# Patient Record
Sex: MALE | Race: WHITE | NOT HISPANIC OR LATINO | Employment: OTHER | URBAN - METROPOLITAN AREA
[De-identification: names, ages, dates, MRNs, and addresses within clinical notes are randomized per-mention and may not be internally consistent; named-entity substitution may affect disease eponyms.]

---

## 2019-02-05 ENCOUNTER — HOSPITAL ENCOUNTER (OUTPATIENT)
Dept: RADIOLOGY | Facility: MEDICAL CENTER | Age: 74
End: 2019-02-05

## 2019-02-05 ENCOUNTER — HOSPITAL ENCOUNTER (EMERGENCY)
Facility: MEDICAL CENTER | Age: 74
End: 2019-02-06
Attending: EMERGENCY MEDICINE
Payer: COMMERCIAL

## 2019-02-05 ENCOUNTER — APPOINTMENT (OUTPATIENT)
Dept: RADIOLOGY | Facility: MEDICAL CENTER | Age: 74
End: 2019-02-05
Attending: EMERGENCY MEDICINE
Payer: COMMERCIAL
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
## 2019-02-05 DIAGNOSIS — S09.90XA CLOSED HEAD INJURY, INITIAL ENCOUNTER: ICD-10-CM

## 2019-02-05 LAB
ALBUMIN SERPL BCP-MCNC: 3.3 G/DL (ref 3.2–4.9)
ALBUMIN/GLOB SERPL: 0.8 G/DL
ALP SERPL-CCNC: 81 U/L (ref 30–99)
ALT SERPL-CCNC: 11 U/L (ref 2–50)
ANION GAP SERPL CALC-SCNC: 7 MMOL/L (ref 0–11.9)
AST SERPL-CCNC: 33 U/L (ref 12–45)
BILIRUB SERPL-MCNC: 0.3 MG/DL (ref 0.1–1.5)
BUN SERPL-MCNC: 7 MG/DL (ref 8–22)
CALCIUM SERPL-MCNC: 8.6 MG/DL (ref 8.5–10.5)
CHLORIDE SERPL-SCNC: 103 MMOL/L (ref 96–112)
CO2 SERPL-SCNC: 22 MMOL/L (ref 20–33)
CREAT SERPL-MCNC: 0.59 MG/DL (ref 0.5–1.4)
ERYTHROCYTE [DISTWIDTH] IN BLOOD BY AUTOMATED COUNT: 48.5 FL (ref 35.9–50)
ETHANOL BLD-MCNC: 0.11 G/DL
GLOBULIN SER CALC-MCNC: 4.3 G/DL (ref 1.9–3.5)
GLUCOSE SERPL-MCNC: 91 MG/DL (ref 65–99)
HCT VFR BLD AUTO: 46.4 % (ref 42–52)
HGB BLD-MCNC: 15.4 G/DL (ref 14–18)
MCH RBC QN AUTO: 31 PG (ref 27–33)
MCHC RBC AUTO-ENTMCNC: 33.2 G/DL (ref 33.7–35.3)
MCV RBC AUTO: 93.5 FL (ref 81.4–97.8)
PLATELET # BLD AUTO: 260 K/UL (ref 164–446)
PMV BLD AUTO: 9.8 FL (ref 9–12.9)
POTASSIUM SERPL-SCNC: 5.9 MMOL/L (ref 3.6–5.5)
PROT SERPL-MCNC: 7.6 G/DL (ref 6–8.2)
RBC # BLD AUTO: 4.96 M/UL (ref 4.7–6.1)
SODIUM SERPL-SCNC: 132 MMOL/L (ref 135–145)
WBC # BLD AUTO: 3.8 K/UL (ref 4.8–10.8)

## 2019-02-05 PROCEDURE — 80307 DRUG TEST PRSMV CHEM ANLYZR: CPT

## 2019-02-05 PROCEDURE — 36415 COLL VENOUS BLD VENIPUNCTURE: CPT

## 2019-02-05 PROCEDURE — 80053 COMPREHEN METABOLIC PANEL: CPT

## 2019-02-05 PROCEDURE — 90715 TDAP VACCINE 7 YRS/> IM: CPT | Performed by: EMERGENCY MEDICINE

## 2019-02-05 PROCEDURE — 85027 COMPLETE CBC AUTOMATED: CPT

## 2019-02-05 PROCEDURE — 305948 HCHG GREEN TRAUMA ACT PRE-NOTIFY NO CC

## 2019-02-05 PROCEDURE — 90471 IMMUNIZATION ADMIN: CPT

## 2019-02-05 PROCEDURE — 99284 EMERGENCY DEPT VISIT MOD MDM: CPT

## 2019-02-05 PROCEDURE — 700111 HCHG RX REV CODE 636 W/ 250 OVERRIDE (IP): Performed by: EMERGENCY MEDICINE

## 2019-02-05 RX ORDER — ASPIRIN 81 MG/1
81 TABLET, CHEWABLE ORAL DAILY
Status: SHIPPED | COMMUNITY
End: 2023-09-24

## 2019-02-05 RX ADMIN — CLOSTRIDIUM TETANI TOXOID ANTIGEN (FORMALDEHYDE INACTIVATED), CORYNEBACTERIUM DIPHTHERIAE TOXOID ANTIGEN (FORMALDEHYDE INACTIVATED), BORDETELLA PERTUSSIS TOXOID ANTIGEN (GLUTARALDEHYDE INACTIVATED), BORDETELLA PERTUSSIS FILAMENTOUS HEMAGGLUTININ ANTIGEN (FORMALDEHYDE INACTIVATED), BORDETELLA PERTUSSIS PERTACTIN ANTIGEN, AND BORDETELLA PERTUSSIS FIMBRIAE 2/3 ANTIGEN 0.5 ML: 5; 2; 2.5; 5; 3; 5 INJECTION, SUSPENSION INTRAMUSCULAR at 22:15

## 2019-02-06 VITALS
OXYGEN SATURATION: 97 % | WEIGHT: 200.62 LBS | SYSTOLIC BLOOD PRESSURE: 116 MMHG | HEIGHT: 68 IN | TEMPERATURE: 98.7 F | HEART RATE: 87 BPM | RESPIRATION RATE: 18 BRPM | BODY MASS INDEX: 30.41 KG/M2 | DIASTOLIC BLOOD PRESSURE: 78 MMHG

## 2019-02-06 PROCEDURE — 72141 MRI NECK SPINE W/O DYE: CPT

## 2019-02-06 NOTE — ED PROVIDER NOTES
"ED Provider Note    ER PROVIDER NOTE    CHIEF COMPLAINT  Chief Complaint   Patient presents with   • Trauma Green     [facility] and [facility] head, CT shows c-2 pedicle abnormality, recommends MRI for confirmation       HPI  eSn Vasquez is a 73 y.o. male who presents to the emergency department as a [facility] green [facility].  Patient was intoxicated walking to actually get in his car when he slipped and fell hitting the back of his head.  This was witnessed, no LOC.  [facility] CT scan scan of his C-spine.  Patient denies any focal complaints currently.  He denies any headache, neck pain, focal weakness numbness or tingling.  No nausea or vomiting.  No chest pain abdominal pain or extremity pain.  States his last tetanus shot was in 2002    REVIEW OF SYSTEMS  Pertinent positives include fall. Pertinent negatives include no weakness numbness. See HPI for details. All other systems reviewed and are negative.    PAST MEDICAL HISTORY   has a past medical history of Alcohol abuse; Coronary arteriosclerosis; Dyslipidemia; GERD (gastroesophageal reflux disease); Mediastinal mass; and Osteopenia.\"Heart problems but I do not know what\"per patient    SURGICAL HISTORY   has a past surgical history that includes coronary artery bypass, 1.    FAMILY HISTORY  History reviewed. No pertinent family history.    SOCIAL HISTORY  Social History     Social History   • Marital status: Single     Spouse name: N/A   • Number of children: N/A   • Years of education: N/A     Social History Main Topics   • Smoking status: Current Some Day Smoker     Types: Cigarettes   • Smokeless tobacco: Never Used   • Alcohol use Yes      Comment: 400 ml Arabella daily   • Drug use: No   • Sexual activity: Not on file     Other Topics Concern   • Not on file     Social History Narrative   • No narrative on file      History   Drug Use No       CURRENT " "MEDICATIONS  Home Medications     Reviewed by Fish Miller R.N. (Registered Nurse) on 02/05/19 at 2211  Med List Status: Complete   Medication Last Dose Status   aspirin (ASA) 81 MG Chew Tab chewable tablet  Active                ALLERGIES  Allergies   Allergen Reactions   • Pravastatin Hives and Swelling       PHYSICAL EXAM    PRIMARY SURVEY:    Airway: Phonating well,clear  Breathing: Equal breath sounds bilaterally  Circulation: Normal heart sounds 2+ pulses at bilateral radial and femoral arteries  Disability:  GCS 15      Blood pressure 105/65, pulse 84, temperature 36 °C (96.8 °F), temperature source Temporal, resp. rate 14, height 1.727 m (5' 8\"), weight 91 kg (200 lb 9.9 oz), SpO2 94 %.    Secondary Survey:      Constitutional: Awake, alert, oriented x3.    Heent: Head is normocephalic other than small abrasion to posterior occiput, atraumaticPupils 3mm reactive bilaterally. Midface stable. No malocclusion.  No hemotympanum bilaterally. No septal hematoma.  Neck: No tracheal deviation. No midline cervical spine tenderness. C-collar in place.  Cardiovascular: Regular rate and rhythm no murmur rub or gallop intact distal pulses peripherally x4  Pulmonary/Chest: Clavicles nontender to palpation. There is not any chest wall tenderness bilaterally.  No crepitus. Positive breath sounds bilaterally.   Abdominal: Soft, nondistended. Nontender to palpation. Pelvis is stable to AP and lateral compression. No seatbelt sign.   Musculoskeletal: Right upper extremity atraumatic, palpable radial pulse. 5/5  strength. Full ROM and strength at elbow.  Left upper extremity atraumatic, palpable radial pulse. 5/5  strength. Full ROM and strength at elbow.  Right lower extremity atraumatic. 5/5 strength in ankle plantar flexion and dorsiflexion. No pain and full ROM at right knee and hip.   Left  lower extremity atraumatic. 5/5 strength in ankle plantar flexion and dorsiflexion. No pain and full ROM at left knee " "and hip.   Back: Midline thoracic and lumbar spines are nontender to palpation. No step-offs.   Neurological: Sensation intact to light touch dorsum and plantar surfaces of both feet and the medial and lateral aspects of both lower legs.  Sensation intact to light touch dorsum and plantar surfaces of both hands.   Skin: Skin is warm and dry.  No diaphoresis. No erythema. No pallor.      VITAL SIGNS: /65   Pulse 84   Temp 36 °C (96.8 °F) (Temporal)   Resp 14   Ht 1.727 m (5' 8\")   Wt 91 kg (200 lb 9.9 oz)   SpO2 94%   BMI 30.50 kg/m²   Pulse ox interpretation: I interpret this pulse ox as normal.        DIAGNOSTIC STUDIES / PROCEDURES      LABS  Labs Reviewed   DIAGNOSTIC ALCOHOL - Abnormal; Notable for the following:        Result Value    Diagnostic Alcohol 0.11 (*)     All other components within normal limits   CBC WITHOUT DIFFERENTIAL - Abnormal; Notable for the following:     WBC 3.8 (*)     MCHC 33.2 (*)     All other components within normal limits   COMP METABOLIC PANEL - Abnormal; Notable for the following:     Sodium 132 (*)     Potassium 5.9 (*)     Bun 7 (*)     Globulin 4.3 (*)     All other components within normal limits   ESTIMATED GFR       All labs reviewed by me.    RADIOLOGY  OUTSIDE IMAGES-CT CERVICAL SPINE   Final Result      OUTSIDE IMAGES-CT HEAD   Final Result      OUTSIDE IMAGES-DX CHEST   Final Result      MR-CERVICAL SPINE-W/O    (Results Pending)     Discussed results of MR with radiologist, appears negative    The radiologist's interpretation of all radiological studies have been reviewed by me.    COURSE & MEDICAL DECISION MAKING  Nursing notes, VS, PMSFHx reviewed in chart.    9:58 PM Patient seen and examined at bedside. Patient will be treated with tdap. Ordered for MRI to evaluate his symptoms.     Reviewed records from outside facility, negative CT head, negative chest x-ray, CT of his C-spine with questionable lucency at C2 through the pedicle    1:20 AM discussed " results of MRI with radiologist, appears negative    1:30 AM  Patient reevaluated, he is comfortable at this time, c-collar removed, full range of motion without pain, neck is still nontender, will plan for discharge    Decision Making:  This is a 73 y.o. male [facility] with [facility].  Patient had a mechanical fall while intoxicated hitting his head.  There is no intracranial pathology on his CT although he had a questionable abnormality on the CT of his cervical spine was transferred here for MRI.  MRIs negative per radiology read at this time.  Additionally patient has no actual neck pain tenderness or neurologic findings on his exam or per his symptoms to suggest spinal injury.  Patient was intoxicated but sobered appropriately, slight elevation in his potassium which we will have rechecked through [facility]     The patient will return for new or worsening symptoms and is stable at the time of discharge.    The patient is referred to a primary physician for blood pressure management, diabetic screening, and for all other preventative health concerns.      DISPOSITION:  Patient will be discharged home in stable condition.    FOLLOW UP:  With your primary care doctor through the [facility]    In 1 week        OUTPATIENT MEDICATIONS:  New Prescriptions    No medications on file         FINAL IMPRESSION  1. Closed head injury, initial encounter    2. Fall, initial encounter          The note accurately reflects work and decisions made by me.  Drake Latif  2/6/2019  1:38 AM

## 2019-02-06 NOTE — ED TRIAGE NOTES
Sen Vasquez  73 y.o.  Chief Complaint   Patient presents with   • Trauma Green     [facility], [facility] fell backwards and hit head, CT shows c-2 pedicle abnormality, recommends MRI for confirmation     [facility]. Pt was getting into his car tonight and fell backwards and hit head.  Patient has history of alcoholism, diagnostic alcohol [facility].2 [facility].  Unknown LOC.  Apparently [facility] so they transferred for patient to have MRI.    PTA pt received 500 ml banana bag, PIV placed.     Pt denies pain at this time.      Report to [facility].

## 2021-01-15 DIAGNOSIS — Z23 NEED FOR VACCINATION: ICD-10-CM

## 2021-07-12 PROBLEM — I25.10 CAD (CORONARY ARTERY DISEASE): Status: ACTIVE | Noted: 2021-07-12

## 2021-07-12 PROBLEM — F10.10 ETOH ABUSE: Status: ACTIVE | Noted: 2021-07-12

## 2021-07-12 PROBLEM — G89.29 CHRONIC PAIN OF BOTH KNEES: Status: ACTIVE | Noted: 2021-07-12

## 2021-07-12 PROBLEM — K61.39 ISCHIORECTAL ABSCESS: Status: ACTIVE | Noted: 2021-07-12

## 2021-07-12 PROBLEM — M25.561 CHRONIC PAIN OF BOTH KNEES: Status: ACTIVE | Noted: 2021-07-12

## 2021-07-12 PROBLEM — M25.562 CHRONIC PAIN OF BOTH KNEES: Status: ACTIVE | Noted: 2021-07-12

## 2021-07-13 PROBLEM — R53.1 WEAKNESS: Status: ACTIVE | Noted: 2021-07-13

## 2023-09-24 ENCOUNTER — APPOINTMENT (OUTPATIENT)
Dept: RADIOLOGY | Facility: MEDICAL CENTER | Age: 78
DRG: 896 | End: 2023-09-24
Attending: STUDENT IN AN ORGANIZED HEALTH CARE EDUCATION/TRAINING PROGRAM
Payer: COMMERCIAL

## 2023-09-24 ENCOUNTER — HOSPITAL ENCOUNTER (INPATIENT)
Facility: MEDICAL CENTER | Age: 78
LOS: 3 days | DRG: 896 | End: 2023-09-27
Attending: STUDENT IN AN ORGANIZED HEALTH CARE EDUCATION/TRAINING PROGRAM | Admitting: STUDENT IN AN ORGANIZED HEALTH CARE EDUCATION/TRAINING PROGRAM
Payer: COMMERCIAL

## 2023-09-24 DIAGNOSIS — F10.931 ALCOHOL WITHDRAWAL SYNDROME, WITH DELIRIUM (HCC): ICD-10-CM

## 2023-09-24 PROBLEM — E87.6 HYPOKALEMIA: Status: ACTIVE | Noted: 2023-09-24

## 2023-09-24 PROBLEM — F10.139 ALCOHOL ABUSE WITH WITHDRAWAL (HCC): Status: ACTIVE | Noted: 2023-09-24

## 2023-09-24 PROBLEM — G93.40 ENCEPHALOPATHY: Status: ACTIVE | Noted: 2023-09-24

## 2023-09-24 LAB
ALBUMIN SERPL BCP-MCNC: 3.4 G/DL (ref 3.2–4.9)
ALBUMIN/GLOB SERPL: 1.1 G/DL
ALP SERPL-CCNC: 114 U/L (ref 30–99)
ALT SERPL-CCNC: 54 U/L (ref 2–50)
AMMONIA PLAS-SCNC: <10 UMOL/L (ref 11–45)
ANION GAP SERPL CALC-SCNC: 13 MMOL/L (ref 7–16)
APAP SERPL-MCNC: <5 UG/ML (ref 10–30)
APPEARANCE UR: CLEAR
AST SERPL-CCNC: 105 U/L (ref 12–45)
BACTERIA #/AREA URNS HPF: NEGATIVE /HPF
BASOPHILS # BLD AUTO: 0.5 % (ref 0–1.8)
BASOPHILS # BLD: 0.02 K/UL (ref 0–0.12)
BILIRUB SERPL-MCNC: 1 MG/DL (ref 0.1–1.5)
BILIRUB UR QL STRIP.AUTO: NEGATIVE
BUN SERPL-MCNC: 3 MG/DL (ref 8–22)
CALCIUM ALBUM COR SERPL-MCNC: 8.3 MG/DL (ref 8.5–10.5)
CALCIUM SERPL-MCNC: 7.8 MG/DL (ref 8.5–10.5)
CHLORIDE SERPL-SCNC: 100 MMOL/L (ref 96–112)
CO2 SERPL-SCNC: 24 MMOL/L (ref 20–33)
COLOR UR: YELLOW
CREAT SERPL-MCNC: 0.48 MG/DL (ref 0.5–1.4)
EOSINOPHIL # BLD AUTO: 0.01 K/UL (ref 0–0.51)
EOSINOPHIL NFR BLD: 0.2 % (ref 0–6.9)
EPI CELLS #/AREA URNS HPF: NEGATIVE /HPF
ERYTHROCYTE [DISTWIDTH] IN BLOOD BY AUTOMATED COUNT: 51.8 FL (ref 35.9–50)
ETHANOL BLD-MCNC: <10.1 MG/DL
GFR SERPLBLD CREATININE-BSD FMLA CKD-EPI: 107 ML/MIN/1.73 M 2
GLOBULIN SER CALC-MCNC: 3.1 G/DL (ref 1.9–3.5)
GLUCOSE SERPL-MCNC: 105 MG/DL (ref 65–99)
GLUCOSE UR STRIP.AUTO-MCNC: NEGATIVE MG/DL
HCT VFR BLD AUTO: 46.5 % (ref 42–52)
HGB BLD-MCNC: 16.1 G/DL (ref 14–18)
HYALINE CASTS #/AREA URNS LPF: ABNORMAL /LPF
IMM GRANULOCYTES # BLD AUTO: 0.01 K/UL (ref 0–0.11)
IMM GRANULOCYTES NFR BLD AUTO: 0.2 % (ref 0–0.9)
KETONES UR STRIP.AUTO-MCNC: 15 MG/DL
LEUKOCYTE ESTERASE UR QL STRIP.AUTO: ABNORMAL
LIPASE SERPL-CCNC: 15 U/L (ref 11–82)
LYMPHOCYTES # BLD AUTO: 0.54 K/UL (ref 1–4.8)
LYMPHOCYTES NFR BLD: 12.2 % (ref 22–41)
MCH RBC QN AUTO: 33.7 PG (ref 27–33)
MCHC RBC AUTO-ENTMCNC: 34.6 G/DL (ref 32.3–36.5)
MCV RBC AUTO: 97.3 FL (ref 81.4–97.8)
MICRO URNS: ABNORMAL
MONOCYTES # BLD AUTO: 0.14 K/UL (ref 0–0.85)
MONOCYTES NFR BLD AUTO: 3.2 % (ref 0–13.4)
NEUTROPHILS # BLD AUTO: 3.72 K/UL (ref 1.82–7.42)
NEUTROPHILS NFR BLD: 83.7 % (ref 44–72)
NITRITE UR QL STRIP.AUTO: NEGATIVE
NRBC # BLD AUTO: 0 K/UL
NRBC BLD-RTO: 0 /100 WBC (ref 0–0.2)
PH UR STRIP.AUTO: 6.5 [PH] (ref 5–8)
PLATELET # BLD AUTO: 142 K/UL (ref 164–446)
PMV BLD AUTO: 10.6 FL (ref 9–12.9)
POTASSIUM SERPL-SCNC: 3.5 MMOL/L (ref 3.6–5.5)
PROT SERPL-MCNC: 6.5 G/DL (ref 6–8.2)
PROT UR QL STRIP: NEGATIVE MG/DL
RBC # BLD AUTO: 4.78 M/UL (ref 4.7–6.1)
RBC # URNS HPF: ABNORMAL /HPF
RBC UR QL AUTO: NEGATIVE
SALICYLATES SERPL-MCNC: <1 MG/DL (ref 15–25)
SODIUM SERPL-SCNC: 137 MMOL/L (ref 135–145)
SP GR UR STRIP.AUTO: 1.01
TROPONIN T SERPL-MCNC: 12 NG/L (ref 6–19)
UROBILINOGEN UR STRIP.AUTO-MCNC: 1 MG/DL
WBC # BLD AUTO: 4.4 K/UL (ref 4.8–10.8)
WBC #/AREA URNS HPF: ABNORMAL /HPF

## 2023-09-24 PROCEDURE — 99291 CRITICAL CARE FIRST HOUR: CPT

## 2023-09-24 PROCEDURE — 94760 N-INVAS EAR/PLS OXIMETRY 1: CPT

## 2023-09-24 PROCEDURE — 700111 HCHG RX REV CODE 636 W/ 250 OVERRIDE (IP): Mod: JZ,JG | Performed by: STUDENT IN AN ORGANIZED HEALTH CARE EDUCATION/TRAINING PROGRAM

## 2023-09-24 PROCEDURE — 82140 ASSAY OF AMMONIA: CPT

## 2023-09-24 PROCEDURE — 99223 1ST HOSP IP/OBS HIGH 75: CPT | Mod: AI | Performed by: STUDENT IN AN ORGANIZED HEALTH CARE EDUCATION/TRAINING PROGRAM

## 2023-09-24 PROCEDURE — 81001 URINALYSIS AUTO W/SCOPE: CPT

## 2023-09-24 PROCEDURE — 80179 DRUG ASSAY SALICYLATE: CPT

## 2023-09-24 PROCEDURE — 82077 ASSAY SPEC XCP UR&BREATH IA: CPT

## 2023-09-24 PROCEDURE — 80053 COMPREHEN METABOLIC PANEL: CPT

## 2023-09-24 PROCEDURE — 71045 X-RAY EXAM CHEST 1 VIEW: CPT

## 2023-09-24 PROCEDURE — 96375 TX/PRO/DX INJ NEW DRUG ADDON: CPT

## 2023-09-24 PROCEDURE — 36415 COLL VENOUS BLD VENIPUNCTURE: CPT

## 2023-09-24 PROCEDURE — 80143 DRUG ASSAY ACETAMINOPHEN: CPT

## 2023-09-24 PROCEDURE — 70498 CT ANGIOGRAPHY NECK: CPT

## 2023-09-24 PROCEDURE — 70496 CT ANGIOGRAPHY HEAD: CPT

## 2023-09-24 PROCEDURE — 96365 THER/PROPH/DIAG IV INF INIT: CPT

## 2023-09-24 PROCEDURE — 770000 HCHG ROOM/CARE - INTERMEDIATE ICU *

## 2023-09-24 PROCEDURE — 700117 HCHG RX CONTRAST REV CODE 255: Performed by: STUDENT IN AN ORGANIZED HEALTH CARE EDUCATION/TRAINING PROGRAM

## 2023-09-24 PROCEDURE — 700101 HCHG RX REV CODE 250: Performed by: STUDENT IN AN ORGANIZED HEALTH CARE EDUCATION/TRAINING PROGRAM

## 2023-09-24 PROCEDURE — 85025 COMPLETE CBC W/AUTO DIFF WBC: CPT

## 2023-09-24 PROCEDURE — 93005 ELECTROCARDIOGRAM TRACING: CPT | Performed by: STUDENT IN AN ORGANIZED HEALTH CARE EDUCATION/TRAINING PROGRAM

## 2023-09-24 PROCEDURE — 84484 ASSAY OF TROPONIN QUANT: CPT

## 2023-09-24 PROCEDURE — 83690 ASSAY OF LIPASE: CPT

## 2023-09-24 RX ORDER — LORAZEPAM 2 MG/ML
0.5 INJECTION INTRAMUSCULAR ONCE
Status: COMPLETED | OUTPATIENT
Start: 2023-09-24 | End: 2023-09-24

## 2023-09-24 RX ORDER — LORAZEPAM 2 MG/ML
1 INJECTION INTRAMUSCULAR
Status: DISCONTINUED | OUTPATIENT
Start: 2023-09-24 | End: 2023-09-25

## 2023-09-24 RX ORDER — DIPHENHYDRAMINE HYDROCHLORIDE 50 MG/ML
12.5 INJECTION INTRAMUSCULAR; INTRAVENOUS ONCE
Status: COMPLETED | OUTPATIENT
Start: 2023-09-24 | End: 2023-09-24

## 2023-09-24 RX ORDER — LORAZEPAM 0.5 MG/1
0.5 TABLET ORAL EVERY 4 HOURS PRN
Status: DISCONTINUED | OUTPATIENT
Start: 2023-09-24 | End: 2023-09-25

## 2023-09-24 RX ORDER — LORAZEPAM 2 MG/ML
0.5 INJECTION INTRAMUSCULAR EVERY 4 HOURS PRN
Status: DISCONTINUED | OUTPATIENT
Start: 2023-09-24 | End: 2023-09-25

## 2023-09-24 RX ORDER — ENOXAPARIN SODIUM 100 MG/ML
40 INJECTION SUBCUTANEOUS DAILY
Status: DISCONTINUED | OUTPATIENT
Start: 2023-09-25 | End: 2023-09-27 | Stop reason: HOSPADM

## 2023-09-24 RX ORDER — MAGNESIUM OXIDE 420 MG/1
420 TABLET ORAL DAILY
Status: ON HOLD | COMMUNITY
Start: 2023-09-11 | End: 2023-09-27

## 2023-09-24 RX ORDER — LORAZEPAM 1 MG/1
1 TABLET ORAL EVERY 4 HOURS PRN
Status: DISCONTINUED | OUTPATIENT
Start: 2023-09-24 | End: 2023-09-25

## 2023-09-24 RX ORDER — LORAZEPAM 2 MG/1
2 TABLET ORAL
Status: DISCONTINUED | OUTPATIENT
Start: 2023-09-24 | End: 2023-09-25

## 2023-09-24 RX ORDER — POTASSIUM CHLORIDE 7.45 MG/ML
10 INJECTION INTRAVENOUS
Status: COMPLETED | OUTPATIENT
Start: 2023-09-25 | End: 2023-09-25

## 2023-09-24 RX ORDER — LORAZEPAM 2 MG/1
4 TABLET ORAL
Status: DISCONTINUED | OUTPATIENT
Start: 2023-09-24 | End: 2023-09-25

## 2023-09-24 RX ORDER — GAUZE BANDAGE 2" X 2"
100 BANDAGE TOPICAL DAILY
Status: DISCONTINUED | OUTPATIENT
Start: 2023-09-25 | End: 2023-09-24

## 2023-09-24 RX ORDER — PHENOBARBITAL SODIUM 130 MG/ML
130 INJECTION, SOLUTION INTRAMUSCULAR; INTRAVENOUS ONCE
Status: COMPLETED | OUTPATIENT
Start: 2023-09-24 | End: 2023-09-24

## 2023-09-24 RX ORDER — CLONIDINE HYDROCHLORIDE 0.1 MG/1
0.1 TABLET ORAL
Status: DISCONTINUED | OUTPATIENT
Start: 2023-09-24 | End: 2023-09-27 | Stop reason: HOSPADM

## 2023-09-24 RX ORDER — LORAZEPAM 2 MG/ML
1.5 INJECTION INTRAMUSCULAR
Status: DISCONTINUED | OUTPATIENT
Start: 2023-09-24 | End: 2023-09-25

## 2023-09-24 RX ORDER — SODIUM CHLORIDE, SODIUM LACTATE, POTASSIUM CHLORIDE, CALCIUM CHLORIDE 600; 310; 30; 20 MG/100ML; MG/100ML; MG/100ML; MG/100ML
INJECTION, SOLUTION INTRAVENOUS CONTINUOUS
Status: ACTIVE | OUTPATIENT
Start: 2023-09-25 | End: 2023-09-25

## 2023-09-24 RX ORDER — FOLIC ACID 1 MG/1
1 TABLET ORAL DAILY
Status: DISCONTINUED | OUTPATIENT
Start: 2023-09-25 | End: 2023-09-27 | Stop reason: HOSPADM

## 2023-09-24 RX ORDER — LORAZEPAM 2 MG/ML
2 INJECTION INTRAMUSCULAR
Status: DISCONTINUED | OUTPATIENT
Start: 2023-09-24 | End: 2023-09-25

## 2023-09-24 RX ORDER — LORAZEPAM 2 MG/ML
1 INJECTION INTRAMUSCULAR ONCE
Status: COMPLETED | OUTPATIENT
Start: 2023-09-24 | End: 2023-09-24

## 2023-09-24 RX ADMIN — LORAZEPAM 1 MG: 2 INJECTION INTRAMUSCULAR; INTRAVENOUS at 22:03

## 2023-09-24 RX ADMIN — IOHEXOL 80 ML: 350 INJECTION, SOLUTION INTRAVENOUS at 21:15

## 2023-09-24 RX ADMIN — THIAMINE HYDROCHLORIDE: 100 INJECTION, SOLUTION INTRAMUSCULAR; INTRAVENOUS at 21:45

## 2023-09-24 RX ADMIN — LORAZEPAM 0.5 MG: 2 INJECTION INTRAMUSCULAR; INTRAVENOUS at 21:51

## 2023-09-24 RX ADMIN — PHENOBARBITAL SODIUM 130 MG: 130 INJECTION INTRAMUSCULAR; INTRAVENOUS at 21:54

## 2023-09-24 RX ADMIN — DIPHENHYDRAMINE HYDROCHLORIDE 12.5 MG: 50 INJECTION, SOLUTION INTRAMUSCULAR; INTRAVENOUS at 20:27

## 2023-09-25 PROBLEM — D69.6 THROMBOCYTOPENIA (HCC): Status: ACTIVE | Noted: 2023-09-25

## 2023-09-25 PROBLEM — E83.42 HYPOMAGNESEMIA: Status: ACTIVE | Noted: 2023-09-25

## 2023-09-25 LAB
ALBUMIN SERPL BCP-MCNC: 3.5 G/DL (ref 3.2–4.9)
ALBUMIN/GLOB SERPL: 1.1 G/DL
ALP SERPL-CCNC: 116 U/L (ref 30–99)
ALT SERPL-CCNC: 47 U/L (ref 2–50)
ANION GAP SERPL CALC-SCNC: 15 MMOL/L (ref 7–16)
AST SERPL-CCNC: 76 U/L (ref 12–45)
BASOPHILS # BLD AUTO: 0.4 % (ref 0–1.8)
BASOPHILS # BLD: 0.03 K/UL (ref 0–0.12)
BILIRUB SERPL-MCNC: 1.2 MG/DL (ref 0.1–1.5)
BUN SERPL-MCNC: 2 MG/DL (ref 8–22)
CALCIUM ALBUM COR SERPL-MCNC: 8.6 MG/DL (ref 8.5–10.5)
CALCIUM SERPL-MCNC: 8.2 MG/DL (ref 8.5–10.5)
CHLORIDE SERPL-SCNC: 95 MMOL/L (ref 96–112)
CO2 SERPL-SCNC: 23 MMOL/L (ref 20–33)
CREAT SERPL-MCNC: 0.48 MG/DL (ref 0.5–1.4)
EKG IMPRESSION: NORMAL
EOSINOPHIL # BLD AUTO: 0 K/UL (ref 0–0.51)
EOSINOPHIL NFR BLD: 0 % (ref 0–6.9)
ERYTHROCYTE [DISTWIDTH] IN BLOOD BY AUTOMATED COUNT: 49.8 FL (ref 35.9–50)
GFR SERPLBLD CREATININE-BSD FMLA CKD-EPI: 107 ML/MIN/1.73 M 2
GLOBULIN SER CALC-MCNC: 3.1 G/DL (ref 1.9–3.5)
GLUCOSE SERPL-MCNC: 90 MG/DL (ref 65–99)
HCT VFR BLD AUTO: 44.1 % (ref 42–52)
HGB BLD-MCNC: 15.4 G/DL (ref 14–18)
IMM GRANULOCYTES # BLD AUTO: 0.02 K/UL (ref 0–0.11)
IMM GRANULOCYTES NFR BLD AUTO: 0.3 % (ref 0–0.9)
LYMPHOCYTES # BLD AUTO: 1.25 K/UL (ref 1–4.8)
LYMPHOCYTES NFR BLD: 16.6 % (ref 22–41)
MAGNESIUM SERPL-MCNC: 1.4 MG/DL (ref 1.5–2.5)
MCH RBC QN AUTO: 33.4 PG (ref 27–33)
MCHC RBC AUTO-ENTMCNC: 34.9 G/DL (ref 32.3–36.5)
MCV RBC AUTO: 95.7 FL (ref 81.4–97.8)
MONOCYTES # BLD AUTO: 0.28 K/UL (ref 0–0.85)
MONOCYTES NFR BLD AUTO: 3.7 % (ref 0–13.4)
NEUTROPHILS # BLD AUTO: 5.96 K/UL (ref 1.82–7.42)
NEUTROPHILS NFR BLD: 79 % (ref 44–72)
NRBC # BLD AUTO: 0 K/UL
NRBC BLD-RTO: 0 /100 WBC (ref 0–0.2)
PHOSPHATE SERPL-MCNC: 1.6 MG/DL (ref 2.5–4.5)
PLATELET # BLD AUTO: 159 K/UL (ref 164–446)
PMV BLD AUTO: 10.7 FL (ref 9–12.9)
POTASSIUM SERPL-SCNC: 4.1 MMOL/L (ref 3.6–5.5)
PROT SERPL-MCNC: 6.6 G/DL (ref 6–8.2)
RBC # BLD AUTO: 4.61 M/UL (ref 4.7–6.1)
SODIUM SERPL-SCNC: 133 MMOL/L (ref 135–145)
WBC # BLD AUTO: 7.5 K/UL (ref 4.8–10.8)

## 2023-09-25 PROCEDURE — 700111 HCHG RX REV CODE 636 W/ 250 OVERRIDE (IP): Performed by: HOSPITALIST

## 2023-09-25 PROCEDURE — 700101 HCHG RX REV CODE 250: Performed by: HOSPITALIST

## 2023-09-25 PROCEDURE — 99291 CRITICAL CARE FIRST HOUR: CPT | Performed by: HOSPITALIST

## 2023-09-25 PROCEDURE — 83735 ASSAY OF MAGNESIUM: CPT

## 2023-09-25 PROCEDURE — 770000 HCHG ROOM/CARE - INTERMEDIATE ICU *

## 2023-09-25 PROCEDURE — 96375 TX/PRO/DX INJ NEW DRUG ADDON: CPT

## 2023-09-25 PROCEDURE — 700111 HCHG RX REV CODE 636 W/ 250 OVERRIDE (IP): Mod: JZ | Performed by: STUDENT IN AN ORGANIZED HEALTH CARE EDUCATION/TRAINING PROGRAM

## 2023-09-25 PROCEDURE — 700111 HCHG RX REV CODE 636 W/ 250 OVERRIDE (IP): Performed by: INTERNAL MEDICINE

## 2023-09-25 PROCEDURE — 80053 COMPREHEN METABOLIC PANEL: CPT

## 2023-09-25 PROCEDURE — 84100 ASSAY OF PHOSPHORUS: CPT

## 2023-09-25 PROCEDURE — 700105 HCHG RX REV CODE 258: Performed by: STUDENT IN AN ORGANIZED HEALTH CARE EDUCATION/TRAINING PROGRAM

## 2023-09-25 PROCEDURE — 85025 COMPLETE CBC W/AUTO DIFF WBC: CPT

## 2023-09-25 PROCEDURE — 700105 HCHG RX REV CODE 258: Performed by: HOSPITALIST

## 2023-09-25 RX ORDER — DEXMEDETOMIDINE HYDROCHLORIDE 4 UG/ML
.1-1.5 INJECTION, SOLUTION INTRAVENOUS CONTINUOUS
Status: DISCONTINUED | OUTPATIENT
Start: 2023-09-25 | End: 2023-09-27 | Stop reason: HOSPADM

## 2023-09-25 RX ORDER — LORAZEPAM 2 MG/ML
0.5 INJECTION INTRAMUSCULAR
Status: DISCONTINUED | OUTPATIENT
Start: 2023-09-25 | End: 2023-09-27

## 2023-09-25 RX ORDER — LORAZEPAM 2 MG/ML
2 INJECTION INTRAMUSCULAR EVERY 6 HOURS
Status: DISCONTINUED | OUTPATIENT
Start: 2023-09-25 | End: 2023-09-26

## 2023-09-25 RX ADMIN — LORAZEPAM 1 MG: 2 INJECTION INTRAMUSCULAR; INTRAVENOUS at 01:14

## 2023-09-25 RX ADMIN — SODIUM CHLORIDE, POTASSIUM CHLORIDE, SODIUM LACTATE AND CALCIUM CHLORIDE: 600; 310; 30; 20 INJECTION, SOLUTION INTRAVENOUS at 09:03

## 2023-09-25 RX ADMIN — LORAZEPAM 0.5 MG: 2 INJECTION INTRAMUSCULAR; INTRAVENOUS at 23:17

## 2023-09-25 RX ADMIN — LORAZEPAM 2 MG: 2 INJECTION INTRAMUSCULAR; INTRAVENOUS at 10:29

## 2023-09-25 RX ADMIN — LORAZEPAM 2 MG: 2 INJECTION INTRAMUSCULAR; INTRAVENOUS at 05:01

## 2023-09-25 RX ADMIN — POTASSIUM CHLORIDE 10 MEQ: 7.46 INJECTION, SOLUTION INTRAVENOUS at 00:53

## 2023-09-25 RX ADMIN — ENOXAPARIN SODIUM 40 MG: 100 INJECTION SUBCUTANEOUS at 01:15

## 2023-09-25 RX ADMIN — THIAMINE HYDROCHLORIDE 200 MG: 100 INJECTION, SOLUTION INTRAMUSCULAR; INTRAVENOUS at 12:03

## 2023-09-25 RX ADMIN — THIAMINE HYDROCHLORIDE 200 MG: 100 INJECTION, SOLUTION INTRAMUSCULAR; INTRAVENOUS at 17:20

## 2023-09-25 RX ADMIN — THIAMINE HYDROCHLORIDE 200 MG: 100 INJECTION, SOLUTION INTRAMUSCULAR; INTRAVENOUS at 01:24

## 2023-09-25 RX ADMIN — POTASSIUM CHLORIDE 10 MEQ: 7.46 INJECTION, SOLUTION INTRAVENOUS at 04:23

## 2023-09-25 RX ADMIN — POTASSIUM CHLORIDE 10 MEQ: 7.46 INJECTION, SOLUTION INTRAVENOUS at 02:02

## 2023-09-25 RX ADMIN — SODIUM CHLORIDE, POTASSIUM CHLORIDE, SODIUM LACTATE AND CALCIUM CHLORIDE: 600; 310; 30; 20 INJECTION, SOLUTION INTRAVENOUS at 00:43

## 2023-09-25 RX ADMIN — ENOXAPARIN SODIUM 40 MG: 100 INJECTION SUBCUTANEOUS at 17:18

## 2023-09-25 RX ADMIN — DEXMEDETOMIDINE 0.2 MCG/KG/HR: 100 INJECTION, SOLUTION INTRAVENOUS at 04:57

## 2023-09-25 RX ADMIN — LORAZEPAM 2 MG: 2 INJECTION INTRAMUSCULAR; INTRAVENOUS at 21:02

## 2023-09-25 RX ADMIN — SODIUM CHLORIDE, POTASSIUM CHLORIDE, SODIUM LACTATE AND CALCIUM CHLORIDE: 600; 310; 30; 20 INJECTION, SOLUTION INTRAVENOUS at 01:16

## 2023-09-25 RX ADMIN — POTASSIUM CHLORIDE 10 MEQ: 7.46 INJECTION, SOLUTION INTRAVENOUS at 03:12

## 2023-09-25 RX ADMIN — DEXMEDETOMIDINE 0.3 MCG/KG/HR: 100 INJECTION, SOLUTION INTRAVENOUS at 19:25

## 2023-09-25 RX ADMIN — LORAZEPAM 2 MG: 2 INJECTION INTRAMUSCULAR; INTRAVENOUS at 03:10

## 2023-09-25 RX ADMIN — THIAMINE HYDROCHLORIDE 200 MG: 100 INJECTION, SOLUTION INTRAMUSCULAR; INTRAVENOUS at 06:33

## 2023-09-25 RX ADMIN — LORAZEPAM 2 MG: 2 INJECTION INTRAMUSCULAR; INTRAVENOUS at 16:51

## 2023-09-25 ASSESSMENT — FIBROSIS 4 INDEX: FIB4 SCORE: 5.23

## 2023-09-25 NOTE — CARE PLAN
The patient is Stable - Low risk of patient condition declining or worsening         Progress made toward(s) clinical / shift goals:      Problem: Optimal Care for Alcohol Withdrawal  Goal: Optimal Care for the alcohol withdrawal patient  Outcome: Progressing     Problem: Seizure Precautions  Goal: Implementation of seizure precautions  Outcome: Progressing     Problem: Risk for Aspiration  Goal: Patient's risk for aspiration will be absent or decrease  Outcome: Progressing       Patient is not progressing towards the following goals:

## 2023-09-25 NOTE — ASSESSMENT & PLAN NOTE
Acute metabolic encephalopathy secondary to alcohol withdrawal.  CT head negative, ammonia negative  9/27/2023:  Gradually improving continue to monitor clinically

## 2023-09-25 NOTE — PROGRESS NOTES
Pt on transport monitor, VSS, refused to wear nasal cannula oxygen sats 87-90 %. Pt agitated not answering orientation questions, RASS  -1 /+1 to +2. All belongings are with the pt.

## 2023-09-25 NOTE — ED NOTES
Med rec updated and complete.  Allergies reviewed.  Pt is not able to participate in an interview.  [facility].  Pt received a a prescription for  magnesium oxide on 09/11/23 for a 7 day course. Prior to that date last fill of any medications was 11/2022 for pantoprazole  40 mg and Vitamin B 12 100 mcg.      [facility]   [phone]

## 2023-09-25 NOTE — ED NOTES
Pt transported to [facility] with Rns on monitor in stable condition, chart and all belongings including phone, glasses and personal walker with patient.

## 2023-09-25 NOTE — ASSESSMENT & PLAN NOTE
Severe alcohol withdrawal with delirium  Given Ativan and phenobarbital in ED  Requiring admission to IMCU and initiation and titration of an IV Precedex drip. Add scheduled IV ativan 2 mg q6 hour to mitigate seizures.  High-dose thiamine for possible Wernicke's encephalopathy, 200 mg IV 3 times daily for 3 days then switch to p.o.  Multivitamin, folate, received detox bag in ED  Follow daily magnesium, phosphorus, and potassium and replace accordingly  9/27/2023:  Patient continues on Precedex infusion at present at 0.5 mg continue with scheduled Valium continue with as needed Ativan IV.  Patient not following adequate managed to have oral intake at present.  Continue to reorient replete electrolytes daily as needed.  Magnesium 1.8 potassium 3.8.

## 2023-09-25 NOTE — PROGRESS NOTES
Pt awake, mumbling where am I, Rass +2/3.  Scheduled ativan given.  May need to place patient in wrist restraints if agitation continues

## 2023-09-25 NOTE — ED TRIAGE NOTES
"77 y.o.  Male      Chief Complaint   Patient presents with    ALOC       Pt BIB EMS from home.  Pt's friend called EMS due to above complaint. Noticed that different behavior today.  GCS 12, ? (+) ETOH seen bottle of ashley beside the Pt. Denies CP and SOB.      Per EMS report poor historian,   Pt blood glucose level - 110 mg/ dl. Room air saturation 89% put Pt on 2 lpm via nasal cannula of oxygen. Pt not answering direct question and keep talking.     No treatment given except established PIV.     Pt back to room Red 7. Pt placed into gown and placed on the monitor.  Blood sample sent to lab. Chart up for ERP to see.     Ht 1.778 m (5' 10\")   Wt 81.6 kg (180 lb)   BMI 25.83 kg/m²     "

## 2023-09-25 NOTE — H&P
Hospital Medicine History & Physical Note    Date of Service  9/24/2023    Primary Care Physician  No primary care provider on file.      Code Status  Full Code    Chief Complaint  Chief Complaint   Patient presents with    ALOC       History of Presenting Illness  Sen Vasquez is a 77 y.o. male who presented 9/24/2023 with encephalopathy.  [facility] so [facility] review is limited.  History obtained mostly from EDP who was able to speak with his son.  I attempted calling but did not get through.  He does have a history of daily heavy alcohol use.  Patient lives alone and apparently is normally independent and cogent, he was last seen by his son 1 week ago and was in his normal state.  A friend felt he was altered, possibly after talking on the phone?  EMS was called and he was brought in by ambulance, EMS apparently noted a bottle of ashley next the patient.  Alcohol level was negative in the ED as well as ammonia.  CT head negative.  He was hypertensive, restless, delirious, not following commands.  Was felt this was most likely due to alcohol withdrawal and he was initially given Ativan and then phenobarbital after which the patient appeared more relaxed and was speaking in 1 or 2 words, following some commands.  IMCU admission requested due to high risk of deterioration requiring possible Precedex infusion etc.    I discussed the plan of care with patient.    Review of Systems  Review of Systems   Unable to perform ROS: Acuity of condition       Past Medical History   has a past medical history of Alcohol abuse, Coronary arteriosclerosis, Dyslipidemia, GERD (gastroesophageal reflux disease), Mediastinal mass, and Osteopenia.    Surgical History   has a past surgical history that includes coronary artery bypass, 1.     Family History  Patient unable to give family history  Family history reviewed with patient. There is no family history that is pertinent to the chief complaint.      Social History   reports that he has been smoking cigarettes. He has never used smokeless tobacco. He reports current alcohol use. He reports that he does not use drugs.    Allergies  Allergies   Allergen Reactions    Pravastatin Hives and Swelling       Medications  Prior to Admission Medications   Prescriptions Last Dose Informant Patient Reported? Taking?   Magnesium Oxide 420 MG Tab unknown at unknown  Yes Yes   Sig: Take 420 mg by mouth every day. * 7 DAY COURSE ONLY*      Facility-Administered Medications: None       Physical Exam  Temp:  [36.4 °C (97.5 °F)-36.7 °C (98 °F)] 36.5 °C (97.7 °F)  Pulse:  [] 84  Resp:  [15-20] 15  BP: (143-183)/(65-92) 183/81  SpO2:  [92 %-97 %] 96 %  Blood Pressure : (!) 143/65   Temperature: 36.4 °C (97.5 °F)   Pulse: 92   Respiration: 20   Pulse Oximetry: 92 %       Physical Exam  Constitutional:       General: He is not in acute distress.     Appearance: He is not toxic-appearing.   HENT:      Head: Normocephalic and atraumatic.      Nose: Nose normal.      Mouth/Throat:      Mouth: Mucous membranes are dry.      Pharynx: Oropharynx is clear.   Eyes:      Extraocular Movements: Extraocular movements intact.      Conjunctiva/sclera: Conjunctivae normal.   Cardiovascular:      Rate and Rhythm: Normal rate and regular rhythm.      Pulses: Normal pulses.      Heart sounds: Normal heart sounds.   Pulmonary:      Effort: Pulmonary effort is normal.      Breath sounds: Normal breath sounds.   Abdominal:      General: Bowel sounds are normal.      Palpations: Abdomen is soft.   Musculoskeletal:         General: No swelling or deformity.      Cervical back: Neck supple. No rigidity.   Skin:     General: Skin is warm and dry.   Neurological:      Comments: Awakens to speech and touch, responds with grunts or unintelligible speech  We will follow some commands, largely nonverbal  No focal deficits, appears delirious         Laboratory:  Recent Labs     09/24/23  1824   WBC 4.4*  "  RBC 4.78   HEMOGLOBIN 16.1   HEMATOCRIT 46.5   MCV 97.3   MCH 33.7*   MCHC 34.6   RDW 51.8*   PLATELETCT 142*   MPV 10.6     Recent Labs     09/24/23  1824   SODIUM 137   POTASSIUM 3.5*   CHLORIDE 100   CO2 24   GLUCOSE 105*   BUN 3*   CREATININE 0.48*   CALCIUM 7.8*     Recent Labs     09/24/23  1824   ALTSGPT 54*   ASTSGOT 105*   ALKPHOSPHAT 114*   TBILIRUBIN 1.0   LIPASE 15   GLUCOSE 105*         No results for input(s): \"NTPROBNP\" in the last 72 hours.      Recent Labs     09/24/23  1824   TROPONINT 12       Imaging:  CT-CTA HEAD WITH & W/O-POST PROCESS   Final Result         1.  No large vessel occlusion or aneurysm identified      CT-CTA NECK WITH & W/O-POST PROCESSING   Final Result         1.  Atherosclerosis and soft plaque versus mural thrombus at the left carotid bifurcation resulting in 50-70% stenosis   2.  Bilateral maxillary sinusitis changes, greater on the left         DX-CHEST-PORTABLE (1 VIEW)   Final Result      1.  Left basilar atelectasis or parenchymal scar      2.  Enlarged cardiac silhouette          EKG:  I have personally reviewed the images and compared with prior images. and My impression is: Sinus rhythm with ventricular rate 84, no ST elevations or depressions, QTc 439    Assessment/Plan:  Justification for Admission Status  I anticipate this patient will require at least two midnights for appropriate medical management, necessitating inpatient admission because acute encephalopathy likely secondary to severe alcohol withdrawal requiring close monitoring, IV medications and monitoring for toxicity    Patient will need a ICU (Adult and Pediatrics) bed on EMERGENCY service .  The need is secondary to above.    * Alcohol abuse with withdrawal (HCC)- (present on admission)  Assessment & Plan  Apparent alcohol withdrawal with delirium  Given Ativan and phenobarbital in ED  Patient currently appears more stable, largely nonverbal, following commands intermittently  Admit to IMCU  Started " CIWA protocol, may consider Precedex drip if benzo alone inadequate  High-dose thiamine for possible Wernicke's encephalopathy, 200 mg IV 3 times daily for 3 days then switch to p.o.  Multivitamin, folate, received detox bag in ED  Continue IV fluids, optimize electrolytes, have added mag and Phos to specimen in lab    Acute encephalopathy  Assessment & Plan  Most likely alcohol withdrawal versus Wernicke's encephalopathy  CT head negative, ammonia negative  Checking thyroid  See alcohol withdrawal for plan    Hypokalemia  Assessment & Plan  K 3.5  IV replacement ordered  Continue to monitor and replace as indicated for goal K greater than 4  Have added mag to specimen in lab, awaiting result replace as indicated        VTE prophylaxis: SCDs/TEDs and enoxaparin ppx

## 2023-09-25 NOTE — PROGRESS NOTES
4 Eyes Skin Assessment Completed by BROOKE Berg and BROOKE Walter.    Head WNL  Ears Redness, Scab back of left ear  Nose WDL  Mouth WDL  Neck WDL  Breast/Chest WDL  Shoulder Blades WDL  Spine WDL  (R) Arm/Elbow/Hand WDL  (L) Arm/Elbow/Hand WDL  Abdomen WDL  Groin WDL  Scrotum/Coccyx/Buttocks Redness and Blanching  (R) Leg Scab  (L) Leg Scab  (R) Heel/Foot/Toe WDL  (L) Heel/Foot/Toe WDL          Devices In Places ECG, Blood Pressure Cuff, and Pulse Ox      Interventions In Place Gray Ear Foams, Pillows, and Low Air Loss Mattress    Possible Skin Injury No    Pictures Uploaded Into Epic N/A  Wound Consult Placed N/A  RN Wound Prevention Protocol Ordered No

## 2023-09-25 NOTE — PROGRESS NOTES
IMCU Acceptance Note    Called by ERP for admission to IMCU for acute encephalopathy, possible EtOH withdrawal.  77-year-old male [facility], according to his son drinks alcohol daily in excess and lives alone.  Last seen well 1 week ago, brought in today with confusion.  CTA head unremarkable.  In the ED had possible focal motor seizure with left upper extremity twitching.  This was associated with tachycardia and hypertension and alcohol withdrawal/DTs was felt to be possible.  He received phenobarbital and benzodiazepines.  He is currently quite stable, somnolent but arouses and although mostly nonverbal does arouse, follow my commands and answer questions appropriately with nods.  Recommend against further withdrawal phenobarbital protocol but monitoring in IMCU with dexmedetomidine/Ativan protocol initiation if needed and initiation of high-dose thiamine.  He is stable for admission to IMCU under the care of the hospitalist.  Critical care services available if needed.

## 2023-09-25 NOTE — ASSESSMENT & PLAN NOTE
Platelets low at 142  Likely due to alcohol-induced bone marrow suppression.  9/27/2023:  As indicated above secondary to chronic bone marrow suppression in the setting of chronic alcoholism.  Patient's platelets have gradually improved.  Continue to monitor as needed.  No overt signs of bleeding.

## 2023-09-25 NOTE — ASSESSMENT & PLAN NOTE
K 3.5  IV replacement given and continue to follow daily.  9/27/2023:  Today potassium 3.8 will give patient 40 mEq of K lizzette

## 2023-09-25 NOTE — PROGRESS NOTES
Dex infusion stopped, HR 38-41, Dr Clay already aware of bradycardia.  Pt rass -2/3, holding 1600 dose of ativan.  Dr clay updated

## 2023-09-25 NOTE — ED NOTES
Safety measures initiated including pt educated on importance of not getting out of bed without help and calling fist, fall risk assessment complete.  Fall precautions initiated including bed alarm and pt belongings within reach on bedside table, Call light within reach.

## 2023-09-25 NOTE — ED NOTES
Checked on bed, connected to monitor,  with unlabored respirations maintained on oxygen at 2 lpm via nasal cannula. . Vital signs monitored.   Denied any new complaints. No current needs identified. Fall risk sign outside and bed alarm kept.   Gurney in low position, side rail up for pt safety. Call light within reach.

## 2023-09-25 NOTE — ED NOTES
Pt medicated per MAR. Resting, intermittently agitated, repositioned for comfort. Pending transport to [facility]

## 2023-09-25 NOTE — ED NOTES
Called via voalte ERP informed Pt with episode of twitching face and arms, ? Seizure witnessed by BROOKE Velásquez.

## 2023-09-25 NOTE — PROGRESS NOTES
Hospital Medicine Daily Progress Note    Date of Service  9/25/2023    Chief Complaint  Sen Vasquez is a 77 y.o. male admitted 9/24/2023 with alcohol withdrawal.     Hospital Course  Mr. Vasquez is a 77 y.o. male who presented 9/24/2023 with encephalopathy.  [facility] so [facility] review is limited.  History obtained mostly from EDP who was able to speak with his son.  I attempted calling but did not get through.  He does have a history of daily heavy alcohol use.  Patient lives alone and apparently is normally independent and cogent, he was last seen by his son 1 week ago and was in his normal state.  A friend felt he was altered, possibly after talking on the phone?  EMS was called and he was brought in by ambulance, EMS apparently noted a bottle of ashley next the patient.  Alcohol level was negative in the ED as well as ammonia.  CT head negative.  He was hypertensive, restless, delirious, not following commands.  Was felt this was most likely due to alcohol withdrawal and he was initially given Ativan and then phenobarbital after which the patient appeared more relaxed and was speaking in 1 or 2 words, following some commands.  IMCU admission requested due to high risk of deterioration requiring possible Precedex infusion    Interval Problem Update  9/25: Mr. Vasquez was evaluated and examined in the IMCU. He is on an IV Precedex drip with titration. IV ativan 2 mg q6 hours scheduled ordered to mitigate seizures. His magnesium and phosphorus are low and will be given IV. IV fluids. He is NPO as he is not safe for swallowing yet.     I have discussed this patient's plan of care and discharge plan at IDT rounds today with Case Management, Nursing, Nursing leadership, and other members of the IDT team.    Consultants/Specialty  none    Code Status  Full Code    Disposition  The patient is not medically cleared for discharge to home or a post-acute facility.      I have placed the  appropriate orders for post-discharge needs.    Review of Systems  Review of Systems   Unable to perform ROS: Mental acuity        Physical Exam  Temp:  [36.4 °C (97.5 °F)-36.7 °C (98 °F)] 36.5 °C (97.7 °F)  Pulse:  [] 98  Resp:  [15-20] 17  BP: (130-183)/(61-92) 148/67  SpO2:  [91 %-97 %] 91 %    Physical Exam  Vitals and nursing note reviewed.   Constitutional:       General: He is in acute distress.      Appearance: He is ill-appearing and toxic-appearing.   Cardiovascular:      Rate and Rhythm: Regular rhythm. Bradycardia present.   Pulmonary:      Effort: Pulmonary effort is normal.      Breath sounds: Normal breath sounds.   Abdominal:      General: There is no distension.      Tenderness: There is no abdominal tenderness.   Genitourinary:     Comments: Condom catheter  Musculoskeletal:      Right lower leg: No edema.      Left lower leg: No edema.   Neurological:      Comments: Somnolent  He is non-verbal and does not follow         Fluids    Intake/Output Summary (Last 24 hours) at 9/25/2023 0705  Last data filed at 9/25/2023 0703  Gross per 24 hour   Intake 1008.9 ml   Output --   Net 1008.9 ml       Laboratory  Recent Labs     09/24/23  1824 09/25/23  0315   WBC 4.4* 7.5   RBC 4.78 4.61*   HEMOGLOBIN 16.1 15.4   HEMATOCRIT 46.5 44.1   MCV 97.3 95.7   MCH 33.7* 33.4*   MCHC 34.6 34.9   RDW 51.8* 49.8   PLATELETCT 142* 159*   MPV 10.6 10.7     Recent Labs     09/24/23  1824 09/25/23  0315   SODIUM 137 133*   POTASSIUM 3.5* 4.1   CHLORIDE 100 95*   CO2 24 23   GLUCOSE 105* 90   BUN 3* 2*   CREATININE 0.48* 0.48*   CALCIUM 7.8* 8.2*                   Imaging  CT-CTA HEAD WITH & W/O-POST PROCESS   Final Result         1.  No large vessel occlusion or aneurysm identified      CT-CTA NECK WITH & W/O-POST PROCESSING   Final Result         1.  Atherosclerosis and soft plaque versus mural thrombus at the left carotid bifurcation resulting in 50-70% stenosis   2.  Bilateral maxillary sinusitis changes, greater  on the left         DX-CHEST-PORTABLE (1 VIEW)   Final Result      1.  Left basilar atelectasis or parenchymal scar      2.  Enlarged cardiac silhouette           Assessment/Plan  * Alcohol abuse with withdrawal (HCC)- (present on admission)  Assessment & Plan   Severe alcohol withdrawal with delirium  Given Ativan and phenobarbital in ED  Requiring admission to IMCU and initiation and titration of an IV Precedex drip. Add scheduled IV ativan 2 mg q6 hour to mitigate seizures.  High-dose thiamine for possible Wernicke's encephalopathy, 200 mg IV 3 times daily for 3 days then switch to p.o.  Multivitamin, folate, received detox bag in ED  Follow daily magnesium, phosphorus, and potassium and replace accordingly    Acute encephalopathy  Assessment & Plan  Acute metabolic encephalopathy secondary to alcohol withdrawal.  CT head negative, ammonia negative      Hypomagnesemia- (present on admission)  Assessment & Plan  Mag is low at 1.4 thus IV magnesium ordered    Thrombocytopenia (HCC)- (present on admission)  Assessment & Plan  Platelets low at 142  Likely due to alcohol-induced bone marrow suppression.     Hypokalemia  Assessment & Plan  K 3.5  IV replacement given and continue to follow daily.         VTE prophylaxis:    enoxaparin ppx      I have performed a physical exam and reviewed and updated ROS and Plan today (9/25/2023). In review of yesterday's note (9/24/2023), there are no changes except as documented above.    Mr. Vasquez is critically ill. 34 minutes of critical care time were spent with patient, nursing, pharmacy, and in specific management of severe alcohol detox requiring titration of an IV Precedex drip in the IMCU. Please see orders.

## 2023-09-25 NOTE — ED PROVIDER NOTES
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  [facility]    HPI/ROS  LIMITATION TO HISTORY   Select: Altered mental status / Confusion  OUTSIDE HISTORIAN(S):  Family son via telephone    Sen Vasquez is a 77 y.o. male who presents with altered mental status.  Per EMS report, a friend called EMS as the patient seemed altered.  Unfortunately there is no phone number for the friend to obtain collateral information.  The patient himself is unable to tell me why he is in the hospital.  He does not answer any questions but asked to have his diaper change.  I called his son, Sen, who states that [facility].  He states that the patient is normally alert and oriented and takes care of himself and lives alone.  Last time the patient's son talked to him was 1 week ago.  He seemed fine 1 week ago when he went to his house.  The patient's son states that the patient drinks daily.  He is not sure of any issues that he has had recently he is not sure what medications he is on.  He states that he normally goes to hospital once a month but is unsure for what.      PAST MEDICAL HISTORY   has a past medical history of Alcohol abuse, Coronary arteriosclerosis, Dyslipidemia, GERD (gastroesophageal reflux disease), Mediastinal mass, and Osteopenia.    SURGICAL HISTORY   has a past surgical history that includes coronary artery bypass, 1.    FAMILY HISTORY  No family history on file.    SOCIAL HISTORY  Social History     Tobacco Use    Smoking status: Some Days     Types: Cigarettes    Smokeless tobacco: Never   Substance and Sexual Activity    Alcohol use: Yes     Comment: 400 ml Raabella daily    Drug use: No    Sexual activity: Not on file       CURRENT MEDICATIONS  Home Medications    **Home medications have not yet  "been reviewed for this encounter**         ALLERGIES  Allergies   Allergen Reactions    Pravastatin Hives and Swelling       PHYSICAL EXAM  VITAL SIGNS: BP (!) 168/76   Pulse 71   Temp 36.7 °C (98 °F) (Temporal)   Resp 18   Ht 1.778 m (5' 10\")   Wt 81.6 kg (180 lb)   SpO2 96%   BMI 25.83 kg/m²      Constitutional: Well developed, Well nourished, laying in bed, asking for diaper to be changed, not answering questions otherwise  HEENT: Normocephalic, Atraumatic,  external ears normal, pharynx pink,  Mucous  Membranes moist, No rhinorrhea or mucosal edema  Eyes: PERRL, EOMI, Conjunctiva normal, No discharge.   Neck: Normal range of motion, No tenderness, Supple, No stridor.     Cardiovascular: Regular Rate and Rhythm, No murmurs,  rubs, or gallops.   Thorax & Lungs: Lungs clear to auscultation bilaterally, No respiratory distress, No wheezes, rhales or rhonchi, No chest wall tenderness.   Abdomen: Bowel sounds normal, Soft, non tender, non distended,  No pulsatile masses., no rebound guarding or peritoneal signs.   Skin: Warm, Dry, No erythema, No rash,   Back: No abnormality seen on back  Extremities: Equal, intact distal pulses, No cyanosis, clubbing or edema,  No tenderness.   Musculoskeletal: Good range of motion in all major joints. No tenderness to palpation or major deformities noted.   Neurologic: Alert alert, not answering orientation questions, seen moving all 4 extremities but does not follow commands and just talks about having diaper change  Psychiatric: Unable to assess due to altered mental status      DIAGNOSTIC STUDIES / PROCEDURES    LABS/EKG  Results for orders placed or performed during the hospital encounter of 09/24/23   CBC WITH DIFFERENTIAL   Result Value Ref Range    WBC 4.4 (L) 4.8 - 10.8 K/uL    RBC 4.78 4.70 - 6.10 M/uL    Hemoglobin 16.1 14.0 - 18.0 g/dL    Hematocrit 46.5 42.0 - 52.0 %    MCV 97.3 81.4 - 97.8 fL    MCH 33.7 (H) 27.0 - 33.0 pg    MCHC 34.6 32.3 - 36.5 g/dL    RDW " 51.8 (H) 35.9 - 50.0 fL    Platelet Count 142 (L) 164 - 446 K/uL    MPV 10.6 9.0 - 12.9 fL    Neutrophils-Polys 83.70 (H) 44.00 - 72.00 %    Lymphocytes 12.20 (L) 22.00 - 41.00 %    Monocytes 3.20 0.00 - 13.40 %    Eosinophils 0.20 0.00 - 6.90 %    Basophils 0.50 0.00 - 1.80 %    Immature Granulocytes 0.20 0.00 - 0.90 %    Nucleated RBC 0.00 0.00 - 0.20 /100 WBC    Neutrophils (Absolute) 3.72 1.82 - 7.42 K/uL    Lymphs (Absolute) 0.54 (L) 1.00 - 4.80 K/uL    Monos (Absolute) 0.14 0.00 - 0.85 K/uL    Eos (Absolute) 0.01 0.00 - 0.51 K/uL    Baso (Absolute) 0.02 0.00 - 0.12 K/uL    Immature Granulocytes (abs) 0.01 0.00 - 0.11 K/uL    NRBC (Absolute) 0.00 K/uL   COMP METABOLIC PANEL   Result Value Ref Range    Sodium 137 135 - 145 mmol/L    Potassium 3.5 (L) 3.6 - 5.5 mmol/L    Chloride 100 96 - 112 mmol/L    Co2 24 20 - 33 mmol/L    Anion Gap 13.0 7.0 - 16.0    Glucose 105 (H) 65 - 99 mg/dL    Bun 3 (L) 8 - 22 mg/dL    Creatinine 0.48 (L) 0.50 - 1.40 mg/dL    Calcium 7.8 (L) 8.5 - 10.5 mg/dL    Correct Calcium 8.3 (L) 8.5 - 10.5 mg/dL    AST(SGOT) 105 (H) 12 - 45 U/L    ALT(SGPT) 54 (H) 2 - 50 U/L    Alkaline Phosphatase 114 (H) 30 - 99 U/L    Total Bilirubin 1.0 0.1 - 1.5 mg/dL    Albumin 3.4 3.2 - 4.9 g/dL    Total Protein 6.5 6.0 - 8.2 g/dL    Globulin 3.1 1.9 - 3.5 g/dL    A-G Ratio 1.1 g/dL   LIPASE   Result Value Ref Range    Lipase 15 11 - 82 U/L   DIAGNOSTIC ALCOHOL   Result Value Ref Range    Diagnostic Alcohol <10.1 <10.1 mg/dL   Salicylate   Result Value Ref Range    Salicylates, Quant. <1.0 (L) 15.0 - 25.0 mg/dL   ACETAMINOPHEN   Result Value Ref Range    Acetaminophen -Tylenol <5.0 (L) 10.0 - 30.0 ug/mL   URINALYSIS CULTURE, IF INDICATED    Specimen: Urine, Cath   Result Value Ref Range    Color Yellow     Character Clear     Specific Gravity 1.014 <1.035    Ph 6.5 5.0 - 8.0    Glucose Negative Negative mg/dL    Ketones 15 (A) Negative mg/dL    Protein Negative Negative mg/dL    Bilirubin Negative Negative     Urobilinogen, Urine 1.0 Negative    Nitrite Negative Negative    Leukocyte Esterase Trace (A) Negative    Occult Blood Negative Negative    Micro Urine Req Microscopic    AMMONIA   Result Value Ref Range    Ammonia <10 (L) 11 - 45 umol/L   TROPONIN   Result Value Ref Range    Troponin T 12 6 - 19 ng/L   URINE MICROSCOPIC (W/UA)   Result Value Ref Range    WBC 0-2 (A) /hpf    RBC 0-2 (A) /hpf    Bacteria Negative None /hpf    Epithelial Cells Negative /hpf    Hyaline Cast 0-2 /lpf   ESTIMATED GFR   Result Value Ref Range    GFR (CKD-EPI) 107 >60 mL/min/1.73 m 2   Phosphorus   Result Value Ref Range    Phosphorus 1.6 (L) 2.5 - 4.5 mg/dL   CBC WITH DIFFERENTIAL   Result Value Ref Range    WBC 7.5 4.8 - 10.8 K/uL    RBC 4.61 (L) 4.70 - 6.10 M/uL    Hemoglobin 15.4 14.0 - 18.0 g/dL    Hematocrit 44.1 42.0 - 52.0 %    MCV 95.7 81.4 - 97.8 fL    MCH 33.4 (H) 27.0 - 33.0 pg    MCHC 34.9 32.3 - 36.5 g/dL    RDW 49.8 35.9 - 50.0 fL    Platelet Count 159 (L) 164 - 446 K/uL    MPV 10.7 9.0 - 12.9 fL    Neutrophils-Polys 79.00 (H) 44.00 - 72.00 %    Lymphocytes 16.60 (L) 22.00 - 41.00 %    Monocytes 3.70 0.00 - 13.40 %    Eosinophils 0.00 0.00 - 6.90 %    Basophils 0.40 0.00 - 1.80 %    Immature Granulocytes 0.30 0.00 - 0.90 %    Nucleated RBC 0.00 0.00 - 0.20 /100 WBC    Neutrophils (Absolute) 5.96 1.82 - 7.42 K/uL    Lymphs (Absolute) 1.25 1.00 - 4.80 K/uL    Monos (Absolute) 0.28 0.00 - 0.85 K/uL    Eos (Absolute) 0.00 0.00 - 0.51 K/uL    Baso (Absolute) 0.03 0.00 - 0.12 K/uL    Immature Granulocytes (abs) 0.02 0.00 - 0.11 K/uL    NRBC (Absolute) 0.00 K/uL   Comp Metabolic Panel   Result Value Ref Range    Sodium 133 (L) 135 - 145 mmol/L    Potassium 4.1 3.6 - 5.5 mmol/L    Chloride 95 (L) 96 - 112 mmol/L    Co2 23 20 - 33 mmol/L    Anion Gap 15.0 7.0 - 16.0    Glucose 90 65 - 99 mg/dL    Bun 2 (L) 8 - 22 mg/dL    Creatinine 0.48 (L) 0.50 - 1.40 mg/dL    Calcium 8.2 (L) 8.5 - 10.5 mg/dL    Correct Calcium 8.6 8.5 - 10.5  mg/dL    AST(SGOT) 76 (H) 12 - 45 U/L    ALT(SGPT) 47 2 - 50 U/L    Alkaline Phosphatase 116 (H) 30 - 99 U/L    Total Bilirubin 1.2 0.1 - 1.5 mg/dL    Albumin 3.5 3.2 - 4.9 g/dL    Total Protein 6.6 6.0 - 8.2 g/dL    Globulin 3.1 1.9 - 3.5 g/dL    A-G Ratio 1.1 g/dL   MAGNESIUM   Result Value Ref Range    Magnesium 1.4 (L) 1.5 - 2.5 mg/dL   ESTIMATED GFR   Result Value Ref Range    GFR (CKD-EPI) 107 >60 mL/min/1.73 m 2   EKG (Now)   Result Value Ref Range    Report       [facility] Emergency Dept.    Test Date:  2023  Pt Name:    KENRICK THOMAS                  Department: ER  MRN:        [id number]                      Room:       [facility]  Gender:     Male                         Technician: [id number]  :        1945                   Requested By:REECE LAZCANO  Order #:    [id number]                    Reading MD:    Measurements  Intervals                                Axis  Rate:       84                           P:          21  PA:         271                          QRS:        46  QRSD:       91                           T:          13  QT:         371  QTc:        439    Interpretive Statements  Sinus tachycardia  Atrial premature complexes  Prolonged PA interval  Borderline low voltage, extremity leads  No previous ECG available for comparison         I have independently interpreted this EKG    RADIOLOGY  I have independently interpreted the diagnostic imaging associated with this visit and am waiting the final reading from the radiologist.   My preliminary interpretation is as follows: No ICH  Radiologist interpretation:   CT-CTA HEAD WITH & W/O-POST PROCESS   Final Result         1.  No large vessel occlusion or aneurysm identified      CT-CTA NECK WITH & W/O-POST PROCESSING   Final Result         1.  Atherosclerosis and soft plaque versus mural thrombus at the left carotid bifurcation resulting in 50-70% stenosis   2.  Bilateral maxillary sinusitis changes, greater on the  left         DX-CHEST-PORTABLE (1 VIEW)   Final Result      1.  Left basilar atelectasis or parenchymal scar      2.  Enlarged cardiac silhouette            COURSE & MEDICAL DECISION MAKING    ED Observation Status? No    6:42 PM Patient seen and initial orders placed.    7:00 PM called son for collateral.  I had the patient's son call his father while was in the room and his son states that this is not normal behavior for the patient.    8:53 PM Patient reevaluated, still altered, stating that we are all playing games. He is not tachycardic    9:45 PM I was contacted by the nurse that the patient was twitching the left arm and blinking.  I asked that she provide him Ativan 0.5 mg.  I have ordered for a banana bag.    9:52 PM I reevaluated the patient at bedside.  He is alert but is experiencing rhythmic movements of the left upper extremity and is blinking his eyes.  He is saying inappropriate words.  I am concerned for alcohol withdrawal.  He was given phenobarbital.  His heart rate is in low 100s.    10:04 PM Patient was reassessed and he is now resting comfortably, will have ICU evaluate for admission.    10:10 PM I discussed with intensivist, Dr. De Los Santos, who will evaluate patient.     10:30 PM patient evaluated by intensivist who states patient is stable for IMCU admission.  I discussed with the hospitalist, Dr. Mejias who agreed admit the patient to the hospital.    INITIAL ASSESSMENT, COURSE AND PLAN  Care Narrative: This is a 77-year-old male with history as noted above who is presenting for altered mental status of unknown time.  On arrival his vital signs are stable.  The patient is disoriented and unable to provide any history but he is alert and protecting his own airway.    In terms of altered mental status, consider hepatic encephalopathy however ammonia is negative.  Consider infectious process however patient has no  fever, no evidence of urinary tract infection, no pneumonia, doubt meningitis or  encephalitis as there is no leukocytosis.  He does have leukopenia at 4.4 but he does have history of alcohol abuse therefore this may be secondary to this.  His renal function is normal.  His alcohol level is undetectable therefore consider alcohol withdrawal given patient's daily alcohol use.  Stroke was considered however there are no lateralizing symptoms and CTA head and neck are negative for acute abnormality.    I was called in the room as patient was having seizure-like activity.  He was given Ativan and phenobarbital with improvement in the activity.  I am concerned for potential alcohol withdrawal seizure.  Patient not initially tachycardic or significantly hypertensive or with tremors on initial assessment to raise concern for alcohol withdrawal initially however given unrevealing work-up, negative alcohol level, history of alcohol use, I am more concerned for this.  I discussed with intensivist, Dr. De Los Santos,  who evaluated the patient and agrees.  He recommends admission to [facility].  Because of this, I discussed with hospitalist, Dr. Mejias, who agreed to admit the patient to the hospital.  Patient admitted to [facility] in guarded condition.    HYDRATION: Based on the patient's presentation of Sulema GALLEGOS the patient was given IV fluids. IV Hydration was used because oral hydration was not as rapid as required. Upon recheck following hydration, the patient was resting comfortably.        CRITICAL CARE  The very real possibilty of a deterioration of this patient's condition required the highest level of my preparedness for sudden, emergent intervention.  I provided critical care services, which included medication orders, frequent reevaluations of the patient's condition and response to treatment, ordering and reviewing test results, and discussing the case with various consultants.  The critical care time associated with the care of the patient was 40 minutes. Review chart for interventions. This time is  exclusive of any other billable procedures.     DISPOSITION AND DISCUSSIONS  I have discussed management of the patient with the following physicians and CECILIA's:    Intensivist - Dr. De Los Santos  Hospitalist - Dr. Mejias    Patient admitted to hospitalist to the [facility], Dr. Mejias, in guarded condition    FINAL DIAGNOSIS  Altered mental status   Alcohol withdrawal  Abnormal movements  Alcohol use disorder       Electronically signed by: Chayito Feliz M.D., 9/24/2023 6:26 PM

## 2023-09-26 PROCEDURE — 700111 HCHG RX REV CODE 636 W/ 250 OVERRIDE (IP): Performed by: HOSPITALIST

## 2023-09-26 PROCEDURE — 700101 HCHG RX REV CODE 250: Performed by: HOSPITALIST

## 2023-09-26 PROCEDURE — 700111 HCHG RX REV CODE 636 W/ 250 OVERRIDE (IP): Performed by: INTERNAL MEDICINE

## 2023-09-26 PROCEDURE — 700111 HCHG RX REV CODE 636 W/ 250 OVERRIDE (IP): Mod: JZ | Performed by: STUDENT IN AN ORGANIZED HEALTH CARE EDUCATION/TRAINING PROGRAM

## 2023-09-26 PROCEDURE — 99291 CRITICAL CARE FIRST HOUR: CPT | Performed by: STUDENT IN AN ORGANIZED HEALTH CARE EDUCATION/TRAINING PROGRAM

## 2023-09-26 PROCEDURE — 700101 HCHG RX REV CODE 250: Performed by: INTERNAL MEDICINE

## 2023-09-26 PROCEDURE — 700105 HCHG RX REV CODE 258: Performed by: STUDENT IN AN ORGANIZED HEALTH CARE EDUCATION/TRAINING PROGRAM

## 2023-09-26 PROCEDURE — 770000 HCHG ROOM/CARE - INTERMEDIATE ICU *

## 2023-09-26 PROCEDURE — 700105 HCHG RX REV CODE 258: Performed by: HOSPITALIST

## 2023-09-26 PROCEDURE — 700105 HCHG RX REV CODE 258: Performed by: INTERNAL MEDICINE

## 2023-09-26 PROCEDURE — 700111 HCHG RX REV CODE 636 W/ 250 OVERRIDE (IP): Performed by: STUDENT IN AN ORGANIZED HEALTH CARE EDUCATION/TRAINING PROGRAM

## 2023-09-26 RX ORDER — DIAZEPAM 5 MG/ML
5 INJECTION, SOLUTION INTRAMUSCULAR; INTRAVENOUS EVERY 6 HOURS
Status: COMPLETED | OUTPATIENT
Start: 2023-09-26 | End: 2023-09-27

## 2023-09-26 RX ORDER — DIAZEPAM 5 MG/1
5 TABLET ORAL EVERY 6 HOURS
Status: DISCONTINUED | OUTPATIENT
Start: 2023-09-27 | End: 2023-09-26

## 2023-09-26 RX ORDER — MAGNESIUM SULFATE HEPTAHYDRATE 40 MG/ML
4 INJECTION, SOLUTION INTRAVENOUS ONCE
Status: COMPLETED | OUTPATIENT
Start: 2023-09-26 | End: 2023-09-26

## 2023-09-26 RX ORDER — DIAZEPAM 5 MG/ML
2.5 INJECTION, SOLUTION INTRAMUSCULAR; INTRAVENOUS EVERY 6 HOURS
Status: DISCONTINUED | OUTPATIENT
Start: 2023-09-27 | End: 2023-09-27 | Stop reason: HOSPADM

## 2023-09-26 RX ORDER — DIAZEPAM 5 MG/ML
5 INJECTION, SOLUTION INTRAMUSCULAR; INTRAVENOUS EVERY 6 HOURS
Status: DISCONTINUED | OUTPATIENT
Start: 2023-09-27 | End: 2023-09-26

## 2023-09-26 RX ORDER — LORAZEPAM 2 MG/ML
1 INJECTION INTRAMUSCULAR ONCE
Status: COMPLETED | OUTPATIENT
Start: 2023-09-26 | End: 2023-09-26

## 2023-09-26 RX ORDER — DIAZEPAM 5 MG/1
10 TABLET ORAL EVERY 6 HOURS
Status: DISCONTINUED | OUTPATIENT
Start: 2023-09-26 | End: 2023-09-26

## 2023-09-26 RX ORDER — DIAZEPAM 5 MG/ML
10 INJECTION, SOLUTION INTRAMUSCULAR; INTRAVENOUS EVERY 6 HOURS
Status: DISCONTINUED | OUTPATIENT
Start: 2023-09-26 | End: 2023-09-26

## 2023-09-26 RX ADMIN — LORAZEPAM 0.5 MG: 2 INJECTION INTRAMUSCULAR; INTRAVENOUS at 07:25

## 2023-09-26 RX ADMIN — THIAMINE HYDROCHLORIDE 200 MG: 100 INJECTION, SOLUTION INTRAMUSCULAR; INTRAVENOUS at 14:20

## 2023-09-26 RX ADMIN — DIAZEPAM 5 MG: 5 INJECTION, SOLUTION INTRAMUSCULAR; INTRAVENOUS at 10:03

## 2023-09-26 RX ADMIN — THIAMINE HYDROCHLORIDE 200 MG: 100 INJECTION, SOLUTION INTRAMUSCULAR; INTRAVENOUS at 18:21

## 2023-09-26 RX ADMIN — ENOXAPARIN SODIUM 40 MG: 100 INJECTION SUBCUTANEOUS at 18:15

## 2023-09-26 RX ADMIN — DEXMEDETOMIDINE 0.7 MCG/KG/HR: 100 INJECTION, SOLUTION INTRAVENOUS at 10:07

## 2023-09-26 RX ADMIN — LORAZEPAM 2 MG: 2 INJECTION INTRAMUSCULAR; INTRAVENOUS at 04:16

## 2023-09-26 RX ADMIN — MAGNESIUM SULFATE IN WATER 4 G: 40 INJECTION, SOLUTION INTRAVENOUS at 09:27

## 2023-09-26 RX ADMIN — SODIUM PHOSPHATE, MONOBASIC, MONOHYDRATE AND SODIUM PHOSPHATE, DIBASIC, ANHYDROUS 30 MMOL: 276; 142 INJECTION, SOLUTION INTRAVENOUS at 11:54

## 2023-09-26 RX ADMIN — DIAZEPAM 5 MG: 5 INJECTION, SOLUTION INTRAMUSCULAR; INTRAVENOUS at 18:11

## 2023-09-26 RX ADMIN — DEXMEDETOMIDINE 0.7 MCG/KG/HR: 100 INJECTION, SOLUTION INTRAVENOUS at 18:56

## 2023-09-26 RX ADMIN — LORAZEPAM 1 MG: 2 INJECTION INTRAMUSCULAR; INTRAVENOUS at 02:30

## 2023-09-26 RX ADMIN — THIAMINE HYDROCHLORIDE 200 MG: 100 INJECTION, SOLUTION INTRAMUSCULAR; INTRAVENOUS at 05:07

## 2023-09-26 RX ADMIN — LORAZEPAM 0.5 MG: 2 INJECTION INTRAMUSCULAR; INTRAVENOUS at 20:38

## 2023-09-26 ASSESSMENT — PAIN DESCRIPTION - PAIN TYPE
TYPE: ACUTE PAIN

## 2023-09-26 ASSESSMENT — FIBROSIS 4 INDEX
FIB4 SCORE: 5.23
FIB4 SCORE: 5.23

## 2023-09-26 NOTE — CARE PLAN
The patient is Watcher - Medium risk of patient condition declining or worsening    Shift Goals  Clinical Goals: Decrease agitation, Safety  Patient Goals: JF  Family Goals: JF    Problem: Safety - Medical Restraint  Goal: Remains free of injury from restraints (Restraint for Interference with Medical Device)  Description: INTERVENTIONS:  1. Determine that other, less restrictive measures have been tried or would not be effective before applying the restraint  2. Evaluate the patient's condition at the time of restraint application  3. Educate patient/family regarding the reason for restraint  4. Q2H: Monitor safety, psychosocial status, comfort, circulation, respiratory status, LOC, nutrition and hydration  Outcome: Progressing  Goal: Free from restraint(s) (Restraint for Interference with Medical Device)  Description: INTERVENTIONS:  1.  ONCE/SHIFT or MINIMUM Q12H: Assess and document the continuing need for restraints  2.  Q24H: Continued use of restraint requires LIP to perform face to face examination and written order  3.  Identify and implement measures to help patient regain control  4.  Educate patient/family on discontinuation criteria   5.  Assess patient's understanding and retention of education provided  6.  Assess readiness for release & initiate progressive release per protocol  7.  Identify and document criteria for restraints  Outcome: Progressing

## 2023-09-26 NOTE — PROGRESS NOTES
Pt. Awake yelling, attempting to pull Ivs and oxygen mask. Placed pt. On bilateral wrist restraints. Notified Dr. Reyes, new orders placed for Ativan 0.5mg PRN q3h.

## 2023-09-27 VITALS
SYSTOLIC BLOOD PRESSURE: 147 MMHG | BODY MASS INDEX: 25.31 KG/M2 | HEART RATE: 51 BPM | HEIGHT: 70 IN | TEMPERATURE: 97.1 F | OXYGEN SATURATION: 98 % | RESPIRATION RATE: 21 BRPM | WEIGHT: 176.81 LBS | DIASTOLIC BLOOD PRESSURE: 70 MMHG

## 2023-09-27 LAB
ANION GAP SERPL CALC-SCNC: 12 MMOL/L (ref 7–16)
BUN SERPL-MCNC: 6 MG/DL (ref 8–22)
CALCIUM SERPL-MCNC: 8.3 MG/DL (ref 8.5–10.5)
CHLORIDE SERPL-SCNC: 99 MMOL/L (ref 96–112)
CO2 SERPL-SCNC: 27 MMOL/L (ref 20–33)
CREAT SERPL-MCNC: 0.47 MG/DL (ref 0.5–1.4)
GFR SERPLBLD CREATININE-BSD FMLA CKD-EPI: 108 ML/MIN/1.73 M 2
GLUCOSE SERPL-MCNC: 87 MG/DL (ref 65–99)
MAGNESIUM SERPL-MCNC: 1.8 MG/DL (ref 1.5–2.5)
PHOSPHATE SERPL-MCNC: 3.5 MG/DL (ref 2.5–4.5)
POTASSIUM SERPL-SCNC: 3.8 MMOL/L (ref 3.6–5.5)
SODIUM SERPL-SCNC: 138 MMOL/L (ref 135–145)

## 2023-09-27 PROCEDURE — 83735 ASSAY OF MAGNESIUM: CPT

## 2023-09-27 PROCEDURE — 700105 HCHG RX REV CODE 258: Performed by: STUDENT IN AN ORGANIZED HEALTH CARE EDUCATION/TRAINING PROGRAM

## 2023-09-27 PROCEDURE — 99239 HOSP IP/OBS DSCHRG MGMT >30: CPT | Performed by: STUDENT IN AN ORGANIZED HEALTH CARE EDUCATION/TRAINING PROGRAM

## 2023-09-27 PROCEDURE — 84100 ASSAY OF PHOSPHORUS: CPT

## 2023-09-27 PROCEDURE — 80048 BASIC METABOLIC PNL TOTAL CA: CPT

## 2023-09-27 PROCEDURE — 700101 HCHG RX REV CODE 250: Performed by: HOSPITALIST

## 2023-09-27 PROCEDURE — 700105 HCHG RX REV CODE 258: Performed by: HOSPITALIST

## 2023-09-27 PROCEDURE — 700111 HCHG RX REV CODE 636 W/ 250 OVERRIDE (IP): Mod: JZ | Performed by: STUDENT IN AN ORGANIZED HEALTH CARE EDUCATION/TRAINING PROGRAM

## 2023-09-27 RX ORDER — LORAZEPAM 2 MG/ML
1 INJECTION INTRAMUSCULAR
Status: DISCONTINUED | OUTPATIENT
Start: 2023-09-27 | End: 2023-09-27 | Stop reason: HOSPADM

## 2023-09-27 RX ORDER — DEXTROSE, SODIUM CHLORIDE, SODIUM LACTATE, POTASSIUM CHLORIDE, AND CALCIUM CHLORIDE 5; .6; .31; .03; .02 G/100ML; G/100ML; G/100ML; G/100ML; G/100ML
INJECTION, SOLUTION INTRAVENOUS CONTINUOUS
Status: DISCONTINUED | OUTPATIENT
Start: 2023-09-27 | End: 2023-09-27 | Stop reason: HOSPADM

## 2023-09-27 RX ORDER — LORAZEPAM 2 MG/ML
3 INJECTION INTRAMUSCULAR
Status: DISCONTINUED | OUTPATIENT
Start: 2023-09-27 | End: 2023-09-27 | Stop reason: HOSPADM

## 2023-09-27 RX ORDER — LORAZEPAM 2 MG/ML
2 INJECTION INTRAMUSCULAR
Status: DISCONTINUED | OUTPATIENT
Start: 2023-09-27 | End: 2023-09-27 | Stop reason: HOSPADM

## 2023-09-27 RX ORDER — LORAZEPAM 2 MG/ML
4 INJECTION INTRAMUSCULAR
Status: DISCONTINUED | OUTPATIENT
Start: 2023-09-27 | End: 2023-09-27 | Stop reason: HOSPADM

## 2023-09-27 RX ORDER — DEXTROSE MONOHYDRATE 50 MG/ML
INJECTION, SOLUTION INTRAVENOUS CONTINUOUS
Status: DISCONTINUED | OUTPATIENT
Start: 2023-09-27 | End: 2023-09-27

## 2023-09-27 RX ORDER — POTASSIUM CHLORIDE 7.45 MG/ML
10 INJECTION INTRAVENOUS
Status: DISCONTINUED | OUTPATIENT
Start: 2023-09-27 | End: 2023-09-27 | Stop reason: HOSPADM

## 2023-09-27 RX ORDER — SODIUM CHLORIDE, SODIUM LACTATE, POTASSIUM CHLORIDE, CALCIUM CHLORIDE 600; 310; 30; 20 MG/100ML; MG/100ML; MG/100ML; MG/100ML
INJECTION, SOLUTION INTRAVENOUS CONTINUOUS
Status: DISCONTINUED | OUTPATIENT
Start: 2023-09-27 | End: 2023-09-27

## 2023-09-27 RX ORDER — ZIPRASIDONE MESYLATE 20 MG/ML
10 INJECTION, POWDER, LYOPHILIZED, FOR SOLUTION INTRAMUSCULAR ONCE
Status: COMPLETED | OUTPATIENT
Start: 2023-09-27 | End: 2023-09-27

## 2023-09-27 RX ORDER — MAGNESIUM SULFATE HEPTAHYDRATE 40 MG/ML
2 INJECTION, SOLUTION INTRAVENOUS ONCE
Status: COMPLETED | OUTPATIENT
Start: 2023-09-27 | End: 2023-09-27

## 2023-09-27 RX ADMIN — MAGNESIUM SULFATE HEPTAHYDRATE 2 G: 2 INJECTION, SOLUTION INTRAVENOUS at 16:00

## 2023-09-27 RX ADMIN — ZIPRASIDONE MESYLATE 10 MG: 20 INJECTION, POWDER, LYOPHILIZED, FOR SOLUTION INTRAMUSCULAR at 11:38

## 2023-09-27 RX ADMIN — SODIUM CHLORIDE, SODIUM LACTATE, POTASSIUM CHLORIDE, CALCIUM CHLORIDE AND DEXTROSE MONOHYDRATE: 5; 600; 310; 30; 20 INJECTION, SOLUTION INTRAVENOUS at 15:53

## 2023-09-27 RX ADMIN — DIAZEPAM 2.5 MG: 5 INJECTION, SOLUTION INTRAMUSCULAR; INTRAVENOUS at 16:40

## 2023-09-27 RX ADMIN — SODIUM CHLORIDE, POTASSIUM CHLORIDE, SODIUM LACTATE AND CALCIUM CHLORIDE: 600; 310; 30; 20 INJECTION, SOLUTION INTRAVENOUS at 11:02

## 2023-09-27 RX ADMIN — THIAMINE HYDROCHLORIDE 200 MG: 100 INJECTION, SOLUTION INTRAMUSCULAR; INTRAVENOUS at 05:03

## 2023-09-27 RX ADMIN — DIAZEPAM 2.5 MG: 5 INJECTION, SOLUTION INTRAMUSCULAR; INTRAVENOUS at 11:02

## 2023-09-27 RX ADMIN — DEXMEDETOMIDINE 0.7 MCG/KG/HR: 100 INJECTION, SOLUTION INTRAVENOUS at 02:49

## 2023-09-27 RX ADMIN — POTASSIUM CHLORIDE 10 MEQ: 7.46 INJECTION, SOLUTION INTRAVENOUS at 17:06

## 2023-09-27 RX ADMIN — DIAZEPAM 5 MG: 5 INJECTION, SOLUTION INTRAMUSCULAR; INTRAVENOUS at 04:56

## 2023-09-27 RX ADMIN — LORAZEPAM 2 MG: 2 INJECTION INTRAMUSCULAR; INTRAVENOUS at 16:39

## 2023-09-27 RX ADMIN — ENOXAPARIN SODIUM 40 MG: 100 INJECTION SUBCUTANEOUS at 17:06

## 2023-09-27 RX ADMIN — POTASSIUM CHLORIDE 10 MEQ: 7.46 INJECTION, SOLUTION INTRAVENOUS at 15:57

## 2023-09-27 RX ADMIN — THIAMINE HYDROCHLORIDE 200 MG: 100 INJECTION, SOLUTION INTRAMUSCULAR; INTRAVENOUS at 11:02

## 2023-09-27 RX ADMIN — DIAZEPAM 5 MG: 5 INJECTION, SOLUTION INTRAMUSCULAR; INTRAVENOUS at 00:02

## 2023-09-27 RX ADMIN — DEXMEDETOMIDINE 0.5 MCG/KG/HR: 100 INJECTION, SOLUTION INTRAVENOUS at 13:30

## 2023-09-27 ASSESSMENT — COGNITIVE AND FUNCTIONAL STATUS - GENERAL
STANDING UP FROM CHAIR USING ARMS: A LITTLE
CLIMB 3 TO 5 STEPS WITH RAILING: A LOT
MOBILITY SCORE: 17
HELP NEEDED FOR BATHING: A LITTLE
WALKING IN HOSPITAL ROOM: A LOT
MOVING FROM LYING ON BACK TO SITTING ON SIDE OF FLAT BED: A LITTLE
SUGGESTED CMS G CODE MODIFIER MOBILITY: CK
DAILY ACTIVITIY SCORE: 22
MOVING TO AND FROM BED TO CHAIR: A LITTLE
DRESSING REGULAR LOWER BODY CLOTHING: A LITTLE
SUGGESTED CMS G CODE MODIFIER DAILY ACTIVITY: CJ

## 2023-09-27 ASSESSMENT — PAIN DESCRIPTION - PAIN TYPE
TYPE: ACUTE PAIN

## 2023-09-27 NOTE — PROGRESS NOTES
Hospital Medicine Daily Progress Note    Date of Service  9/27/2023    Chief Complaint  Sen Vasquez is a 77 y.o. male admitted 9/24/2023 with alcohol withdrawal.     Hospital Course  Mr. Vasquez is a 77 y.o. male who presented 9/24/2023 with encephalopathy.  [facility] so [facility].  History obtained mostly from EDP who was able to speak with his son.  I attempted calling but did not get through.  He does have a history of daily heavy alcohol use.  Patient lives alone and apparently is normally independent and cogent, he was last seen by his son 1 week ago and was in his normal state.  A friend felt he was altered, possibly after talking on the phone?  EMS was called and he was brought in by ambulance, EMS apparently noted a bottle of ashley next the patient.  Alcohol level was negative in the ED as well as ammonia.  CT head negative.  He was hypertensive, restless, delirious, not following commands.  Was felt this was most likely due to alcohol withdrawal and he was initially given Ativan and then phenobarbital after which the patient appeared more relaxed and was speaking in 1 or 2 words, following some commands.  IMCU admission requested due to high risk of deterioration requiring possible Precedex infusion    Interval Problem Update  9/25: Mr. Vasquez was evaluated and examined in the IMCU. He is on an IV Precedex drip with titration. IV ativan 2 mg q6 hours scheduled ordered to mitigate seizures. His magnesium and phosphorus are low and will be given IV. IV fluids. He is NPO as he is not safe for swallowing yet.   9/26/2023:  Continue to wean off Precedex as tolerated I have added Valium scheduled today.  Appreciate speech therapy recommendations.  Reevaluate with CIWA score in the morning/RASS score.  Otherwise continue with electrolyte replete meant.    9/27/2023:  Patient continues to require for 0.5 mg infusion of Precedex.  Continue with scheduled Valium IV  continue with as needed Ativan IV.  Patient having gradual improvement in encephalopathy.  Patient not able to follow adequate command however did have oral intake.  Patient continues on D5W infusion.  Continue to monitor daily electrolyte levels.  Continue to monitor on telemetry.    Patient was critically ill during the time I treated the patient.  He had acute alcohol with delirium tremens and was at risk for uncontrolled seizures and cardiac arrest and death  I provided continuous IV infusion of vasoactive medication in the form of Precedex in a closely monitored telemetry environment uncontrolled seizures to prevent cardiopulmonary arrest and death       35 minutes of critical care time were spent, including examination and interview of the patient, explanation of prognosis/diagnosis/plan of care, direction of nursing staff and discussion of the patient with other physicians involved.  This time was independent of procedures performed.  Greater than 50% of which was spent at the bedside.      I had discussion with Dr. James pulmonology/intensivist at the [facility] regarding transfer back of patient.  [facility] and [facility] this patient informed him of course of clinical care all questions were answered in detail conversation took place on 9/27/2023 at 1430.      I have discussed this patient's plan of care and discharge plan at IDT rounds today with Case Management, Nursing, Nursing leadership, and other members of the IDT team.    Consultants/Specialty  none    Code Status  Full Code    Disposition  The patient is not medically cleared for discharge to home or a post-acute facility.  Anticipate discharge to: a short-term general hospital for inpatient care    I have placed the appropriate orders for post-discharge needs.    Review of Systems  Review of Systems   Unable to perform ROS: Mental acuity        Physical Exam  Temp:  [36.2 °C (97.1 °F)-36.7 °C (98 °F)] 36.2 °C  (97.1 °F)  Pulse:  [46-60] 53  Resp:  [12-25] 13  BP: ()/(54-76) 146/72  SpO2:  [98 %-100 %] 99 %    Physical Exam  Vitals and nursing note reviewed.   Constitutional:       General: He is in acute distress.      Appearance: He is ill-appearing and toxic-appearing.   Cardiovascular:      Rate and Rhythm: Regular rhythm. Bradycardia present.   Pulmonary:      Effort: Pulmonary effort is normal.      Breath sounds: Normal breath sounds.   Abdominal:      General: There is no distension.      Tenderness: There is no abdominal tenderness.   Genitourinary:     Comments: Condom catheter  Musculoskeletal:      Right lower leg: No edema.      Left lower leg: No edema.   Neurological:      Comments: Somnolent  He is non-verbal and does not follow         Fluids    Intake/Output Summary (Last 24 hours) at 9/27/2023 1451  Last data filed at 9/27/2023 1111  Gross per 24 hour   Intake 1107.07 ml   Output 625 ml   Net 482.07 ml       Laboratory  Recent Labs     09/24/23 1824 09/25/23  0315   WBC 4.4* 7.5   RBC 4.78 4.61*   HEMOGLOBIN 16.1 15.4   HEMATOCRIT 46.5 44.1   MCV 97.3 95.7   MCH 33.7* 33.4*   MCHC 34.6 34.9   RDW 51.8* 49.8   PLATELETCT 142* 159*   MPV 10.6 10.7     Recent Labs     09/24/23 1824 09/25/23 0315 09/27/23  0244   SODIUM 137 133* 138   POTASSIUM 3.5* 4.1 3.8   CHLORIDE 100 95* 99   CO2 24 23 27   GLUCOSE 105* 90 87   BUN 3* 2* 6*   CREATININE 0.48* 0.48* 0.47*   CALCIUM 7.8* 8.2* 8.3*                   Imaging  CT-CTA HEAD WITH & W/O-POST PROCESS   Final Result         1.  No large vessel occlusion or aneurysm identified      CT-CTA NECK WITH & W/O-POST PROCESSING   Final Result         1.  Atherosclerosis and soft plaque versus mural thrombus at the left carotid bifurcation resulting in 50-70% stenosis   2.  Bilateral maxillary sinusitis changes, greater on the left         DX-CHEST-PORTABLE (1 VIEW)   Final Result      1.  Left basilar atelectasis or parenchymal scar      2.  Enlarged cardiac  silhouette             Assessment/Plan  * Alcohol abuse with withdrawal (HCC)- (present on admission)  Assessment & Plan   Severe alcohol withdrawal with delirium  Given Ativan and phenobarbital in ED  Requiring admission to IMCU and initiation and titration of an IV Precedex drip. Add scheduled IV ativan 2 mg q6 hour to mitigate seizures.  High-dose thiamine for possible Wernicke's encephalopathy, 200 mg IV 3 times daily for 3 days then switch to p.o.  Multivitamin, folate, received detox bag in ED  Follow daily magnesium, phosphorus, and potassium and replace accordingly  9/27/2023:  Patient continues on Precedex infusion at present at 0.5 mg continue with scheduled Valium continue with as needed Ativan IV.  Patient not following adequate managed to have oral intake at present.  Continue to reorient replete electrolytes daily as needed.  Magnesium 1.8 potassium 3.8.    Hypomagnesemia- (present on admission)  Assessment & Plan  Mag is low at 1.4 thus IV magnesium ordered  9/27/2023:  Magnesium 1.8 at 2 g    Thrombocytopenia (HCC)- (present on admission)  Assessment & Plan  Platelets low at 142  Likely due to alcohol-induced bone marrow suppression.  9/27/2023:  As indicated above secondary to chronic bone marrow suppression in the setting of chronic alcoholism.  Patient's platelets have gradually improved.  Continue to monitor as needed.  No overt signs of bleeding.    Acute encephalopathy  Assessment & Plan  Acute metabolic encephalopathy secondary to alcohol withdrawal.  CT head negative, ammonia negative  9/27/2023:  Gradually improving continue to monitor clinically    Hypokalemia  Assessment & Plan  K 3.5  IV replacement given and continue to follow daily.  9/27/2023:  Today potassium 3.8 will give patient 40 mEq of K rider       Please note that this dictation was created using voice recognition software. I have made every reasonable attempt to correct obvious errors, but I expect that there are errors of  grammar and possibly context that I did not discover before finalizing the note.    VTE prophylaxis:   SCDs/TEDs   enoxaparin ppx      I have performed a physical exam and reviewed and updated ROS and Plan today (9/27/2023). In review of yesterday's note (9/26/2023), there are no changes except as documented above.

## 2023-09-27 NOTE — DISCHARGE PLANNING
Case Management Discharge Planning    Admission Date: 9/24/2023  GMLOS: 5  ALOS: 3    6-Clicks ADL Score: 22  6-Clicks Mobility Score: 17        Anticipated Discharge Dispo: Discharge Disposition: Discharged to home/self care (01)      Action(s) Taken: RNCM received call from [facility] regarding getting the patient transferred back to their facility. Their dr will reach out to .     Escalations Completed: None    Medically Clear: No    Next Steps: RNCM will continue to follow to assist with transfer as needed.     Barriers to Discharge: Medical clearance    Is the patient up for discharge tomorrow: No        1510 RNCM received fax from [facility] for  to sign. Form completed and sent back to [place]. [facility] to [facility] to initiate [facility]. RNCM will complete COBRA and transfer packet.  notified to complete DC summary.     2593 RNCM gave COBRA and transfer packet to bedside RN. Patients transfer has been accepted pending bed.

## 2023-09-27 NOTE — DISCHARGE SUMMARY
Discharge Summary    CHIEF COMPLAINT ON ADMISSION  Chief Complaint   Patient presents with    ALOC       Reason for Admission  ems     Admission Date  9/24/2023    CODE STATUS  Full Code    HPI & HOSPITAL COURSE  This is a 77 y.o. male here with encephalopathy  Mr. Vasquez is a 77 y.o. male who presented 9/24/2023 with encephalopathy.  [facility] so [facility] review is limited.  History obtained mostly from EDP who was able to speak with his son.  I attempted calling but did not get through.  He does have a history of daily heavy alcohol use.  Patient lives alone and apparently is normally independent and cogent, he was last seen by his son 1 week ago and was in his normal state.  A friend felt he was altered, possibly after talking on the phone?  EMS was called and he was brought in by ambulance, EMS apparently noted a bottle of ashley next the patient.  Alcohol level was negative in the ED as well as ammonia.  CT head negative.  He was hypertensive, restless, delirious, not following commands.  Was felt this was most likely due to alcohol withdrawal and he was initially given Ativan and then phenobarbital after which the patient appeared more relaxed and was speaking in 1 or 2 words, following some commands.  IMCU admission requested due to high risk of deterioration requiring possible Precedex infusion    Patient continues to require for 0.5 mg infusion of Precedex.  Continue with scheduled Valium IV continue with as needed Ativan IV.  Patient having gradual improvement in encephalopathy.  Patient not able to follow adequate command however did have oral intake.  Patient continues on D5W infusion.  Continue to monitor daily electrolyte levels. Continue to monitor on telemetry.    I had discussion with Dr. James pulmonology/intensivist at the [facility] regarding transfer back of patient.  [facility] and [facility] care of this patient informed him of course  of clinical care all questions were answered in detail conversation took place on 9/27/2023 at 1430.    Therefore, he is discharged in good and stable condition to a short-term general hosptial for inpatient care.    The patient met 2-midnight criteria for an inpatient stay at the time of discharge.    Discharge Date  9/27/2023    FOLLOW UP ITEMS POST DISCHARGE  Take all medication as prescribed  Go to all follow-up appointments as indicated      DISCHARGE DIAGNOSES  Principal Problem:    Alcohol abuse with withdrawal (HCC) (POA: Yes)  Active Problems:    Hypokalemia (POA: Unknown)    Acute encephalopathy (POA: Unknown)    Thrombocytopenia (HCC) (POA: Yes)    Hypomagnesemia (POA: Yes)  Resolved Problems:    * No resolved hospital problems. *      FOLLOW UP    [facility]  [address] [place] [address]  Follow up        MEDICATIONS ON DISCHARGE     Medication List        STOP taking these medications      Magnesium Oxide 420 MG Tabs              Allergies  Allergies   Allergen Reactions    Pravastatin Hives and Swelling       DIET  Orders Placed This Encounter   Procedures    Diet NPO Restrict to: Sips with Medications (Bedside swallow eval)     Standing Status:   Standing     Number of Occurrences:   1     Order Specific Question:   Diet NPO Restrict to:     Answer:   Sips with Medications [3]     Comments:   Bedside swallow eval       ACTIVITY  As tolerated.  Weight bearing as tolerated    CONSULTATIONS  None    PROCEDURES  None    LABORATORY  Lab Results   Component Value Date    SODIUM 138 09/27/2023    POTASSIUM 3.8 09/27/2023    CHLORIDE 99 09/27/2023    CO2 27 09/27/2023    GLUCOSE 87 09/27/2023    BUN 6 (L) 09/27/2023    CREATININE 0.47 (L) 09/27/2023        Lab Results   Component Value Date    WBC 7.5 09/25/2023    HEMOGLOBIN 15.4 09/25/2023    HEMATOCRIT 44.1 09/25/2023    PLATELETCT 159 (L) 09/25/2023      Please note that this dictation was created using voice recognition software. I have made  every reasonable attempt to correct obvious errors, but I expect that there are errors of grammar and possibly context that I did not discover before finalizing the note.    Total time of the discharge process exceeds 35 minutes.

## 2023-09-27 NOTE — PROGRESS NOTES
Hospital Medicine Daily Progress Note    Date of Service  9/26/2023    Chief Complaint  Sen Vasquez is a 77 y.o. male admitted 9/24/2023 with alcohol withdrawal.     Hospital Course  Mr. Vasquez is a 77 y.o. male who presented 9/24/2023 with encephalopathy.  [facility] so [facility] is [facility].  History obtained mostly from EDP who was able to speak with his son.  I attempted calling but did not get through.  He does have a history of daily heavy alcohol use.  Patient lives alone and apparently is normally independent and cogent, he was last seen by his son 1 week ago and was in his normal state.  A friend felt he was altered, possibly after talking on the phone?  EMS was called and he was brought in by ambulance, EMS apparently noted a bottle of ashley next the patient.  Alcohol level was negative in the ED as well as ammonia.  CT head negative.  He was hypertensive, restless, delirious, not following commands.  Was felt this was most likely due to alcohol withdrawal and he was initially given Ativan and then phenobarbital after which the patient appeared more relaxed and was speaking in 1 or 2 words, following some commands.  IMCU admission requested due to high risk of deterioration requiring possible Precedex infusion    Interval Problem Update  9/25: Mr. Vasquez was evaluated and examined in the IMCU. He is on an IV Precedex drip with titration. IV ativan 2 mg q6 hours scheduled ordered to mitigate seizures. His magnesium and phosphorus are low and will be given IV. IV fluids. He is NPO as he is not safe for swallowing yet.   9/26/2023:  Continue to wean off Precedex as tolerated I have added Valium scheduled today.  Appreciate speech therapy recommendations.  Reevaluate with CIWA score in the morning/RASS score.  Otherwise continue with electrolyte replete meant.    Patient was critically ill during the time I treated the patient.  He had acute alcohol with delirium tremens and  was at risk for uncontrolled seizures and cardiac arrest and death  I provided continuous IV infusion of vasoactive medication in the form of Precedex in a closely monitored telemetry environment uncontrolled seizures to prevent cardiopulmonary arrest and death       35 minutes of critical care time were spent, including examination and interview of the patient, explanation of prognosis/diagnosis/plan of care, direction of nursing staff and discussion of the patient with other physicians involved.  This time was independent of procedures performed.    Greater than 50% of which was spent at the bedside.    I have discussed this patient's plan of care and discharge plan at IDT rounds today with Case Management, Nursing, Nursing leadership, and other members of the IDT team.    Consultants/Specialty  none    Code Status  Full Code    Disposition  The patient is not medically cleared for discharge to home or a post-acute facility.  Anticipate discharge to: home with organized home healthcare and close outpatient follow-up    I have placed the appropriate orders for post-discharge needs.    Review of Systems  Review of Systems   Unable to perform ROS: Mental acuity        Physical Exam  Temp:  [36.5 °C (97.7 °F)-36.7 °C (98 °F)] 36.5 °C (97.7 °F)  Pulse:  [] 49  Resp:  [14-27] 18  BP: (106-172)/(55-81) 120/60  SpO2:  [88 %-100 %] 100 %    Physical Exam  Vitals and nursing note reviewed.   Constitutional:       General: He is in acute distress.      Appearance: He is ill-appearing and toxic-appearing.   Cardiovascular:      Rate and Rhythm: Regular rhythm. Bradycardia present.   Pulmonary:      Effort: Pulmonary effort is normal.      Breath sounds: Normal breath sounds.   Abdominal:      General: There is no distension.      Tenderness: There is no abdominal tenderness.   Genitourinary:     Comments: Condom catheter  Musculoskeletal:      Right lower leg: No edema.      Left lower leg: No edema.   Neurological:       Comments: Somnolent  He is non-verbal and does not follow         Fluids    Intake/Output Summary (Last 24 hours) at 9/26/2023 1746  Last data filed at 9/26/2023 1400  Gross per 24 hour   Intake 1472.49 ml   Output 2575 ml   Net -1102.51 ml         Laboratory  Recent Labs     09/24/23 1824 09/25/23  0315   WBC 4.4* 7.5   RBC 4.78 4.61*   HEMOGLOBIN 16.1 15.4   HEMATOCRIT 46.5 44.1   MCV 97.3 95.7   MCH 33.7* 33.4*   MCHC 34.6 34.9   RDW 51.8* 49.8   PLATELETCT 142* 159*   MPV 10.6 10.7       Recent Labs     09/24/23 1824 09/25/23 0315   SODIUM 137 133*   POTASSIUM 3.5* 4.1   CHLORIDE 100 95*   CO2 24 23   GLUCOSE 105* 90   BUN 3* 2*   CREATININE 0.48* 0.48*   CALCIUM 7.8* 8.2*                     Imaging  CT-CTA HEAD WITH & W/O-POST PROCESS   Final Result         1.  No large vessel occlusion or aneurysm identified      CT-CTA NECK WITH & W/O-POST PROCESSING   Final Result         1.  Atherosclerosis and soft plaque versus mural thrombus at the left carotid bifurcation resulting in 50-70% stenosis   2.  Bilateral maxillary sinusitis changes, greater on the left         DX-CHEST-PORTABLE (1 VIEW)   Final Result      1.  Left basilar atelectasis or parenchymal scar      2.  Enlarged cardiac silhouette             Assessment/Plan  * Alcohol abuse with withdrawal (HCC)- (present on admission)  Assessment & Plan   Severe alcohol withdrawal with delirium  Given Ativan and phenobarbital in ED  Requiring admission to IMCU and initiation and titration of an IV Precedex drip. Add scheduled IV ativan 2 mg q6 hour to mitigate seizures.  High-dose thiamine for possible Wernicke's encephalopathy, 200 mg IV 3 times daily for 3 days then switch to p.o.  Multivitamin, folate, received detox bag in ED  Follow daily magnesium, phosphorus, and potassium and replace accordingly    Hypomagnesemia- (present on admission)  Assessment & Plan  Mag is low at 1.4 thus IV magnesium ordered    Thrombocytopenia (HCC)- (present on  admission)  Assessment & Plan  Platelets low at 142  Likely due to alcohol-induced bone marrow suppression.     Acute encephalopathy  Assessment & Plan  Acute metabolic encephalopathy secondary to alcohol withdrawal.  CT head negative, ammonia negative      Hypokalemia  Assessment & Plan  K 3.5  IV replacement given and continue to follow daily.       Please note that this dictation was created using voice recognition software. I have made every reasonable attempt to correct obvious errors, but I expect that there are errors of grammar and possibly context that I did not discover before finalizing the note.    VTE prophylaxis:   SCDs/TEDs   enoxaparin ppx      I have performed a physical exam and reviewed and updated ROS and Plan today (9/26/2023). In review of yesterday's note (9/25/2023), there are no changes except as documented above.

## 2023-09-27 NOTE — HOSPITAL COURSE
Mr. Vasquez is a 77 y.o. male who presented 9/24/2023 with encephalopathy.  [facility] so [facility] is limited.  History obtained mostly from EDP who was able to speak with his son.  I attempted calling but did not get through.  He does have a history of daily heavy alcohol use.  Patient lives alone and apparently is normally independent and cogent, he was last seen by his son 1 week ago and was in his normal state.  A friend felt he was altered, possibly after talking on the phone?  EMS was called and he was brought in by ambulance, EMS apparently noted a bottle of ashley next the patient.  Alcohol level was negative in the ED as well as ammonia.  CT head negative.  He was hypertensive, restless, delirious, not following commands.  Was felt this was most likely due to alcohol withdrawal and he was initially given Ativan and then phenobarbital after which the patient appeared more relaxed and was speaking in 1 or 2 words, following some commands.  IMCU admission requested due to high risk of deterioration requiring possible Precedex infusion    Patient continues to require for 0.5 mg infusion of Precedex.  Continue with scheduled Valium IV continue with as needed Ativan IV.  Patient having gradual improvement in encephalopathy.  Patient not able to follow adequate command however did have oral intake.  Patient continues on D5W infusion.  Continue to monitor daily electrolyte levels. Continue to monitor on telemetry.    I had discussion with Dr. James pulmonology/intensivist at the [facility] regarding transfer back of patient.  [facility] and [facility] of this patient informed him of course of clinical care all questions were answered in detail conversation took place on 9/27/2023 at 1430.

## 2023-09-27 NOTE — CARE PLAN
The patient is Watcher - Medium risk of patient condition declining or worsening    Shift Goals  Clinical Goals: Safety, CIWA's  Patient Goals: eat  Family Goals: JF    Progress made toward(s) clinical / shift goals:    Problem: Pain - Standard  Goal: Alleviation of pain or a reduction in pain to the patient’s comfort goal  Outcome: Progressing, pt has no complaints of pain.      Problem: Safety - Medical Restraint  Goal: Remains free of injury from restraints (Restraint for Interference with Medical Device)  Outcome: Progressing, pt has no visible injuries from restraints.      Problem: Fall Risk  Goal: Patient will remain free from falls  Outcome: Progressing, pt remains free from falls.        Patient is not progressing towards the following goals:

## 2023-09-27 NOTE — CARE PLAN
The patient is Watcher - Medium risk of patient condition declining or worsening    Shift Goals  Clinical Goals: stable RASS, wean precedex  Patient Goals: JF  Family Goals: JF    Progress made toward(s) clinical / shift goals:    Problem: Optimal Care for Alcohol Withdrawal  Goal: Optimal Care for the alcohol withdrawal patient  Outcome: Progressing     Problem: Safety - Medical Restraint  Goal: Remains free of injury from restraints (Restraint for Interference with Medical Device)  Outcome: Progressing       Patient is not progressing towards the following goals:      Problem: Knowledge Deficit - Standard  Goal: Patient and family/care givers will demonstrate understanding of plan of care, disease process/condition, diagnostic tests and medications  Outcome: Not Progressing  Note: Pt educated regarding plan of care and medications. All questions answered.       Problem: Safety - Medical Restraint  Goal: Free from restraint(s) (Restraint for Interference with Medical Device)  Outcome: Not Progressing  Flowsheets (Taken 9/27/2023 1202)  Addressed this shift: Free from restraint(s) (restraint for interference with medical device):   ONCE/SHIFT or MINIMUM Every 12 hours: Assess and document the continuing need for restraints   Identify and implement measures to help patient regain control   Every 24 hours: Continued use of restraint requires Licensed Independent Practitioner to perform face to face examination and written order     Problem: Fall Risk  Goal: Patient will remain free from falls  Outcome: Not Progressing  Note: Fall precautions in place. Bed in lowest position. Non-skid socks in place. Personal possessions within reach. Mobility sign on door. Bed-alarm on. Call light within reach. Pt educated regarding fall prevention and states understanding.

## 2023-09-28 NOTE — PROGRESS NOTES
Pt dc'd by [facility] to [facility]. IV and monitor removed; monitor room notified. Personal belongings with pt when leaving unit. Pt appears comfortable and resting at time of transport. [facility].

## 2023-10-04 ENCOUNTER — HOSPITAL ENCOUNTER (INPATIENT)
Facility: MEDICAL CENTER | Age: 78
LOS: 13 days | DRG: 070 | End: 2023-10-17
Attending: STUDENT IN AN ORGANIZED HEALTH CARE EDUCATION/TRAINING PROGRAM | Admitting: STUDENT IN AN ORGANIZED HEALTH CARE EDUCATION/TRAINING PROGRAM
Payer: COMMERCIAL

## 2023-10-04 DIAGNOSIS — I63.9 CEREBROVASCULAR ACCIDENT (CVA), UNSPECIFIED MECHANISM (HCC): ICD-10-CM

## 2023-10-04 LAB
BASOPHILS # BLD AUTO: 0.8 % (ref 0–1.8)
BASOPHILS # BLD: 0.04 K/UL (ref 0–0.12)
EOSINOPHIL # BLD AUTO: 0.18 K/UL (ref 0–0.51)
EOSINOPHIL NFR BLD: 3.6 % (ref 0–6.9)
ERYTHROCYTE [DISTWIDTH] IN BLOOD BY AUTOMATED COUNT: 51.2 FL (ref 35.9–50)
HCT VFR BLD AUTO: 50.6 % (ref 42–52)
HGB BLD-MCNC: 17.1 G/DL (ref 14–18)
IMM GRANULOCYTES # BLD AUTO: 0.01 K/UL (ref 0–0.11)
IMM GRANULOCYTES NFR BLD AUTO: 0.2 % (ref 0–0.9)
LYMPHOCYTES # BLD AUTO: 1.03 K/UL (ref 1–4.8)
LYMPHOCYTES NFR BLD: 20.6 % (ref 22–41)
MCH RBC QN AUTO: 33.5 PG (ref 27–33)
MCHC RBC AUTO-ENTMCNC: 33.8 G/DL (ref 32.3–36.5)
MCV RBC AUTO: 99.2 FL (ref 81.4–97.8)
MONOCYTES # BLD AUTO: 0.28 K/UL (ref 0–0.85)
MONOCYTES NFR BLD AUTO: 5.6 % (ref 0–13.4)
NEUTROPHILS # BLD AUTO: 3.47 K/UL (ref 1.82–7.42)
NEUTROPHILS NFR BLD: 69.2 % (ref 44–72)
NRBC # BLD AUTO: 0 K/UL
NRBC BLD-RTO: 0 /100 WBC (ref 0–0.2)
PLATELET # BLD AUTO: 258 K/UL (ref 164–446)
PMV BLD AUTO: 10.3 FL (ref 9–12.9)
RBC # BLD AUTO: 5.1 M/UL (ref 4.7–6.1)
WBC # BLD AUTO: 5 K/UL (ref 4.8–10.8)

## 2023-10-04 PROCEDURE — 86140 C-REACTIVE PROTEIN: CPT

## 2023-10-04 PROCEDURE — 83735 ASSAY OF MAGNESIUM: CPT

## 2023-10-04 PROCEDURE — 83615 LACTATE (LD) (LDH) ENZYME: CPT

## 2023-10-04 PROCEDURE — 82607 VITAMIN B-12: CPT

## 2023-10-04 PROCEDURE — 85025 COMPLETE CBC W/AUTO DIFF WBC: CPT

## 2023-10-04 PROCEDURE — 80053 COMPREHEN METABOLIC PANEL: CPT

## 2023-10-04 PROCEDURE — 36415 COLL VENOUS BLD VENIPUNCTURE: CPT

## 2023-10-04 PROCEDURE — 84100 ASSAY OF PHOSPHORUS: CPT

## 2023-10-04 PROCEDURE — 83605 ASSAY OF LACTIC ACID: CPT

## 2023-10-04 PROCEDURE — 770001 HCHG ROOM/CARE - MED/SURG/GYN PRIV*

## 2023-10-04 PROCEDURE — 99223 1ST HOSP IP/OBS HIGH 75: CPT | Performed by: STUDENT IN AN ORGANIZED HEALTH CARE EDUCATION/TRAINING PROGRAM

## 2023-10-04 PROCEDURE — HZ2ZZZZ DETOXIFICATION SERVICES FOR SUBSTANCE ABUSE TREATMENT: ICD-10-PCS | Performed by: STUDENT IN AN ORGANIZED HEALTH CARE EDUCATION/TRAINING PROGRAM

## 2023-10-04 PROCEDURE — 85610 PROTHROMBIN TIME: CPT

## 2023-10-04 PROCEDURE — 82140 ASSAY OF AMMONIA: CPT

## 2023-10-04 PROCEDURE — 85652 RBC SED RATE AUTOMATED: CPT

## 2023-10-04 PROCEDURE — 84145 PROCALCITONIN (PCT): CPT

## 2023-10-04 PROCEDURE — 82533 TOTAL CORTISOL: CPT

## 2023-10-04 RX ORDER — SODIUM CHLORIDE, SODIUM LACTATE, POTASSIUM CHLORIDE, AND CALCIUM CHLORIDE .6; .31; .03; .02 G/100ML; G/100ML; G/100ML; G/100ML
500 INJECTION, SOLUTION INTRAVENOUS
Status: DISCONTINUED | OUTPATIENT
Start: 2023-10-04 | End: 2023-10-17 | Stop reason: HOSPADM

## 2023-10-04 RX ORDER — ACETAMINOPHEN 325 MG/1
650 TABLET ORAL EVERY 6 HOURS PRN
Status: DISCONTINUED | OUTPATIENT
Start: 2023-10-04 | End: 2023-10-07

## 2023-10-04 RX ORDER — LORAZEPAM 2 MG/1
2 TABLET ORAL
Status: DISCONTINUED | OUTPATIENT
Start: 2023-10-04 | End: 2023-10-07

## 2023-10-04 RX ORDER — AMOXICILLIN 250 MG
2 CAPSULE ORAL 2 TIMES DAILY
Status: DISCONTINUED | OUTPATIENT
Start: 2023-10-04 | End: 2023-10-07

## 2023-10-04 RX ORDER — CLONIDINE HYDROCHLORIDE 0.1 MG/1
0.1 TABLET ORAL
Status: DISCONTINUED | OUTPATIENT
Start: 2023-10-04 | End: 2023-10-07

## 2023-10-04 RX ORDER — LORAZEPAM 2 MG/ML
0.5 INJECTION INTRAMUSCULAR EVERY 4 HOURS PRN
Status: DISCONTINUED | OUTPATIENT
Start: 2023-10-04 | End: 2023-10-07

## 2023-10-04 RX ORDER — POLYETHYLENE GLYCOL 3350 17 G/17G
1 POWDER, FOR SOLUTION ORAL
Status: DISCONTINUED | OUTPATIENT
Start: 2023-10-04 | End: 2023-10-07

## 2023-10-04 RX ORDER — LORAZEPAM 1 MG/1
1 TABLET ORAL EVERY 4 HOURS PRN
Status: DISCONTINUED | OUTPATIENT
Start: 2023-10-04 | End: 2023-10-07

## 2023-10-04 RX ORDER — LORAZEPAM 2 MG/ML
1 INJECTION INTRAMUSCULAR
Status: DISCONTINUED | OUTPATIENT
Start: 2023-10-04 | End: 2023-10-07

## 2023-10-04 RX ORDER — CHLORDIAZEPOXIDE HYDROCHLORIDE 25 MG/1
25 CAPSULE, GELATIN COATED ORAL EVERY 6 HOURS
Status: DISCONTINUED | OUTPATIENT
Start: 2023-10-06 | End: 2023-10-05

## 2023-10-04 RX ORDER — GAUZE BANDAGE 2" X 2"
100 BANDAGE TOPICAL DAILY
Status: DISCONTINUED | OUTPATIENT
Start: 2023-10-08 | End: 2023-10-07

## 2023-10-04 RX ORDER — LORAZEPAM 2 MG/1
4 TABLET ORAL
Status: DISCONTINUED | OUTPATIENT
Start: 2023-10-04 | End: 2023-10-07

## 2023-10-04 RX ORDER — ONDANSETRON 4 MG/1
4 TABLET, ORALLY DISINTEGRATING ORAL EVERY 4 HOURS PRN
Status: DISCONTINUED | OUTPATIENT
Start: 2023-10-04 | End: 2023-10-07

## 2023-10-04 RX ORDER — BISACODYL 10 MG
10 SUPPOSITORY, RECTAL RECTAL
Status: DISCONTINUED | OUTPATIENT
Start: 2023-10-04 | End: 2023-10-07

## 2023-10-04 RX ORDER — LABETALOL HYDROCHLORIDE 5 MG/ML
10 INJECTION, SOLUTION INTRAVENOUS EVERY 4 HOURS PRN
Status: DISCONTINUED | OUTPATIENT
Start: 2023-10-04 | End: 2023-10-17 | Stop reason: HOSPADM

## 2023-10-04 RX ORDER — CHLORDIAZEPOXIDE HYDROCHLORIDE 25 MG/1
50 CAPSULE, GELATIN COATED ORAL EVERY 6 HOURS
Status: DISCONTINUED | OUTPATIENT
Start: 2023-10-05 | End: 2023-10-05

## 2023-10-04 RX ORDER — LORAZEPAM 2 MG/ML
1.5 INJECTION INTRAMUSCULAR
Status: DISCONTINUED | OUTPATIENT
Start: 2023-10-04 | End: 2023-10-07

## 2023-10-04 RX ORDER — LORAZEPAM 2 MG/ML
2 INJECTION INTRAMUSCULAR
Status: DISCONTINUED | OUTPATIENT
Start: 2023-10-04 | End: 2023-10-07

## 2023-10-04 RX ORDER — LORAZEPAM 1 MG/1
0.5 TABLET ORAL EVERY 4 HOURS PRN
Status: DISCONTINUED | OUTPATIENT
Start: 2023-10-04 | End: 2023-10-07

## 2023-10-04 RX ORDER — FOLIC ACID 1 MG/1
1 TABLET ORAL DAILY
Status: DISCONTINUED | OUTPATIENT
Start: 2023-10-05 | End: 2023-10-07

## 2023-10-04 RX ORDER — ONDANSETRON 2 MG/ML
4 INJECTION INTRAMUSCULAR; INTRAVENOUS EVERY 4 HOURS PRN
Status: DISCONTINUED | OUTPATIENT
Start: 2023-10-04 | End: 2023-10-17 | Stop reason: HOSPADM

## 2023-10-04 ASSESSMENT — FIBROSIS 4 INDEX
FIB4 SCORE: 5.23
FIB4 SCORE: 5.23

## 2023-10-04 ASSESSMENT — PAIN DESCRIPTION - PAIN TYPE: TYPE: ACUTE PAIN

## 2023-10-05 ENCOUNTER — APPOINTMENT (OUTPATIENT)
Dept: RADIOLOGY | Facility: MEDICAL CENTER | Age: 78
DRG: 070 | End: 2023-10-05
Attending: STUDENT IN AN ORGANIZED HEALTH CARE EDUCATION/TRAINING PROGRAM
Payer: COMMERCIAL

## 2023-10-05 PROBLEM — F10.931 ALCOHOL WITHDRAWAL SYNDROME, WITH DELIRIUM (HCC): Status: ACTIVE | Noted: 2023-09-24

## 2023-10-05 LAB
ALBUMIN SERPL BCP-MCNC: 3.6 G/DL (ref 3.2–4.9)
ALBUMIN/GLOB SERPL: 0.9 G/DL
ALP SERPL-CCNC: 99 U/L (ref 30–99)
ALT SERPL-CCNC: 19 U/L (ref 2–50)
AMMONIA PLAS-SCNC: 12 UMOL/L (ref 11–45)
ANION GAP SERPL CALC-SCNC: 14 MMOL/L (ref 7–16)
AST SERPL-CCNC: 27 U/L (ref 12–45)
BILIRUB SERPL-MCNC: 0.9 MG/DL (ref 0.1–1.5)
BUN SERPL-MCNC: 7 MG/DL (ref 8–22)
CALCIUM ALBUM COR SERPL-MCNC: 9.8 MG/DL (ref 8.5–10.5)
CALCIUM SERPL-MCNC: 9.5 MG/DL (ref 8.5–10.5)
CHLORIDE SERPL-SCNC: 100 MMOL/L (ref 96–112)
CO2 SERPL-SCNC: 26 MMOL/L (ref 20–33)
CORTIS SERPL-MCNC: 12.2 UG/DL (ref 0–23)
CREAT SERPL-MCNC: 0.57 MG/DL (ref 0.5–1.4)
CRP SERPL HS-MCNC: 5.18 MG/DL (ref 0–0.75)
ERYTHROCYTE [SEDIMENTATION RATE] IN BLOOD BY WESTERGREN METHOD: 7 MM/HOUR (ref 0–20)
GFR SERPLBLD CREATININE-BSD FMLA CKD-EPI: 102 ML/MIN/1.73 M 2
GLOBULIN SER CALC-MCNC: 3.9 G/DL (ref 1.9–3.5)
GLUCOSE SERPL-MCNC: 87 MG/DL (ref 65–99)
INR PPP: 1.08 (ref 0.87–1.13)
LACTATE SERPL-SCNC: 1.8 MMOL/L (ref 0.5–2)
LDH SERPL L TO P-CCNC: 250 U/L (ref 107–266)
MAGNESIUM SERPL-MCNC: 1.8 MG/DL (ref 1.5–2.5)
PHOSPHATE SERPL-MCNC: 3.1 MG/DL (ref 2.5–4.5)
POTASSIUM SERPL-SCNC: 4.4 MMOL/L (ref 3.6–5.5)
PROCALCITONIN SERPL-MCNC: <0.05 NG/ML
PROT SERPL-MCNC: 7.5 G/DL (ref 6–8.2)
PROTHROMBIN TIME: 14.1 SEC (ref 12–14.6)
SODIUM SERPL-SCNC: 140 MMOL/L (ref 135–145)
VIT B12 SERPL-MCNC: 1209 PG/ML (ref 211–911)

## 2023-10-05 PROCEDURE — A9270 NON-COVERED ITEM OR SERVICE: HCPCS | Performed by: STUDENT IN AN ORGANIZED HEALTH CARE EDUCATION/TRAINING PROGRAM

## 2023-10-05 PROCEDURE — 99233 SBSQ HOSP IP/OBS HIGH 50: CPT | Performed by: INTERNAL MEDICINE

## 2023-10-05 PROCEDURE — 84425 ASSAY OF VITAMIN B-1: CPT

## 2023-10-05 PROCEDURE — 51798 US URINE CAPACITY MEASURE: CPT

## 2023-10-05 PROCEDURE — 700105 HCHG RX REV CODE 258: Performed by: STUDENT IN AN ORGANIZED HEALTH CARE EDUCATION/TRAINING PROGRAM

## 2023-10-05 PROCEDURE — 87040 BLOOD CULTURE FOR BACTERIA: CPT | Mod: 91

## 2023-10-05 PROCEDURE — 700111 HCHG RX REV CODE 636 W/ 250 OVERRIDE (IP): Performed by: STUDENT IN AN ORGANIZED HEALTH CARE EDUCATION/TRAINING PROGRAM

## 2023-10-05 PROCEDURE — 36415 COLL VENOUS BLD VENIPUNCTURE: CPT

## 2023-10-05 PROCEDURE — 770001 HCHG ROOM/CARE - MED/SURG/GYN PRIV*

## 2023-10-05 PROCEDURE — 700102 HCHG RX REV CODE 250 W/ 637 OVERRIDE(OP): Performed by: STUDENT IN AN ORGANIZED HEALTH CARE EDUCATION/TRAINING PROGRAM

## 2023-10-05 PROCEDURE — 700101 HCHG RX REV CODE 250: Performed by: STUDENT IN AN ORGANIZED HEALTH CARE EDUCATION/TRAINING PROGRAM

## 2023-10-05 RX ORDER — MAGNESIUM SULFATE 1 G/100ML
1 INJECTION INTRAVENOUS ONCE
Status: COMPLETED | OUTPATIENT
Start: 2023-10-05 | End: 2023-10-05

## 2023-10-05 RX ORDER — NALOXONE HYDROCHLORIDE 0.4 MG/ML
0.4 INJECTION, SOLUTION INTRAMUSCULAR; INTRAVENOUS; SUBCUTANEOUS ONCE
Status: COMPLETED | OUTPATIENT
Start: 2023-10-05 | End: 2023-10-05

## 2023-10-05 RX ADMIN — CHLORDIAZEPOXIDE HYDROCHLORIDE 50 MG: 25 CAPSULE ORAL at 01:22

## 2023-10-05 RX ADMIN — FOLIC ACID: 5 INJECTION, SOLUTION INTRAMUSCULAR; INTRAVENOUS; SUBCUTANEOUS at 00:04

## 2023-10-05 RX ADMIN — DOCUSATE SODIUM 50 MG AND SENNOSIDES 8.6 MG 2 TABLET: 8.6; 5 TABLET, FILM COATED ORAL at 00:04

## 2023-10-05 RX ADMIN — FOLIC ACID 1 MG: 1 TABLET ORAL at 06:29

## 2023-10-05 RX ADMIN — LORAZEPAM 2 MG: 2 INJECTION INTRAMUSCULAR; INTRAVENOUS at 08:37

## 2023-10-05 RX ADMIN — LORAZEPAM 1 MG: 2 INJECTION INTRAMUSCULAR; INTRAVENOUS at 21:23

## 2023-10-05 RX ADMIN — LORAZEPAM 1.5 MG: 2 INJECTION INTRAMUSCULAR; INTRAVENOUS at 22:53

## 2023-10-05 RX ADMIN — THIAMINE HYDROCHLORIDE 500 MG: 100 INJECTION, SOLUTION INTRAMUSCULAR; INTRAVENOUS at 00:11

## 2023-10-05 RX ADMIN — THIAMINE HYDROCHLORIDE 500 MG: 100 INJECTION, SOLUTION INTRAMUSCULAR; INTRAVENOUS at 18:17

## 2023-10-05 RX ADMIN — NALOXONE HYDROCHLORIDE 0.4 MG: 0.4 INJECTION, SOLUTION INTRAMUSCULAR; INTRAVENOUS; SUBCUTANEOUS at 04:49

## 2023-10-05 RX ADMIN — THIAMINE HYDROCHLORIDE 500 MG: 100 INJECTION, SOLUTION INTRAMUSCULAR; INTRAVENOUS at 21:24

## 2023-10-05 RX ADMIN — LORAZEPAM 1 MG: 2 INJECTION INTRAMUSCULAR; INTRAVENOUS at 09:42

## 2023-10-05 RX ADMIN — THIAMINE HYDROCHLORIDE 500 MG: 100 INJECTION, SOLUTION INTRAMUSCULAR; INTRAVENOUS at 09:43

## 2023-10-05 RX ADMIN — LORAZEPAM 0.5 MG: 2 INJECTION INTRAMUSCULAR; INTRAVENOUS at 04:49

## 2023-10-05 RX ADMIN — LORAZEPAM 1 MG: 2 INJECTION INTRAMUSCULAR; INTRAVENOUS at 00:32

## 2023-10-05 RX ADMIN — MAGNESIUM SULFATE IN DEXTROSE 1 G: 10 INJECTION, SOLUTION INTRAVENOUS at 04:52

## 2023-10-05 RX ADMIN — THERA TABS 1 TABLET: TAB at 06:30

## 2023-10-05 RX ADMIN — LORAZEPAM 1 MG: 2 INJECTION INTRAMUSCULAR; INTRAVENOUS at 18:17

## 2023-10-05 RX ADMIN — LORAZEPAM 2 MG: 2 INJECTION INTRAMUSCULAR; INTRAVENOUS at 15:13

## 2023-10-05 RX ADMIN — LORAZEPAM 1.5 MG: 2 INJECTION INTRAMUSCULAR; INTRAVENOUS at 02:31

## 2023-10-05 RX ADMIN — LORAZEPAM 2 MG: 2 INJECTION INTRAMUSCULAR; INTRAVENOUS at 08:19

## 2023-10-05 RX ADMIN — LORAZEPAM 1.5 MG: 2 INJECTION INTRAMUSCULAR; INTRAVENOUS at 16:06

## 2023-10-05 RX ADMIN — DOCUSATE SODIUM 50 MG AND SENNOSIDES 8.6 MG 2 TABLET: 8.6; 5 TABLET, FILM COATED ORAL at 06:30

## 2023-10-05 ASSESSMENT — LIFESTYLE VARIABLES
VISUAL DISTURBANCES: VERY MILD SENSITIVITY
TOTAL SCORE: 29
VISUAL DISTURBANCES: VERY MILD SENSITIVITY
TOTAL SCORE: MODERATE ITCHING, PINS AND NEEDLES SENSATION, BURNING OR NUMBNESS
HEADACHE, FULLNESS IN HEAD: VERY MILD
TOTAL SCORE: 14
ORIENTATION AND CLOUDING OF SENSORIUM: DISORIENTED FOR PLACE AND / OR PERSON
NAUSEA AND VOMITING: NO NAUSEA AND NO VOMITING
NAUSEA AND VOMITING: NO NAUSEA AND NO VOMITING
AGITATION: SOMEWHAT MORE THAN NORMAL ACTIVITY
PAROXYSMAL SWEATS: BEADS OF SWEAT OBVIOUS ON FOREHEAD
AUDITORY DISTURBANCES: NOT PRESENT
NAUSEA AND VOMITING: NO NAUSEA AND NO VOMITING
PAROXYSMAL SWEATS: NO SWEAT VISIBLE
AGITATION: NORMAL ACTIVITY
NAUSEA AND VOMITING: NO NAUSEA AND NO VOMITING
TREMOR: NO TREMOR
AGITATION: *
TREMOR: NO TREMOR
TOTAL SCORE: 7
ANXIETY: MODERATELY ANXIOUS OR GUARDED, SO ANXIETY IS INFERRED
TOTAL SCORE: 9
HEADACHE, FULLNESS IN HEAD: NOT PRESENT
AGITATION: *
TREMOR: *
AUDITORY DISTURBANCES: MODERATE HARSHNESS OR ABILITY TO FRIGHTEN
VISUAL DISTURBANCES: NOT PRESENT
PAROXYSMAL SWEATS: BARELY PERCEPTIBLE SWEATING. PALMS MOIST
PAROXYSMAL SWEATS: NO SWEAT VISIBLE
ANXIETY: MODERATELY ANXIOUS OR GUARDED, SO ANXIETY IS INFERRED
AUDITORY DISTURBANCES: MODERATE HARSHNESS OR ABILITY TO FRIGHTEN
HEADACHE, FULLNESS IN HEAD: NOT PRESENT
AGITATION: *
TOTAL SCORE: MODERATE ITCHING, PINS AND NEEDLES SENSATION, BURNING OR NUMBNESS
NAUSEA AND VOMITING: NO NAUSEA AND NO VOMITING
HEADACHE, FULLNESS IN HEAD: NOT PRESENT
AGITATION: *
ANXIETY: *
AGITATION: NORMAL ACTIVITY
NAUSEA AND VOMITING: NO NAUSEA AND NO VOMITING
TREMOR: *
PAROXYSMAL SWEATS: BEADS OF SWEAT OBVIOUS ON FOREHEAD
ANXIETY: MODERATELY ANXIOUS OR GUARDED, SO ANXIETY IS INFERRED
ORIENTATION AND CLOUDING OF SENSORIUM: DISORIENTED FOR PLACE AND / OR PERSON
ORIENTATION AND CLOUDING OF SENSORIUM: DISORIENTED FOR PLACE AND / OR PERSON
VISUAL DISTURBANCES: VERY MILD SENSITIVITY
AGITATION: MODERATELY FIDGETY AND RESTLESS
NAUSEA AND VOMITING: NO NAUSEA AND NO VOMITING
HEADACHE, FULLNESS IN HEAD: MILD
ORIENTATION AND CLOUDING OF SENSORIUM: DISORIENTED FOR PLACE AND / OR PERSON
VISUAL DISTURBANCES: MILD SENSITIVITY
HEADACHE, FULLNESS IN HEAD: NOT PRESENT
VISUAL DISTURBANCES: MILD SENSITIVITY
VISUAL DISTURBANCES: NOT PRESENT
PAROXYSMAL SWEATS: *
ANXIETY: MODERATELY ANXIOUS OR GUARDED, SO ANXIETY IS INFERRED
TOTAL SCORE: 12
ORIENTATION AND CLOUDING OF SENSORIUM: DISORIENTED FOR PLACE AND / OR PERSON
PAROXYSMAL SWEATS: NO SWEAT VISIBLE
ANXIETY: *
AUDITORY DISTURBANCES: NOT PRESENT
TREMOR: *
TOTAL SCORE: 29
ANXIETY: *
NAUSEA AND VOMITING: NO NAUSEA AND NO VOMITING
AGITATION: MODERATELY FIDGETY AND RESTLESS
VISUAL DISTURBANCES: NOT PRESENT
AUDITORY DISTURBANCES: NOT PRESENT
PAROXYSMAL SWEATS: BARELY PERCEPTIBLE SWEATING. PALMS MOIST
TOTAL SCORE: VERY MILD ITCHING, PINS AND NEEDLES SENSATION, BURNING OR NUMBNESS
NAUSEA AND VOMITING: NO NAUSEA AND NO VOMITING
TREMOR: TREMOR NOT VISIBLE BUT CAN BE FELT, FINGERTIP TO FINGERTIP
PAROXYSMAL SWEATS: BARELY PERCEPTIBLE SWEATING. PALMS MOIST
ANXIETY: MODERATELY ANXIOUS OR GUARDED, SO ANXIETY IS INFERRED
TREMOR: MODERATE TREMOR WITH ARMS EXTENDED
PAROXYSMAL SWEATS: *
TREMOR: MODERATE TREMOR WITH ARMS EXTENDED
ANXIETY: MODERATELY ANXIOUS OR GUARDED, SO ANXIETY IS INFERRED
AGITATION: MODERATELY FIDGETY AND RESTLESS
ANXIETY: *
ORIENTATION AND CLOUDING OF SENSORIUM: DISORIENTED FOR PLACE AND / OR PERSON
PAROXYSMAL SWEATS: BARELY PERCEPTIBLE SWEATING. PALMS MOIST
AUDITORY DISTURBANCES: NOT PRESENT
ORIENTATION AND CLOUDING OF SENSORIUM: DISORIENTED FOR PLACE AND / OR PERSON
TOTAL SCORE: VERY MILD ITCHING, PINS AND NEEDLES SENSATION, BURNING OR NUMBNESS
ORIENTATION AND CLOUDING OF SENSORIUM: DISORIENTED FOR PLACE AND / OR PERSON
AUDITORY DISTURBANCES: VERY MILD HARSHNESS OR ABILITY TO FRIGHTEN
HEADACHE, FULLNESS IN HEAD: NOT PRESENT
VISUAL DISTURBANCES: NOT PRESENT
TOTAL SCORE: 15
HEADACHE, FULLNESS IN HEAD: NOT PRESENT
NAUSEA AND VOMITING: NO NAUSEA AND NO VOMITING
AUDITORY DISTURBANCES: MODERATE HARSHNESS OR ABILITY TO FRIGHTEN
ORIENTATION AND CLOUDING OF SENSORIUM: DISORIENTED FOR PLACE AND / OR PERSON
AUDITORY DISTURBANCES: NOT PRESENT
HEADACHE, FULLNESS IN HEAD: NOT PRESENT
AUDITORY DISTURBANCES: NOT PRESENT
TREMOR: NO TREMOR
TOTAL SCORE: MODERATE ITCHING, PINS AND NEEDLES SENSATION, BURNING OR NUMBNESS
TOTAL SCORE: 27
HEADACHE, FULLNESS IN HEAD: NOT PRESENT
TOTAL SCORE: 20
TOTAL SCORE: VERY MILD ITCHING, PINS AND NEEDLES SENSATION, BURNING OR NUMBNESS
AGITATION: MODERATELY FIDGETY AND RESTLESS
TREMOR: *
ANXIETY: MILDLY ANXIOUS
VISUAL DISTURBANCES: MILD SENSITIVITY
VISUAL DISTURBANCES: MODERATE SENSITIVITY
NAUSEA AND VOMITING: NO NAUSEA AND NO VOMITING
AUDITORY DISTURBANCES: NOT PRESENT
TOTAL SCORE: 18
TOTAL SCORE: 14
ORIENTATION AND CLOUDING OF SENSORIUM: DISORIENTED FOR PLACE AND / OR PERSON
HEADACHE, FULLNESS IN HEAD: VERY MILD
ORIENTATION AND CLOUDING OF SENSORIUM: DISORIENTED FOR PLACE AND / OR PERSON
TREMOR: *

## 2023-10-05 ASSESSMENT — COGNITIVE AND FUNCTIONAL STATUS - GENERAL
TOILETING: A LOT
CLIMB 3 TO 5 STEPS WITH RAILING: TOTAL
PERSONAL GROOMING: A LOT
DRESSING REGULAR UPPER BODY CLOTHING: A LOT
STANDING UP FROM CHAIR USING ARMS: TOTAL
TURNING FROM BACK TO SIDE WHILE IN FLAT BAD: A LITTLE
SUGGESTED CMS G CODE MODIFIER MOBILITY: CL
DAILY ACTIVITIY SCORE: 12
SUGGESTED CMS G CODE MODIFIER DAILY ACTIVITY: CL
MOVING FROM LYING ON BACK TO SITTING ON SIDE OF FLAT BED: A LOT
HELP NEEDED FOR BATHING: A LOT
MOBILITY SCORE: 10
EATING MEALS: A LOT
WALKING IN HOSPITAL ROOM: TOTAL
MOVING TO AND FROM BED TO CHAIR: A LOT
DRESSING REGULAR LOWER BODY CLOTHING: A LOT

## 2023-10-05 ASSESSMENT — PAIN DESCRIPTION - PAIN TYPE: TYPE: ACUTE PAIN

## 2023-10-05 NOTE — PROGRESS NOTES
Hospitalist triage acceptance note:    77-year-old male [facility] now presumably admitted for alcohol withdrawal.  However patient continues to be agitated, combative, uncooperative.  No lab abnormalities, chest x-ray negative, UA negative.  Neurology consulted at outside facility, recommends MRI.  No MRI with sedation available at outside facility, request to be transferred to [facility] for MRI with sedation.

## 2023-10-05 NOTE — CARE PLAN
The patient is Stable - Low risk of patient condition declining or worsening    Shift Goals  Clinical Goals: monitor neuro status  Patient Goals: rest  Family Goals: rest    Progress made toward(s) clinical / shift goals:  Patient alert and agitated. Redirection and de escalation techniques applied. No acute change noted.    Patient is not progressing towards the following goals:

## 2023-10-05 NOTE — H&P
Hospital Medicine History & Physical Note    Date of Service  10/5/2023    Primary Care Physician  No primary care provider on file.    Consultants  none        Code Status  Full Code    Chief Complaint  Altered mental status      History of Presenting Illness  Sen Vasquez is a 77 y.o. male past medical history of alcohol abuse who presented 10/4/2023 with acute encephalopathy.  History obtained from chart review given patient's disorientation.  Patient was admitted for EtOH withdrawal and was in ICU subsequently transferred back on Lib[facility].  High level of care requested as patient requires sedation by anesthesia for MRI of the brain.  Admitted to medicine service.    I discussed the plan of care with bedside RN.    Review of Systems  Review of Systems   Unable to perform ROS: Mental status change       Past Medical History   has a past medical history of Alcohol abuse, Coronary arteriosclerosis, Dyslipidemia, GERD (gastroesophageal reflux disease), Mediastinal mass, and Osteopenia.    Surgical History   has a past surgical history that includes coronary artery bypass, 1.     Family History  family history is not on file.   Family history reviewed with patient. There is no family history that is pertinent to the chief complaint.     Social History   reports that he has been smoking cigarettes. He has never used smokeless tobacco. He reports current alcohol use. He reports that he does not use drugs.    Allergies  Allergies   Allergen Reactions    Pravastatin Hives and Swelling       Medications  None       Physical Exam  Temp:  [36.7 °C (98.1 °F)-37 °C (98.6 °F)] 36.7 °C (98.1 °F)  Pulse:  [70-80] 70  Resp:  [18-19] 18  BP: (110-125)/(63-87) 110/87  SpO2:  [90 %-91 %] 91 %  Blood Pressure : 110/87   Temperature: 36.7 °C (98.1 °F)   Pulse: 70   Respiration: 18   Pulse Oximetry: 91 %       Physical Exam  Vitals and nursing note reviewed.   Constitutional:       Comments: Somnolent but arousable   HENT:      Head:  "Normocephalic and atraumatic.      Right Ear: Tympanic membrane normal.      Left Ear: Tympanic membrane normal.      Nose: Nose normal.      Mouth/Throat:      Mouth: Mucous membranes are moist.      Pharynx: Oropharynx is clear.   Eyes:      Extraocular Movements: Extraocular movements intact.      Pupils: Pupils are equal, round, and reactive to light.      Comments: Pinpoint pupils   Cardiovascular:      Rate and Rhythm: Normal rate and regular rhythm.      Pulses: Normal pulses.      Heart sounds: Normal heart sounds.   Pulmonary:      Effort: Pulmonary effort is normal.      Breath sounds: Normal breath sounds.   Abdominal:      General: Bowel sounds are normal. There is no distension.      Palpations: Abdomen is soft. There is no mass.   Musculoskeletal:         General: Normal range of motion.      Cervical back: Neck supple.   Skin:     General: Skin is warm.      Capillary Refill: Capillary refill takes less than 2 seconds.   Neurological:      Mental Status: He is disoriented.      Comments: Does not follow commands  Alert and oriented to self only    Psychiatric:         Mood and Affect: Mood normal.         Behavior: Behavior normal.         Laboratory:  Recent Labs     10/04/23  2339   WBC 5.0   RBC 5.10   HEMOGLOBIN 17.1   HEMATOCRIT 50.6   MCV 99.2*   MCH 33.5*   MCHC 33.8   RDW 51.2*   PLATELETCT 258   MPV 10.3     Recent Labs     10/04/23  2339   SODIUM 140   POTASSIUM 4.4   CHLORIDE 100   CO2 26   GLUCOSE 87   BUN 7*   CREATININE 0.57   CALCIUM 9.5     Recent Labs     10/04/23  2339   ALTSGPT 19   ASTSGOT 27   ALKPHOSPHAT 99   TBILIRUBIN 0.9   GLUCOSE 87     Recent Labs     10/04/23  2339   INR 1.08     No results for input(s): \"NTPROBNP\" in the last 72 hours.      No results for input(s): \"TROPONINT\" in the last 72 hours.    Imaging:  MR-BRAIN-W/O    (Results Pending)       no X-Ray or EKG requiring interpretation    Assessment/Plan:  Justification for Admission Status  I anticipate this patient " will require at least two midnights for appropriate medical management, necessitating inpatient admission because acute encephalopathy requiring MRI brain with sedation.    Patient will need a Med/Surg bed on NEUROLOGY service .  The need is secondary to see above.    * Acute encephalopathy- (present on admission)  Assessment & Plan  Frequent neurochecks every 4 hours  Transferred here for MRI brain without contrast with sedation  Check TSH, ammonia, sed rate, B12, procalcitonin, cortisol  Trial of Narcan given pinpoint pupils  Consider neurology consult in a.m.  Thiamine, folic acid, multivitamins  Fall, seizure, aspiration precautions      ETOH abuse- (present on admission)  Assessment & Plan  CIWA protocol  Thiamine, folic acid, multivitamins  Monitor for withdrawal        VTE prophylaxis: SCDs/TEDs

## 2023-10-05 NOTE — ASSESSMENT & PLAN NOTE
[facility] protocol  Thiamine, folic acid, multivitamins  Monitor for withdrawal

## 2023-10-05 NOTE — PROGRESS NOTES
Patients MRI Anesthesia has been scheduled to happened 10/6/23 @1300    Anesthesia tech and PACU have both been notified about this case.

## 2023-10-05 NOTE — PROGRESS NOTES
4 Eyes Skin Assessment Completed by BROOKE Ansari and BROOKE Philip.    Head WDL  Ears WDL  Nose WDL  Mouth WDL  Neck WDL  Breast/Chest WDL  Shoulder Blades WDL  Spine WDL  (R) Arm/Elbow/Hand Bruising and Abrasion  (L) Arm/Elbow/Hand Bruising and Scab  Abdomen WDL  Groin WDL  Scrotum/Coccyx/Buttocks WDL  (R) Leg Abrasion  (L) Leg Abrasion  (R) Heel/Foot/Toe WDL  (L) Heel/Foot/Toe WDL          Devices In Places SCD's      Interventions In Place Pillows    Possible Skin Injury No    Pictures Uploaded Into Epic N/A  Wound Consult Placed N/A  RN Wound Prevention Protocol Ordered No

## 2023-10-05 NOTE — DISCHARGE PLANNING
Case Management Discharge Planning    Admission Date: 10/4/2023  GMLOS: 3.4  ALOS: 1    TRANSFER BACK PATIENT    Referring Facility: [facility]   Reason for Transfer: MRI with sedation    Signed Repatriation/Transfer Back Agreement saved in .    Once this patient has received the requested service or the patient no longer requires tertiary level of care from [facility] and is stable to transfer back to referring facility, we can facilitate a transfer back. Please contact the [facility] at [phone] to coordinate this transfer request.

## 2023-10-05 NOTE — ASSESSMENT & PLAN NOTE
Frequent neurochecks every 4 hours  Transferred here for MRI brain without contrast with sedation  Check TSH, ammonia, sed rate, B12, procalcitonin, cortisol  Trial of Narcan given pinpoint pupils  Consider neurology consult in a.m.  Thiamine, folic acid, multivitamins  Fall, seizure, aspiration precautions    10/11 Unsuccessful multiple attempts of LP by IR  10/13 underwent LP by IR  Per IR, very difficult LP, C1-2 Puncture performed  I reviewed previous neuro notes, ordered cell count, protein, glucose, culture, [place] serologies, meningitis panel, autoimmune encephalitis panel, lyme panel, and VDRL

## 2023-10-06 ENCOUNTER — APPOINTMENT (OUTPATIENT)
Dept: RADIOLOGY | Facility: MEDICAL CENTER | Age: 78
DRG: 070 | End: 2023-10-06
Attending: STUDENT IN AN ORGANIZED HEALTH CARE EDUCATION/TRAINING PROGRAM
Payer: COMMERCIAL

## 2023-10-06 ENCOUNTER — APPOINTMENT (OUTPATIENT)
Dept: RADIOLOGY | Facility: MEDICAL CENTER | Age: 78
DRG: 070 | End: 2023-10-06
Attending: INTERNAL MEDICINE
Payer: COMMERCIAL

## 2023-10-06 ENCOUNTER — ANESTHESIA (OUTPATIENT)
Dept: SURGERY | Facility: MEDICAL CENTER | Age: 78
DRG: 070 | End: 2023-10-06
Payer: COMMERCIAL

## 2023-10-06 ENCOUNTER — ANESTHESIA EVENT (OUTPATIENT)
Dept: SURGERY | Facility: MEDICAL CENTER | Age: 78
DRG: 070 | End: 2023-10-06
Payer: COMMERCIAL

## 2023-10-06 PROBLEM — I63.9 CVA (CEREBRAL VASCULAR ACCIDENT) (HCC): Status: ACTIVE | Noted: 2023-10-06

## 2023-10-06 LAB
ALBUMIN SERPL BCP-MCNC: 3.5 G/DL (ref 3.2–4.9)
ALBUMIN/GLOB SERPL: 1 G/DL
ALP SERPL-CCNC: 93 U/L (ref 30–99)
ALT SERPL-CCNC: 18 U/L (ref 2–50)
ANION GAP SERPL CALC-SCNC: 12 MMOL/L (ref 7–16)
AST SERPL-CCNC: 17 U/L (ref 12–45)
BILIRUB SERPL-MCNC: 0.7 MG/DL (ref 0.1–1.5)
BUN SERPL-MCNC: 6 MG/DL (ref 8–22)
CALCIUM ALBUM COR SERPL-MCNC: 9.7 MG/DL (ref 8.5–10.5)
CALCIUM SERPL-MCNC: 9.3 MG/DL (ref 8.5–10.5)
CHLORIDE SERPL-SCNC: 101 MMOL/L (ref 96–112)
CO2 SERPL-SCNC: 26 MMOL/L (ref 20–33)
CREAT SERPL-MCNC: 0.45 MG/DL (ref 0.5–1.4)
ERYTHROCYTE [DISTWIDTH] IN BLOOD BY AUTOMATED COUNT: 49.1 FL (ref 35.9–50)
EST. AVERAGE GLUCOSE BLD GHB EST-MCNC: 94 MG/DL
GFR SERPLBLD CREATININE-BSD FMLA CKD-EPI: 109 ML/MIN/1.73 M 2
GLOBULIN SER CALC-MCNC: 3.5 G/DL (ref 1.9–3.5)
GLUCOSE SERPL-MCNC: 86 MG/DL (ref 65–99)
HBA1C MFR BLD: 4.9 % (ref 4–5.6)
HCT VFR BLD AUTO: 50.1 % (ref 42–52)
HGB BLD-MCNC: 16.7 G/DL (ref 14–18)
MCH RBC QN AUTO: 32.7 PG (ref 27–33)
MCHC RBC AUTO-ENTMCNC: 33.3 G/DL (ref 32.3–36.5)
MCV RBC AUTO: 98 FL (ref 81.4–97.8)
PLATELET # BLD AUTO: 244 K/UL (ref 164–446)
PMV BLD AUTO: 10.1 FL (ref 9–12.9)
POTASSIUM SERPL-SCNC: 3.8 MMOL/L (ref 3.6–5.5)
PROT SERPL-MCNC: 7 G/DL (ref 6–8.2)
RBC # BLD AUTO: 5.11 M/UL (ref 4.7–6.1)
SODIUM SERPL-SCNC: 139 MMOL/L (ref 135–145)
WBC # BLD AUTO: 5.4 K/UL (ref 4.8–10.8)

## 2023-10-06 PROCEDURE — 700111 HCHG RX REV CODE 636 W/ 250 OVERRIDE (IP): Performed by: ANESTHESIOLOGY

## 2023-10-06 PROCEDURE — A9270 NON-COVERED ITEM OR SERVICE: HCPCS | Performed by: INTERNAL MEDICINE

## 2023-10-06 PROCEDURE — 36415 COLL VENOUS BLD VENIPUNCTURE: CPT

## 2023-10-06 PROCEDURE — 70551 MRI BRAIN STEM W/O DYE: CPT

## 2023-10-06 PROCEDURE — 83036 HEMOGLOBIN GLYCOSYLATED A1C: CPT

## 2023-10-06 PROCEDURE — 99222 1ST HOSP IP/OBS MODERATE 55: CPT | Mod: FS | Performed by: NURSE PRACTITIONER

## 2023-10-06 PROCEDURE — G0316 PR PROLONGED IP/OBS E&M EA 15 MIN: HCPCS | Performed by: INTERNAL MEDICINE

## 2023-10-06 PROCEDURE — 74018 RADEX ABDOMEN 1 VIEW: CPT

## 2023-10-06 PROCEDURE — 700102 HCHG RX REV CODE 250 W/ 637 OVERRIDE(OP): Performed by: INTERNAL MEDICINE

## 2023-10-06 PROCEDURE — 51798 US URINE CAPACITY MEASURE: CPT

## 2023-10-06 PROCEDURE — 160002 HCHG RECOVERY MINUTES (STAT)

## 2023-10-06 PROCEDURE — 770001 HCHG ROOM/CARE - MED/SURG/GYN PRIV*

## 2023-10-06 PROCEDURE — 160035 HCHG PACU - 1ST 60 MINS PHASE I

## 2023-10-06 PROCEDURE — 700102 HCHG RX REV CODE 250 W/ 637 OVERRIDE(OP): Performed by: STUDENT IN AN ORGANIZED HEALTH CARE EDUCATION/TRAINING PROGRAM

## 2023-10-06 PROCEDURE — 99233 SBSQ HOSP IP/OBS HIGH 50: CPT | Mod: 25 | Performed by: INTERNAL MEDICINE

## 2023-10-06 PROCEDURE — 700101 HCHG RX REV CODE 250: Performed by: ANESTHESIOLOGY

## 2023-10-06 PROCEDURE — 85027 COMPLETE CBC AUTOMATED: CPT

## 2023-10-06 PROCEDURE — 80053 COMPREHEN METABOLIC PANEL: CPT

## 2023-10-06 PROCEDURE — A9270 NON-COVERED ITEM OR SERVICE: HCPCS | Performed by: STUDENT IN AN ORGANIZED HEALTH CARE EDUCATION/TRAINING PROGRAM

## 2023-10-06 PROCEDURE — 700105 HCHG RX REV CODE 258: Performed by: STUDENT IN AN ORGANIZED HEALTH CARE EDUCATION/TRAINING PROGRAM

## 2023-10-06 PROCEDURE — 700105 HCHG RX REV CODE 258: Performed by: ANESTHESIOLOGY

## 2023-10-06 PROCEDURE — 700111 HCHG RX REV CODE 636 W/ 250 OVERRIDE (IP): Mod: JZ

## 2023-10-06 PROCEDURE — 700111 HCHG RX REV CODE 636 W/ 250 OVERRIDE (IP): Performed by: STUDENT IN AN ORGANIZED HEALTH CARE EDUCATION/TRAINING PROGRAM

## 2023-10-06 RX ORDER — HYDRALAZINE HYDROCHLORIDE 20 MG/ML
5 INJECTION INTRAMUSCULAR; INTRAVENOUS
Status: DISCONTINUED | OUTPATIENT
Start: 2023-10-06 | End: 2023-10-06 | Stop reason: HOSPADM

## 2023-10-06 RX ORDER — LABETALOL HYDROCHLORIDE 5 MG/ML
5 INJECTION, SOLUTION INTRAVENOUS
Status: DISCONTINUED | OUTPATIENT
Start: 2023-10-06 | End: 2023-10-06 | Stop reason: HOSPADM

## 2023-10-06 RX ORDER — SODIUM CHLORIDE, SODIUM LACTATE, POTASSIUM CHLORIDE, CALCIUM CHLORIDE 600; 310; 30; 20 MG/100ML; MG/100ML; MG/100ML; MG/100ML
INJECTION, SOLUTION INTRAVENOUS CONTINUOUS
Status: DISCONTINUED | OUTPATIENT
Start: 2023-10-06 | End: 2023-10-06 | Stop reason: HOSPADM

## 2023-10-06 RX ORDER — SODIUM CHLORIDE, SODIUM LACTATE, POTASSIUM CHLORIDE, CALCIUM CHLORIDE 600; 310; 30; 20 MG/100ML; MG/100ML; MG/100ML; MG/100ML
INJECTION, SOLUTION INTRAVENOUS
Status: DISCONTINUED | OUTPATIENT
Start: 2023-10-06 | End: 2023-10-06

## 2023-10-06 RX ORDER — ATORVASTATIN CALCIUM 40 MG/1
40 TABLET, FILM COATED ORAL EVERY EVENING
Status: DISCONTINUED | OUTPATIENT
Start: 2023-10-06 | End: 2023-10-06

## 2023-10-06 RX ORDER — SODIUM CHLORIDE, SODIUM GLUCONATE, SODIUM ACETATE, POTASSIUM CHLORIDE AND MAGNESIUM CHLORIDE 526; 502; 368; 37; 30 MG/100ML; MG/100ML; MG/100ML; MG/100ML; MG/100ML
INJECTION, SOLUTION INTRAVENOUS
Status: DISCONTINUED | OUTPATIENT
Start: 2023-10-06 | End: 2023-10-06 | Stop reason: SURG

## 2023-10-06 RX ORDER — ASPIRIN 300 MG/1
300 SUPPOSITORY RECTAL DAILY
Status: DISCONTINUED | OUTPATIENT
Start: 2023-10-06 | End: 2023-10-17 | Stop reason: HOSPADM

## 2023-10-06 RX ORDER — HALOPERIDOL 5 MG/ML
5 INJECTION INTRAMUSCULAR EVERY 6 HOURS PRN
Status: DISCONTINUED | OUTPATIENT
Start: 2023-10-06 | End: 2023-10-15

## 2023-10-06 RX ORDER — ASPIRIN 81 MG/1
81 TABLET, CHEWABLE ORAL DAILY
Status: DISCONTINUED | OUTPATIENT
Start: 2023-10-06 | End: 2023-10-17 | Stop reason: HOSPADM

## 2023-10-06 RX ORDER — ENOXAPARIN SODIUM 100 MG/ML
40 INJECTION SUBCUTANEOUS DAILY
Status: DISCONTINUED | OUTPATIENT
Start: 2023-10-07 | End: 2023-10-17 | Stop reason: HOSPADM

## 2023-10-06 RX ORDER — ONDANSETRON 2 MG/ML
4 INJECTION INTRAMUSCULAR; INTRAVENOUS
Status: DISCONTINUED | OUTPATIENT
Start: 2023-10-06 | End: 2023-10-06 | Stop reason: HOSPADM

## 2023-10-06 RX ORDER — HALOPERIDOL 5 MG/ML
1 INJECTION INTRAMUSCULAR
Status: DISCONTINUED | OUTPATIENT
Start: 2023-10-06 | End: 2023-10-06 | Stop reason: HOSPADM

## 2023-10-06 RX ORDER — EZETIMIBE 10 MG/1
10 TABLET ORAL DAILY
Status: DISCONTINUED | OUTPATIENT
Start: 2023-10-06 | End: 2023-10-07

## 2023-10-06 RX ORDER — DIPHENHYDRAMINE HYDROCHLORIDE 50 MG/ML
12.5 INJECTION INTRAMUSCULAR; INTRAVENOUS
Status: DISCONTINUED | OUTPATIENT
Start: 2023-10-06 | End: 2023-10-06 | Stop reason: HOSPADM

## 2023-10-06 RX ORDER — MEPERIDINE HYDROCHLORIDE 25 MG/ML
12.5 INJECTION INTRAMUSCULAR; INTRAVENOUS; SUBCUTANEOUS
Status: DISCONTINUED | OUTPATIENT
Start: 2023-10-06 | End: 2023-10-06 | Stop reason: HOSPADM

## 2023-10-06 RX ORDER — ONDANSETRON 2 MG/ML
INJECTION INTRAMUSCULAR; INTRAVENOUS PRN
Status: DISCONTINUED | OUTPATIENT
Start: 2023-10-06 | End: 2023-10-06 | Stop reason: SURG

## 2023-10-06 RX ADMIN — THIAMINE HYDROCHLORIDE 500 MG: 100 INJECTION, SOLUTION INTRAMUSCULAR; INTRAVENOUS at 23:36

## 2023-10-06 RX ADMIN — SODIUM CHLORIDE, SODIUM GLUCONATE, SODIUM ACETATE, POTASSIUM CHLORIDE AND MAGNESIUM CHLORIDE: 526; 502; 368; 37; 30 INJECTION, SOLUTION INTRAVENOUS at 13:15

## 2023-10-06 RX ADMIN — HALOPERIDOL LACTATE 5 MG: 5 INJECTION, SOLUTION INTRAMUSCULAR at 03:12

## 2023-10-06 RX ADMIN — ONDANSETRON 4 MG: 2 INJECTION INTRAMUSCULAR; INTRAVENOUS at 13:43

## 2023-10-06 RX ADMIN — THIAMINE HYDROCHLORIDE 500 MG: 100 INJECTION, SOLUTION INTRAMUSCULAR; INTRAVENOUS at 15:36

## 2023-10-06 RX ADMIN — SUGAMMADEX 200 MG: 100 INJECTION, SOLUTION INTRAVENOUS at 13:43

## 2023-10-06 RX ADMIN — LORAZEPAM 1.5 MG: 2 INJECTION INTRAMUSCULAR; INTRAVENOUS at 00:07

## 2023-10-06 RX ADMIN — ROCURONIUM BROMIDE 30 MG: 10 INJECTION, SOLUTION INTRAVENOUS at 13:20

## 2023-10-06 RX ADMIN — THIAMINE HYDROCHLORIDE 500 MG: 100 INJECTION, SOLUTION INTRAMUSCULAR; INTRAVENOUS at 09:10

## 2023-10-06 RX ADMIN — DOCUSATE SODIUM 50 MG AND SENNOSIDES 8.6 MG 2 TABLET: 8.6; 5 TABLET, FILM COATED ORAL at 18:16

## 2023-10-06 RX ADMIN — HALOPERIDOL LACTATE 5 MG: 5 INJECTION, SOLUTION INTRAMUSCULAR at 09:07

## 2023-10-06 RX ADMIN — PROPOFOL 100 MG: 10 INJECTION, EMULSION INTRAVENOUS at 13:20

## 2023-10-06 RX ADMIN — HALOPERIDOL LACTATE 5 MG: 5 INJECTION, SOLUTION INTRAMUSCULAR at 22:22

## 2023-10-06 RX ADMIN — EZETIMIBE 10 MG: 10 TABLET ORAL at 18:16

## 2023-10-06 RX ADMIN — LORAZEPAM 2 MG: 2 INJECTION INTRAMUSCULAR; INTRAVENOUS at 12:56

## 2023-10-06 RX ADMIN — ASPIRIN 81 MG 81 MG: 81 TABLET ORAL at 18:16

## 2023-10-06 ASSESSMENT — LIFESTYLE VARIABLES
TREMOR: TREMOR NOT VISIBLE BUT CAN BE FELT, FINGERTIP TO FINGERTIP
PAROXYSMAL SWEATS: BARELY PERCEPTIBLE SWEATING. PALMS MOIST
AGITATION: NORMAL ACTIVITY
HEADACHE, FULLNESS IN HEAD: NOT PRESENT
HEADACHE, FULLNESS IN HEAD: NOT PRESENT
ANXIETY: NO ANXIETY (AT EASE)
TOTAL SCORE: 4
TREMOR: MODERATE TREMOR WITH ARMS EXTENDED
ORIENTATION AND CLOUDING OF SENSORIUM: DISORIENTED FOR PLACE AND / OR PERSON
VISUAL DISTURBANCES: NOT PRESENT
HEADACHE, FULLNESS IN HEAD: NOT PRESENT
AGITATION: MODERATELY FIDGETY AND RESTLESS
ANXIETY: MODERATELY ANXIOUS OR GUARDED, SO ANXIETY IS INFERRED
TOTAL SCORE: 28
PAROXYSMAL SWEATS: NO SWEAT VISIBLE
ORIENTATION AND CLOUDING OF SENSORIUM: DISORIENTED FOR PLACE AND / OR PERSON
AUDITORY DISTURBANCES: MODERATE HARSHNESS OR ABILITY TO FRIGHTEN
TOTAL SCORE: MODERATE ITCHING, PINS AND NEEDLES SENSATION, BURNING OR NUMBNESS
AUDITORY DISTURBANCES: NOT PRESENT
AUDITORY DISTURBANCES: NOT PRESENT
AGITATION: *
TREMOR: NO TREMOR
VISUAL DISTURBANCES: MODERATE SENSITIVITY
NAUSEA AND VOMITING: NO NAUSEA AND NO VOMITING
ORIENTATION AND CLOUDING OF SENSORIUM: DISORIENTED FOR PLACE AND / OR PERSON
TOTAL SCORE: 10
PAROXYSMAL SWEATS: *
VISUAL DISTURBANCES: NOT PRESENT
ANXIETY: *

## 2023-10-06 ASSESSMENT — PAIN SCALES - WONG BAKER: WONGBAKER_NUMERICALRESPONSE: DOESN'T HURT AT ALL

## 2023-10-06 NOTE — PROGRESS NOTES
Hospital Medicine Daily Progress Note    Date of Service  10/5/2023    Chief Complaint  Sen Vasquez is a 77 y.o. male admitted [facility] and w.u for EtOH withdrawal and unexplained comfusion.    Hospital Course  No notes on file  He is being evaluated for confusion and agitated 7d out of withdrawal period.   Interval Problem Update  10/5. Patient is confused. When aroused he is combative and agitated  Ultimately placed restraints.  Spoke with Neurology as patient is 7 days out of withdrawal period. He remains on detox bag. MRI PENDING.     I have discussed this patient's plan of care and discharge plan at IDT rounds today with Case Management, Nursing, Nursing leadership, and other members of the IDT team.    Consultants/Specialty      Code Status  Full Code    Disposition  The patient is not medically cleared for discharge to home or a post-acute facility.      I have placed the appropriate orders for post-discharge needs.    Review of Systems  ROS     Physical Exam  Temp:  [36.5 °C (97.7 °F)-37 °C (98.6 °F)] 36.5 °C (97.7 °F)  Pulse:  [70-80] 72  Resp:  [18-19] 18  BP: (110-125)/(63-87) 110/85  SpO2:  [90 %-91 %] 91 %    Physical Exam    Fluids  No intake or output data in the 24 hours ending 10/05/23 1734    Laboratory  Recent Labs     10/04/23  2339   WBC 5.0   RBC 5.10   HEMOGLOBIN 17.1   HEMATOCRIT 50.6   MCV 99.2*   MCH 33.5*   MCHC 33.8   RDW 51.2*   PLATELETCT 258   MPV 10.3     Recent Labs     10/04/23  2339   SODIUM 140   POTASSIUM 4.4   CHLORIDE 100   CO2 26   GLUCOSE 87   BUN 7*   CREATININE 0.57   CALCIUM 9.5     Recent Labs     10/04/23  2339   INR 1.08               Imaging  MR-BRAIN-W/O    (Results Pending)        Assessment/Plan  * Alcohol withdrawal syndrome, with delirium (HCC)  Assessment & Plan  On detox bag  PRN ATivan  MRI pending,. Neurology aware.    Acute encephalopathy- (present on admission)  Assessment & Plan  Frequent neurochecks every 4  hours  Transferred here for MRI brain without contrast with sedation  Check TSH, ammonia, sed rate, B12, procalcitonin, cortisol  Trial of Narcan given pinpoint pupils  Consider neurology consult in a.m.  Thiamine, folic acid, multivitamins  Fall, seizure, aspiration precautions      ETOH abuse- (present on admission)  Assessment & Plan  [facility] protocol  Thiamine, folic acid, multivitamins  Monitor for withdrawal         VTE prophylaxis:   SCDs/TEDs      I have performed a physical exam and reviewed and updated ROS and Plan today (10/5/2023). In review of yesterday's note (10/4/2023), there are no changes except as documented above.

## 2023-10-06 NOTE — OR NURSING
Patient recovered well post-op. VSS, on 2L via NC. Restrained per orders. Condom cath in place. No belongings in pacu. Report to BROOKE Burton. Patient transported to S[facility]. O2 tank 1/2 full.

## 2023-10-06 NOTE — ASSESSMENT & PLAN NOTE
On detox bag  PRN ATivan  MRI showed CVA  CVA not culprit to confusion per Neurology  Asa, Zetia and continue work-up for confusion  [facility], Neurology consulted. Ordered LP and EEG ordered LP studies including viral panel. LP planned w/ anesthesia Wednesday  Now has Montrell and on restraints

## 2023-10-06 NOTE — PROGRESS NOTES
Hospital Medicine Daily Progress Note    Date of Service  10/6/2023    Chief Complaint  Sen Vasquez is a 77 y.o. male admitted 10/4/20[facility] with [facility] and w.u for EtOH withdrawal and unexplained comfusion.    Hospital Course  No notes on file  He is being evaluated for confusion and agitated 7d out of withdrawal period.   Interval Problem Update  10/5. Patient is confused. When aroused he is combative and agitated  Ultimately placed restraints.  Spoke with Neurology as patient is 7 days out of withdrawal period. He remains on detox bag. MRI PENDING.   10/6. I reviewed the chart along with vitals, labs, imaging, test (both pending and resulted) and recommendations from specialists and interdisciplinary team.. Getting MRI with anesthesia; still confused. His son is unreachable. Consent signed.   Discussed with Social Work. [facility] and was transferred here primarily for MRI. May need to be transferred back after results. Discussed with Dr. Roblero who agreed with the MRI.    Patient is has a high medical complexity, complex decision making and is at high risk for complication, morbidity, and mortality, thus requiring the highest level of my preparedness for sudden, emergent intervention. Medical decision making is therefore complex. I provided  services, which included ordering labs and/or imaging, and discussing the case with various consultants.medication orders, frequent reevaluations of the patient's condition and response to treatment. Time was also devoted to counseling and coordinating care including review of records, pertinent lab data and studies, as well as discussing diagnostic evaluation and work up, planned therapeutic interventions and future disposition of care. Where indicated, the assessment and plan reflect discussion of patient with consultants, other healthcare providers, family members, and additional  research needed to obtain further information in formulating the plan of care for Sen Vasquez. Total time spent was 68 minutes.       I have discussed this patient's plan of care and discharge plan at IDT rounds today with Case Management, Nursing, Nursing leadership, and other members of the IDT team.    Consultants/Specialty      Code Status  Full Code    Disposition  Medically Cleared  I have placed the appropriate orders for post-discharge needs.    Review of Systems  ROS     Physical Exam  Temp:  [36.1 °C (97 °F)-36.8 °C (98.2 °F)] 36.5 °C (97.7 °F)  Pulse:  [62-91] 66  Resp:  [13-18] 16  BP: ()/(53-89) 92/56  SpO2:  [90 %-100 %] 96 %    Physical Exam    Fluids    Intake/Output Summary (Last 24 hours) at 10/6/2023 1652  Last data filed at 10/6/2023 1351  Gross per 24 hour   Intake 400 ml   Output --   Net 400 ml       Laboratory  Recent Labs     10/04/23  2339 10/06/23  0339   WBC 5.0 5.4   RBC 5.10 5.11   HEMOGLOBIN 17.1 16.7   HEMATOCRIT 50.6 50.1   MCV 99.2* 98.0*   MCH 33.5* 32.7   MCHC 33.8 33.3   RDW 51.2* 49.1   PLATELETCT 258 244   MPV 10.3 10.1       Recent Labs     10/04/23  2339 10/06/23  0339   SODIUM 140 139   POTASSIUM 4.4 3.8   CHLORIDE 100 101   CO2 26 26   GLUCOSE 87 86   BUN 7* 6*   CREATININE 0.57 0.45*   CALCIUM 9.5 9.3       Recent Labs     10/04/23  2339   INR 1.08                 Imaging  MR-BRAIN-W/O   Final Result      1.  There are tiny areas of acute infarcts within the large RIGHT temporal parietal and occipital encephalomalacia.   2.  Tiny areas of chronic lacunar infarcts in the bilateral basal ganglia and LEFT cerebellum.   3.  Mild cerebral volume loss.   4.  Moderate chronic microvascular ischemic disease.      [id number] VIEW   Final Result      No findings to preclude MRI.             Assessment/Plan  * Alcohol withdrawal syndrome, with delirium, CVA (HCC)  Assessment & Plan  On detox bag  PRN ATivan  MRI pending,. Neurology aware.    CVA (cerebral vascular accident)  (HCC)  Assessment & Plan  Seen on MRI  Started aspirin, (has allergy to statin will try Zatia), ordered lipid p[leon and A1c  COnsulting NEurovasculary query if we need CTA HnN versus carotid Dopplers.      Acute encephalopathy- (present on admission)  Assessment & Plan  Frequent neurochecks every 4 hours  Transferred here for MRI brain without contrast with sedation  Check TSH, ammonia, sed rate, B12, procalcitonin, cortisol  Trial of Narcan given pinpoint pupils  Consider neurology consult in a.m.  Thiamine, folic acid, multivitamins  Fall, seizure, aspiration precautions      ETOH abuse- (present on admission)  Assessment & Plan  CIWA protocol  Thiamine, folic acid, multivitamins  Monitor for withdrawal         VTE prophylaxis: Lovenox ppx planned for tomorrow, stroke w/u being done    I have performed a physical exam and reviewed and updated ROS and Plan today (10/6/2023). In review of yesterday's note (10/5/2023), there are no changes except as documented above.

## 2023-10-06 NOTE — CARE PLAN
The patient is Watcher - Medium risk of patient condition declining or worsening    Shift Goals  Clinical Goals: Safety, maintain skin integrity  Patient Goals: JF  Family Goals: JF    Progress made toward(s) clinical / shift goals:  Patient is AAO to self. He does not follow commands or directions. Patient agitated throughout the night. Medicated patient for CIWA. Ativan given per MAR, new orders of haldol received. Bed low, locked and call light in reach.    Problem: Seizure Precautions  Goal: Implementation of seizure precautions  Outcome: Progressing   Precautions remain in place, padded rails, suction and oxygen tubing  Problem: Safety - Medical Restraint  Goal: Remains free of injury from restraints (Restraint for Interference with Medical Device)  Outcome: Progressing   Patient remains in restraints but free from injury, Q2 hour turns in place    Patient is not progressing towards the following goals:    Problem: Knowledge Deficit - Standard  Goal: Patient and family/care givers will demonstrate understanding of plan of care, disease process/condition, diagnostic tests and medications  Outcome: Not Progressing   Patient does not understand POC or safety education, will continue to redirect  Problem: Safety - Medical Restraint  Goal: Free from restraint(s) (Restraint for Interference with Medical Device)  Outcome: Not Progressing   Patient remains in restraints, patient removing lines and equipment. Safety sitter remains at bedside.

## 2023-10-06 NOTE — ANESTHESIA PREPROCEDURE EVALUATION
Case: [id number] Anesthesia Start Date/Time: 10/06/23 5025    Surgeons: [facility]    Procedures:       MR-BRAIN-W/O      MRI-ANESTHESIA CASE    Anesthesia type: general    Diagnosis:             Acute encephalopathy [G93.40]            Acute encephalopathy [G93.40]    Indications: encephalopathy    [facility] - [facility]          Relevant Problems   CARDIAC   (positive) CAD (coronary artery disease)      Other   (positive) Acute encephalopathy   (positive) Alcohol withdrawal syndrome, with delirium (HCC)   (positive) ETOH abuse       Physical Exam    Airway   Mallampati: II  TM distance: >3 FB  Neck ROM: full       Cardiovascular - normal exam  Rhythm: regular  Rate: normal  (-) murmur     Dental   (+) upper dentures, lower dentures           Pulmonary - normal exam  Breath sounds clear to auscultation     Abdominal    Neurological - abnormal exam and sedated/unconcious                 Anesthesia Plan    ASA 3   ASA physical status 3 criteria: alcohol and/or substance dependence or abuse    Plan - general       Airway plan will be LMA          Induction: intravenous      Pertinent diagnostic labs and testing reviewed    Informed Consent:    Anesthetic plan and risks discussed with patient.      Pt is delirious.  Etiology of delirium is unknown.  Exam necessary for appropriate prognostication.  Two physician signature performed.

## 2023-10-06 NOTE — ANESTHESIA PROCEDURE NOTES
Airway    Date/Time: 10/6/2023 1:21 PM    Performed by: Abisai Cox M.D.  Authorized by: Abisai Cox M.D.    Location:  OR  Urgency:  Elective  Indications for Airway Management:  Anesthesia      Spontaneous Ventilation: absent    Sedation Level:  Deep  Preoxygenated: Yes    Final Airway Type:  Supraglottic airway  Final Supraglottic Airway:  Standard LMA    SGA Size:  5  Number of Attempts at Approach:  1

## 2023-10-06 NOTE — PROGRESS NOTES
This nurse attempted to call Sen Lee and left a voicemail regarding the patient's MRI questionnaire. Will attempt again in the morning.

## 2023-10-06 NOTE — ANESTHESIA TIME REPORT
Anesthesia Start and Stop Event Times     Date Time Event    10/6/2023 1314 Ready for Procedure     1315 Anesthesia Start     1351 Anesthesia Stop        Responsible Staff  10/06/23    Name Role Begin End    Abisai Cox M.D. Anesth 1315 1351        Overtime Reason:  no overtime (within assigned shift)    Comments:

## 2023-10-07 ENCOUNTER — APPOINTMENT (OUTPATIENT)
Dept: RADIOLOGY | Facility: MEDICAL CENTER | Age: 78
DRG: 070 | End: 2023-10-07
Attending: INTERNAL MEDICINE
Payer: COMMERCIAL

## 2023-10-07 ENCOUNTER — APPOINTMENT (OUTPATIENT)
Dept: CARDIOLOGY | Facility: MEDICAL CENTER | Age: 78
DRG: 070 | End: 2023-10-07
Attending: INTERNAL MEDICINE
Payer: COMMERCIAL

## 2023-10-07 ENCOUNTER — HOSPITAL ENCOUNTER (INPATIENT)
Facility: REHABILITATION | Age: 78
DRG: 070 | End: 2023-10-07
Attending: PHYSICAL MEDICINE & REHABILITATION | Admitting: PHYSICAL MEDICINE & REHABILITATION
Payer: COMMERCIAL

## 2023-10-07 LAB
ALBUMIN SERPL BCP-MCNC: 3.1 G/DL (ref 3.2–4.9)
BUN SERPL-MCNC: 5 MG/DL (ref 8–22)
CALCIUM ALBUM COR SERPL-MCNC: 9.5 MG/DL (ref 8.5–10.5)
CALCIUM SERPL-MCNC: 8.8 MG/DL (ref 8.5–10.5)
CHLORIDE SERPL-SCNC: 99 MMOL/L (ref 96–112)
CHOLEST SERPL-MCNC: 147 MG/DL (ref 100–199)
CO2 SERPL-SCNC: 23 MMOL/L (ref 20–33)
CREAT SERPL-MCNC: 0.47 MG/DL (ref 0.5–1.4)
ERYTHROCYTE [DISTWIDTH] IN BLOOD BY AUTOMATED COUNT: 49.7 FL (ref 35.9–50)
GFR SERPLBLD CREATININE-BSD FMLA CKD-EPI: 108 ML/MIN/1.73 M 2
GLUCOSE SERPL-MCNC: 79 MG/DL (ref 65–99)
HCT VFR BLD AUTO: 45.7 % (ref 42–52)
HDLC SERPL-MCNC: 44 MG/DL
HGB BLD-MCNC: 15.3 G/DL (ref 14–18)
LDLC SERPL CALC-MCNC: 85 MG/DL
LV EJECT FRACT  99904: 60
MCH RBC QN AUTO: 32.9 PG (ref 27–33)
MCHC RBC AUTO-ENTMCNC: 33.5 G/DL (ref 32.3–36.5)
MCV RBC AUTO: 98.3 FL (ref 81.4–97.8)
PHOSPHATE SERPL-MCNC: 3.2 MG/DL (ref 2.5–4.5)
PLATELET # BLD AUTO: 254 K/UL (ref 164–446)
PMV BLD AUTO: 10.2 FL (ref 9–12.9)
POTASSIUM SERPL-SCNC: 4.9 MMOL/L (ref 3.6–5.5)
RBC # BLD AUTO: 4.65 M/UL (ref 4.7–6.1)
SODIUM SERPL-SCNC: 135 MMOL/L (ref 135–145)
TRIGL SERPL-MCNC: 88 MG/DL (ref 0–149)
TSH SERPL DL<=0.005 MIU/L-ACNC: 1.42 UIU/ML (ref 0.38–5.33)
WBC # BLD AUTO: 6.1 K/UL (ref 4.8–10.8)

## 2023-10-07 PROCEDURE — 36415 COLL VENOUS BLD VENIPUNCTURE: CPT

## 2023-10-07 PROCEDURE — 770001 HCHG ROOM/CARE - MED/SURG/GYN PRIV*

## 2023-10-07 PROCEDURE — 92610 EVALUATE SWALLOWING FUNCTION: CPT

## 2023-10-07 PROCEDURE — 99232 SBSQ HOSP IP/OBS MODERATE 35: CPT | Performed by: NURSE PRACTITIONER

## 2023-10-07 PROCEDURE — 93306 TTE W/DOPPLER COMPLETE: CPT | Mod: 26 | Performed by: INTERNAL MEDICINE

## 2023-10-07 PROCEDURE — 700111 HCHG RX REV CODE 636 W/ 250 OVERRIDE (IP): Mod: JZ | Performed by: INTERNAL MEDICINE

## 2023-10-07 PROCEDURE — 700111 HCHG RX REV CODE 636 W/ 250 OVERRIDE (IP): Performed by: STUDENT IN AN ORGANIZED HEALTH CARE EDUCATION/TRAINING PROGRAM

## 2023-10-07 PROCEDURE — 70498 CT ANGIOGRAPHY NECK: CPT

## 2023-10-07 PROCEDURE — A9270 NON-COVERED ITEM OR SERVICE: HCPCS | Performed by: STUDENT IN AN ORGANIZED HEALTH CARE EDUCATION/TRAINING PROGRAM

## 2023-10-07 PROCEDURE — 700105 HCHG RX REV CODE 258: Performed by: STUDENT IN AN ORGANIZED HEALTH CARE EDUCATION/TRAINING PROGRAM

## 2023-10-07 PROCEDURE — 700102 HCHG RX REV CODE 250 W/ 637 OVERRIDE(OP): Performed by: STUDENT IN AN ORGANIZED HEALTH CARE EDUCATION/TRAINING PROGRAM

## 2023-10-07 PROCEDURE — 99223 1ST HOSP IP/OBS HIGH 75: CPT | Performed by: PHYSICAL MEDICINE & REHABILITATION

## 2023-10-07 PROCEDURE — 99233 SBSQ HOSP IP/OBS HIGH 50: CPT | Mod: 25 | Performed by: INTERNAL MEDICINE

## 2023-10-07 PROCEDURE — 87498 ENTEROVIRUS PROBE&REVRS TRNS: CPT

## 2023-10-07 PROCEDURE — 99232 SBSQ HOSP IP/OBS MODERATE 35: CPT | Performed by: PSYCHIATRY & NEUROLOGY

## 2023-10-07 PROCEDURE — 70496 CT ANGIOGRAPHY HEAD: CPT

## 2023-10-07 PROCEDURE — 85027 COMPLETE CBC AUTOMATED: CPT

## 2023-10-07 PROCEDURE — 700111 HCHG RX REV CODE 636 W/ 250 OVERRIDE (IP): Mod: JZ

## 2023-10-07 PROCEDURE — 80061 LIPID PANEL: CPT

## 2023-10-07 PROCEDURE — 700102 HCHG RX REV CODE 250 W/ 637 OVERRIDE(OP): Performed by: INTERNAL MEDICINE

## 2023-10-07 PROCEDURE — 93306 TTE W/DOPPLER COMPLETE: CPT

## 2023-10-07 PROCEDURE — 31720 CLEARANCE OF AIRWAYS: CPT

## 2023-10-07 PROCEDURE — G0316 PR PROLONGED IP/OBS E&M EA 15 MIN: HCPCS | Performed by: INTERNAL MEDICINE

## 2023-10-07 PROCEDURE — A9270 NON-COVERED ITEM OR SERVICE: HCPCS | Performed by: INTERNAL MEDICINE

## 2023-10-07 PROCEDURE — 84443 ASSAY THYROID STIM HORMONE: CPT

## 2023-10-07 PROCEDURE — 700117 HCHG RX CONTRAST REV CODE 255: Performed by: INTERNAL MEDICINE

## 2023-10-07 PROCEDURE — 80069 RENAL FUNCTION PANEL: CPT

## 2023-10-07 RX ORDER — GAUZE BANDAGE 2" X 2"
100 BANDAGE TOPICAL DAILY
Status: DISPENSED | OUTPATIENT
Start: 2023-10-08 | End: 2023-10-12

## 2023-10-07 RX ORDER — LORAZEPAM 1 MG/1
1 TABLET ORAL EVERY 4 HOURS PRN
Status: DISCONTINUED | OUTPATIENT
Start: 2023-10-07 | End: 2023-10-11

## 2023-10-07 RX ORDER — LORAZEPAM 2 MG/1
4 TABLET ORAL
Status: DISCONTINUED | OUTPATIENT
Start: 2023-10-07 | End: 2023-10-11

## 2023-10-07 RX ORDER — CLONIDINE HYDROCHLORIDE 0.1 MG/1
0.1 TABLET ORAL
Status: DISCONTINUED | OUTPATIENT
Start: 2023-10-07 | End: 2023-10-17 | Stop reason: HOSPADM

## 2023-10-07 RX ORDER — BISACODYL 10 MG
10 SUPPOSITORY, RECTAL RECTAL
Status: DISCONTINUED | OUTPATIENT
Start: 2023-10-07 | End: 2023-10-17 | Stop reason: HOSPADM

## 2023-10-07 RX ORDER — ONDANSETRON 4 MG/1
4 TABLET, ORALLY DISINTEGRATING ORAL EVERY 4 HOURS PRN
Status: DISCONTINUED | OUTPATIENT
Start: 2023-10-07 | End: 2023-10-17 | Stop reason: HOSPADM

## 2023-10-07 RX ORDER — LORAZEPAM 2 MG/1
2 TABLET ORAL
Status: DISCONTINUED | OUTPATIENT
Start: 2023-10-07 | End: 2023-10-11

## 2023-10-07 RX ORDER — AMOXICILLIN 250 MG
2 CAPSULE ORAL 2 TIMES DAILY
Status: DISCONTINUED | OUTPATIENT
Start: 2023-10-07 | End: 2023-10-17 | Stop reason: HOSPADM

## 2023-10-07 RX ORDER — LORAZEPAM 2 MG/ML
0.5 INJECTION INTRAMUSCULAR EVERY 4 HOURS PRN
Status: DISCONTINUED | OUTPATIENT
Start: 2023-10-07 | End: 2023-10-11

## 2023-10-07 RX ORDER — LORAZEPAM 2 MG/ML
2 INJECTION INTRAMUSCULAR
Status: DISCONTINUED | OUTPATIENT
Start: 2023-10-07 | End: 2023-10-11

## 2023-10-07 RX ORDER — FOLIC ACID 1 MG/1
1 TABLET ORAL DAILY
Status: COMPLETED | OUTPATIENT
Start: 2023-10-08 | End: 2023-10-08

## 2023-10-07 RX ORDER — ACETAMINOPHEN 325 MG/1
650 TABLET ORAL EVERY 6 HOURS PRN
Status: DISCONTINUED | OUTPATIENT
Start: 2023-10-07 | End: 2023-10-17 | Stop reason: HOSPADM

## 2023-10-07 RX ORDER — EZETIMIBE 10 MG/1
10 TABLET ORAL DAILY
Status: DISCONTINUED | OUTPATIENT
Start: 2023-10-08 | End: 2023-10-17 | Stop reason: HOSPADM

## 2023-10-07 RX ORDER — LORAZEPAM 2 MG/ML
1.5 INJECTION INTRAMUSCULAR
Status: DISCONTINUED | OUTPATIENT
Start: 2023-10-07 | End: 2023-10-11

## 2023-10-07 RX ORDER — POLYETHYLENE GLYCOL 3350 17 G/17G
1 POWDER, FOR SOLUTION ORAL
Status: DISCONTINUED | OUTPATIENT
Start: 2023-10-07 | End: 2023-10-17 | Stop reason: HOSPADM

## 2023-10-07 RX ORDER — LORAZEPAM 2 MG/ML
1 INJECTION INTRAMUSCULAR
Status: DISCONTINUED | OUTPATIENT
Start: 2023-10-07 | End: 2023-10-11

## 2023-10-07 RX ORDER — LORAZEPAM 1 MG/1
0.5 TABLET ORAL EVERY 4 HOURS PRN
Status: DISCONTINUED | OUTPATIENT
Start: 2023-10-07 | End: 2023-10-11

## 2023-10-07 RX ADMIN — DOCUSATE SODIUM 50 MG AND SENNOSIDES 8.6 MG 2 TABLET: 8.6; 5 TABLET, FILM COATED ORAL at 05:37

## 2023-10-07 RX ADMIN — IOHEXOL 80 ML: 350 INJECTION, SOLUTION INTRAVENOUS at 04:45

## 2023-10-07 RX ADMIN — EZETIMIBE 10 MG: 10 TABLET ORAL at 05:37

## 2023-10-07 RX ADMIN — ENOXAPARIN SODIUM 40 MG: 100 INJECTION SUBCUTANEOUS at 17:33

## 2023-10-07 RX ADMIN — ASPIRIN 81 MG 81 MG: 81 TABLET ORAL at 05:36

## 2023-10-07 RX ADMIN — LORAZEPAM 0.5 MG: 2 INJECTION INTRAMUSCULAR; INTRAVENOUS at 07:55

## 2023-10-07 RX ADMIN — THIAMINE HYDROCHLORIDE 500 MG: 100 INJECTION, SOLUTION INTRAMUSCULAR; INTRAVENOUS at 07:59

## 2023-10-07 RX ADMIN — HALOPERIDOL LACTATE 5 MG: 5 INJECTION, SOLUTION INTRAMUSCULAR at 22:19

## 2023-10-07 RX ADMIN — LORAZEPAM 3 MG: 2 TABLET ORAL at 21:06

## 2023-10-07 RX ADMIN — THIAMINE HYDROCHLORIDE 500 MG: 100 INJECTION, SOLUTION INTRAMUSCULAR; INTRAVENOUS at 15:45

## 2023-10-07 RX ADMIN — FOLIC ACID 1 MG: 1 TABLET ORAL at 05:38

## 2023-10-07 RX ADMIN — THERA TABS 1 TABLET: TAB at 05:38

## 2023-10-07 ASSESSMENT — LIFESTYLE VARIABLES
AGITATION: *
PAROXYSMAL SWEATS: BARELY PERCEPTIBLE SWEATING. PALMS MOIST
VISUAL DISTURBANCES: NOT PRESENT
HEADACHE, FULLNESS IN HEAD: NOT PRESENT
ORIENTATION AND CLOUDING OF SENSORIUM: DISORIENTED FOR PLACE AND / OR PERSON
AUDITORY DISTURBANCES: NOT PRESENT
ORIENTATION AND CLOUDING OF SENSORIUM: DISORIENTED FOR PLACE AND / OR PERSON
TOTAL SCORE: 5
VISUAL DISTURBANCES: NOT PRESENT
ANXIETY: *
AGITATION: NORMAL ACTIVITY
TREMOR: TREMOR NOT VISIBLE BUT CAN BE FELT, FINGERTIP TO FINGERTIP
HEADACHE, FULLNESS IN HEAD: NOT PRESENT
NAUSEA AND VOMITING: NO NAUSEA AND NO VOMITING
TREMOR: NO TREMOR
ANXIETY: MILDLY ANXIOUS
AUDITORY DISTURBANCES: NOT PRESENT
HEADACHE, FULLNESS IN HEAD: NOT PRESENT
AUDITORY DISTURBANCES: NOT PRESENT
AGITATION: NORMAL ACTIVITY
ORIENTATION AND CLOUDING OF SENSORIUM: DISORIENTED FOR PLACE AND / OR PERSON
AUDITORY DISTURBANCES: NOT PRESENT
ANXIETY: MILDLY ANXIOUS
NAUSEA AND VOMITING: NO NAUSEA AND NO VOMITING
TOTAL SCORE: 9
ANXIETY: NO ANXIETY (AT EASE)
TREMOR: TREMOR NOT VISIBLE BUT CAN BE FELT, FINGERTIP TO FINGERTIP
VISUAL DISTURBANCES: NOT PRESENT
TOTAL SCORE: 12
TOTAL SCORE: 15
PAROXYSMAL SWEATS: NO SWEAT VISIBLE
AGITATION: *
NAUSEA AND VOMITING: NO NAUSEA AND NO VOMITING
PAROXYSMAL SWEATS: NO SWEAT VISIBLE
ORIENTATION AND CLOUDING OF SENSORIUM: DISORIENTED FOR PLACE AND / OR PERSON
AUDITORY DISTURBANCES: NOT PRESENT
NAUSEA AND VOMITING: NO NAUSEA AND NO VOMITING
HEADACHE, FULLNESS IN HEAD: NOT PRESENT
HEADACHE, FULLNESS IN HEAD: NOT PRESENT
PAROXYSMAL SWEATS: NO SWEAT VISIBLE
TOTAL SCORE: 4
ANXIETY: *
PAROXYSMAL SWEATS: NO SWEAT VISIBLE
NAUSEA AND VOMITING: NO NAUSEA AND NO VOMITING
TREMOR: NO TREMOR
TREMOR: *
ORIENTATION AND CLOUDING OF SENSORIUM: DISORIENTED FOR PLACE AND / OR PERSON
AGITATION: *
VISUAL DISTURBANCES: VERY MILD SENSITIVITY
VISUAL DISTURBANCES: MODERATE SENSITIVITY

## 2023-10-07 ASSESSMENT — PAIN DESCRIPTION - PAIN TYPE
TYPE: ACUTE PAIN

## 2023-10-07 ASSESSMENT — FIBROSIS 4 INDEX: FIB4 SCORE: 1.18

## 2023-10-07 NOTE — DIETARY
"Nutrition Support Assessment:  Day 3 of admit.  Sen Vasquez is a 77 y.o. male with admitting DX of acute encephalopathy.     Current problem list:  Cerebral vascular accident  Alcohol withdrawal syndrome with delirium  Acute encephalopathy  ETOH abuse     Assessment:  Estimated Nutritional Needs based on:   Height: 177.8 cm (5' 10\")  Weight: 74.2 kg (163 lb 9.3 oz)  Weight to Use in Calculations: 74.2 kg (163 lb 9.3 oz)  Ideal Body Weight: 75.3 kg (166 lb)  Percent Ideal Body Weight: 98.5  Body mass index is 23.47 kg/m²., BMI classification: normal    Calculation/Equation: MSJ x 1.2 = 1782 kcals/day  Total Calories / day: 1782 -  (Calories / k - 27)  Total Grams Protein / day: 89 - 104 (Grams Protein / k.2 - 1.4)     Evaluation[facility]   [facility] Pt experiencing alcohol withdrawal.  MRI of brain completed 10/6.  Pt previously on a L4/L1 diet (pureed meals with slightly thick liquids) from 10/4-10/6. Per ADL flow sheet, PO <25% for the majority of meals.   Swallow evaluation with SLP today. Recommended NPO with re-evaluation tomorrow vs NGT placement with tube feeding. Pt with acute dysphagia 2' alcohol withdrawal and acute CVA. Pt not appropriate for diagnostic swallow evaluation at this time given AMS and poor participation.  BUN 5, Creatinine 0.47  Aspirin, Pericolace, Thiamine, Multivitamin, Folvite per MAR.  Wt hx per chart review: 177 lbs 23, 200 lbs 19. Unclear/variable wt hx per chart review.   Pt with poor PO intake, may be at risk for refeeding given hx of ETOH.  Standard tube feeding formula of Jevity 1.2 can meet pt's estimated nutrition needs.      Malnutrition Risk: Unable to determine at this time.     Recommendations/Plan:  Start Jevity 1.2 @ 25 mL/hr. Advance per protocol to goal rate of 65 mL/hr, providing 1872 kcals, 87 grams of protein and 1259 mL of free water per day.  Fluids per MD.  Monitor for refeeding: Order " BMP w/ Mg and Phos x 7 days. Replete K, Phos and Mg prn. Continue to supplement 100 mg Thiamine x 7 days to reduce risk of refeeding  PO diet per SLP/MD when safe/appropriate and pt can adequately consume.    RD following.

## 2023-10-07 NOTE — CONSULTS
Physical Medicine and Rehabilitation Consultation          Date of initial consultation: 10/7/2023  Consulting provider: Rocael Donahue M.D.  Reason for consultation: assess for acute inpatient rehab appropriateness  LOS: 3 Day(s)    Chief complaint: Altered mental status    HPI: The patient is a 77 y.o. right hand dominant male with a past medical history of alcohol abuse;  who presented on 10/4/2023 10:42 PM with encephalopathy.  MR brain here found tiny areas of infarct with encephalomalacia in the right temporal, parietal and occipital lobes.  Patient was seen by neurology, found to have NIH score of 13.  The opinion of the neurologist at that time was that the patient's profound encephalopathy and combative behavior did not correlate with the MRI findings, and warranted further work-up for toxic/metabolic etiologies.  UA pending.  Ammonia WNL, TSH WNL.     The patient currently is severely altered, unable to answer questions or follow commands.  Family is at bedside.  They report that he is a daily hard alcohol user, but they are unsure how much.  Patient is very secretive about his alcohol and tobacco use.    ROS  Pertinent positives are mentioned in the HPI, all others reviewed and are negative.    Social Hx:  1 SH  1 LETICIA  With: Alone    Tobacco: Family endorses smoking  Alcohol: Daily ashley drinker, unknown amounts  Drugs: Unknown but unlikely    THERAPY:  Restrictions: Pending  PT: Functional mobility   Pending    OT: ADLs  Pending    SLP:   N.p.o., recommending NG tube    IMAGING:  MR brain 10/6/2023    1.  There are tiny areas of acute infarcts within the large RIGHT temporal parietal and occipital encephalomalacia.  2.  Tiny areas of chronic lacunar infarcts in the bilateral basal ganglia and LEFT cerebellum.  3.  Mild cerebral volume loss.  4.  Moderate chronic microvascular ischemic disease.    PROCEDURES:  None    PMH:  Past Medical History:   Diagnosis Date    Alcohol abuse     Coronary  arteriosclerosis     Dyslipidemia     GERD (gastroesophageal reflux disease)     Mediastinal mass     Osteopenia        PSH:  Past Surgical History:   Procedure Laterality Date    CORONARY ARTERY BYPASS, 1         FHX:  Non-pertinent to today's issues    Medications:  Current Facility-Administered Medications   Medication Dose    haloperidol lactate (Haldol) injection 5 mg  5 mg    [Held by provider] aspirin (Asa) chewable tab 81 mg  81 mg    Or    [Held by provider] aspirin (Asa) suppository 300 mg  300 mg    enoxaparin (Lovenox) inj 40 mg  40 mg    ezetimibe (Zetia) tablet 10 mg  10 mg    senna-docusate (Pericolace Or Senokot S) 8.6-50 MG per tablet 2 Tablet  2 Tablet    And    polyethylene glycol/lytes (Miralax) PACKET 1 Packet  1 Packet    And    bisacodyl (Dulcolax) suppository 10 mg  10 mg    lactated ringers infusion (BOLUS)  500 mL    acetaminophen (Tylenol) tablet 650 mg  650 mg    labetalol (Normodyne/Trandate) injection 10 mg  10 mg    ondansetron (Zofran) syringe/vial injection 4 mg  4 mg    ondansetron (Zofran ODT) dispertab 4 mg  4 mg    thiamine (B-1) 500 mg in dextrose 5% 100 mL IVPB  500 mg    [START ON 10/8/2023] thiamine (Vitamin B-1) tablet 100 mg  100 mg    And    multivitamin tablet 1 Tablet  1 Tablet    And    folic acid (Folvite) tablet 1 mg  1 mg    LORazepam (Ativan) tablet 0.5 mg  0.5 mg    LORazepam (Ativan) tablet 1 mg  1 mg    Or    LORazepam (Ativan) injection 0.5 mg  0.5 mg    LORazepam (Ativan) tablet 2 mg  2 mg    Or    LORazepam (Ativan) injection 1 mg  1 mg    LORazepam (Ativan) tablet 3 mg  3 mg    Or    LORazepam (Ativan) injection 1.5 mg  1.5 mg    LORazepam (Ativan) tablet 4 mg  4 mg    Or    LORazepam (Ativan) injection 2 mg  2 mg    cloNIDine (Catapres) tablet 0.1 mg  0.1 mg       Allergies:  Allergies   Allergen Reactions    Pravastatin Hives and Swelling         Physical Exam:  Vitals: /66   Pulse 71   Temp 36.7 °C (98.1 °F) (Temporal)   Resp 18   Wt 74.2 kg (163  lb 9.3 oz)   SpO2 95%   Gen: NAD  Head: NC/AT  Eyes/ Nose/ Mouth: PERRLA, moist mucous membranes  Cardio: RRR, good distal perfusion, warm extremities  Pulm: normal respiratory effort, no cyanosis   Abd: Soft NTND, negative borborygmi   Ext: No peripheral edema. No calf tenderness. No clubbing.    Mental status: Not following commands  Speech: None    Labs: Reviewed and significant for   Recent Labs     10/04/23  2339 10/06/23  0339 10/07/23  0625   RBC 5.10 5.11 4.65*   HEMOGLOBIN 17.1 16.7 15.3   HEMATOCRIT 50.6 50.1 45.7   PLATELETCT 258 244 254   PROTHROMBTM 14.1  --   --    INR 1.08  --   --      Recent Labs     10/04/23  2339 10/06/23  0339 10/07/23  0625   SODIUM 140 139 135   POTASSIUM 4.4 3.8 4.9   CHLORIDE 100 101 99   CO2 26 26 23   GLUCOSE 87 86 79   BUN 7* 6* 5*   CREATININE 0.57 0.45* 0.47*   CALCIUM 9.5 9.3 8.8     Recent Results (from the past 24 hour(s))   Hemoglobin A1C    Collection Time: 10/06/23  5:36 PM   Result Value Ref Range    Glycohemoglobin 4.9 4.0 - 5.6 %    Est Avg Glucose 94 mg/dL   Lipid Profile    Collection Time: 10/07/23  6:25 AM   Result Value Ref Range    Cholesterol,Tot 147 100 - 199 mg/dL    Triglycerides 88 0 - 149 mg/dL    HDL 44 >=40 mg/dL    LDL 85 <100 mg/dL   CBC WITHOUT DIFFERENTIAL    Collection Time: 10/07/23  6:25 AM   Result Value Ref Range    WBC 6.1 4.8 - 10.8 K/uL    RBC 4.65 (L) 4.70 - 6.10 M/uL    Hemoglobin 15.3 14.0 - 18.0 g/dL    Hematocrit 45.7 42.0 - 52.0 %    MCV 98.3 (H) 81.4 - 97.8 fL    MCH 32.9 27.0 - 33.0 pg    MCHC 33.5 32.3 - 36.5 g/dL    RDW 49.7 35.9 - 50.0 fL    Platelet Count 254 164 - 446 K/uL    MPV 10.2 9.0 - 12.9 fL   Renal Function Panel    Collection Time: 10/07/23  6:25 AM   Result Value Ref Range    Sodium 135 135 - 145 mmol/L    Potassium 4.9 3.6 - 5.5 mmol/L    Chloride 99 96 - 112 mmol/L    Co2 23 20 - 33 mmol/L    Glucose 79 65 - 99 mg/dL    Creatinine 0.47 (L) 0.50 - 1.40 mg/dL    Bun 5 (L) 8 - 22 mg/dL    Calcium 8.8 8.5 - 10.5  mg/dL    Correct Calcium 9.5 8.5 - 10.5 mg/dL    Phosphorus 3.2 2.5 - 4.5 mg/dL    Albumin 3.1 (L) 3.2 - 4.9 g/dL   ESTIMATED GFR    Collection Time: 10/07/23  6:25 AM   Result Value Ref Range    GFR (CKD-EPI) 108 >60 mL/min/1.73 m 2   TSH WITH REFLEX TO FT4    Collection Time: 10/07/23 11:18 AM   Result Value Ref Range    TSH 1.420 0.380 - 5.330 uIU/mL   Enterovirus (CSF) PCR    Collection Time: 10/07/23  1:47 PM   Result Value Ref Range    Enterovirus Source blood          ASSESSMENT:  Patient is a 77 y.o. male admitted with altered mental status, found to have small embolic appearing stroke on MRI brain, however patient's presentation is far more consistent with alcohol withdrawal encephalopathy than stroke    [facility] Code / Diagnosis to Support: 0002.1 - Brain Dysfunction: Non-Traumatic    Rehabilitation: Impaired ADLs and mobility  Patient is not a candidate for inpatient rehab because he is not expected to have a reasonable amount of improvement in a reasonable amount of time using the combined approach.     All cases are subject to administrative review and recommendations may change    Disposition recommendations:  -Patient will likely need SNF placement.  Patient's presentation is far more consistent with alcohol withdrawal encephalopathy than stroke.  Patient's recovery is likely to take weeks to several months  -Patient's family updated at bedside  -PMR will sign off, please reconsult or reach out via Voalte if further evaluation or medical management is requested    Medical Complexity:    Altered mental status  -Suspected to be secondary to alcohol withdrawal  -Patient's stroke burden is very low, and is unlikely to be contributing to his altered mental status  -Continue restraints as needed  -Recommend NG tube placement  -Limit centrally acting medications, no neuro stimulants.   -Continue PT OT and SLP as tolerated  -Plan for SNF placement    Right embolic stroke  -Tiny areas of acute infarct in the  temporal parietal and occipital areas  -Tiny chronic lacunar infarcts in the bilateral basal ganglia and left cerebellum  -Moderate chronic microvascular ischemic disease  -Overall these findings represent a brain with limited neuronal reserve from many years of high blood pressure and alcohol exposure as well as a previous injury to the right hemisphere resulting in encephalomalacia  -Appreciate neurology following  -Secondary stroke prevention with aspirin 81 mg daily    Alcohol withdrawal  - CIWA precautions  -Family to investigate patient's alcohol use  -Thiamine supplementation      DVT PPX: Lovenox      Thank you for allowing us to participate in the care of this patient.     Patient was seen for >80 minutes on unit/floor of which > 50% of time was spent on counseling and coordination of care regarding the above, including prognosis, risk reduction, benefits of treatment, and options for next stage of care.    Isiah Anderson, DO   Physical Medicine and Rehabilitation     Please note that this dictation was created using voice recognition software. I have made every reasonable attempt to correct obvious errors, but there may be errors of grammar and possibly content that I did not discover before finalizing the note.         Back Pain

## 2023-10-07 NOTE — THERAPY
"Speech Language Pathology   Clinical Swallow Evaluation     Patient Name: Sen Vasquez  AGE:  77 y.o., SEX:  male  Medical Record #: [id number]  Date of Service: 10/7/2023      History of Present Illness    76 y/o admitted on 10/4 for AMS. Not seen by SLP before at [facility].    MRI of brain 10/6: \"There are tiny areas of acute infarcts within the large RIGHT temporal parietal and occipital encephalomalacia. Tiny areas of chronic lacunar infarcts in the bilateral basal ganglia and LEFT cerebellum.\"    CTA of neck: \"Atherosclerosis with mural thrombus versus soft plaque at the left carotid bulb resulting in 50-70% stenosis.\"      PMHx:  alcohol abuse    General Information:  Vitals  O2 (LPM): 2  O2 Delivery Device: Silicone Nasal Cannula  Level of Consciousness: Obtunded  Patient Behaviors: Uncooperative, Lethargic  Orientation: Self         Prior Living Situation & Level of Function:  Lives with - Patient's Self Care Capacity: Unable To Determine At This Time  Communication: unknown PLOF  Swallowing: unknown PLOF       Oral Mechanism Evaluation:  Dentition: Pt did not follow commands to assess   Facial Symmetry: Pt did not follow commands to assess  Labial Observations: Pt did not follow commands to assess   Lingual Observations: Pt did not follow commands to assess  Motor Speech: dysarthric speech            Laryngeal Function:  Secretion Management: Adequate  Cough: Perceptually weak       Assessment  Current Method of Nutrition: Oral diet (puree/slightly thick)  Positioning: Decker's (60-90 degrees)  Bolus Administration: SLP  O2 (LPM): 2 O2 Delivery Device: Silicone Nasal Cannula  Factor(s) Affecting Performance: Impaired mental status, Impaired command following          Swallowing Trials:  Swallowing Trials  Thin Liquid (TN0): Impaired  Pureed (PU4): Impaired      Comments: Pt kept eyes closed for majority of the session and required stimulation to maintain wakefulness during session. Pt required max verbal cues to " trigger swallow with trials of puree and thin liquids. Delayed swallow trigger noted to be greater than 20 seconds at times. Immediate coughing noted after the swallow with thin liquids concerning for airway invasion. Per sitter at bedside, pt with poor intake with meals.      Clinical Impressions  Pt is presenting with concerns for an acute dysphagia 2/2 alcohol withdrawal and an acute CVA. Currently pt is not appropriate for a swallow diagnostic given AMS and poor participation. Discussed with MD and recommending placement of NG.     Recommendations  Diet Consistency: recommend NPO with re-eval on 10/8 vs placement of NG  Instrumentation: Instrumental swallow study pending clinical progress  Medication:  (defer to MD)  Oral Care: Q4h         SLP Treatment Plan  Treatment Plan: Dysphagia Treatment  SLP Frequency: 4x Per Week  Estimated Duration: Until Therapy Goals Met      Anticipated Discharge Needs  Discharge Recommendations: Recommend post-acute placement for additional speech therapy services prior to discharge home   Therapy Recommendations Upon DC: Dysphagia Training, Community Re-Integration, Patient / Family / Caregiver Education        Patient / Family Goals  Patient / Family Goal #1: did not state  Short Term Goals  Short Term Goal # 1: Pt will consume prefeeding trials with no overt s/sx of aspiration      Karen West, SLP

## 2023-10-07 NOTE — PROGRESS NOTES
No chief complaint on file.      Neurology Progress Note  Neurohospitalist Service, [facility]    History of present illness:  Sen Vasquez is a 77 y.o. male with a PMHx of EtOH abuse [facility]. [facility]. Due to persistent encephalopathy and agitated behavior it was determined that the patient needed sedation for MRI, which was not available [facility]. The patient was transferred here to [facility] for MRI and further evaluation. MRI brain revealed tiny areas of infarcts within encephalomalacia in the right temporal, parietal, and occipital lobes. Neurology has been consulted for further evaluation of the above.     Referring Provider: Rocael Donahue M.D. has consulted Neurology for further evaluation    Past medical history:   Past Medical History:   Diagnosis Date    Alcohol abuse     Coronary arteriosclerosis     Dyslipidemia     GERD (gastroesophageal reflux disease)     Mediastinal mass     Osteopenia        Past surgical history:   Past Surgical History:   Procedure Laterality Date    CORONARY ARTERY BYPASS, 1         Family history:   No family history on file.    Social history:   Social History     Socioeconomic History    Marital status: Single     Spouse name: Not on file    Number of children: Not on file    Years of education: Not on file    Highest education level: Not on file   Occupational History    Not on file   Tobacco Use    Smoking status: Some Days     Types: Cigarettes    Smokeless tobacco: Never   Substance and Sexual Activity    Alcohol use: Yes     Comment: 400 ml Arabella daily    Drug use: No    Sexual activity: Not on file   Other Topics Concern    Not on file   Social History Narrative    Not on file     Social Determinants of Health     Financial Resource Strain: Not on file   Food Insecurity: Not on file   Transportation Needs: Not on file    Physical Activity: Not on file   Stress: Not on file   Social Connections: Not on file   Intimate Partner Violence: Not on file   Housing Stability: Not on file       Current medications:   Current Facility-Administered Medications   Medication Dose    haloperidol lactate (Haldol) injection 5 mg  5 mg    aspirin (Asa) chewable tab 81 mg  81 mg    Or    aspirin (Asa) suppository 300 mg  300 mg    enoxaparin (Lovenox) inj 40 mg  40 mg    ezetimibe (Zetia) tablet 10 mg  10 mg    senna-docusate (Pericolace Or Senokot S) 8.6-50 MG per tablet 2 Tablet  2 Tablet    And    polyethylene glycol/lytes (Miralax) PACKET 1 Packet  1 Packet    And    bisacodyl (Dulcolax) suppository 10 mg  10 mg    lactated ringers infusion (BOLUS)  500 mL    acetaminophen (Tylenol) tablet 650 mg  650 mg    labetalol (Normodyne/Trandate) injection 10 mg  10 mg    ondansetron (Zofran) syringe/vial injection 4 mg  4 mg    ondansetron (Zofran ODT) dispertab 4 mg  4 mg    thiamine (B-1) 500 mg in dextrose 5% 100 mL IVPB  500 mg    [START ON 10/8/2023] thiamine (Vitamin B-1) tablet 100 mg  100 mg    And    multivitamin tablet 1 Tablet  1 Tablet    And    folic acid (Folvite) tablet 1 mg  1 mg    LORazepam (Ativan) tablet 0.5 mg  0.5 mg    LORazepam (Ativan) tablet 1 mg  1 mg    Or    LORazepam (Ativan) injection 0.5 mg  0.5 mg    LORazepam (Ativan) tablet 2 mg  2 mg    Or    LORazepam (Ativan) injection 1 mg  1 mg    LORazepam (Ativan) tablet 3 mg  3 mg    Or    LORazepam (Ativan) injection 1.5 mg  1.5 mg    LORazepam (Ativan) tablet 4 mg  4 mg    Or    LORazepam (Ativan) injection 2 mg  2 mg    cloNIDine (Catapres) tablet 0.1 mg  0.1 mg       Medication Allergy:  Allergies   Allergen Reactions    Pravastatin Hives and Swelling       Review of systems:   Unable to obtain ROS due to encephalopathy    Physical examination:   Vitals:    10/06/23 1945 10/06/23 2342 10/07/23 0320 10/07/23 0728   BP: 102/58  106/59 100/63   Pulse: 64 77 72 83   Resp: 17 18  18 18   Temp: 36.3 °C (97.3 °F) 36.6 °C (97.9 °F) 36.5 °C (97.7 °F) 36.5 °C (97.7 °F)   TempSrc: Temporal Temporal Temporal Temporal   SpO2: 98%  99% 94%   Weight:         General: Stuporous, minimal arousal to noxious stimuli  HEENT: Normocephalic, no signs of acute trauma.   Neck: supple, no meningeal signs or carotid bruits. There is normal range of motion. No tenderness on exam.   Chest: clear to auscultation. No cough.   CV: RRR, no murmurs.   Skin: no signs of acute rashes or trauma.   Musculoskeletal: joints exhibit full range of motion, without any pain to palpation. There are no signs of joint or muscle swelling. There is no tenderness to deep palpation of muscles.   Psychiatric: Obtunded    NEUROLOGICAL EXAM:   Mental status, orientation: Obtunded, opens eyes briefly to noxious stimul   Speech and language: Nonverbal   Memory: Unable to assess.   Cranial nerve exam: Pupils are 2-3 mm bilaterally and equally reactive to light and accommodation. Visual fields are intact by confrontation. There is no nystagmus on primary or secondary gaze. Face appears symmetric.   Motor exam: Brisk withdrawal to noxious stimuli in all 4 extremities  Sensory exam Appears intact bilaterally  Deep tendon reflexes:  2+ throughout. Plantar responses are flexor. There is no clonus.   Coordination: Unable to assess  Gait: Not assessed due to patient acuity    NIHSS: National Institutes of Health Stroke Scale    [2] 1a:Level of Consciousness    0-alert 1-drowsy   2-stupor   3-coma  [2] 1b:LOC Questions                  0-both  1-one      2-neither  [2] 1c:LOC Commands                   0-both  1-one      2-neither  [0] 2: Best Gaze                     0-nl    1-partial  2-forced  [0] 3: Visual Fields                   0-nl    1-partial  2-complete 3-bilat  [0] 4: Facial Paresis                0-nl    1-minor    2-partial  3-full  MOTOR                       0-nl  [1] 5: Right Arm           1-drift  [1] 6: Left Arm             2-some  effort vs gravity  [1] 7: Right Leg           3-no effort vs gravity  [1] 8: Left Leg             4-no movement                             x-untestable  [0] 9: Limb Ataxia                    0-abs   1-1_limb   2-2+_limbs       x-untestable  [0] 10:Sensory                        0-nl    1-partial  2-dense  [3] 11:Best Language/Aphasia         0-nl    1-mild/mod 2-severe   3-mute  [0] 12:Dysarthria                     0-nl    1-mild/mod 2-severe       x-untestable  [0] 13:Neglect/Inattention            0-none  1-partial  2-complete  [13] TOTAL    NIHSS Time: 10/06/223 @ 1730    ANCILLARY DATA REVIEWED:     Lab Data Review:  Recent Results (from the past 24 hour(s))   Hemoglobin A1C    Collection Time: 10/06/23  5:36 PM   Result Value Ref Range    Glycohemoglobin 4.9 4.0 - 5.6 %    Est Avg Glucose 94 mg/dL   Lipid Profile    Collection Time: 10/07/23  6:25 AM   Result Value Ref Range    Cholesterol,Tot 147 100 - 199 mg/dL    Triglycerides 88 0 - 149 mg/dL    HDL 44 >=40 mg/dL    LDL 85 <100 mg/dL   CBC WITHOUT DIFFERENTIAL    Collection Time: 10/07/23  6:25 AM   Result Value Ref Range    WBC 6.1 4.8 - 10.8 K/uL    RBC 4.65 (L) 4.70 - 6.10 M/uL    Hemoglobin 15.3 14.0 - 18.0 g/dL    Hematocrit 45.7 42.0 - 52.0 %    MCV 98.3 (H) 81.4 - 97.8 fL    MCH 32.9 27.0 - 33.0 pg    MCHC 33.5 32.3 - 36.5 g/dL    RDW 49.7 35.9 - 50.0 fL    Platelet Count 254 164 - 446 K/uL    MPV 10.2 9.0 - 12.9 fL   Renal Function Panel    Collection Time: 10/07/23  6:25 AM   Result Value Ref Range    Sodium 135 135 - 145 mmol/L    Potassium 4.9 3.6 - 5.5 mmol/L    Chloride 99 96 - 112 mmol/L    Co2 23 20 - 33 mmol/L    Glucose 79 65 - 99 mg/dL    Creatinine 0.47 (L) 0.50 - 1.40 mg/dL    Bun 5 (L) 8 - 22 mg/dL    Calcium 8.8 8.5 - 10.5 mg/dL    Correct Calcium 9.5 8.5 - 10.5 mg/dL    Phosphorus 3.2 2.5 - 4.5 mg/dL    Albumin 3.1 (L) 3.2 - 4.9 g/dL   ESTIMATED GFR    Collection Time: 10/07/23  6:25 AM   Result Value Ref Range    GFR (CKD-EPI) 108  >60 mL/min/1.73 m 2       Labs reviewed by me.       Imaging reviewed by me:     CT-CTA NECK WITH & W/O-POST PROCESSING   Final Result         1.  Atherosclerosis with mural thrombus versus soft plaque at the left carotid bulb resulting in 50-70% stenosis. Similar to prior study.         CT-CTA HEAD WITH & W/O-POST PROCESS   Final Result         1.  No large vessel occlusion or aneurysm identified      MR-BRAIN-W/O   Final Result   Addendum (preliminary) 1 of 1   Addendum: There is diffuse T2 hyperintensity in the splenium of the corpus    callosum without restricted diffusion. This may represent a nonspecific    gliosis.      Final      1.  There are tiny areas of acute infarcts within the large RIGHT temporal parietal and occipital encephalomalacia.   2.  Tiny areas of chronic lacunar infarcts in the bilateral basal ganglia and LEFT cerebellum.   3.  Mild cerebral volume loss.   4.  Moderate chronic microvascular ischemic disease.      [id number] VIEW   Final Result      No findings to preclude MRI.      EC-ECHOCARDIOGRAM COMPLETE W/O CONT    (Results Pending)         Presumed mechanism by TOAST:  __Large Artery Atherosclerosis  __Small Vessel (Lacunar)  X_Cardioembolic  __Other (Sickle Cell, Vasculitis, Hypercoagulable)  __Unknown    Modified Miller Scale (MRS): 2 = Slight disability; unable to perform all previous activities but able to look after own affairs without assistance      ASSESSMENT AND PLAN:    Assessment and Plan:     77 y.o. male with an extensive EtOH abuse history [facility]. Despite interventions the patient remained profoundly encephalopathic and combative for which the [facility] wanted to [facility] an [facility], but did not have the capabilities to provide sedation for MRI at that time. Therefore the patient was transferred here to [facility] for MRI and further work up. MRI brain showed tiny acute infarcts with encephalomalacia in  the right temporal, parietal, and occipital lobes. These infarcts would not account for the patient's profound encephalopathy and combative behavior and are likely incidental findings. Will complete work up at this time with CTA head/neck to evaluate vasculature and TTE. Recommend further toxic/metabolic work up per medicine team for further evaluation of encephalopathy.    Interval Update 10/07/2023: No acute events overnight, patient remains encephalopathic. CTA head/neck obtained overnight, demonstrates left carotid bulb atherosclerosis with soft plaque versus mural thrombus, stable since prior imaging. This is asymptomatic and will be medically managed with ASA.       Problem list:  Tiny right sided infarcts within chronic encephalomalacia      Recommendations:  -q4h and PRN neuro assessment. VS per nursing/unit protocol.   -BP goal is normotension, 100-130/60-80. Antihypertensives per primary team. No role for permissive hypertension   -Obtain CTA head and neck  -Telemetry; currently SR. Screen for Afib/arrhythmia. Obtain TTE- pending   -Long term cardiac monitoring with Zio patch on discharge  -Check stroke labs: LDL 85, HgbA1c 4.9  -ASA 81 mg PO q day  -Atorvastatin 40 mg PO q HS. Titrate to long term LDL goal < 70  -BG management per primary team. BG goal   -Explore toxic/metabolic etiologies for encephalopathy as these tiny infarcts are likely incidental and wouldn't clinical correlate to patient's presentation   -PT/OT/SLP eval and treat.   -DVT PPX: SCDs. Okay for Lovenox from neurology perspective  -Will need [facility] follow up once discharged    Case reviewed and plan created with Dr. Mohamud Lassiter, Vascular Neurology. Neurology will sign off at this time. We will watch peripherally for TTE results and make recommendations as indicated.      AVA Giang.  Vascular Neurology, [facility]

## 2023-10-07 NOTE — CONSULTS
No chief complaint on file.    I agree with plan as detailed; see any corrections below:     I discussed the management with the APRN Corona Rizwana . I reviewed his note and agree with his findings and plan as documented in the note except as noted below. The chart was reviewed and summarized.  As listed in reviewed note any/all available labs, imaging, vitals were reviewed and neuroimaging visualized. Available nursing, consultant, and resident notes were reviewed.     Addendum:     76 y/o M w/ hx of EtOH abuse, HLD, who was transferred to [facility] from [facility] for MRI in setting of alcohol withdrawal and acute mental status changes. MRI demonstrated acute punctate infarcts in right parietal occipital lobe adjacent to region of encephalomalacia. Finding likely incidental to patient's presentation although will proceed with secondary stroke work up to identify and correct modifiable risk factors. Patient was encephalopathic and limited neurologic examination could be provided during first evaluation today, patient was in four point restraints but was noted to have movement in all extremities. Further recommendations per documentation below.        Reji Luis III, MD  Vascular Neuology, [facility]        Neurology Initial Consult H&P  Neurohospitalist Service, [facility]    History of present illness:  Sen Vasquez is a 77 y.o. male with a PMHx of EtOH abuse who [facility]. [facility]. Due to persistent encephalopathy and agitated behavior it was determined that the patient needed sedation for MRI, which was not available [facility]. The patient was transferred here to [facility] for MRI and further evaluation. MRI brain revealed tiny areas of infarcts within encephalomalacia in the right temporal, parietal, and occipital lobes. Neurology has been  consulted for further evaluation of the above.     Referring Provider: Rocael Donahue M.D. has consulted Neurology for further evaluation    Past medical history:   Past Medical History:   Diagnosis Date    Alcohol abuse     Coronary arteriosclerosis     Dyslipidemia     GERD (gastroesophageal reflux disease)     Mediastinal mass     Osteopenia        Past surgical history:   Past Surgical History:   Procedure Laterality Date    CORONARY ARTERY BYPASS, 1         Family history:   No family history on file.    Social history:   Social History     Socioeconomic History    Marital status: Single     Spouse name: Not on file    Number of children: Not on file    Years of education: Not on file    Highest education level: Not on file   Occupational History    Not on file   Tobacco Use    Smoking status: Some Days     Types: Cigarettes    Smokeless tobacco: Never   Substance and Sexual Activity    Alcohol use: Yes     Comment: 400 ml Arabella daily    Drug use: No    Sexual activity: Not on file   Other Topics Concern    Not on file   Social History Narrative    Not on file     Social Determinants of Health     Financial Resource Strain: Not on file   Food Insecurity: Not on file   Transportation Needs: Not on file   Physical Activity: Not on file   Stress: Not on file   Social Connections: Not on file   Intimate Partner Violence: Not on file   Housing Stability: Not on file       Current medications:   Current Facility-Administered Medications   Medication Dose    haloperidol lactate (Haldol) injection 5 mg  5 mg    aspirin (Asa) chewable tab 81 mg  81 mg    Or    aspirin (Asa) suppository 300 mg  300 mg    [START ON 10/7/2023] enoxaparin (Lovenox) inj 40 mg  40 mg    ezetimibe (Zetia) tablet 10 mg  10 mg    senna-docusate (Pericolace Or Senokot S) 8.6-50 MG per tablet 2 Tablet  2 Tablet    And    polyethylene glycol/lytes (Miralax) PACKET 1 Packet  1 Packet    And    bisacodyl (Dulcolax) suppository 10 mg  10 mg     lactated ringers infusion (BOLUS)  500 mL    acetaminophen (Tylenol) tablet 650 mg  650 mg    labetalol (Normodyne/Trandate) injection 10 mg  10 mg    ondansetron (Zofran) syringe/vial injection 4 mg  4 mg    ondansetron (Zofran ODT) dispertab 4 mg  4 mg    thiamine (B-1) 500 mg in dextrose 5% 100 mL IVPB  500 mg    [START ON 10/8/2023] thiamine (Vitamin B-1) tablet 100 mg  100 mg    And    multivitamin tablet 1 Tablet  1 Tablet    And    folic acid (Folvite) tablet 1 mg  1 mg    LORazepam (Ativan) tablet 0.5 mg  0.5 mg    LORazepam (Ativan) tablet 1 mg  1 mg    Or    LORazepam (Ativan) injection 0.5 mg  0.5 mg    LORazepam (Ativan) tablet 2 mg  2 mg    Or    LORazepam (Ativan) injection 1 mg  1 mg    LORazepam (Ativan) tablet 3 mg  3 mg    Or    LORazepam (Ativan) injection 1.5 mg  1.5 mg    LORazepam (Ativan) tablet 4 mg  4 mg    Or    LORazepam (Ativan) injection 2 mg  2 mg    cloNIDine (Catapres) tablet 0.1 mg  0.1 mg       Medication Allergy:  Allergies   Allergen Reactions    Pravastatin Hives and Swelling       Review of systems:   Unable to obtain ROS due to encephalopathy    Physical examination:   Vitals:    10/06/23 1347 10/06/23 1400 10/06/23 1415 10/06/23 1600   BP: (!) 79/53 100/60 113/66 92/56   Pulse: 65 62 62 66   Resp: 16 14 13 16   Temp: 36.1 °C (97 °F)   36.5 °C (97.7 °F)   TempSrc: Temporal   Temporal   SpO2: 100% 98% 99% 96%   Weight:         General: Stuporous, minimal arousal to noxious stimuli  HEENT: Normocephalic, no signs of acute trauma.   Neck: supple, no meningeal signs or carotid bruits. There is normal range of motion. No tenderness on exam.   Chest: clear to auscultation. No cough.   CV: RRR, no murmurs.   Skin: no signs of acute rashes or trauma.   Musculoskeletal: joints exhibit full range of motion, without any pain to palpation. There are no signs of joint or muscle swelling. There is no tenderness to deep palpation of muscles.   Psychiatric: Obtunded    NEUROLOGICAL EXAM:    Mental status, orientation: Obtunded, opens eyes briefly to noxious stimul   Speech and language: Nonverbal   Memory: Unable to assess.   Cranial nerve exam: Pupils are 2-3 mm bilaterally and equally reactive to light and accommodation. Visual fields are intact by confrontation. There is no nystagmus on primary or secondary gaze. Face appears symmetric.   Motor exam: Brisk withdrawal to noxious stimuli in all 4 extremities  Sensory exam Appears intact bilaterally  Deep tendon reflexes:  2+ throughout. Plantar responses are flexor. There is no clonus.   Coordination: Unable to assess  Gait: Not assessed due to patient acuity    NIHSS: National Institutes of Health Stroke Scale    [2] 1a:Level of Consciousness    0-alert 1-drowsy   2-stupor   3-coma  [2] 1b:LOC Questions                  0-both  1-one      2-neither  [2] 1c:LOC Commands                   0-both  1-one      2-neither  [0] 2: Best Gaze                     0-nl    1-partial  2-forced  [0] 3: Visual Fields                   0-nl    1-partial  2-complete 3-bilat  [0] 4: Facial Paresis                0-nl    1-minor    2-partial  3-full  MOTOR                       0-nl  [1] 5: Right Arm           1-drift  [1] 6: Left Arm             2-some effort vs gravity  [1] 7: Right Leg           3-no effort vs gravity  [1] 8: Left Leg             4-no movement                             x-untestable  [0] 9: Limb Ataxia                    0-abs   1-1_limb   2-2+_limbs       x-untestable  [0] 10:Sensory                        0-nl    1-partial  2-dense  [3] 11:Best Language/Aphasia         0-nl    1-mild/mod 2-severe   3-mute  [0] 12:Dysarthria                     0-nl    1-mild/mod 2-severe       x-untestable  [0] 13:Neglect/Inattention            0-none  1-partial  2-complete  [13] TOTAL    NIHSS Time: 10/06/223 @ 1730    ANCILLARY DATA REVIEWED:     Lab Data Review:  Recent Results (from the past 24 hour(s))   CBC WITHOUT DIFFERENTIAL    Collection Time: 10/06/23   3:39 AM   Result Value Ref Range    WBC 5.4 4.8 - 10.8 K/uL    RBC 5.11 4.70 - 6.10 M/uL    Hemoglobin 16.7 14.0 - 18.0 g/dL    Hematocrit 50.1 42.0 - 52.0 %    MCV 98.0 (H) 81.4 - 97.8 fL    MCH 32.7 27.0 - 33.0 pg    MCHC 33.3 32.3 - 36.5 g/dL    RDW 49.1 35.9 - 50.0 fL    Platelet Count 244 164 - 446 K/uL    MPV 10.1 9.0 - 12.9 fL   Comp Metabolic Panel    Collection Time: 10/06/23  3:39 AM   Result Value Ref Range    Sodium 139 135 - 145 mmol/L    Potassium 3.8 3.6 - 5.5 mmol/L    Chloride 101 96 - 112 mmol/L    Co2 26 20 - 33 mmol/L    Anion Gap 12.0 7.0 - 16.0    Glucose 86 65 - 99 mg/dL    Bun 6 (L) 8 - 22 mg/dL    Creatinine 0.45 (L) 0.50 - 1.40 mg/dL    Calcium 9.3 8.5 - 10.5 mg/dL    Correct Calcium 9.7 8.5 - 10.5 mg/dL    AST(SGOT) 17 12 - 45 U/L    ALT(SGPT) 18 2 - 50 U/L    Alkaline Phosphatase 93 30 - 99 U/L    Total Bilirubin 0.7 0.1 - 1.5 mg/dL    Albumin 3.5 3.2 - 4.9 g/dL    Total Protein 7.0 6.0 - 8.2 g/dL    Globulin 3.5 1.9 - 3.5 g/dL    A-G Ratio 1.0 g/dL   ESTIMATED GFR    Collection Time: 10/06/23  3:39 AM   Result Value Ref Range    GFR (CKD-EPI) 109 >60 mL/min/1.73 m 2       Labs reviewed by me.       Imaging reviewed by me:     MR-BRAIN-W/O   Final Result      1.  There are tiny areas of acute infarcts within the large RIGHT temporal parietal and occipital encephalomalacia.   2.  Tiny areas of chronic lacunar infarcts in the bilateral basal ganglia and LEFT cerebellum.   3.  Mild cerebral volume loss.   4.  Moderate chronic microvascular ischemic disease.      [id number] VIEW   Final Result      No findings to preclude MRI.      EC-ECHOCARDIOGRAM COMPLETE W/O CONT    (Results Pending)         Presumed mechanism by TOAST:  __Large Artery Atherosclerosis  __Small Vessel (Lacunar)  X_Cardioembolic  __Other (Sickle Cell, Vasculitis, Hypercoagulable)  __Unknown    Modified Salem Scale (MRS): 2 = Slight disability; unable to perform all previous activities but able to look after own affairs  without assistance      ASSESSMENT AND PLAN:    Assessment and Plan:     77 y.o. male with an extensive EtOH abuse history [facility]. Despite interventions the patient remained profoundly encephalopathic and combative for which the [facility] wanted to [facility] an MRI, but did not have the capabilities to provide sedation for MRI at that time. Therefore the patient was transferred here to [facility] for MRI and further work up. MRI brain showed tiny acute infarcts with encephalomalacia in the right temporal, parietal, and occipital lobes. These infarcts would not account for the patient's profound encephalopathy and combative behavior and are likely incidental findings. Will complete work up at this time with CTA head/neck to evaluate vasculature and TTE. Recommend further toxic/metabolic work up per medicine team for further evaluation of encephalopathy.     Problem list:  Tiny right sided infarcts within chronic encephalomalacia      Recommendations:  -q4h and PRN neuro assessment. VS per nursing/unit protocol.   -BP goal is normotension, 100-130/60-80. Antihypertensives per primary team. No role for permissive hypertension   -Obtain CTA head and neck  -Telemetry; currently SR. Screen for Afib/arrhythmia. Obtain TTE.   -Check stroke labs: Lipid Panel, HgbA1c  -ASA 81 mg PO q day  -Start Atorvastatin 40 mg PO q HS. Titrate to long term LDL goal < 70  -BG management per primary team. BG goal   -Explore toxic/metabolic etiologies for encephalopathy as these tiny infarcts are likely incidental and wouldn't clinical correlate to patient's presentation   -PT/OT/SLP eval and treat.   -DVT PPX: SCDs. Okay for Lovenox from neurology perspective  -Will need [facility] follow up once discharged    Case reviewed and plan created with Dr. Lius, Vascular Neurology. Please call with any questions      AVA Giang.  Vascular  Neurology, [facility]

## 2023-10-07 NOTE — THERAPY
Physical Therapy Contact Note    Patient Name: Sen Vasquez  Age:  77 y.o., Sex:  male  Medical Record #: [id number]  Today's Date: 10/7/2023    PT consult received, eval attempted; with PMR; will return when able;     Arabella GARNICA, PT,  [phone]

## 2023-10-07 NOTE — PROGRESS NOTES
Neurology Initial Consult H&P  Neurohospitalist Service, [facility]iences    Referring Physician: Rocael Donahue M.D.    No chief complaint on file.      HPI: Sen Vasquez is a 77 y.o. male with history of heavy etoh use (not clear how heavy) that was admitted for etoh withdrawal. The encephalopathy was not resolving, so a workup was initiated for alternative causes. [facility], which revealed known L posterior MCA infarcts with small new areas of punctate infarcts. Vascular neurology was consult and made recommendations. However, the degree of impairment of the encephalopathy is out of proportion to what would be expected from the aforementioned infarcts.     Review of systems: In addition to what is detailed in the HPI above, (and scanned into the chart if and when applicable), all other systems reviewed and are negative.    Past Medical History:    has a past medical history of Alcohol abuse, Coronary arteriosclerosis, Dyslipidemia, GERD (gastroesophageal reflux disease), Mediastinal mass, and Osteopenia.    FHx:  family history is not on file.    SHx:   reports that he has been smoking cigarettes. He has never used smokeless tobacco. He reports current alcohol use. He reports that he does not use drugs.    Allergies:  Allergies   Allergen Reactions    Pravastatin Hives and Swelling       Medications:    Current Facility-Administered Medications:     Pharmacy Consult: Enteral tube insertion - review meds/change route/product selection, 1 Each, Other, PHARMACY TO DOSE, Rocael Donahue M.D.    [START ON 10/8/2023] ezetimibe (Zetia) tablet 10 mg, 10 mg, Enteral Tube, DAILY, Rocael Donahue M.D.    senna-docusate (Pericolace Or Senokot S) 8.6-50 MG per tablet 2 Tablet, 2 Tablet, Enteral Tube, BID **AND** polyethylene glycol/lytes (Miralax) PACKET 1 Packet, 1 Packet, Enteral Tube, QDAY PRN **AND** bisacodyl (Dulcolax) suppository 10 mg, 10  mg, Rectal, QDAY PRN, Rocael Donahue M.D.    [COMPLETED] Rally Bag IV (D5LR 1000 mL + Thiamine 100 mg + Folic Acid 1 mg + Magnesium Sulfate 1 g) infusion, , Intravenous, Once, Last Rate: 250 mL/hr at 10/05/23 0004, New Bag at 10/05/23 0004 **AND** [START ON 10/8/2023] thiamine (Vitamin B-1) tablet 100 mg, 100 mg, Enteral Tube, DAILY **AND** [START ON 10/8/2023] multivitamin tablet 1 Tablet, 1 Tablet, Enteral Tube, DAILY **AND** [START ON 10/8/2023] folic acid (Folvite) tablet 1 mg, 1 mg, Enteral Tube, DAILY, Rocael Donahue M.D.    acetaminophen (Tylenol) tablet 650 mg, 650 mg, Enteral Tube, Q6HRS PRN, Rocael Donahue M.D.    cloNIDine (Catapres) tablet 0.1 mg, 0.1 mg, Enteral Tube, Q HOUR PRN, Rocael Donahue M.D.    LORazepam (Ativan) tablet 0.5 mg, 0.5 mg, Enteral Tube, Q4HRS PRN, Rocael Donahue M.D.    LORazepam (Ativan) tablet 1 mg, 1 mg, Enteral Tube, Q4HRS PRN **OR** LORazepam (Ativan) injection 0.5 mg, 0.5 mg, Intravenous, Q4HRS PRN, Rocael Donahue M.D.    LORazepam (Ativan) tablet 2 mg, 2 mg, Enteral Tube, Q2HRS PRN **OR** LORazepam (Ativan) injection 1 mg, 1 mg, Intravenous, Q2HRS PRN, Rocael Donahue M.D.    LORazepam (Ativan) tablet 3 mg, 3 mg, Enteral Tube, Q HOUR PRN **OR** LORazepam (Ativan) injection 1.5 mg, 1.5 mg, Intravenous, Q HOUR PRN, Rocael Donahue M.D.    LORazepam (Ativan) tablet 4 mg, 4 mg, Enteral Tube, Q15 MIN PRN **OR** LORazepam (Ativan) injection 2 mg, 2 mg, Intravenous, Q15 MIN PRN, Rocael Donahue M.D.    ondansetron (Zofran ODT) dispertab 4 mg, 4 mg, Enteral Tube, Q4HRS PRN, Rocael Donahue M.D.    haloperidol lactate (Haldol) injection 5 mg, 5 mg, Intravenous, Q6HRS PRN, Evy Osorio, ERROL.P.R.N., 5 mg at 10/06/23 2222    [Held by provider] aspirin (Asa) chewable tab 81 mg, 81 mg, Oral, DAILY, 81 mg at 10/07/23 0536 **OR** [Held by provider] aspirin (Asa) suppository 300 mg, 300 mg, Rectal, DAILY, Rocael Donahue M.D.    enoxaparin (Lovenox) inj 40 mg,  40 mg, Subcutaneous, DAILY AT 1800, Rocael Donahue M.D.    lactated ringers infusion (BOLUS), 500 mL, Intravenous, Once PRN, Oniel Maki M.D.    labetalol (Normodyne/Trandate) injection 10 mg, 10 mg, Intravenous, Q4HRS PRN, Oniel Maki M.D.    ondansetron (Zofran) syringe/vial injection 4 mg, 4 mg, Intravenous, Q4HRS PRN, Oniel Maki M.D.    Physical Examination:     Vitals:    10/07/23 0320 10/07/23 0728 10/07/23 1215 10/07/23 1544   BP: 106/59 100/63 139/66 103/65   Pulse: 72 83 71 66   Resp: 18 18 18 18   Temp: 36.5 °C (97.7 °F) 36.5 °C (97.7 °F) 36.7 °C (98.1 °F) 36.6 °C (97.9 °F)   TempSrc: Temporal Temporal Temporal Temporal   SpO2: 99% 94% 95% 96%   Weight:       Height:           General: lying in bed; restrained      Neuro exam:  MS: eyes closed; does not open eyes but responds to voice and is oriented to person; does not follow commands or speak other than to say his name  CN: PEERL, slight side to side movements - no gaze preference, unable to eval VF, no obvious facial asymmetry; unable to test uvular  lift and tongue protrusion, SCM and trap; Hearing intact grossly.  Fundus not visualized  MOT: Nl bulk and tone.  Moves all extremities to noxious stimulation  SENS: responds to touch throughout  DTR: 2+ and symmetric; toes down  COOR: unable to test  Gait: unable to test    Objective Data:    Labs:  Lab Results   Component Value Date/Time    PROTHROMBTM 14.1 10/04/2023 11:39 PM    INR 1.08 10/04/2023 11:39 PM      Lab Results   Component Value Date/Time    WBC 6.1 10/07/2023 06:25 AM    RBC 4.65 (L) 10/07/2023 06:25 AM    HEMOGLOBIN 15.3 10/07/2023 06:25 AM    HEMATOCRIT 45.7 10/07/2023 06:25 AM    MCV 98.3 (H) 10/07/2023 06:25 AM    MCH 32.9 10/07/2023 06:25 AM    MCHC 33.5 10/07/2023 06:25 AM    MPV 10.2 10/07/2023 06:25 AM    NEUTSPOLYS 69.20 10/04/2023 11:39 PM    LYMPHOCYTES 20.60 (L) 10/04/2023 11:39 PM    MONOCYTES 5.60 10/04/2023 11:39 PM    EOSINOPHILS 3.60 10/04/2023 11:39 PM     BASOPHILS 0.80 10/04/2023 11:39 PM      Lab Results   Component Value Date/Time    SODIUM 135 10/07/2023 06:25 AM    POTASSIUM 4.9 10/07/2023 06:25 AM    CHLORIDE 99 10/07/2023 06:25 AM    CO2 23 10/07/2023 06:25 AM    GLUCOSE 79 10/07/2023 06:25 AM    BUN 5 (L) 10/07/2023 06:25 AM    CREATININE 0.47 (L) 10/07/2023 06:25 AM      Lab Results   Component Value Date/Time    CHOLSTRLTOT 147 10/07/2023 06:25 AM    LDL 85 10/07/2023 06:25 AM    HDL 44 10/07/2023 06:25 AM    TRIGLYCERIDE 88 10/07/2023 06:25 AM       Lab Results   Component Value Date/Time    ALKPHOSPHAT 93 10/06/2023 03:39 AM    ASTSGOT 17 10/06/2023 03:39 AM    ALTSGPT 18 10/06/2023 03:39 AM    TBILIRUBIN 0.7 10/06/2023 03:39 AM        Imaging/Testing:  MRI brain as per HPI    Assessment and Plan:    Persistent encephalopathy out of proportion to what would be expected from MRI findings. Likely has multifactorial delirium from prolonged etoh withdrawal in addition to benzo use - he received 9.5 mg of ativan on 10/5, 3 mg of 10/6, and 0.5 mg on 10/7. Obtaining LP is reasonable.     Plan:  EEG  CSF analysis: cell count, protein, glucose, culture, [place] serologies, meningitis panel, autoimmune encephalitis panel, lyme panel, and VDRL    Barry Roblero MD  Board Certified Neurology, ABPN

## 2023-10-07 NOTE — ASSESSMENT & PLAN NOTE
Seen on MRI  Started aspirin, (has allergy to statin will try Zatia), ordered lipid p[leon and A1c  COnsulting NEurovasculary query if we need CTA HnN versus carotid Dopplers.  Neurovascular felt ICA stenosis not symptomatic. No further workup. Continue aspirin and Zetia.

## 2023-10-07 NOTE — ANESTHESIA POSTPROCEDURE EVALUATION
Patient: Sne Vasquez    Procedure Summary     Date: 10/06/23 Room / Location: [facility] IMAGING / SURGERY [facility] - [facility]    Anesthesia Start: 1315 Anesthesia Stop: 1351    Procedures:       MR-BRAIN-W/O      MRI-ANESTHESIA CASE Diagnosis:             Acute encephalopathy            Acute encephalopathy      (encephalopathy)    Scheduled Providers: [facility] Responsible Provider: Abisai Cox M.D.    Anesthesia Type: general ASA Status: 3          Final Anesthesia Type: general  Last vitals  BP   Blood Pressure : 92/56    Temp   36.5 °C (97.7 °F)    Pulse   66   Resp   16    SpO2   96 %      Anesthesia Post Evaluation    Patient location during evaluation: PACU  Patient participation: complete - patient cannot participate  Level of consciousness: obtunded/minimal responses    Airway patency: patent  Anesthetic complications: no  Cardiovascular status: hemodynamically stable  Respiratory status: acceptable  Hydration status: euvolemic              No notable events documented.     Nurse Pain Score: 0  (Kothari-Baker Scale)

## 2023-10-07 NOTE — PROGRESS NOTES
Dr. Hernandez notified of pts updated neuro and inability to assess NIHSS. Pt arousable to verbal stim, but not following commands at this time.

## 2023-10-07 NOTE — DISCHARGE PLANNING
[facility] Transitional Care Coordination    Referral from:  Dr. Donahue  Insurance Provider on F[facility]:  [facility]  Potential Rehab diagnosis:  Stroke/Encephalopathy    Chart review indicates patient has ongoing medical management and may have therapy needs to possibly meet inpatient rehab facility criteria with the goal of returning to community.      D/C Support:  Son - will need verification    Physiatry to consult.  Stroke, NIH 13.  Would welcome PT/OT as clinically appropriate.  TCC will monitor.      Last Covid test:    Thank you for the referral.

## 2023-10-07 NOTE — CARE PLAN
The patient is Stable - Low risk of patient condition declining or worsening    Shift Goals  Clinical Goals: Neuro checks, attempt pulm hygiene `  Patient Goals: JF  Family Goals: JF      Problem: Risk for Aspiration  Goal: Patient's risk for aspiration will be absent or decrease  Outcome: Progressing     Problem: Safety - Medical Restraint  Goal: Remains free of injury from restraints (Restraint for Interference with Medical Device)  Outcome: Progressing  Goal: Free from restraint(s) (Restraint for Interference with Medical Device)  Outcome: Progressing

## 2023-10-08 LAB
ALBUMIN SERPL BCP-MCNC: 3.3 G/DL (ref 3.2–4.9)
BUN SERPL-MCNC: 5 MG/DL (ref 8–22)
CALCIUM ALBUM COR SERPL-MCNC: 9.8 MG/DL (ref 8.5–10.5)
CALCIUM SERPL-MCNC: 9.2 MG/DL (ref 8.5–10.5)
CHLORIDE SERPL-SCNC: 101 MMOL/L (ref 96–112)
CO2 SERPL-SCNC: 27 MMOL/L (ref 20–33)
CREAT SERPL-MCNC: 0.5 MG/DL (ref 0.5–1.4)
CRP SERPL HS-MCNC: 4.04 MG/DL (ref 0–0.75)
ERYTHROCYTE [DISTWIDTH] IN BLOOD BY AUTOMATED COUNT: 49.3 FL (ref 35.9–50)
GFR SERPLBLD CREATININE-BSD FMLA CKD-EPI: 106 ML/MIN/1.73 M 2
GLUCOSE SERPL-MCNC: 88 MG/DL (ref 65–99)
HCT VFR BLD AUTO: 47.1 % (ref 42–52)
HGB BLD-MCNC: 15.8 G/DL (ref 14–18)
MAGNESIUM SERPL-MCNC: 1.7 MG/DL (ref 1.5–2.5)
MCH RBC QN AUTO: 32.8 PG (ref 27–33)
MCHC RBC AUTO-ENTMCNC: 33.5 G/DL (ref 32.3–36.5)
MCV RBC AUTO: 97.7 FL (ref 81.4–97.8)
PHOSPHATE SERPL-MCNC: 3.1 MG/DL (ref 2.5–4.5)
PLATELET # BLD AUTO: 262 K/UL (ref 164–446)
PMV BLD AUTO: 10.3 FL (ref 9–12.9)
POTASSIUM SERPL-SCNC: 4.2 MMOL/L (ref 3.6–5.5)
PREALB SERPL-MCNC: 8.9 MG/DL (ref 18–38)
RBC # BLD AUTO: 4.82 M/UL (ref 4.7–6.1)
SODIUM SERPL-SCNC: 141 MMOL/L (ref 135–145)
VIT B1 BLD-MCNC: 312 NMOL/L (ref 70–180)
WBC # BLD AUTO: 7.1 K/UL (ref 4.8–10.8)

## 2023-10-08 PROCEDURE — 80069 RENAL FUNCTION PANEL: CPT

## 2023-10-08 PROCEDURE — 770001 HCHG ROOM/CARE - MED/SURG/GYN PRIV*

## 2023-10-08 PROCEDURE — 84134 ASSAY OF PREALBUMIN: CPT

## 2023-10-08 PROCEDURE — 700102 HCHG RX REV CODE 250 W/ 637 OVERRIDE(OP): Performed by: INTERNAL MEDICINE

## 2023-10-08 PROCEDURE — A9270 NON-COVERED ITEM OR SERVICE: HCPCS | Performed by: INTERNAL MEDICINE

## 2023-10-08 PROCEDURE — 700111 HCHG RX REV CODE 636 W/ 250 OVERRIDE (IP): Mod: JZ | Performed by: INTERNAL MEDICINE

## 2023-10-08 PROCEDURE — 99233 SBSQ HOSP IP/OBS HIGH 50: CPT | Performed by: INTERNAL MEDICINE

## 2023-10-08 PROCEDURE — 86140 C-REACTIVE PROTEIN: CPT

## 2023-10-08 PROCEDURE — 85027 COMPLETE CBC AUTOMATED: CPT

## 2023-10-08 PROCEDURE — 83735 ASSAY OF MAGNESIUM: CPT

## 2023-10-08 PROCEDURE — 36415 COLL VENOUS BLD VENIPUNCTURE: CPT

## 2023-10-08 RX ADMIN — FOLIC ACID 1 MG: 1 TABLET ORAL at 05:10

## 2023-10-08 RX ADMIN — LORAZEPAM 2 MG: 2 TABLET ORAL at 01:24

## 2023-10-08 RX ADMIN — EZETIMIBE 10 MG: 10 TABLET ORAL at 05:09

## 2023-10-08 RX ADMIN — DOCUSATE SODIUM 50 MG AND SENNOSIDES 8.6 MG 2 TABLET: 8.6; 5 TABLET, FILM COATED ORAL at 05:09

## 2023-10-08 RX ADMIN — POLYETHYLENE GLYCOL 3350 1 PACKET: 17 POWDER, FOR SOLUTION ORAL at 17:37

## 2023-10-08 RX ADMIN — LORAZEPAM 2 MG: 2 TABLET ORAL at 05:09

## 2023-10-08 RX ADMIN — THERA TABS 1 TABLET: TAB at 05:10

## 2023-10-08 RX ADMIN — Medication 100 MG: at 05:10

## 2023-10-08 RX ADMIN — ENOXAPARIN SODIUM 40 MG: 100 INJECTION SUBCUTANEOUS at 17:38

## 2023-10-08 RX ADMIN — DOCUSATE SODIUM 50 MG AND SENNOSIDES 8.6 MG 2 TABLET: 8.6; 5 TABLET, FILM COATED ORAL at 17:37

## 2023-10-08 ASSESSMENT — LIFESTYLE VARIABLES
ANXIETY: *
AUDITORY DISTURBANCES: NOT PRESENT
ORIENTATION AND CLOUDING OF SENSORIUM: DISORIENTED FOR PLACE AND / OR PERSON
ANXIETY: *
TOTAL SCORE: 12
NAUSEA AND VOMITING: NO NAUSEA AND NO VOMITING
TREMOR: TREMOR NOT VISIBLE BUT CAN BE FELT, FINGERTIP TO FINGERTIP
AGITATION: *
TOTAL SCORE: 12
AUDITORY DISTURBANCES: NOT PRESENT
AGITATION: *
PAROXYSMAL SWEATS: NO SWEAT VISIBLE
TOTAL SCORE: 12
ORIENTATION AND CLOUDING OF SENSORIUM: DISORIENTED FOR PLACE AND / OR PERSON
NAUSEA AND VOMITING: NO NAUSEA AND NO VOMITING
TREMOR: TREMOR NOT VISIBLE BUT CAN BE FELT, FINGERTIP TO FINGERTIP
TOTAL SCORE: 12
TREMOR: TREMOR NOT VISIBLE BUT CAN BE FELT, FINGERTIP TO FINGERTIP
HEADACHE, FULLNESS IN HEAD: NOT PRESENT
ORIENTATION AND CLOUDING OF SENSORIUM: DISORIENTED FOR PLACE AND / OR PERSON
NAUSEA AND VOMITING: NO NAUSEA AND NO VOMITING
AGITATION: *
TOTAL SCORE: 12
NAUSEA AND VOMITING: NO NAUSEA AND NO VOMITING
TREMOR: NO TREMOR
VISUAL DISTURBANCES: NOT PRESENT
HEADACHE, FULLNESS IN HEAD: NOT PRESENT
NAUSEA AND VOMITING: NO NAUSEA AND NO VOMITING
ANXIETY: *
ANXIETY: *
HEADACHE, FULLNESS IN HEAD: NOT PRESENT
TREMOR: NO TREMOR
PAROXYSMAL SWEATS: *
ORIENTATION AND CLOUDING OF SENSORIUM: DISORIENTED FOR PLACE AND / OR PERSON
AUDITORY DISTURBANCES: NOT PRESENT
HEADACHE, FULLNESS IN HEAD: NOT PRESENT
AGITATION: *
HEADACHE, FULLNESS IN HEAD: NOT PRESENT
PAROXYSMAL SWEATS: NO SWEAT VISIBLE
VISUAL DISTURBANCES: NOT PRESENT
VISUAL DISTURBANCES: VERY MILD SENSITIVITY
PAROXYSMAL SWEATS: NO SWEAT VISIBLE
ANXIETY: *
VISUAL DISTURBANCES: VERY MILD SENSITIVITY
AGITATION: *
PAROXYSMAL SWEATS: *
ORIENTATION AND CLOUDING OF SENSORIUM: DISORIENTED FOR PLACE AND / OR PERSON
AUDITORY DISTURBANCES: NOT PRESENT
AUDITORY DISTURBANCES: NOT PRESENT
VISUAL DISTURBANCES: VERY MILD SENSITIVITY

## 2023-10-08 ASSESSMENT — COGNITIVE AND FUNCTIONAL STATUS - GENERAL
EATING MEALS: TOTAL
DAILY ACTIVITIY SCORE: 6
DRESSING REGULAR LOWER BODY CLOTHING: TOTAL
PERSONAL GROOMING: TOTAL
TURNING FROM BACK TO SIDE WHILE IN FLAT BAD: A LITTLE
SUGGESTED CMS G CODE MODIFIER DAILY ACTIVITY: CN
DRESSING REGULAR UPPER BODY CLOTHING: TOTAL
MOVING FROM LYING ON BACK TO SITTING ON SIDE OF FLAT BED: A LOT
WALKING IN HOSPITAL ROOM: TOTAL
HELP NEEDED FOR BATHING: TOTAL
STANDING UP FROM CHAIR USING ARMS: TOTAL
MOVING TO AND FROM BED TO CHAIR: A LOT
CLIMB 3 TO 5 STEPS WITH RAILING: TOTAL
MOBILITY SCORE: 10
SUGGESTED CMS G CODE MODIFIER MOBILITY: CL
TOILETING: TOTAL

## 2023-10-08 NOTE — PROGRESS NOTES
Hospital Medicine Daily Progress Note    Date of Service  10/7/2023    Chief Complaint  Sen Vasquez is a 77 y.o. male admitted 10/4/2023 [facility] with [facility] and w.u for EtOH withdrawal and unexplained comfusion.    Hospital Course  No notes on file  He is being evaluated for confusion and agitated 7d out of withdrawal period.   Interval Problem Update  10/5. Patient is confused. When aroused he is combative and agitated  Ultimately placed restraints.  Spoke with Neurology as patient is 7 days out of withdrawal period. He remains on detox bag. MRI PENDING.   10/6. I reviewed the chart along with vitals, labs, imaging, test (both pending and resulted) and recommendations from specialists and interdisciplinary team.. Getting MRI with anesthesia; still confused. His son is unreachable. Consent signed.   Discussed with Social Work. [facility] and was transferred here primarily for MRI. May need to be transferred back after results. Discussed with Dr. Roblero who agreed with the MRI.  10/7. I spent a lot of time with this patient and management.  He remains confused.  MRI was done showing strokes. There is 50-70% ICA stenosis.  I talked to Neurovascular. COntinue monotherapy antiplatelet and statin. Per NEuro ICA stenosis asymptomatic, no indication for CEA or stenting right now. Strokes though not culprit to his confusion.  I spoek ke th CM/SW. They mentioned [facility] with [facility]. Patient planned to be [facility] to [facility] their w/u for confusion and alcohol withdrawal.  [facility]. She mentioned that they were also wanting an LP with anesthesia aside from the MRI. We reviewed their neurologists recommendations and viral encephalitis w/u was recommended.   I ordered IR LP as patient remains confused and will need anesthesia as we did the MRI. They do not have staff  to do it over the weekend likely Monday  I ordered a batter of CSF tests. I consulted Neurology who agreed with the encephalitis panel. Ordered EEG  I noted that patient hasn't really been eating so I ordered tube feeds and Cortrak with bridle.Patient on IV detox bag   I tried calling son again. No answer.    Patient is has a high medical complexity, complex decision making and is at high risk for complication, morbidity, and mortality, thus requiring the highest level of my preparedness for sudden, emergent intervention. Medical decision making is therefore complex. I provided  services, which included ordering labs and/or imaging, and discussing the case with various consultants.medication orders, frequent reevaluations of the patient's condition and response to treatment. Time was also devoted to counseling and coordinating care including review of records, pertinent lab data and studies, as well as discussing diagnostic evaluation and work up, planned therapeutic interventions and future disposition of care. Where indicated, the assessment and plan reflect discussion of patient with consultants, other healthcare providers, family members, and additional research needed to obtain further information in formulating the plan of care for Sen Vasquez. Total time spent was 85 minutes.       I have discussed this patient's plan of care and discharge plan at IDT rounds today with Case Management, Nursing, Nursing leadership, and other members of the IDT team.    Consultants/Specialty      Code Status  Full Code    Disposition  The patient is not medically cleared for discharge to home or a post-acute facility.      I have placed the appropriate orders for post-discharge needs.    Review of Systems  Review of Systems   Unable to perform ROS: Mental acuity        Physical Exam  Temp:  [36.3 °C (97.3 °F)-36.7 °C (98.1 °F)] 36.6 °C (97.9 °F)  Pulse:  [64-83] 66  Resp:  [17-18] 18  BP: (100-139)/(58-66) 103/65  SpO2:  [94  %-99 %] 96 %    Physical Exam  Vitals and nursing note reviewed.   HENT:      Head: Normocephalic and atraumatic.      Right Ear: External ear normal.      Left Ear: External ear normal.      Nose: Nose normal.      Mouth/Throat:      Mouth: Mucous membranes are moist.   Eyes:      General: No scleral icterus.     Conjunctiva/sclera: Conjunctivae normal.   Cardiovascular:      Rate and Rhythm: Normal rate and regular rhythm.      Heart sounds: No murmur heard.     No friction rub. No gallop.   Pulmonary:      Effort: Pulmonary effort is normal.      Breath sounds: Normal breath sounds.   Abdominal:      General: Abdomen is flat. Bowel sounds are normal. There is no distension.      Palpations: Abdomen is soft.      Tenderness: There is no abdominal tenderness. There is no guarding.   Musculoskeletal:         General: Normal range of motion.      Cervical back: Normal range of motion and neck supple.   Skin:     General: Skin is warm.   Neurological:      Mental Status: He is alert. Mental status is at baseline. He is disoriented.      Comments: On restraints   Psychiatric:         Mood and Affect: Mood normal.         Behavior: Behavior normal.         Thought Content: Thought content normal.         Judgment: Judgment normal.         Fluids    Intake/Output Summary (Last 24 hours) at 10/7/2023 1713  Last data filed at 10/7/2023 0600  Gross per 24 hour   Intake 100 ml   Output 475 ml   Net -375 ml         Laboratory  Recent Labs     10/04/23  2339 10/06/23  0339 10/07/23  0625   WBC 5.0 5.4 6.1   RBC 5.10 5.11 4.65*   HEMOGLOBIN 17.1 16.7 15.3   HEMATOCRIT 50.6 50.1 45.7   MCV 99.2* 98.0* 98.3*   MCH 33.5* 32.7 32.9   MCHC 33.8 33.3 33.5   RDW 51.2* 49.1 49.7   PLATELETCT 258 244 254   MPV 10.3 10.1 10.2       Recent Labs     10/04/23  2339 10/06/23  0339 10/07/23  0625   SODIUM 140 139 135   POTASSIUM 4.4 3.8 4.9   CHLORIDE 100 101 99   CO2 26 26 23   GLUCOSE 87 86 79   BUN 7* 6* 5*   CREATININE 0.57 0.45* 0.47*    CALCIUM 9.5 9.3 8.8       Recent Labs     10/04/23  2339   INR 1.08           Recent Labs     10/07/23  0625   TRIGLYCERIDE 88   HDL 44   LDL 85       Imaging  EC-ECHOCARDIOGRAM COMPLETE W/O CONT         CT-CTA NECK WITH & W/O-POST PROCESSING   Final Result         1.  Atherosclerosis with mural thrombus versus soft plaque at the left carotid bulb resulting in 50-70% stenosis. Similar to prior study.         CT-CTA HEAD WITH & W/O-POST PROCESS   Final Result         1.  No large vessel occlusion or aneurysm identified      MR-BRAIN-W/O   Final Result   Addendum (preliminary) 1 of 1   Addendum: There is diffuse T2 hyperintensity in the splenium of the corpus    callosum without restricted diffusion. This may represent a nonspecific    gliosis.      Final      1.  There are tiny areas of acute infarcts within the large RIGHT temporal parietal and occipital encephalomalacia.   2.  Tiny areas of chronic lacunar infarcts in the bilateral basal ganglia and LEFT cerebellum.   3.  Mild cerebral volume loss.   4.  Moderate chronic microvascular ischemic disease.      [id number] VIEW   Final Result      No findings to preclude MRI.      DX-LUMBAR PUNCTURE FOR DIAGNOSIS    (Results Pending)   DX-ABDOMEN FOR TUBE PLACEMENT    (Results Pending)          Assessment/Plan  * Alcohol withdrawal syndrome, with delirium, CVA, prolonged delirium (HCC)  Assessment & Plan  On detox bag  PRN ATivan  MRI showed CVA  CVA not culprit to confusion per Neurology  Asa, Zetia and continue work-up for confusion  [facility], Neurology consulted. Ordered LP and EEG ordered LP studies including viral panel.    CVA (cerebral vascular accident) (HCC)  Assessment & Plan  Seen on MRI  Started aspirin, (has allergy to statin will try Zatia), ordered lipid p[leon and A1c  COnsulting NEurovasculary query if we need CTA HnN versus carotid Dopplers.  Neurovascular felt ICA stenosis not symptomatic. No further workup. Continue aspirin and  Zetia.      Acute encephalopathy- (present on admission)  Assessment & Plan  Frequent neurochecks every 4 hours  Transferred here for MRI brain without contrast with sedation  Check TSH, ammonia, sed rate, B12, procalcitonin, cortisol  Trial of Narcan given pinpoint pupils  Consider neurology consult in a.m.  Thiamine, folic acid, multivitamins  Fall, seizure, aspiration precautions      ETOH abuse- (present on admission)  Assessment & Plan  CIWA protocol  Thiamine, folic acid, multivitamins  Monitor for withdrawal         VTE prophylaxis: Lovenox ppx planned for tomorrow, stroke w/u being done    I have performed a physical exam and reviewed and updated ROS and Plan today (10/7/2023). In review of yesterday's note (10/6/2023), there are no changes except as documented above.

## 2023-10-08 NOTE — PROGRESS NOTES
Hospital Medicine Daily Progress Note    Date of Service  10/8/2023    Chief Complaint  Sen Vasquez is a 77 y.o. male admitted 10/4/2023 [facility] with [facility] and w.u for EtOH withdrawal and unexplained comfusion.    Hospital Course  No notes on file  He is being evaluated for confusion and agitated 7d out of withdrawal period.   Interval Problem Update  10/5. Patient is confused. When aroused he is combative and agitated  Ultimately placed restraints.  Spoke with Neurology as patient is 7 days out of withdrawal period. He remains on detox bag. MRI PENDING.   10/6. I reviewed the chart along with vitals, labs, imaging, test (both pending and resulted) and recommendations from specialists and interdisciplinary team.. Getting MRI with anesthesia; still confused. His son is unreachable. Consent signed.   Discussed with Social Work. [facility] and was transferred here primarily for MRI. May need to be transferred back after results. Discussed with Dr. Roblero who agreed with the MRI.  10/7. I spent a lot of time with this patient and management.  He remains confused.  MRI was done showing strokes. There is 50-70% ICA stenosis.  I talked to Neurovascular. COntinue monotherapy antiplatelet and statin. Per NEuro ICA stenosis asymptomatic, no indication for CEA or stenting right now. Strokes though not culprit to his confusion.  I spoek ke th CM/SW. They mentioned [facility] was [facility] with [facility]. Patient planned to be transferred [facility] to [facility] to continue their w/u for confusion and alcohol withdrawal.  I spoke with [facility]. Ramos, [facility]. She mentioned that they were also wanting an LP with anesthesia aside from the MRI. We reviewed their neurologists recommendations and viral encephalitis w/u was recommended.   I ordered IR LP as patient remains confused and will need anesthesia as we did the MRI. They do not have staff  to do it over the weekend likely Monday  I ordered a batter of CSF tests. I consulted Neurology who agreed with the encephalitis panel. Ordered EEG  I noted that patient hasn't really been eating so I ordered tube feeds and Cortrak with bridle.Patient on IV detox bag   I tried calling son again. No answer.    Patient is has a high medical complexity, complex decision making and is at high risk for complication, morbidity, and mortality, thus requiring the highest level of my preparedness for sudden, emergent intervention. Medical decision making is therefore complex. I provided  services, which included ordering labs and/or imaging, and discussing the case with various consultants.medication orders, frequent reevaluations of the patient's condition and response to treatment. Time was also devoted to counseling and coordinating care including review of records, pertinent lab data and studies, as well as discussing diagnostic evaluation and work up, planned therapeutic interventions and future disposition of care. Where indicated, the assessment and plan reflect discussion of patient with consultants, other healthcare providers, family members, and additional research needed to obtain further information in formulating the plan of care for Sen Vasquez. Total time spent was 85 minutes.     10/9. LP planned for tomorrow.  Appreciate neuro recs  So far patient is keeping the feeding tube      I have discussed this patient's plan of care and discharge plan at IDT rounds today with Case Management, Nursing, Nursing leadership, and other members of the IDT team.    Consultants/Specialty      Code Status  Full Code    Disposition  The patient is not medically cleared for discharge to home or a post-acute facility.      I have placed the appropriate orders for post-discharge needs.    Review of Systems  Review of Systems   Unable to perform ROS: Mental acuity        Physical Exam  Temp:  [36.5 °C (97.7 °F)-36.8 °C (98.2 °F)]  36.7 °C (98.1 °F)  Pulse:  [66-91] 84  Resp:  [18] 18  BP: (103-156)/(62-95) 135/80  SpO2:  [94 %-96 %] 95 %    Physical Exam  Vitals and nursing note reviewed.   HENT:      Head: Normocephalic and atraumatic.      Right Ear: External ear normal.      Left Ear: External ear normal.      Nose: Nose normal.      Mouth/Throat:      Mouth: Mucous membranes are moist.      Comments: Cortrak  Eyes:      General: No scleral icterus.     Conjunctiva/sclera: Conjunctivae normal.   Cardiovascular:      Rate and Rhythm: Normal rate and regular rhythm.      Heart sounds: No murmur heard.     No friction rub. No gallop.   Pulmonary:      Effort: Pulmonary effort is normal.      Breath sounds: Normal breath sounds.   Abdominal:      General: Abdomen is flat. Bowel sounds are normal. There is no distension.      Palpations: Abdomen is soft.      Tenderness: There is no abdominal tenderness. There is no guarding.   Musculoskeletal:         General: Normal range of motion.      Cervical back: Normal range of motion and neck supple.   Skin:     General: Skin is warm.   Neurological:      Mental Status: He is alert. Mental status is at baseline. He is disoriented.      Comments: On restraints   Psychiatric:         Mood and Affect: Mood normal.         Behavior: Behavior normal.         Thought Content: Thought content normal.         Judgment: Judgment normal.         Fluids    Intake/Output Summary (Last 24 hours) at 10/8/2023 1511  Last data filed at 10/8/2023 0900  Gross per 24 hour   Intake 753 ml   Output 1100 ml   Net -347 ml         Laboratory  Recent Labs     10/06/23  0339 10/07/23  0625 10/08/23  0403   WBC 5.4 6.1 7.1   RBC 5.11 4.65* 4.82   HEMOGLOBIN 16.7 15.3 15.8   HEMATOCRIT 50.1 45.7 47.1   MCV 98.0* 98.3* 97.7   MCH 32.7 32.9 32.8   MCHC 33.3 33.5 33.5   RDW 49.1 49.7 49.3   PLATELETCT 244 254 262   MPV 10.1 10.2 10.3       Recent Labs     10/06/23  0339 10/07/23  0625 10/08/23  0403   SODIUM 139 135 141   POTASSIUM  3.8 4.9 4.2   CHLORIDE 101 99 101   CO2 26 23 27   GLUCOSE 86 79 88   BUN 6* 5* 5*   CREATININE 0.45* 0.47* 0.50   CALCIUM 9.3 8.8 9.2                 Recent Labs     10/07/23  0625   TRIGLYCERIDE 88   HDL 44   LDL 85         Imaging  DX-ABDOMEN FOR TUBE PLACEMENT   Final Result         Gastric drainage tube with tip projecting over the expected area of the stomach.      EC-ECHOCARDIOGRAM COMPLETE W/O CONT   Final Result      CT-CTA NECK WITH & W/O-POST PROCESSING   Final Result         1.  Atherosclerosis with mural thrombus versus soft plaque at the left carotid bulb resulting in 50-70% stenosis. Similar to prior study.         CT-CTA HEAD WITH & W/O-POST PROCESS   Final Result         1.  No large vessel occlusion or aneurysm identified      MR-BRAIN-W/O   Final Result   Addendum (preliminary) 1 of 1   Addendum: There is diffuse T2 hyperintensity in the splenium of the corpus    callosum without restricted diffusion. This may represent a nonspecific    gliosis.      Final      1.  There are tiny areas of acute infarcts within the large RIGHT temporal parietal and occipital encephalomalacia.   2.  Tiny areas of chronic lacunar infarcts in the bilateral basal ganglia and LEFT cerebellum.   3.  Mild cerebral volume loss.   4.  Moderate chronic microvascular ischemic disease.      UC-BCNHRXA-7 VIEW   Final Result      No findings to preclude MRI.      DX-LUMBAR PUNCTURE FOR DIAGNOSIS    (Results Pending)          Assessment/Plan  * Alcohol withdrawal syndrome, with delirium, CVA, prolonged delirium (HCC)  Assessment & Plan  On detox bag  PRN ATivan  MRI showed CVA  CVA not culprit to confusion per Neurology  Asa, Zetia and continue work-up for confusion  [facility], Neurology consulted. Ordered LP and EEG ordered LP studies including viral panel. LP planned w/ anesthesia Monday  Now has Cortak and on restraints    CVA (cerebral vascular accident) (HCC)  Assessment & Plan  Seen on MRI  Started aspirin,  (has allergy to statin will try Zatia), ordered lipid p[leon and A1c  COnsulting NEurovasculary query if we need CTA HnN versus carotid Dopplers.  Neurovascular felt ICA stenosis not symptomatic. No further workup. Continue aspirin and Zetia.      Acute encephalopathy- (present on admission)  Assessment & Plan  Frequent neurochecks every 4 hours  Transferred here for MRI brain without contrast with sedation  Check TSH, ammonia, sed rate, B12, procalcitonin, cortisol  Trial of Narcan given pinpoint pupils  Consider neurology consult in a.m.  Thiamine, folic acid, multivitamins  Fall, seizure, aspiration precautions      ETOH abuse- (present on admission)  Assessment & Plan  CIWA protocol  Thiamine, folic acid, multivitamins  Monitor for withdrawal         VTE prophylaxis: Lovenox ppx planned for tomorrow, stroke w/u being done    I have performed a physical exam and reviewed and updated ROS and Plan today (10/8/2023). In review of yesterday's note (10/7/2023), there are no changes except as documented above.

## 2023-10-08 NOTE — CARE PLAN
The patient is Stable - Low risk of patient condition declining or worsening    Shift Goals  Clinical Goals: monitor neuros  Patient Goals: JF  Family Goals: JF    Progress made toward(s) clinical / shift goals:    Problem: Knowledge Deficit - Standard  Goal: Patient and family/care givers will demonstrate understanding of plan of care, disease process/condition, diagnostic tests and medications  Description: Target End Date:  1-3 days or as soon as patient condition allows    Document in Patient Education    1.  Patient and family/caregiver oriented to unit, equipment, visitation policy and means for communicating concern  2.  Complete/review Learning Assessment  3.  Assess knowledge level of disease process/condition, treatment plan, diagnostic tests and medications  4.  Explain disease process/condition, treatment plan, diagnostic tests and medications  Outcome: Progressing     Problem: Optimal Care for Alcohol Withdrawal  Goal: Optimal Care for the alcohol withdrawal patient  Description: Target End Date:  1 to 3 days    1.  Alcohol history screening done on admission  2.  CIWA-AR score assessment (includes assessment of nausea/vomiting, tremor, sweats, anxiety, agitation, tactile, visual and auditory disturbances, headache and orientation/sensorium).  Document on CIWA flowsheet.  3.  Follow CIWA-AR score protocol  4.  Frequent reorientation  5.  Monitor for fluid and electrolyte imbalance.  6.  Assess for respiratory depression due to sedation (pulse ox)  7.  Consider thiamine, multivitamins, folic acid and magnesium sulfate per provider order  8.  Collaborate with Social Workers/Case Management  9.  Collaborate with mental health  Outcome: Progressing     Problem: Seizure Precautions  Goal: Implementation of seizure precautions  Description: Target End Date:  Prior to discharge or change in level of care    1.  Padded side rails up at all times  2.  Suction equipment and oxygen delivery system at bedside  3.   Continuous pulse oximeter in use  4.  Implement fall precautions, bed alarm on, bed in lowest position  5.  IV access (per order)  6.  Provide low stimulus environment, avoid exposure to triggers  7.  Instruct patient to use call light/seizure button if having warning signs of impending seizure  Outcome: Progressing     Problem: Lifestyle Changes  Goal: Patient's ability to identify lifestyle changes and available resources to help reduce recurrence of condition will improve  Description: Target End Date:  1 to 3 days    1.  Discuss recommended lifestyle changes  2.  Identify available resources and support systems  3.  Consider referral to substance abuse program  Outcome: Progressing     Problem: Psychosocial  Goal: Patient's level of anxiety will decrease  Description: Target End Date:  1-3 days or as soon as patient condition allows    1.  Collaborate with patient and family/caregiver to identify triggers and develop strategies to cope with anxiety  2.  Implement stimuli reduction, calming techniques  3.  Pharmacologic management per provider order  4.  Encourage patient/family/care giver participation  5.  Collaborate with interdisciplinary team including Psychologist or Behavioral Health Team as needed  Outcome: Progressing  Goal: Spiritual and cultural needs incorporated into hospitalization  Description: Target End Date:  End of day 1    1.  Encourage verbalization of feelings, concerns, expectations and needs  2.  Collaborate with Case Management/  3.  Collaborate with Pastoral Care to meet spiritual needs  Outcome: Progressing     Problem: Risk for Aspiration  Goal: Patient's risk for aspiration will be absent or decrease  Description: Target End Date:  Prior to discharge or change in level of care    1.   Complete dysphagia screening on admission  2.   NPO until dysphagia screening complete or medically cleared  3.   Collaborate with Speech Therapy, Clinical Dietitian and interdisciplinary  team  4.   Implement aspiration precautions  5.   Assist patient up to chair for meals  6.   Elevate head of bed 90 degrees if patient is unable to get out of bed  7.   Encourage small bites  8.   Ensure foods/liquids are of appropriate consistency  9.   Assess for any signs/symptoms of aspiration  10. Assess breath sounds and vital signs after oral intake  Outcome: Progressing     Problem: Hemodynamics  Goal: Patient's hemodynamics, fluid balance and neurologic status will be stable or improve  Description: Target End Date:  Prior to discharge or change in level of care    Document on Assessment and I/O flowsheet templates    1.  Monitor vital signs, pulse oximetry and cardiac monitor per provider order and/or policy  2.  Maintain blood pressure per provider order  3.  Hemodynamic monitoring per provider order  4.  Manage IV fluids and IV infusions  5.  Monitor intake and output  6.  Daily weights per unit policy or provider order  7.  Assess peripheral pulses and capillary refill  8.  Assess color and body temperature  9.  Position patient for maximum circulation/cardiac output  10. Monitor for signs/symptoms of excessive bleeding  11. Assess mental status, restlessness and changes in level of consciousness  12. Monitor temperature and report fever or hypothermia to provider immediately. Consideration of targeted temperature management.  Outcome: Progressing     Problem: Fluid Volume  Goal: Fluid volume balance will be maintained  Description: Target End Date:  Prior to discharge or change in level of care    Document on I/O flowsheet    1.  Monitor intake and output as ordered  2.  Promote oral intake as appropriate  3.  Report inadequate intake or output to physician  4.  Administer IV therapy as ordered  5.  Weights per provider order  6.  Assess for signs and symptoms of bleeding  7.  Monitor for signs of fluid overload (respiratory changes, edema, weight gain, increased abdominal girth)  8.  Monitor of signs  for inadequate fluid volume (poor skin turgor, dry mucous membranes)  9.  Instruct patient on adherence to fluid restrictions  Outcome: Progressing     Problem: Urinary - Renal Perfusion  Goal: Ability to achieve and maintain adequate renal perfusion and functioning will improve  Description: Target End Date:  Prior to discharge or change in level of care    Document on I/O and Assessment flowsheet    1.  Urine output will remain greater than 0.5ml/Kg/HR  2.  Monitor amount and/or characteristics of urine per order/policy. Specific gravity per order/policy  3.  Assess signs and symptoms of renal dysfunction  Outcome: Progressing     Problem: Respiratory  Goal: Patient will achieve/maintain optimum respiratory ventilation and gas exchange  Description: Target End Date:  Prior to discharge or change in level of care    Document on Assessment flowsheet    1.  Assess and monitor rate, rhythm, depth and effort of respiration  2.  Breath sounds assessed qshift and/or as needed  3.  Assess O2 saturation, administer/titrate oxygen as ordered  4.  Position patient for maximum ventilatory efficiency  5.  Turn, cough, and deep breath with splinting to improve effectiveness  6.  Collaborate with RT to administer medication/treatments per order  7.  Encourage use of incentive spirometer and encourage patient to cough after use and utilize splinting techniques if applicable  8.  Airway suctioning  9.  Monitor sputum production for changes in color, consistency and frequency  10. Perform frequent oral hygiene  11. Alternate physical activity with rest periods  Outcome: Progressing     Problem: Mechanical Ventilation  Goal: Safe management of artificial airway and ventilation  Description: Target End Date:  when vent discontinued    Document on VAP flowsheet and Airway LDA    1.  Daily awakening trials per provider order/policy  2.  Suctioning and care of ET/Trach tube (document on LDA)  3.  Collaborate with RT to administer  medications/treatments  4.  Ambu bag at bedside and available for transport  5.  Trach patient - replacement trach at bedside  6.  Provide communication tools if applicable  Outcome: Progressing  Goal: Successful weaning off mechanical ventilator, spontaneously maintains adequate gas exchange  Description: Target End Date:  when vent discontinued    1.  Follow universal weaning protocol for patients on mechanical ventilation per order  2.  Review contraindication list.  Obtain provider order to wean in presence of contraindication.  3.  Obtain Deepa Sedation-Agitation Score  4.  Deepa Score 1-2:  sedation vacation  5.  Deepa Score 3-4:  Collaborate with provider and/or RT to determine readiness for trial  6.  Begin 2 hour trial of weaning following protocol  7.  Evaluate for fatigue parameters per protocol  8.  Fatigue parameters triggered:  Stop wean and return to previous ASV% setting or increase % minute volume to offset work or breathing  Outcome: Progressing  Goal: Patient will be able to express needs and understand communication  Description: Target End Date:  when vent discontinued    1.  Assess ability to communicate and understand  2.  Provide communication tools  3.  Collaborate with Speech Therapy for [facility]  Outcome: Progressing     Problem: Physical Regulation  Goal: Diagnostic test results will improve  Description: Target End Date:  Prior to discharge or change in level of care    1.  Monitor lactic acid levels  2.  Monitor ABG's  3.  Monitor diagnostic test results  Outcome: Progressing  Goal: Signs and symptoms of infection will decrease  Description: Target End Date:  Prior to discharge or change in level of care    1.  Remove potential routes of infection, such as central lines and urinary catheter  2.  Follow facility protocol for changing IV tubing and sites  3.  Collaborate with Infectious Disease  4.  Antibiotic therapy per provider order  5.  Note drug effects and monitor for antibiotic  toxicity  Outcome: Progressing     Problem: Skin Integrity  Goal: Skin integrity is maintained or improved  Description: Target End Date:  Prior to discharge or change in level of care    Document interventions on Skin Risk/Edy flowsheet groups and corresponding LDA    1.  Assess and monitor skin integrity, appearance and/or temperature  2.  Assess risk factors for impaired skin integrity and/or pressures ulcers  3.  Implement precautions to protect skin integrity in collaboration with interdisciplinary team  4.  Implement pressure ulcer prevention protocol if at risk for skin breakdown  5.  Confirm wound care consult if at risk for skin breakdown  6.  Ensure patient use of pressure relieving devices  (Low air loss bed, waffle overlay, heel protectors, ROHO cushion, etc)  Outcome: Progressing     Problem: Fall Risk  Goal: Patient will remain free from falls  Description: Target End Date:  Prior to discharge or change in level of care    Document interventions on the Susie Loo Fall Risk Assessment    1.  Assess for fall risk factors  2.  Implement fall precautions  Outcome: Progressing     Problem: Safety - Medical Restraint  Goal: Remains free of injury from restraints (Restraint for Interference with Medical Device)  Description: INTERVENTIONS:  1. Determine that other, less restrictive measures have been tried or would not be effective before applying the restraint  2. Evaluate the patient's condition at the time of restraint application  3. Educate patient/family regarding the reason for restraint  4. Q2H: Monitor safety, psychosocial status, comfort, circulation, respiratory status, LOC, nutrition and hydration  Outcome: Progressing  Goal: Free from restraint(s) (Restraint for Interference with Medical Device)  Description: INTERVENTIONS:  1.  ONCE/SHIFT or MINIMUM Q12H: Assess and document the continuing need for restraints  2.  Q24H: Continued use of restraint requires LIP to perform face to face  examination and written order  3.  Identify and implement measures to help patient regain control  4.  Educate patient/family on discontinuation criteria   5.  Assess patient's understanding and retention of education provided  6.  Assess readiness for release & initiate progressive release per protocol  7.  Identify and document criteria for restraints  Outcome: Progressing     Problem: Optimal Care of the Stroke Patient  Goal: Optimal emergency care for the stroke patient  Description: Target End Date:  End of day 1    Time of Onset    1.  Time of last known well obtained  2.  Patient and family/caregiver verbalize understanding of diagnosis, medications and testing  3.  NIHSS performed and documented, including date and time, for ischemic stroke patients prior to any acute recanalization therapy (thrombolytics or mechanical) or within 12 hours of arrival if no intervention is warranted  4.  Consults and referrals placed to appropriate departments    Medications Administration as Ordered:    1.  Implement appropriate reversal agents for INR greater than 1.5  2.  Pre-alteplase administration of antihypertensives for SBP >185 DBP >110  3.  Post-alteplase administration of antihypertensives for SBP >185, DBP >105  4.  Thrombolytic Therapy for qualifying ischemic stroke patients who arrive within 4.5 hours of time of Last Known Well. Thrombolytic therapy administered within 30 minutes or a documented reason for delay  Outcome: Progressing  Goal: Optimal acute care for the stroke patient  Description: Target End Date:  1 to 3 days    - Vital signs and neuro checks performed and documented per order  - NIHSS completed and documented per order  - Continuous telemetry monitoring for 72 hours or until discontinued by provider  - Head CT without contrast obtained  - Consideration of MRI/MRA  - MRI screening form completed in worklist if MRI ordered  - Echocardiogram with Bubble Study ordered/completed with consideration of  SUNNI  - Carotid Doppler ordered/completed (Not required if CTA of neck completed in ED)  - Lipid Panel obtained within 48 hours of admission  - PT, PTT, INR obtained per Anticoagulation orders (if applicable)  - Antithrombotic therapy by end of hospital day 2 for ischemic stroke. Provider must document reason if contraindicated.  - Venous Thromboembolism (VTE) Prophylaxis by end of hospital day 2 for ischemic and hemorrhagic stroke. Provider must document reason if contraindicated  - Dysphagia screen completed and documented prior to any PO intake. Patient to remain NPO until Speech Therapy evaluation if thrombolytic or thrombectomy performed  - Rehabilitation assessment including PT/OT/SLP evaluations for referral to Physical Medicine and Rehabilitation services. If none needed, provider needs to document reason  - Neurology consult placed  - Consideration of cardiology consult for cryptogenic strokes  Outcome: Progressing     Problem: Knowledge Deficit - Stroke Education  Goal: Patient's knowledge of stroke and risk factors will improve  Description: Target End Date:  1-3 days or as soon as patient condition allows    Document in Patient Education    1.  Stroke education booklet provided  2.  Education regarding EMS activation, need for follow up, medication prescribed at discharge, risk factors for stroke/lifestyle modifications, warning signs and symptoms of stroke provided  Outcome: Progressing     Problem: Psychosocial - Patient Condition  Goal: Patient's ability to verbalize feelings about condition will improve  Description: Target End Date:  Prior to discharge or change in level of care    1.  Discuss coping with medical condition and its effects  2.  Encourage patient participation in care  3.  Encourage acknowledgement of body changes and accompanying emotions  4.  Perform depression screening  Outcome: Progressing  Goal: Patient's ability to re-evaluate and adapt role responsibilities will  improve  Description: Target End Date:  Prior to discharge or change in level of care    1.  Assess family support  2.  Encourage support system participation in care  3.  Encouraged verbalization of feelings regarding caregiver responsibilities  4.  Discuss changes in role and responsibilities caused by patient's condition  Outcome: Progressing     Problem: Discharge Planning - Stroke  Goal: Ensure Stroke Core Measures are met prior to discharge  Description: Target End Date:  Prior to discharge or change in level of care    1. Patient discharged on antithrombotic therapy (Ischemic Stroke)  2. Patient discharged on intensive statin if LDL is greater than or equal to 70 mg/dl (Ischemic Stroke)  3. Patient discharged on anticoagulation therapy for patients with atrial fibrillation/flutter (Ischemic Stroke)  4. Smoking education/cessation provided if applicable  Outcome: Progressing  Goal: Patient’s continuum of care needs will be met  Description: Target End Date:  Prior to discharge or change in level of care    1.  Potential discharge barriers identified upon admission and throughout hospital stay  2.  Ensure appropriate referrals in place for follow up with specialists after discharge  3.  Ensure appropriate referrals in place for DMEs if applicable  4.  Collaboration with transitional care team and interdisciplinary team to meet discharge needs  5.  Involve patient and family/caregiver in prioritizing goals for discharge planning  6.  Educate patient and caregiver about discharge instructions, medications, and follow up appointments  7.  Referral placed to [facility]  8.  Assure orders and follow up appointments are made for outpatient extended cardiac monitoring if appropriate  Outcome: Progressing     Problem: Neuro Status  Goal: Neuro status will remain stable or improve  Description: Target End Date:  Prior to discharge or change in level of care    Document on Neuro assessment in the Assessment  flowsheet    1.  Assess and monitor neurologic status per provider order/protocol/unit policy  2.  Assess level of consciousness and orientation  3.  Assess for speech, dysarthria, dysphagia, facial symmetry  4.  Assess visual field, eye movements, gaze preference, pupil reaction and size  5.  Assess muscle strength and motor response in all four extremities  6.  Assess for sensation (numbness and tingling)  7.  Assess basic neuro reflexes (cough, gag, corneal)  8.  Identify changes in neuro status and report to provider for testing/treatment orders  Outcome: Progressing     Problem: Hemodynamic Monitoring  Goal: Patient's hemodynamics, fluid balance and neurologic status will be stable or improve  Description: Target End Date:  Prior to discharge or change in level of care    1.  Vital signs, pulse oximetry and cardiac monitor per provider order and/or policy  2.  Frequent pulse checks performed post thrombectomy  3.  Frequent monitoring for signs of bleeding post TPA administration  4.  Proper management of IV infusions  5.  Intake and output monitored per provider order  6.  Daily weight obtained per unit policy or provider order  7.  Peripheral pulses and capillary refill assessed as needed  8.  Monitor for signs/symptoms of excessive bleeding  9.  Body temperature assessed and fevers treated  10. Patient positioned for maximum circulation/cardiac output  Outcome: Progressing     Problem: Respiratory - Stroke Patient  Goal: Patient will achieve/maintain optimum respiratory rate/effort  Description: Target End Date:  Prior to discharge or change in level of care    Document on Assessment flowsheet    1.  Assess and monitor respiratory rate, rhythm, depth and effort of respiration  2.  Oxygenation assessed throughout shift (recommendation of >94% for new stroke patients)  3.  Oxygen administered and/or titrated per order  4.  Collaboration with RT to administer medication/treatments per order  5.  Patient educated  on importance of turning, coughing, and deep breathing  6.  Patient positioned for maximum ventilatory efficiency  7.  Airway suctioning provided as needed  8.  Incentive spirometry encouraged 5-10 times every hour or while awake  Outcome: Progressing     Problem: Dysphagia  Goal: Dysphagia will improve  Description: Target End Date:  Prior to discharge or change in level of care    1.  Assess and monitor ability to swallow  2.  Collaborate with Speech Therapy to determine appropriate adaptation for safe administration of medications and oral nutrition  3.  Elevate head of bed to 90 degrees during feedings and for 30 minutes after each feeding  4.  Encourage proper swallowing techniques  5.  Screening on admission or as soon as possible  Outcome: Progressing     Problem: Urinary Elimination  Goal: Establish and maintain regular urinary output  Description: Target End Date:  Prior to discharge or change in level of care    Document on I/O and Assessment flowsheets    1.  Evaluate need to continue indwelling catheter every shift  2.  Assess signs and symptoms of urinary retention  3.  Assess post-void residual volumes  4.  Implement bladder training program  5.  Encourage scheduled voidings  6.  Assist patient to sit on bedside commode or toilet for voiding  7.  Educate patient and family/caregiver on use and purpose of urine collection devices (document in Patient Education)  Outcome: Progressing     Problem: Bowel Elimination  Goal: Establish and maintain regular bowel function  Description: Target End Date:  Prior to discharge or change in level of care    1.   Note date of last BM  2.   Educate about diet, fluid intake, medication and activity to promote bowel function  3.   Educate signs and symptoms of constipation and interventions to implement  4.   Pharmacologic bowel management per provider order  5.   Regular toileting schedule  6.   Upright position for toileting  7.   High fiber diet  8.   Encourage  hydration  9.   Collaborate with Clinical Dietician  10. Care and maintenance of ostomy if applicable  Outcome: Progressing     Problem: Mobility - Stroke  Goal: Patient's capacity to carry out activities will improve  Description: Target End Date:  Prior to discharge or change in level of care    1.  Assess for barriers to mobility/activity  2.  Implement activity per interdisciplinary team recommendations  3.  Target activity level identified and patient/family/caregiver aware of goal  4.  Provide assistive devices  5.  Instruct patient/caregiver on proper use of assistive/adaptive devices  6.  Schedule activities and rest periods to decrease effects of fatigue  7.  Encourage mobilization to extent of ability  8.  Maintain proper body alignment  9.  Provide adequate pain management to allow progressive mobilization  10. Implement pace maker precautions as needed  Outcome: Progressing  Goal: Spasticity will be prevented or improved  Description: Target End Date:  Prior to discharge or change in level of care    1.  Muscle relaxing agents considered or administered per order  2.  Splints applied properly and used accordingly to therapist's recommendations  3.  Assistance with stretching and range of motion exercises provided  Outcome: Progressing  Goal: Subluxation will be prevented or improved  Description: Target End Date:  Prior to discharge or change in level of care    1.   Ensure proper handling during transfers, ambulation, and repositioning in bed  2.   Reduce traction to affected limb and provide adequate support of weight  3.   Perform passive range of motion  4.  Assist with active range of motion  Outcome: Progressing     Problem: Self Care  Goal: Patient will have the ability to perform ADLs independently or with assistance (bathe, groom, dress, toilet and feed)  Description: Target End Date:  Prior to discharge or change in level of care    Document on ADL flowsheet    1.  Assess the capability and  level of deficiency to perform ADLs  2.  Encourage family/care giver involvement  3.  Provide assistive devices  4.  Consider PT/OT evaluations  5.  Maintain support, give positive feedback, encourage self-care allowing extra time and verbal cuing as needed  6.  Avoid doing something for patients they can do themselves, but provide assistance as needed  7.  Assist in anticipating/planning individual needs  8.  Collaborate with Case Management and  to meet discharge needs  Outcome: Progressing     Problem: Pain - Standard  Goal: Alleviation of pain or a reduction in pain to the patient’s comfort goal  Description: Target End Date:  Prior to discharge or change in level of care    Document on Vitals flowsheet    1.  Document pain using the appropriate pain scale per order or unit policy  2.  Educate and implement non-pharmacologic comfort measures (i.e. relaxation, distraction, massage, cold/heat therapy, etc.)  3.  Pain management medications as ordered  4.  Reassess pain after pain med administration per policy  5.  If opiods administered assess patient's response to pain medication is appropriate per POSS sedation scale  6.  Follow pain management plan developed in collaboration with patient and interdisciplinary team (including palliative care or pain specialists if applicable)  Outcome: Progressing       Patient is not progressing towards the following goals:

## 2023-10-08 NOTE — CARE PLAN
The patient is Watcher - Medium risk of patient condition declining or worsening    Shift Goals  Clinical Goals: Improved neuro exams, decrease agitation  Patient Goals: JF  Family Goals: JF    Progress made toward(s) clinical / shift goals:  Patient is AAO to self, patient remains confused and in restraints. Safety sitter at bedside. Bed low, locked and call light in reach.    Problem: Skin Integrity  Goal: Skin integrity is maintained or improved  Outcome: Progressing  Note: Patient on a waffle mattress, Q2 hour turns in place with TAPs and barrier cream  1.  Assess and monitor skin integrity, appearance and/or temperature  2.  Assess risk factors for impaired skin integrity and/or pressures ulcers  3.  Implement precautions to protect skin integrity in collaboration with interdisciplinary team  4.  Implement pressure ulcer prevention protocol if at risk for skin breakdown  5.  Confirm wound care consult if at risk for skin breakdown  6.  Ensure patient use of pressure relieving devices  (Low air loss bed, waffle overlay, heel protectors, ROHO cushion, etc)     Problem: Safety - Medical Restraint  Goal: Remains free of injury from restraints (Restraint for Interference with Medical Device)  Outcome: Progressing  Note: Q2 hour turns in place to prevent injury, will continue to assess the need for restraints  INTERVENTIONS:  1. Determine that other, less restrictive measures have been tried or would not be effective before applying the restraint  2. Evaluate the patient's condition at the time of restraint application  3. Educate patient/family regarding the reason for restraint  4. Q2H: Monitor safety, psychosocial status, comfort, circulation, respiratory status, LOC, nutrition and hydration    Problem: Optimal Care of the Stroke Patient  Goal: Optimal acute care for the stroke patient  Outcome: Progressing   Stroke education, medication and NIH in place    Patient is not progressing towards the following  goals:    Problem: Knowledge Deficit - Standard  Goal: Patient and family/care givers will demonstrate understanding of plan of care, disease process/condition, diagnostic tests and medications  Outcome: Not Progressing  Note: Patient is not redirectable, he does not follow commands, will continue to try and redirect  1.  Patient and family/caregiver oriented to unit, equipment, visitation policy and means for communicating concern  2.  Complete/review Learning Assessment  3.  Assess knowledge level of disease process/condition, treatment plan, diagnostic tests and medications  4.  Explain disease process/condition, treatment plan, diagnostic tests and medications     Problem: Safety - Medical Restraint  Goal: Free from restraint(s) (Restraint for Interference with Medical Device)  Outcome: Not Progressing  Note: Patient remains in restraints. Patient continues to pull at medical equipment. Will continue to assess the needs for restraints.

## 2023-10-09 ENCOUNTER — APPOINTMENT (OUTPATIENT)
Dept: RADIOLOGY | Facility: MEDICAL CENTER | Age: 78
DRG: 070 | End: 2023-10-09
Attending: INTERNAL MEDICINE
Payer: COMMERCIAL

## 2023-10-09 LAB
ALBUMIN SERPL BCP-MCNC: 3.2 G/DL (ref 3.2–4.9)
BUN SERPL-MCNC: 8 MG/DL (ref 8–22)
CALCIUM ALBUM COR SERPL-MCNC: 9.5 MG/DL (ref 8.5–10.5)
CALCIUM SERPL-MCNC: 8.9 MG/DL (ref 8.5–10.5)
CHLORIDE SERPL-SCNC: 101 MMOL/L (ref 96–112)
CO2 SERPL-SCNC: 28 MMOL/L (ref 20–33)
CREAT SERPL-MCNC: 0.5 MG/DL (ref 0.5–1.4)
CRP SERPL HS-MCNC: 3.47 MG/DL (ref 0–0.75)
ERYTHROCYTE [DISTWIDTH] IN BLOOD BY AUTOMATED COUNT: 47.5 FL (ref 35.9–50)
GFR SERPLBLD CREATININE-BSD FMLA CKD-EPI: 106 ML/MIN/1.73 M 2
GLUCOSE SERPL-MCNC: 131 MG/DL (ref 65–99)
HCT VFR BLD AUTO: 43.9 % (ref 42–52)
HGB BLD-MCNC: 15 G/DL (ref 14–18)
MAGNESIUM SERPL-MCNC: 1.7 MG/DL (ref 1.5–2.5)
MCH RBC QN AUTO: 33.5 PG (ref 27–33)
MCHC RBC AUTO-ENTMCNC: 34.2 G/DL (ref 32.3–36.5)
MCV RBC AUTO: 98 FL (ref 81.4–97.8)
PHOSPHATE SERPL-MCNC: 3.2 MG/DL (ref 2.5–4.5)
PLATELET # BLD AUTO: 296 K/UL (ref 164–446)
PMV BLD AUTO: 10.5 FL (ref 9–12.9)
POTASSIUM SERPL-SCNC: 4.1 MMOL/L (ref 3.6–5.5)
PREALB SERPL-MCNC: 8.4 MG/DL (ref 18–38)
RBC # BLD AUTO: 4.48 M/UL (ref 4.7–6.1)
SODIUM SERPL-SCNC: 139 MMOL/L (ref 135–145)
WBC # BLD AUTO: 7.4 K/UL (ref 4.8–10.8)

## 2023-10-09 PROCEDURE — 700111 HCHG RX REV CODE 636 W/ 250 OVERRIDE (IP): Mod: JZ | Performed by: INTERNAL MEDICINE

## 2023-10-09 PROCEDURE — 99233 SBSQ HOSP IP/OBS HIGH 50: CPT | Performed by: INTERNAL MEDICINE

## 2023-10-09 PROCEDURE — 770001 HCHG ROOM/CARE - MED/SURG/GYN PRIV*

## 2023-10-09 PROCEDURE — 86140 C-REACTIVE PROTEIN: CPT

## 2023-10-09 PROCEDURE — 700111 HCHG RX REV CODE 636 W/ 250 OVERRIDE (IP): Mod: JZ

## 2023-10-09 PROCEDURE — 700102 HCHG RX REV CODE 250 W/ 637 OVERRIDE(OP): Performed by: INTERNAL MEDICINE

## 2023-10-09 PROCEDURE — 80069 RENAL FUNCTION PANEL: CPT

## 2023-10-09 PROCEDURE — 92526 ORAL FUNCTION THERAPY: CPT

## 2023-10-09 PROCEDURE — 74018 RADEX ABDOMEN 1 VIEW: CPT

## 2023-10-09 PROCEDURE — 85027 COMPLETE CBC AUTOMATED: CPT

## 2023-10-09 PROCEDURE — 36415 COLL VENOUS BLD VENIPUNCTURE: CPT

## 2023-10-09 PROCEDURE — 92523 SPEECH SOUND LANG COMPREHEN: CPT

## 2023-10-09 PROCEDURE — 97162 PT EVAL MOD COMPLEX 30 MIN: CPT

## 2023-10-09 PROCEDURE — 84134 ASSAY OF PREALBUMIN: CPT

## 2023-10-09 PROCEDURE — 83735 ASSAY OF MAGNESIUM: CPT

## 2023-10-09 PROCEDURE — A9270 NON-COVERED ITEM OR SERVICE: HCPCS | Performed by: INTERNAL MEDICINE

## 2023-10-09 PROCEDURE — 95816 EEG AWAKE AND DROWSY: CPT | Mod: 26 | Performed by: PSYCHIATRY & NEUROLOGY

## 2023-10-09 PROCEDURE — 95816 EEG AWAKE AND DROWSY: CPT | Performed by: PSYCHIATRY & NEUROLOGY

## 2023-10-09 PROCEDURE — 4A10X4Z MONITORING OF CENTRAL NERVOUS ELECTRICAL ACTIVITY, EXTERNAL APPROACH: ICD-10-PCS | Performed by: PSYCHIATRY & NEUROLOGY

## 2023-10-09 RX ADMIN — Medication 100 MG: at 06:30

## 2023-10-09 RX ADMIN — EZETIMIBE 10 MG: 10 TABLET ORAL at 06:30

## 2023-10-09 RX ADMIN — HALOPERIDOL LACTATE 5 MG: 5 INJECTION, SOLUTION INTRAMUSCULAR at 23:35

## 2023-10-09 RX ADMIN — DOCUSATE SODIUM 50 MG AND SENNOSIDES 8.6 MG 2 TABLET: 8.6; 5 TABLET, FILM COATED ORAL at 06:30

## 2023-10-09 RX ADMIN — ENOXAPARIN SODIUM 40 MG: 100 INJECTION SUBCUTANEOUS at 17:08

## 2023-10-09 ASSESSMENT — GAIT ASSESSMENTS: GAIT LEVEL OF ASSIST: UNABLE TO PARTICIPATE

## 2023-10-09 ASSESSMENT — COGNITIVE AND FUNCTIONAL STATUS - GENERAL
TURNING FROM BACK TO SIDE WHILE IN FLAT BAD: UNABLE
MOVING FROM LYING ON BACK TO SITTING ON SIDE OF FLAT BED: UNABLE
STANDING UP FROM CHAIR USING ARMS: A LOT
MOVING TO AND FROM BED TO CHAIR: UNABLE
WALKING IN HOSPITAL ROOM: TOTAL
MOBILITY SCORE: 7
SUGGESTED CMS G CODE MODIFIER MOBILITY: CM
CLIMB 3 TO 5 STEPS WITH RAILING: TOTAL

## 2023-10-09 ASSESSMENT — PAIN DESCRIPTION - PAIN TYPE
TYPE: ACUTE PAIN

## 2023-10-09 NOTE — DISCHARGE PLANNING
Please review the consult from Dr. Anderson regarding post acute recommendations.  [facility] will no longer follow.  Please reach out to myself with any questions.

## 2023-10-09 NOTE — PROGRESS NOTES
Hospital Medicine Daily Progress Note    Date of Service  10/9/2023    Chief Complaint  Sen Vasquez is a 77 y.o. male [facility]4/[facility] and w.u for EtOH withdrawal and unexplained comfusion.    Hospital Course  No notes on file  He is being evaluated for confusion and agitated 7d out of withdrawal period.   Interval Problem Update  10/5. Patient is confused. When aroused he is combative and agitated  Ultimately placed restraints.  Spoke with Neurology as patient is 7 days out of withdrawal period. He remains on detox bag. MRI PENDING.   10/6. I reviewed the chart along with vitals, labs, imaging, test (both pending and resulted) and recommendations from specialists and interdisciplinary team.. Getting MRI with anesthesia; still confused. His son is unreachable. Consent signed.   Discussed with Social Work. [facility] and was transferred here primarily for MRI. May need to be transferred back after results. Discussed with Dr. Roblero who agreed with the MRI.  10/7. I spent a lot of time with this patient and management.  He remains confused.  MRI was done showing strokes. There is 50-70% ICA stenosis.  I talked to Neurovascular. COntinue monotherapy antiplatelet and statin. Per NEuro ICA stenosis asymptomatic, no indication for CEA or stenting right now. Strokes though not culprit to his confusion.  I spoek ke th CM/SW. They mentioned [facility]. Patient planned to be [facility] to [facility] their w/u for confusion and alcohol withdrawal.  [facility]. She mentioned that they were also wanting an LP with anesthesia aside from the MRI. We reviewed their neurologists recommendations and viral encephalitis w/u was recommended.   I ordered IR LP as patient remains confused and will need anesthesia as we did the MRI. They do not have staff  to do it over the weekend likely Monday  I ordered a batter of CSF tests. I consulted Neurology who agreed with the encephalitis panel. Ordered EEG  I noted that patient hasn't really been eating so I ordered tube feeds and Cortrak with bridle.Patient on IV detox bag   I tried calling son again. No answer.    Patient is has a high medical complexity, complex decision making and is at high risk for complication, morbidity, and mortality, thus requiring the highest level of my preparedness for sudden, emergent intervention. Medical decision making is therefore complex. I provided  services, which included ordering labs and/or imaging, and discussing the case with various consultants.medication orders, frequent reevaluations of the patient's condition and response to treatment. Time was also devoted to counseling and coordinating care including review of records, pertinent lab data and studies, as well as discussing diagnostic evaluation and work up, planned therapeutic interventions and future disposition of care. Where indicated, the assessment and plan reflect discussion of patient with consultants, other healthcare providers, family members, and additional research needed to obtain further information in formulating the plan of care for Sen Vasquez. Total time spent was 85 minutes.     10/9. LP planned for tomorrow.  Appreciate neuro recs  So far patient is keeping the feeding tube      I have discussed this patient's plan of care and discharge plan at IDT rounds today with Case Management, Nursing, Nursing leadership, and other members of the IDT team.    Consultants/Specialty      Code Status  Full Code    Disposition  Medically Cleared    I have placed the appropriate orders for post-discharge needs.    Review of Systems  Review of Systems   Unable to perform ROS: Mental acuity        Physical Exam  Temp:  [36.5 °C (97.7 °F)-36.7 °C (98.1 °F)] 36.7 °C (98.1 °F)  Pulse:  [69-84] 80  Resp:  [15-26] 20  BP:  ()/(60-80) 120/66  SpO2:  [95 %-98 %] 95 %    Physical Exam  Vitals and nursing note reviewed.   HENT:      Head: Normocephalic and atraumatic.      Right Ear: External ear normal.      Left Ear: External ear normal.      Nose: Nose normal.      Mouth/Throat:      Mouth: Mucous membranes are moist.      Comments: Cortrak  Eyes:      General: No scleral icterus.     Conjunctiva/sclera: Conjunctivae normal.   Cardiovascular:      Rate and Rhythm: Normal rate and regular rhythm.      Heart sounds: No murmur heard.     No friction rub. No gallop.   Pulmonary:      Effort: Pulmonary effort is normal.      Breath sounds: Normal breath sounds.   Abdominal:      General: Abdomen is flat. Bowel sounds are normal. There is no distension.      Palpations: Abdomen is soft.      Tenderness: There is no abdominal tenderness. There is no guarding.   Musculoskeletal:         General: Normal range of motion.      Cervical back: Normal range of motion and neck supple.   Skin:     General: Skin is warm.   Neurological:      Mental Status: He is alert. Mental status is at baseline. He is disoriented.      Comments: On restraints   Psychiatric:         Mood and Affect: Mood normal.         Behavior: Behavior normal.         Thought Content: Thought content normal.         Judgment: Judgment normal.       Fluids    Intake/Output Summary (Last 24 hours) at 10/9/2023 1004  Last data filed at 10/9/2023 0812  Gross per 24 hour   Intake 516 ml   Output 300 ml   Net 216 ml         Laboratory  Recent Labs     10/07/23  0625 10/08/23  0403 10/09/23  0310   WBC 6.1 7.1 7.4   RBC 4.65* 4.82 4.48*   HEMOGLOBIN 15.3 15.8 15.0   HEMATOCRIT 45.7 47.1 43.9   MCV 98.3* 97.7 98.0*   MCH 32.9 32.8 33.5*   MCHC 33.5 33.5 34.2   RDW 49.7 49.3 47.5   PLATELETCT 254 262 296   MPV 10.2 10.3 10.5       Recent Labs     10/07/23  0625 10/08/23  0403 10/09/23  0310   SODIUM 135 141 139   POTASSIUM 4.9 4.2 4.1   CHLORIDE 99 101 101   CO2 23 27 28   GLUCOSE 79  88 131*   BUN 5* 5* 8   CREATININE 0.47* 0.50 0.50   CALCIUM 8.8 9.2 8.9                 Recent Labs     10/07/23  0625   TRIGLYCERIDE 88   HDL 44   LDL 85         Imaging  DX-ABDOMEN FOR TUBE PLACEMENT   Final Result         Gastric drainage tube with tip projecting over the expected area of the stomach.      EC-ECHOCARDIOGRAM COMPLETE W/O CONT   Final Result      CT-CTA NECK WITH & W/O-POST PROCESSING   Final Result         1.  Atherosclerosis with mural thrombus versus soft plaque at the left carotid bulb resulting in 50-70% stenosis. Similar to prior study.         CT-CTA HEAD WITH & W/O-POST PROCESS   Final Result         1.  No large vessel occlusion or aneurysm identified      MR-BRAIN-W/O   Final Result   Addendum (preliminary) 1 of 1   Addendum: There is diffuse T2 hyperintensity in the splenium of the corpus    callosum without restricted diffusion. This may represent a nonspecific    gliosis.      Final      1.  There are tiny areas of acute infarcts within the large RIGHT temporal parietal and occipital encephalomalacia.   2.  Tiny areas of chronic lacunar infarcts in the bilateral basal ganglia and LEFT cerebellum.   3.  Mild cerebral volume loss.   4.  Moderate chronic microvascular ischemic disease.      [id number] VIEW   Final Result      No findings to preclude MRI.      DX-LUMBAR PUNCTURE FOR DIAGNOSIS    (Results Pending)          Assessment/Plan  * Alcohol withdrawal syndrome, with delirium, CVA, prolonged delirium (HCC)  Assessment & Plan  On detox bag  PRN ATivan  MRI showed CVA  CVA not culprit to confusion per Neurology  Asa, Zetia and continue work-up for confusion  [facility], Neurology consulted. Ordered LP and EEG ordered LP studies including viral panel. LP planned w/ anesthesia Monday  Now has Cortak and on restraints    CVA (cerebral vascular accident) (HCC)  Assessment & Plan  Seen on MRI  Started aspirin, (has allergy to statin will try Zatia), ordered lipid p[leon  and A1c  COnsulting NEurovasculary query if we need CTA HnN versus carotid Dopplers.  Neurovascular felt ICA stenosis not symptomatic. No further workup. Continue aspirin and Zetia.      Acute encephalopathy- (present on admission)  Assessment & Plan  Frequent neurochecks every 4 hours  Transferred here for MRI brain without contrast with sedation  Check TSH, ammonia, sed rate, B12, procalcitonin, cortisol  Trial of Narcan given pinpoint pupils  Consider neurology consult in a.m.  Thiamine, folic acid, multivitamins  Fall, seizure, aspiration precautions      ETOH abuse- (present on admission)  Assessment & Plan  CIWA protocol  Thiamine, folic acid, multivitamins  Monitor for withdrawal         VTE prophylaxis: Lovenox ppx planned for tomorrow, stroke w/u being done    I have performed a physical exam and reviewed and updated ROS and Plan today (10/9/2023). In review of yesterday's note (10/8/2023), there are no changes except as documented above.

## 2023-10-09 NOTE — CARE PLAN
The patient is Stable - Low risk of patient condition declining or worsening    Shift Goals  Clinical Goals: Monitor neuro status, safety  Patient Goals: JF  Family Goals: JF    Progress made toward(s) clinical / shift goals:  Patient remains confused. Restraints remain in place.     Patient is not progressing towards the following goals:

## 2023-10-09 NOTE — THERAPY
Speech Language Pathology   Communication Evaluation & Swallow Treatment     Patient Name: Sen Vasquez  AGE:  77 y.o., SEX:  male  Medical Record #: [id number]  Date of Service: 10/9/2023    History of Present Illness  76 y/o M admit 10/4 w/ ETOH withdrawal [facility]. An MRI was performed d/t persistent encephalopathy which indicated known left posterior PCA infracts with small new areas of punctuate infarcts.   An LP is pending for today to inform causes of persist encephalopathy.     PMHx: ETOH use, GERD, mediastinal mass, osteopenia, coronary arteriosclerosis, dyslipidemia     General Information  Vitals  O2 (LPM): 2  O2 Delivery Device: Silicone Nasal Cannula  Level of Consciousness: Alert  Patient Behaviors: Confused  Orientation: Self  Follows Directives: Inconsistent    Prior Living Situation & Level of Function  Lives with - Patient's Self Care Capacity: Unable To Determine At This Time     Communication: uknown  Swallowing: unknown     Oral Mechanism Evaluation  Dentition: Scattered dentition, Multiple carries  Facial Symmetry: Central right facial droop  Facial Sensation: Pt did not follow commands to assess  Labial Observations: Right sided weakness  Lingual Observations: Right lingual deviation  Motor Speech: Moderate dysarthria r/t oromotor weakness but largely impacted by the pt's mentation    Laryngeal Function Exam  Secretion Management: Expectoration of secretions  Voice Quality: Wet, Hoarse  Max Phonation Time (seconds): unable to complete d/t mentation  Cough: Perceptually weak    Subjective  Pt seen saturating on 2L NC, NGT in situ w/ bilateral soft wrist restraints. Sitter present at bedside    Communication Domain(s)  Expressive Language: Moderate (suspected secondary to cognition)  Receptive Language: Moderate (suspect secondary to cognition)  Cognitive-Linguistic: Severe  Reading:  (limited assessment d/t pt's reduced cognitive endurance; continue dx as tolerated)      Assessment  The patient was seen this date for an abbreviated cognitive-linguistic evaluation. His cognitive endurance was not sufficient to perform all sections of the Cognistat and ongoing dx will be indicated.    Cognistat  Orientation: Severe; pt only oriented to self  Attention: Severe; reduced for sustained attention to functional tasks. Pt perseverative on asking to have his restraints removed  Comprehension: Moderate  Repetition: Moderate  Other:  Receptive Language: Pt following ~ 70% of simple commands and answering ~60% of simple questions  Expressive: Pt is able to express his basic wants/needs w/ mod support from his conversational partner    Swallow Treatment  SLP provided 1:1 feeding of trace amount of liquid on a tsp & ice chips. Pt w/ bolus holding, benefited from prompting to swallow. To palpation, swallow trigger was suspected to be delayed. Pt with wet vocal quality before, during and after the exam. D/t his mentation, he did not consistently iniate a cough/throat clear to clear secretions. When he did, he was noted to expectorate large amounts of thick, brown secretions.     Clinical Impressions  - Clinical signs of severe cognitive-linguistic deficits, likely acute & multifactorial in the context of alcohol withdrawal syndrome with delirium as well as acute CVA. Per Neurology consult, the pt's confusion is likely not d/t [facility] symptoms- subsequently prognosis for improvement in good pending resolved encephalopathy. Complete ongoing dx as tolerated.    - Continued clinical signs of severe oropharyngeal dysphagia, likely acute & multifactorial in the context of prolonged encephalopathy & neurological deficits from CVA. Recommend to continue NPO at this time w/ alternative nutrition/hydration. Pt is participating in prefeeding trials to demo readiness for an instrumental exam; currently his AMS and poor secretion management are barriers.     NOTE: It is not within the scope of practice of  "Speech-Language Pathologists to determine patient capacity. Please defer to the physician or psych to complete this assessment.     Recommendations  Supervision Needs Upons Discharge: Direct assistance with IADLs (see below)  IADLs: Medication management, Financial management, Appointment management, Household chores, Cooking  Consult Referral(s): Neuropsychologist    SLP Treatment Plan  Treatment Plan: Dysphagia Treatment, Cognitive Treatment, Patient/Family/Caregiver Training  SLP Frequency: 4x Per Week  Estimated Duration: Until Therapy Goals Met    Anticipated Discharge Needs  Discharge Recommendations: Recommend post-acute placement for additional speech therapy services prior to discharge home  Therapy Recommendations Upon DC: Dysphagia Training, Cognitive-Linguistic Training, Patient / Family / Caregiver Education    Patient / Family Goals  Patient / Family Goal #1: \"Take these off\" (the restraints)  Short Term Goal # 1: Pt will consume prefeeding trials w/ oral acceptance over ~ 5 minutes to demo readiness for an instrumental exam  Goal Outcome # 1: Progressing slower than expected  Short Term Goal # 2: Pt will participate in an instrumental swallow exam to define swallow physiology and determine ST POC  Short Term Goal # 3: Pt will demo orientation with 100% accuracy and mod verbal cueing and/or a visual aid  Short Term Goal # 4: Pt will follow simple commands with 90% accuracy & min cueing  Short Term Goal # 5: Pt will answer simple yes/no questions with 90% accuracy and min cueing    Kimberly Resendez, SLP  "

## 2023-10-09 NOTE — PROGRESS NOTES
Pts son Sen Vasquez  contacted about LP with sedation for Wednesday, pts son made aware to have his phone close by to give consent over the phone prior to procedure.

## 2023-10-09 NOTE — PROCEDURES
INPATIENT ROUTINE VIDEO ELECTROENCEPHALOGRAM REPORT    REFERRING PROVIDER: MD Oscar   Patient Name:Sen Vasquez   MRN:  [id number]   DOS: 10/09/2023  ROOM: [facility]/  TOTAL RECORDING TIME:  24 minutes of total recording time    INDICATION:  Sen Vasquez 77 y.o. male with changes in mental status. EEG study ordered to rule out seizures and epileptiform changes contributing    RELEVANT TREATMENTS/MEDICATIONS:    Current Facility-Administered Medications:     Pharmacy    ezetimibe    senna-docusate **AND** polyethylene glycol/lytes **AND** bisacodyl    [COMPLETED] Rally bag 1000 mL infusion **AND** thiamine **AND** [COMPLETED] multivitamin **AND** [COMPLETED] folic acid    acetaminophen    cloNIDine    LORazepam    LORazepam **OR** LORazepam    LORazepam **OR** LORazepam    LORazepam **OR** LORazepam    LORazepam **OR** LORazepam    ondansetron    haloperidol lactate    [Held by provider] aspirin **OR** [Held by provider] aspirin    enoxaparin (LOVENOX) injection    LR    labetalol    ondansetron     TECHNIQUE:   Routine VEEG was set up by a Neurodiagnostic technologist who performed education to the patient and staff. A minimum of 23 electrodes and 23 channel recording was setup and performed by Neurodiagnostic technologist, in accordance with the international 10-20 system. The study was reviewed in bipolar and referential montages. The recording examined the patient in the awake state.      DESCRIPTION OF THE RECORD:  The background activity  consisted of very irregular, but symmetric and poorly sustained 10-12 Hz, less than 20 uV without a distinct posterior dominant rhythm. Features of Stage II sleep were not seen. There was intermittent  bilateral frontal delta and diffuse theta slowing of the background, which were non sustained.     ICTAL AND INTERICTAL FINDINGS:   No focal or generalized epileptiform activity noted.   No regional slowing or persistent focal asymmetries were seen.  No definite seizures.      ACTIVATION PROCEDURES:   Hyperventilation was not attempted. Photic stimulation did not show a significant driving response    EKG: No significant dysrhythmias from a single lead EKG tracing     EVENTS:  None    INTERPRETATION:  Abnormal EEG in a confused patient in awake state, due to presence of non sustained bilateral frontal delta and diffuse theta slowing of the background. There were no distinct epileptiform abnormalities, lateralizing changes or organized electrographic seizure activity noted during this recording.    The above changes are non specific but could be secondary to an encephalopathy.   Clinical correlation is advised.     Lázaro Vitale MD, MHS  Department of Neurology at [facility]  Phone: [phone]  Fax: [phone]

## 2023-10-09 NOTE — CARE PLAN
Problem: Knowledge Deficit - Standard  Goal: Patient and family/care givers will demonstrate understanding of plan of care, disease process/condition, diagnostic tests and medications  Outcome: Progressing     Problem: Optimal Care for Alcohol Withdrawal  Goal: Optimal Care for the alcohol withdrawal patient  Outcome: Progressing     Problem: Seizure Precautions  Goal: Implementation of seizure precautions  Outcome: Progressing     Problem: Lifestyle Changes  Goal: Patient's ability to identify lifestyle changes and available resources to help reduce recurrence of condition will improve  Outcome: Not Progressing     Problem: Psychosocial  Goal: Patient's level of anxiety will decrease  Outcome: Progressing  Goal: Spiritual and cultural needs incorporated into hospitalization  Outcome: Progressing     Problem: Risk for Aspiration  Goal: Patient's risk for aspiration will be absent or decrease  Outcome: Progressing     Problem: Fluid Volume  Goal: Fluid volume balance will be maintained  Outcome: Progressing     Problem: Urinary - Renal Perfusion  Goal: Ability to achieve and maintain adequate renal perfusion and functioning will improve  Outcome: Progressing     Problem: Respiratory  Goal: Patient will achieve/maintain optimum respiratory ventilation and gas exchange  Outcome: Progressing     Problem: Mechanical Ventilation  Goal: Safe management of artificial airway and ventilation  Outcome: Met  Goal: Successful weaning off mechanical ventilator, spontaneously maintains adequate gas exchange  Outcome: Met  Goal: Patient will be able to express needs and understand communication  Outcome: Met     Problem: Physical Regulation  Goal: Diagnostic test results will improve  Outcome: Progressing  Goal: Signs and symptoms of infection will decrease  Outcome: Progressing     Problem: Fall Risk  Goal: Patient will remain free from falls  Outcome: Progressing     Problem: Safety - Medical Restraint  Goal: Remains free of  injury from restraints (Restraint for Interference with Medical Device)  Outcome: Progressing  Goal: Free from restraint(s) (Restraint for Interference with Medical Device)  Outcome: Progressing     Problem: Optimal Care of the Stroke Patient  Goal: Optimal emergency care for the stroke patient  Outcome: Progressing  Goal: Optimal acute care for the stroke patient  Outcome: Progressing     Problem: Knowledge Deficit - Stroke Education  Goal: Patient's knowledge of stroke and risk factors will improve  Outcome: Not Progressing     Problem: Psychosocial - Patient Condition  Goal: Patient's ability to verbalize feelings about condition will improve  Outcome: Not Progressing  Goal: Patient's ability to re-evaluate and adapt role responsibilities will improve  Outcome: Not Progressing     Problem: Discharge Planning - Stroke  Goal: Ensure Stroke Core Measures are met prior to discharge  Outcome: Progressing  Goal: Patient’s continuum of care needs will be met  Outcome: Progressing     Problem: Neuro Status  Goal: Neuro status will remain stable or improve  Outcome: Progressing     Problem: Hemodynamic Monitoring  Goal: Patient's hemodynamics, fluid balance and neurologic status will be stable or improve  Outcome: Progressing     Problem: Respiratory - Stroke Patient  Goal: Patient will achieve/maintain optimum respiratory rate/effort  Outcome: Progressing     Problem: Dysphagia  Goal: Dysphagia will improve  Outcome: Progressing     Problem: Urinary Elimination  Goal: Establish and maintain regular urinary output  Outcome: Progressing     Problem: Bowel Elimination  Goal: Establish and maintain regular bowel function  Outcome: Progressing     Problem: Mobility - Stroke  Goal: Patient's capacity to carry out activities will improve  Outcome: Progressing  Goal: Spasticity will be prevented or improved  Outcome: Progressing  Goal: Subluxation will be prevented or improved  Outcome: Progressing     Problem: Self Care  Goal:  Patient will have the ability to perform ADLs independently or with assistance (bathe, groom, dress, toilet and feed)  Outcome: Progressing     Problem: Pain - Standard  Goal: Alleviation of pain or a reduction in pain to the patient’s comfort goal  Outcome: Progressing   The patient is medium risk    Shift Goals  Clinical Goals: Neuro checks and sitter to keep patient safe.  Patient Goals: JF  Family Goals: JF    Progress made toward(s) clinical / shift goals:  yes  No changes.      Problem: Lifestyle Changes  Goal: Patient's ability to identify lifestyle changes and available resources to help reduce recurrence of condition will improve  Outcome: Not Progressing     Problem: Knowledge Deficit - Stroke Education  Goal: Patient's knowledge of stroke and risk factors will improve  Outcome: Not Progressing     Problem: Psychosocial - Patient Condition  Goal: Patient's ability to verbalize feelings about condition will improve  Outcome: Not Progressing  Goal: Patient's ability to re-evaluate and adapt role responsibilities will improve  Outcome: Not Progressing

## 2023-10-09 NOTE — DISCHARGE PLANNING
Case discussed in IDT rounds this morning:  Per Dr. Donahue, patient not yet medically cleared for transfer back to [facility]; Currently pending LP with anesthesia.

## 2023-10-09 NOTE — THERAPY
Physical Therapy Contact Note    Patient Name: Sen Vasquez  Age:  77 y.o., Sex:  male  Medical Record #: [id number]  Today's Date: 10/8/2023            10/08/23 1059   Initial Contact Note    Initial Contact Note Order Received and Verified, Physical Therapy Evaluation in Progress with Full Report to Follow.   Interdisciplinary Plan of Care Collaboration   IDT Collaboration with  Nursing   Collaboration Comments PT eval held today, still with confusion, not following cues, becomes agitated - pending LP on Monday, PT can follow up when appropriate - WILLEM   Session Information   Date / Session Number  10/8, 10/7 - with PMR  (Cris)

## 2023-10-09 NOTE — THERAPY
Physical Therapy   Initial Evaluation     Patient Name: Sen Vasquez  Age:  77 y.o., Sex:  male  Medical Record #: [id number]  Today's Date: 10/9/2023     Precautions  Precautions: Fall Risk;Swallow Precautions;Nasogastric Tube    Assessment  Patient is 77 y.o. male admitted w/ acute encephalopathy.  MRI indicates tiny infarcts right temporal, parietal and occipital.  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  Hx of ETOH, GERD, mediastinal mass.  He is rec'd alert, in bed, pleasant and able to follow 90% simple single step commands.  He needs mod assist to sit eob.  Once sitting, he is able to maintain midline sitting, but does prefer to lean on his left forearm, but can return to neutral w/ verbal cues only.  Attempted sit to stand several times, each w/ max assist.  Unable to clear buttocks from the bed.  Unable to ascertain PLOF, however at this time, will assume that pt was indep w/ mobility and address goals to that end.  Post acute would most benefit pt at this time, however as cognition clears his mobility will probably improve considerably.  Plan    Physical Therapy Initial Treatment Plan   Treatment Plan : Bed Mobility, Therapeutic Activities, Gait Training  Treatment Frequency: 3 Times per Week  Duration: Until Therapy Goals Met    DC Equipment Recommendations: Unable to determine at this time  Discharge Recommendations: Recommend post-acute placement for additional physical therapy services prior to discharge home       Objective       10/09/23 0806   Prior Living Situation   Housing / Facility Unable To Determine At This Time   Prior Level of Functional Mobility   Bed Mobility Unable To Determine At This Time   Transfer Status Unable To Determine At This Time   Ambulation Unable To Determine At This Time   Assistive Devices Used Unable to Determine At This Time   Cognition    Level of Consciousness Alert   Strength Lower Body   Comments Not able to formally assess, pt unable to follow, however  grossly moving against gravity.   Balance Assessment   Sitting Balance (Static) Fair -   Sitting Balance (Dynamic) Fair -   Standing Balance (Static)   (unable to stand)   Weight Shift Sitting Poor   Weight Shift Standing Absent   Bed Mobility    Supine to Sit Moderate Assist   Sit to Supine Minimal Assist   Gait Analysis   Gait Level Of Assist Unable to Participate   Functional Mobility   Sit to Stand Maximal Assist   Bed, Chair, Wheelchair Transfer Unable to Participate   Short Term Goals    Short Term Goal # 1 Pt to move supine to/from eob w/ spv in 6 visits to improve fxl indep   Short Term Goal # 2 Pt to move sit to/from stand w/ spv in 6 visits to improve fxl indep   Short Term Goal # 3 Pt to ambulate 75 ft w/ spv in 6 visits to improve fxl indep   Physical Therapy Initial Treatment Plan    Treatment Plan  Bed Mobility;Therapeutic Activities;Gait Training   Treatment Frequency 3 Times per Week   Duration Until Therapy Goals Met   Problem List    Problems Impaired Bed Mobility;Impaired Transfers;Impaired Ambulation;Decreased Activity Tolerance;Safety Awareness Deficits / Cognition   Anticipated Discharge Equipment and Recommendations   DC Equipment Recommendations Unable to determine at this time   Discharge Recommendations Recommend post-acute placement for additional physical therapy services prior to discharge home

## 2023-10-09 NOTE — DIETARY
Nutrition Services Brief Update:    Problem: Nutritional:  Goal: Nutrition support tolerated and meeting greater than 85% of estimated needs  Outcome: met    Per flowsheet pt currently receiving Tube Feeding Jevity 1.2 @ goal rate of 65 mL/hr providing 1872 kcals, 87 grams protein and 1259 mL free water.     RD following

## 2023-10-10 ENCOUNTER — APPOINTMENT (OUTPATIENT)
Dept: RADIOLOGY | Facility: MEDICAL CENTER | Age: 78
DRG: 070 | End: 2023-10-10
Attending: INTERNAL MEDICINE
Payer: COMMERCIAL

## 2023-10-10 LAB
ALBUMIN SERPL BCP-MCNC: 3 G/DL (ref 3.2–4.9)
BACTERIA BLD CULT: NORMAL
BACTERIA BLD CULT: NORMAL
BUN SERPL-MCNC: 9 MG/DL (ref 8–22)
CALCIUM ALBUM COR SERPL-MCNC: 9.5 MG/DL (ref 8.5–10.5)
CALCIUM SERPL-MCNC: 8.7 MG/DL (ref 8.5–10.5)
CHLORIDE SERPL-SCNC: 101 MMOL/L (ref 96–112)
CO2 SERPL-SCNC: 32 MMOL/L (ref 20–33)
CREAT SERPL-MCNC: 0.6 MG/DL (ref 0.5–1.4)
ERYTHROCYTE [DISTWIDTH] IN BLOOD BY AUTOMATED COUNT: 50 FL (ref 35.9–50)
EV RNA SPEC QL NAA+PROBE: NOT DETECTED
GFR SERPLBLD CREATININE-BSD FMLA CKD-EPI: 100 ML/MIN/1.73 M 2
GLUCOSE SERPL-MCNC: 124 MG/DL (ref 65–99)
HCT VFR BLD AUTO: 42.8 % (ref 42–52)
HGB BLD-MCNC: 14.4 G/DL (ref 14–18)
MAGNESIUM SERPL-MCNC: 1.7 MG/DL (ref 1.5–2.5)
MCH RBC QN AUTO: 33.2 PG (ref 27–33)
MCHC RBC AUTO-ENTMCNC: 33.6 G/DL (ref 32.3–36.5)
MCV RBC AUTO: 98.6 FL (ref 81.4–97.8)
PHOSPHATE SERPL-MCNC: 3.5 MG/DL (ref 2.5–4.5)
PLATELET # BLD AUTO: 311 K/UL (ref 164–446)
PMV BLD AUTO: 10.3 FL (ref 9–12.9)
POTASSIUM SERPL-SCNC: 4 MMOL/L (ref 3.6–5.5)
RBC # BLD AUTO: 4.34 M/UL (ref 4.7–6.1)
SIGNIFICANT IND 70042: NORMAL
SIGNIFICANT IND 70042: NORMAL
SITE SITE: NORMAL
SITE SITE: NORMAL
SODIUM SERPL-SCNC: 140 MMOL/L (ref 135–145)
SOURCE SOURCE: NORMAL
SOURCE SOURCE: NORMAL
SPECIMEN SOURCE: NORMAL
WBC # BLD AUTO: 7 K/UL (ref 4.8–10.8)

## 2023-10-10 PROCEDURE — 83735 ASSAY OF MAGNESIUM: CPT

## 2023-10-10 PROCEDURE — 700111 HCHG RX REV CODE 636 W/ 250 OVERRIDE (IP): Mod: JZ

## 2023-10-10 PROCEDURE — 36415 COLL VENOUS BLD VENIPUNCTURE: CPT

## 2023-10-10 PROCEDURE — A9270 NON-COVERED ITEM OR SERVICE: HCPCS | Performed by: INTERNAL MEDICINE

## 2023-10-10 PROCEDURE — 770001 HCHG ROOM/CARE - MED/SURG/GYN PRIV*

## 2023-10-10 PROCEDURE — 85027 COMPLETE CBC AUTOMATED: CPT

## 2023-10-10 PROCEDURE — 700102 HCHG RX REV CODE 250 W/ 637 OVERRIDE(OP): Performed by: INTERNAL MEDICINE

## 2023-10-10 PROCEDURE — 99233 SBSQ HOSP IP/OBS HIGH 50: CPT | Performed by: STUDENT IN AN ORGANIZED HEALTH CARE EDUCATION/TRAINING PROGRAM

## 2023-10-10 PROCEDURE — 97167 OT EVAL HIGH COMPLEX 60 MIN: CPT

## 2023-10-10 PROCEDURE — 80069 RENAL FUNCTION PANEL: CPT

## 2023-10-10 RX ADMIN — EZETIMIBE 10 MG: 10 TABLET ORAL at 04:38

## 2023-10-10 RX ADMIN — HALOPERIDOL LACTATE 5 MG: 5 INJECTION, SOLUTION INTRAMUSCULAR at 08:54

## 2023-10-10 RX ADMIN — Medication 100 MG: at 04:38

## 2023-10-10 ASSESSMENT — COGNITIVE AND FUNCTIONAL STATUS - GENERAL
PERSONAL GROOMING: A LOT
DRESSING REGULAR UPPER BODY CLOTHING: A LOT
SUGGESTED CMS G CODE MODIFIER DAILY ACTIVITY: CL
HELP NEEDED FOR BATHING: TOTAL
EATING MEALS: A LOT
DRESSING REGULAR LOWER BODY CLOTHING: TOTAL
TOILETING: TOTAL
DAILY ACTIVITIY SCORE: 9

## 2023-10-10 ASSESSMENT — PAIN DESCRIPTION - PAIN TYPE
TYPE: ACUTE PAIN

## 2023-10-10 NOTE — CARE PLAN
The patient is Stable - Low risk of patient condition declining or worsening    Shift Goals  Clinical Goals: safety, maintain skin integrity  Patient Goals: JF  Family Goals: n/a    Progress made toward(s) clinical / shift goals:      Problem: Skin Integrity  Goal: Skin integrity is maintained or improved  Outcome: Progressing  Note: Waffle overlay, Q2h turns and mepliex in place for prevention measures.        Patient is not progressing towards the following goals:      Problem: Knowledge Deficit - Standard  Goal: Patient and family/care givers will demonstrate understanding of plan of care, disease process/condition, diagnostic tests and medications  Outcome: Not Progressing  Note: Pt education given, no evidence of learning. Family updated via phone.      Problem: Optimal Care for Alcohol Withdrawal  Goal: Optimal Care for the alcohol withdrawal patient  Outcome: Not Progressing     Problem: Seizure Precautions  Goal: Implementation of seizure precautions  Outcome: Not Progressing     Problem: Lifestyle Changes  Goal: Patient's ability to identify lifestyle changes and available resources to help reduce recurrence of condition will improve  Outcome: Not Progressing     Problem: Psychosocial  Goal: Patient's level of anxiety will decrease  Outcome: Not Progressing  Goal: Spiritual and cultural needs incorporated into hospitalization  Outcome: Not Progressing     Problem: Risk for Aspiration  Goal: Patient's risk for aspiration will be absent or decrease  Outcome: Not Progressing     Problem: Hemodynamics  Goal: Patient's hemodynamics, fluid balance and neurologic status will be stable or improve  Outcome: Not Progressing     Problem: Fluid Volume  Goal: Fluid volume balance will be maintained  Outcome: Not Progressing     Problem: Urinary - Renal Perfusion  Goal: Ability to achieve and maintain adequate renal perfusion and functioning will improve  Outcome: Not Progressing     Problem: Respiratory  Goal: Patient  will achieve/maintain optimum respiratory ventilation and gas exchange  Outcome: Not Progressing     Problem: Physical Regulation  Goal: Diagnostic test results will improve  Outcome: Not Progressing  Goal: Signs and symptoms of infection will decrease  Outcome: Not Progressing     Problem: Fall Risk  Goal: Patient will remain free from falls  Outcome: Not Progressing     Problem: Safety - Medical Restraint  Goal: Remains free of injury from restraints (Restraint for Interference with Medical Device)  Outcome: Not Progressing  Note: Pt remains in bilateral wrist restraints with telesitter in place.   Goal: Free from restraint(s) (Restraint for Interference with Medical Device)  Outcome: Not Progressing     Problem: Optimal Care of the Stroke Patient  Goal: Optimal emergency care for the stroke patient  Outcome: Not Progressing  Goal: Optimal acute care for the stroke patient  Outcome: Not Progressing     Problem: Knowledge Deficit - Stroke Education  Goal: Patient's knowledge of stroke and risk factors will improve  Outcome: Not Progressing     Problem: Psychosocial - Patient Condition  Goal: Patient's ability to verbalize feelings about condition will improve  Outcome: Not Progressing  Goal: Patient's ability to re-evaluate and adapt role responsibilities will improve  Outcome: Not Progressing     Problem: Discharge Planning - Stroke  Goal: Ensure Stroke Core Measures are met prior to discharge  Outcome: Not Progressing  Goal: Patient’s continuum of care needs will be met  Outcome: Not Progressing     Problem: Neuro Status  Goal: Neuro status will remain stable or improve  Outcome: Not Progressing  Note: Q4h neuro checks in place.      Problem: Hemodynamic Monitoring  Goal: Patient's hemodynamics, fluid balance and neurologic status will be stable or improve  Outcome: Not Progressing     Problem: Respiratory - Stroke Patient  Goal: Patient will achieve/maintain optimum respiratory rate/effort  Outcome: Not  Progressing     Problem: Dysphagia  Goal: Dysphagia will improve  Outcome: Not Progressing     Problem: Urinary Elimination  Goal: Establish and maintain regular urinary output  Outcome: Not Progressing     Problem: Bowel Elimination  Goal: Establish and maintain regular bowel function  Outcome: Not Progressing     Problem: Mobility - Stroke  Goal: Patient's capacity to carry out activities will improve  Outcome: Not Progressing  Goal: Spasticity will be prevented or improved  Outcome: Not Progressing  Goal: Subluxation will be prevented or improved  Outcome: Not Progressing     Problem: Self Care  Goal: Patient will have the ability to perform ADLs independently or with assistance (bathe, groom, dress, toilet and feed)  Outcome: Not Progressing     Problem: Pain - Standard  Goal: Alleviation of pain or a reduction in pain to the patient’s comfort goal  Outcome: Not Progressing

## 2023-10-10 NOTE — PROGRESS NOTES
PT pulled out NG tube with bridle attempted to place a new NG twice but pt desated during both attempts. Informed Dr and got an order to place an iris with a bridle.

## 2023-10-10 NOTE — THERAPY
Speech Language Therapy Contact Note    Patient Name: Sen Vasquez  Age:  78 y.o., Sex:  male  Medical Record #: [id number]  Today's Date: 10/10/2023    ST swallow re-check deferred; pt very restless and calling out loudly. Not appropriate for structured tasks. Discussed missed therapy with RN.

## 2023-10-10 NOTE — THERAPY
Occupational Therapy   Initial Evaluation     Patient Name: Sen Vasquez  Age:  78 y.o., Sex:  male  Medical Record #: [id number]  Today's Date: 10/10/2023     Precautions  Precautions: Fall Risk, Swallow Precautions, Nasogastric Tube    Assessment  Patient is 78 y.o. male admitted from [facility] in which [facility] had been there for about a week. Hx etoh abuse. He was admitted to [facility] for further workup. Found to have multiple R sided infarcts and chronic encephalomalacia. Pending LP to rule out viral encephalitis.  Additional factors influencing patient status / progress: weakness, fatigue, impaired balance, impaired cognition.      Plan    Occupational Therapy Initial Treatment Plan   Treatment Interventions: Self Care / Activities of Daily Living, Adaptive Equipment, Cognitive Skill Development, Neuro Re-Education / Balance, Therapeutic Exercises, Therapeutic Activity  Treatment Frequency: 3 Times per Week  Duration: Until Therapy Goals Met    DC Equipment Recommendations: Unable to determine at this time  Discharge Recommendations: Recommend post-acute placement for additional occupational therapy services prior to discharge home      Objective       10/10/23 0914   Prior Living Situation   Comments Pt is a poor historian, unable to provide any PLOF/PLS   Prior Level of ADL Function   Comments unable to provide information   Prior Level of IADL Function   Comments unable to provide information   Precautions   Precautions Fall Risk;Swallow Precautions;Nasogastric Tube   Pain 0 - 10 Group   Therapist Pain Assessment Nurse Notified  (no s/s)   Cognition    Cognition / Consciousness X   Speech/ Communication Delayed Responses   Level of Consciousness Alert   Ability To Follow Commands Unable to Follow 1 Step Commands   Safety Awareness Impaired;Impulsive   New Learning Impaired   Attention Impaired   Sequencing Impaired   Initiation Impaired   Comments fidgeting with lines and tubes throughout, would not follow directions  appropriately   Active ROM Upper Body   Active ROM Upper Body  X   Comments difficult to formally assess BUE due to impaired cognition; moving BUE throughout spontaneously; LUE appeared to move less than RUE   Strength Upper Body   Upper Body Strength  X   Comments difficult to formally assess BUE due to impaired cognition; LUE appears grossly weaker   Coordination Upper Body   Coordination X   Comments difficult to formally assess BUE due to impaired cognition   Balance Assessment   Sitting Balance (Static) Poor +   Sitting Balance (Dynamic) Poor   Weight Shift Sitting Poor   Bed Mobility    Supine to Sit Maximal Assist   Sit to Supine Maximal Assist   Scooting Maximal Assist   Rolling Maximal Assist to Rt.   Comments poor initiation   ADL Assessment   Grooming Moderate Assist;Seated   Lower Body Dressing Total Assist   Toileting Total Assist   How much help from another person does the patient currently need...   Putting on and taking off regular lower body clothing? 1   Bathing (including washing, rinsing, and drying)? 1   Toileting, which includes using a toilet, bedpan, or urinal? 1   Putting on and taking off regular upper body clothing? 2   Taking care of personal grooming such as brushing teeth? 2   Eating meals? 2   6 Clicks Daily Activity Score 9   Modified [place] (mRS)   Modified [place] Score 5   Functional Mobility   Sit to Stand Unable to Participate   Bed, Chair, Wheelchair Transfer Unable to Participate   Mobility EOB only today   Short Term Goals   Short Term Goal # 1 pt will follow 1 step direction 50% of the time   Short Term Goal # 2 pt will hold self unsupported 10 sec in prep for seated ADLs   Short Term Goal # 3 pt will complete seated grooming w/ setup

## 2023-10-10 NOTE — DISCHARGE PLANNING
" note:  Rounding done. Pt is confused and mumbling \"alejandra\" and pt unable to answer questions. No family at bedside.     Discussed during IDT rounds. Notified MD that pt has a transfer back agreement with [facility]. Nursing notified team that pt has an LP scheduled at 1pm tomorrow. So pt is not medically ready to transfer back to [facility].  "

## 2023-10-10 NOTE — PROGRESS NOTES
Hospital Medicine Daily Progress Note    Date of Service  10/9/2023    Chief Complaint  Sen Vasquez is a 77 y.o. male admitted 10/4/[facility] and w.u for EtOH withdrawal and unexplained comfusion.    Hospital Course  No notes on file  He is being evaluated for confusion and agitated 7d out of withdrawal period.   Interval Problem Update  10/5. Patient is confused. When aroused he is combative and agitated  Ultimately placed restraints.  Spoke with Neurology as patient is 7 days out of withdrawal period. He remains on detox bag. MRI PENDING.   10/6. I reviewed the chart along with vitals, labs, imaging, test (both pending and resulted) and recommendations from specialists and interdisciplinary team.. Getting MRI with anesthesia; still confused. His son is unreachable. Consent signed.   Discussed with Social Work. [facility] and was transferred here primarily for MRI. May need to be transferred back after results. Discussed with Dr. Roblero who agreed with the MRI.  10/7. I spent a lot of time with this patient and management.  He remains confused.  MRI was done showing strokes. There is 50-70% ICA stenosis.  I talked to Neurovascular. COntinue monotherapy antiplatelet and statin. Per NEuro ICA stenosis asymptomatic, no indication for CEA or stenting right now. Strokes though not culprit to his confusion.  I spoek ke th CM/SW. They mentioned [facility]. Patient planned to be [facility] to continue their w/u for confusion and alcohol withdrawal.  I [facility], [facility]. She mentioned that they were also wanting an LP with anesthesia aside from the MRI. We reviewed their neurologists recommendations and viral encephalitis w/u was recommended.   I ordered IR LP as patient remains confused and will need anesthesia as we did the MRI. They do not have staff  to do it over the weekend likely Monday  I ordered a batter of CSF tests. I consulted Neurology who agreed with the encephalitis panel. Ordered EEG  I noted that patient hasn't really been eating so I ordered tube feeds and Cortrak with bridle.Patient on IV detox bag   I tried calling son again. No answer.    Patient is has a high medical complexity, complex decision making and is at high risk for complication, morbidity, and mortality, thus requiring the highest level of my preparedness for sudden, emergent intervention. Medical decision making is therefore complex. I provided  services, which included ordering labs and/or imaging, and discussing the case with various consultants.medication orders, frequent reevaluations of the patient's condition and response to treatment. Time was also devoted to counseling and coordinating care including review of records, pertinent lab data and studies, as well as discussing diagnostic evaluation and work up, planned therapeutic interventions and future disposition of care. Where indicated, the assessment and plan reflect discussion of patient with consultants, other healthcare providers, family members, and additional research needed to obtain further information in formulating the plan of care for Sen Vasquez. Total time spent was 85 minutes.     10/9. LP planned for Wednesday.  Appreciate neuro recs  So far patient is keeping the feeding tube      I have discussed this patient's plan of care and discharge plan at IDT rounds today with Case Management, Nursing, Nursing leadership, and other members of the IDT team.    Consultants/Specialty      Code Status  Full Code    Disposition  The patient is not medically cleared for discharge to home or a post-acute facility.      I have placed the appropriate orders for post-discharge needs.    Review of Systems  Review of Systems   Unable to perform ROS: Mental acuity        Physical Exam  Temp:  [36.5 °C (97.7 °F)-36.7 °C (98.1 °F)]  36.6 °C (97.9 °F)  Pulse:  [70-82] 74  Resp:  [15-26] 17  BP: (108-120)/(64-68) 108/64  SpO2:  [93 %-97 %] 97 %    Physical Exam  Vitals and nursing note reviewed.   HENT:      Head: Normocephalic and atraumatic.      Right Ear: External ear normal.      Left Ear: External ear normal.      Nose: Nose normal.      Mouth/Throat:      Mouth: Mucous membranes are moist.      Comments: Cortrak  Eyes:      General: No scleral icterus.     Conjunctiva/sclera: Conjunctivae normal.   Cardiovascular:      Rate and Rhythm: Normal rate and regular rhythm.      Heart sounds: No murmur heard.     No friction rub. No gallop.   Pulmonary:      Effort: Pulmonary effort is normal.      Breath sounds: Normal breath sounds.   Abdominal:      General: Abdomen is flat. Bowel sounds are normal. There is no distension.      Palpations: Abdomen is soft.      Tenderness: There is no abdominal tenderness. There is no guarding.   Musculoskeletal:         General: Normal range of motion.      Cervical back: Normal range of motion and neck supple.   Skin:     General: Skin is warm.   Neurological:      Mental Status: He is alert. Mental status is at baseline. He is disoriented.      Comments: On restraints  Less disoriented but still not making sense.   Psychiatric:         Mood and Affect: Mood normal.         Behavior: Behavior normal.         Thought Content: Thought content normal.         Judgment: Judgment normal.         Fluids    Intake/Output Summary (Last 24 hours) at 10/9/2023 1824  Last data filed at 10/9/2023 1800  Gross per 24 hour   Intake 180 ml   Output 700 ml   Net -520 ml         Laboratory  Recent Labs     10/07/23  0625 10/08/23  0403 10/09/23  0310   WBC 6.1 7.1 7.4   RBC 4.65* 4.82 4.48*   HEMOGLOBIN 15.3 15.8 15.0   HEMATOCRIT 45.7 47.1 43.9   MCV 98.3* 97.7 98.0*   MCH 32.9 32.8 33.5*   MCHC 33.5 33.5 34.2   RDW 49.7 49.3 47.5   PLATELETCT 254 262 296   MPV 10.2 10.3 10.5       Recent Labs     10/07/23  0625  10/08/23  0403 10/09/23  0310   SODIUM 135 141 139   POTASSIUM 4.9 4.2 4.1   CHLORIDE 99 101 101   CO2 23 27 28   GLUCOSE 79 88 131*   BUN 5* 5* 8   CREATININE 0.47* 0.50 0.50   CALCIUM 8.8 9.2 8.9                 Recent Labs     10/07/23  0625   TRIGLYCERIDE 88   HDL 44   LDL 85         Imaging  DX-ABDOMEN FOR TUBE PLACEMENT   Final Result         Gastric drainage tube with tip projecting over the expected area of the stomach.      EC-ECHOCARDIOGRAM COMPLETE W/O CONT   Final Result      CT-CTA NECK WITH & W/O-POST PROCESSING   Final Result         1.  Atherosclerosis with mural thrombus versus soft plaque at the left carotid bulb resulting in 50-70% stenosis. Similar to prior study.         CT-CTA HEAD WITH & W/O-POST PROCESS   Final Result         1.  No large vessel occlusion or aneurysm identified      MR-BRAIN-W/O   Final Result   Addendum (preliminary) 1 of 1   Addendum: There is diffuse T2 hyperintensity in the splenium of the corpus    callosum without restricted diffusion. This may represent a nonspecific    gliosis.      Final      1.  There are tiny areas of acute infarcts within the large RIGHT temporal parietal and occipital encephalomalacia.   2.  Tiny areas of chronic lacunar infarcts in the bilateral basal ganglia and LEFT cerebellum.   3.  Mild cerebral volume loss.   4.  Moderate chronic microvascular ischemic disease.      BR-[id number]-1 VIEW   Final Result      No findings to preclude MRI.      DX-LUMBAR PUNCTURE FOR DIAGNOSIS    (Results Pending)          Assessment/Plan  * Alcohol withdrawal syndrome, with delirium, CVA, prolonged delirium (HCC)  Assessment & Plan  On detox bag  PRN ATivan  MRI showed CVA  CVA not culprit to confusion per Neurology  Asa, Zetia and continue work-up for confusion  [facility], Neurology consulted. Ordered LP and EEG ordered LP studies including viral panel. LP planned w/ anesthesia Wednesday  Now has Cortak and on restraints    CVA (cerebral vascular  accident) (HCC)  Assessment & Plan  Seen on MRI  Started aspirin, (has allergy to statin will try Zatia), ordered lipid p[leon and A1c  COnsulting NEurovasculary query if we need CTA HnN versus carotid Dopplers.  Neurovascular felt ICA stenosis not symptomatic. No further workup. Continue aspirin and Zetia.      Acute encephalopathy- (present on admission)  Assessment & Plan  Frequent neurochecks every 4 hours  Transferred here for MRI brain without contrast with sedation  Check TSH, ammonia, sed rate, B12, procalcitonin, cortisol  Trial of Narcan given pinpoint pupils  Consider neurology consult in a.m.  Thiamine, folic acid, multivitamins  Fall, seizure, aspiration precautions      ETOH abuse- (present on admission)  Assessment & Plan  CIWA protocol  Thiamine, folic acid, multivitamins  Monitor for withdrawal         VTE prophylaxis: Lovenox ppx planned for tomorrow, stroke w/u being done    I have performed a physical exam and reviewed and updated ROS and Plan today (10/9/2023). In review of yesterday's note (10/8/2023), there are no changes except as documented above.

## 2023-10-10 NOTE — CARE PLAN
The patient is Watcher - Medium risk of patient condition declining or worsening    Shift Goals  Clinical Goals: Monitor neuro status, safety  Patient Goals: JF  Family Goals: JF    Progress made toward(s) clinical / shift goals:      Patient remains confused bilateral wrist soft restraints still in place. Virtual sitter present in the room. Patient able to expectorate sputum, suctioned secretions as needed. Turned and repositioned q2 for comfort.     0615H speech is improvijg still has on and off slurring and was able to speak clearly in full sentences asked this RN what time is it and if he can get out of here.       Problem: Risk for Aspiration  Goal: Patient's risk for aspiration will be absent or decrease  Outcome: Progressing     Problem: Respiratory  Goal: Patient will achieve/maintain optimum respiratory ventilation and gas exchange  Outcome: Progressing     Problem: Skin Integrity  Goal: Skin integrity is maintained or improved  Outcome: Progressing     Problem: Safety - Medical Restraint  Goal: Remains free of injury from restraints (Restraint for Interference with Medical Device)  Outcome: Progressing       Patient is not progressing towards the following goals:    No evidence of learning, only oriented to self and could not follow commands.     Problem: Knowledge Deficit - Standard  Goal: Patient and family/care givers will demonstrate understanding of plan of care, disease process/condition, diagnostic tests and medications  Outcome: Not Progressing

## 2023-10-10 NOTE — PROGRESS NOTES
Iris Placement    Tube Team verified patient name and medical record number prior to tube placement.  Iris tube (43 inches, 12 Luxem[place]ish) placed at 80 cm in right nare.  Per Iris picture, tube appears to be in the stomach.  Nursing Instructions: Awaiting KUB to confirm placement before use for medications or feeding. Once placement confirmed, flush tube with 30 ml of water, and then remove and save stylet, in patient medication drawer.

## 2023-10-10 NOTE — PROGRESS NOTES
Patient seen trying to get out of bed right leg was already over the side of the bed has been yelling that he needs to get out of the room, has also pulled his condom catheter. Repositioned patient, bed alarm in place, side rails up, medicated per MAR for anxiety. Fall precautions in place at all times tele sitter in place as well.

## 2023-10-11 ENCOUNTER — APPOINTMENT (OUTPATIENT)
Dept: RADIOLOGY | Facility: MEDICAL CENTER | Age: 78
DRG: 070 | End: 2023-10-11
Attending: INTERNAL MEDICINE
Payer: COMMERCIAL

## 2023-10-11 ENCOUNTER — ANESTHESIA (OUTPATIENT)
Dept: RADIOLOGY | Facility: MEDICAL CENTER | Age: 78
DRG: 070 | End: 2023-10-11
Payer: COMMERCIAL

## 2023-10-11 ENCOUNTER — ANESTHESIA EVENT (OUTPATIENT)
Dept: RADIOLOGY | Facility: MEDICAL CENTER | Age: 78
DRG: 070 | End: 2023-10-11
Payer: COMMERCIAL

## 2023-10-11 LAB
ANION GAP SERPL CALC-SCNC: 9 MMOL/L (ref 7–16)
BASOPHILS # BLD AUTO: 0.8 % (ref 0–1.8)
BASOPHILS # BLD: 0.07 K/UL (ref 0–0.12)
BUN SERPL-MCNC: 10 MG/DL (ref 8–22)
CALCIUM SERPL-MCNC: 9 MG/DL (ref 8.5–10.5)
CHLORIDE SERPL-SCNC: 98 MMOL/L (ref 96–112)
CO2 SERPL-SCNC: 30 MMOL/L (ref 20–33)
CREAT SERPL-MCNC: 0.41 MG/DL (ref 0.5–1.4)
EOSINOPHIL # BLD AUTO: 0.34 K/UL (ref 0–0.51)
EOSINOPHIL NFR BLD: 3.8 % (ref 0–6.9)
ERYTHROCYTE [DISTWIDTH] IN BLOOD BY AUTOMATED COUNT: 49.9 FL (ref 35.9–50)
GFR SERPLBLD CREATININE-BSD FMLA CKD-EPI: 112 ML/MIN/1.73 M 2
GLUCOSE SERPL-MCNC: 101 MG/DL (ref 65–99)
HCT VFR BLD AUTO: 43.8 % (ref 42–52)
HGB BLD-MCNC: 14.8 G/DL (ref 14–18)
IMM GRANULOCYTES # BLD AUTO: 0.02 K/UL (ref 0–0.11)
IMM GRANULOCYTES NFR BLD AUTO: 0.2 % (ref 0–0.9)
LYMPHOCYTES # BLD AUTO: 1.15 K/UL (ref 1–4.8)
LYMPHOCYTES NFR BLD: 13 % (ref 22–41)
MAGNESIUM SERPL-MCNC: 1.8 MG/DL (ref 1.5–2.5)
MCH RBC QN AUTO: 33.5 PG (ref 27–33)
MCHC RBC AUTO-ENTMCNC: 33.8 G/DL (ref 32.3–36.5)
MCV RBC AUTO: 99.1 FL (ref 81.4–97.8)
MONOCYTES # BLD AUTO: 0.56 K/UL (ref 0–0.85)
MONOCYTES NFR BLD AUTO: 6.3 % (ref 0–13.4)
NEUTROPHILS # BLD AUTO: 6.7 K/UL (ref 1.82–7.42)
NEUTROPHILS NFR BLD: 75.9 % (ref 44–72)
NRBC # BLD AUTO: 0 K/UL
NRBC BLD-RTO: 0 /100 WBC (ref 0–0.2)
PHOSPHATE SERPL-MCNC: 3.3 MG/DL (ref 2.5–4.5)
PLATELET # BLD AUTO: 335 K/UL (ref 164–446)
PMV BLD AUTO: 10.3 FL (ref 9–12.9)
POTASSIUM SERPL-SCNC: 4.2 MMOL/L (ref 3.6–5.5)
RBC # BLD AUTO: 4.42 M/UL (ref 4.7–6.1)
SODIUM SERPL-SCNC: 137 MMOL/L (ref 135–145)
WBC # BLD AUTO: 8.8 K/UL (ref 4.8–10.8)

## 2023-10-11 PROCEDURE — 160002 HCHG RECOVERY MINUTES (STAT)

## 2023-10-11 PROCEDURE — 700105 HCHG RX REV CODE 258: Performed by: ANESTHESIOLOGY

## 2023-10-11 PROCEDURE — 85025 COMPLETE CBC W/AUTO DIFF WBC: CPT

## 2023-10-11 PROCEDURE — 99232 SBSQ HOSP IP/OBS MODERATE 35: CPT | Performed by: STUDENT IN AN ORGANIZED HEALTH CARE EDUCATION/TRAINING PROGRAM

## 2023-10-11 PROCEDURE — 36415 COLL VENOUS BLD VENIPUNCTURE: CPT

## 2023-10-11 PROCEDURE — A9270 NON-COVERED ITEM OR SERVICE: HCPCS | Performed by: INTERNAL MEDICINE

## 2023-10-11 PROCEDURE — 700111 HCHG RX REV CODE 636 W/ 250 OVERRIDE (IP): Mod: JZ | Performed by: INTERNAL MEDICINE

## 2023-10-11 PROCEDURE — 700101 HCHG RX REV CODE 250: Performed by: ANESTHESIOLOGY

## 2023-10-11 PROCEDURE — 80048 BASIC METABOLIC PNL TOTAL CA: CPT

## 2023-10-11 PROCEDURE — 770001 HCHG ROOM/CARE - MED/SURG/GYN PRIV*

## 2023-10-11 PROCEDURE — 62328 DX LMBR SPI PNXR W/FLUOR/CT: CPT

## 2023-10-11 PROCEDURE — 700111 HCHG RX REV CODE 636 W/ 250 OVERRIDE (IP): Mod: JZ | Performed by: ANESTHESIOLOGY

## 2023-10-11 PROCEDURE — 00JU3ZZ INSPECTION OF SPINAL CANAL, PERCUTANEOUS APPROACH: ICD-10-PCS | Performed by: RADIOLOGY

## 2023-10-11 PROCEDURE — 83735 ASSAY OF MAGNESIUM: CPT

## 2023-10-11 PROCEDURE — 700102 HCHG RX REV CODE 250 W/ 637 OVERRIDE(OP): Performed by: INTERNAL MEDICINE

## 2023-10-11 PROCEDURE — 84100 ASSAY OF PHOSPHORUS: CPT

## 2023-10-11 PROCEDURE — 160035 HCHG PACU - 1ST 60 MINS PHASE I

## 2023-10-11 RX ORDER — LABETALOL HYDROCHLORIDE 5 MG/ML
5 INJECTION, SOLUTION INTRAVENOUS
Status: DISCONTINUED | OUTPATIENT
Start: 2023-10-11 | End: 2023-10-11 | Stop reason: HOSPADM

## 2023-10-11 RX ORDER — PHENYLEPHRINE HCL IN 0.9% NACL 0.5 MG/5ML
SYRINGE (ML) INTRAVENOUS PRN
Status: DISCONTINUED | OUTPATIENT
Start: 2023-10-11 | End: 2023-10-11 | Stop reason: SURG

## 2023-10-11 RX ORDER — SODIUM CHLORIDE, SODIUM LACTATE, POTASSIUM CHLORIDE, CALCIUM CHLORIDE 600; 310; 30; 20 MG/100ML; MG/100ML; MG/100ML; MG/100ML
INJECTION, SOLUTION INTRAVENOUS CONTINUOUS
Status: DISCONTINUED | OUTPATIENT
Start: 2023-10-11 | End: 2023-10-11 | Stop reason: HOSPADM

## 2023-10-11 RX ORDER — EPHEDRINE SULFATE 50 MG/ML
5 INJECTION, SOLUTION INTRAVENOUS
Status: DISCONTINUED | OUTPATIENT
Start: 2023-10-11 | End: 2023-10-11 | Stop reason: HOSPADM

## 2023-10-11 RX ORDER — LIDOCAINE HYDROCHLORIDE 20 MG/ML
INJECTION, SOLUTION EPIDURAL; INFILTRATION; INTRACAUDAL; PERINEURAL PRN
Status: DISCONTINUED | OUTPATIENT
Start: 2023-10-11 | End: 2023-10-11 | Stop reason: SURG

## 2023-10-11 RX ORDER — SODIUM CHLORIDE, SODIUM LACTATE, POTASSIUM CHLORIDE, CALCIUM CHLORIDE 600; 310; 30; 20 MG/100ML; MG/100ML; MG/100ML; MG/100ML
INJECTION, SOLUTION INTRAVENOUS
Status: DISCONTINUED | OUTPATIENT
Start: 2023-10-11 | End: 2023-10-11 | Stop reason: SURG

## 2023-10-11 RX ORDER — DIPHENHYDRAMINE HYDROCHLORIDE 50 MG/ML
12.5 INJECTION INTRAMUSCULAR; INTRAVENOUS
Status: DISCONTINUED | OUTPATIENT
Start: 2023-10-11 | End: 2023-10-11 | Stop reason: HOSPADM

## 2023-10-11 RX ORDER — HYDRALAZINE HYDROCHLORIDE 20 MG/ML
5 INJECTION INTRAMUSCULAR; INTRAVENOUS
Status: DISCONTINUED | OUTPATIENT
Start: 2023-10-11 | End: 2023-10-11 | Stop reason: HOSPADM

## 2023-10-11 RX ORDER — HALOPERIDOL 5 MG/ML
1 INJECTION INTRAMUSCULAR
Status: DISCONTINUED | OUTPATIENT
Start: 2023-10-11 | End: 2023-10-11 | Stop reason: HOSPADM

## 2023-10-11 RX ORDER — ONDANSETRON 2 MG/ML
4 INJECTION INTRAMUSCULAR; INTRAVENOUS
Status: DISCONTINUED | OUTPATIENT
Start: 2023-10-11 | End: 2023-10-11 | Stop reason: HOSPADM

## 2023-10-11 RX ORDER — LIDOCAINE HYDROCHLORIDE 40 MG/ML
SOLUTION TOPICAL PRN
Status: DISCONTINUED | OUTPATIENT
Start: 2023-10-11 | End: 2023-10-11 | Stop reason: SURG

## 2023-10-11 RX ORDER — EPHEDRINE SULFATE 50 MG/ML
INJECTION, SOLUTION INTRAVENOUS PRN
Status: DISCONTINUED | OUTPATIENT
Start: 2023-10-11 | End: 2023-10-11 | Stop reason: SURG

## 2023-10-11 RX ORDER — ONDANSETRON 2 MG/ML
INJECTION INTRAMUSCULAR; INTRAVENOUS PRN
Status: DISCONTINUED | OUTPATIENT
Start: 2023-10-11 | End: 2023-10-11 | Stop reason: SURG

## 2023-10-11 RX ADMIN — LIDOCAINE HYDROCHLORIDE 3 ML: 20 INJECTION, SOLUTION EPIDURAL; INFILTRATION; INTRACAUDAL at 13:15

## 2023-10-11 RX ADMIN — ONDANSETRON 4 MG: 2 INJECTION INTRAMUSCULAR; INTRAVENOUS at 13:25

## 2023-10-11 RX ADMIN — FENTANYL CITRATE 50 MCG: 50 INJECTION, SOLUTION INTRAMUSCULAR; INTRAVENOUS at 13:12

## 2023-10-11 RX ADMIN — Medication 100 MCG: at 13:24

## 2023-10-11 RX ADMIN — EPHEDRINE SULFATE 10 MG: 50 INJECTION INTRAVENOUS at 13:22

## 2023-10-11 RX ADMIN — ENOXAPARIN SODIUM 40 MG: 100 INJECTION SUBCUTANEOUS at 16:25

## 2023-10-11 RX ADMIN — PROPOFOL 100 MG: 10 INJECTION, EMULSION INTRAVENOUS at 13:15

## 2023-10-11 RX ADMIN — SODIUM CHLORIDE, POTASSIUM CHLORIDE, SODIUM LACTATE AND CALCIUM CHLORIDE: 600; 310; 30; 20 INJECTION, SOLUTION INTRAVENOUS at 13:10

## 2023-10-11 RX ADMIN — SUGAMMADEX 200 MG: 100 INJECTION, SOLUTION INTRAVENOUS at 13:50

## 2023-10-11 RX ADMIN — DOCUSATE SODIUM 50 MG AND SENNOSIDES 8.6 MG 2 TABLET: 8.6; 5 TABLET, FILM COATED ORAL at 16:25

## 2023-10-11 RX ADMIN — Medication 100 MCG: at 13:20

## 2023-10-11 RX ADMIN — LIDOCAINE HYDROCHLORIDE 4 ML: 40 SOLUTION TOPICAL at 13:15

## 2023-10-11 RX ADMIN — ROCURONIUM BROMIDE 50 MG: 10 INJECTION, SOLUTION INTRAVENOUS at 13:15

## 2023-10-11 ASSESSMENT — PAIN DESCRIPTION - PAIN TYPE
TYPE: ACUTE PAIN
TYPE: SURGICAL PAIN

## 2023-10-11 ASSESSMENT — COGNITIVE AND FUNCTIONAL STATUS - GENERAL
CLIMB 3 TO 5 STEPS WITH RAILING: TOTAL
DAILY ACTIVITIY SCORE: 6
TOILETING: TOTAL
PERSONAL GROOMING: TOTAL
TURNING FROM BACK TO SIDE WHILE IN FLAT BAD: A LOT
EATING MEALS: TOTAL
MOBILITY SCORE: 7
MOVING FROM LYING ON BACK TO SITTING ON SIDE OF FLAT BED: UNABLE
HELP NEEDED FOR BATHING: TOTAL
SUGGESTED CMS G CODE MODIFIER DAILY ACTIVITY: CN
STANDING UP FROM CHAIR USING ARMS: TOTAL
DRESSING REGULAR LOWER BODY CLOTHING: TOTAL
WALKING IN HOSPITAL ROOM: TOTAL
DRESSING REGULAR UPPER BODY CLOTHING: TOTAL
MOVING TO AND FROM BED TO CHAIR: UNABLE
SUGGESTED CMS G CODE MODIFIER MOBILITY: CM

## 2023-10-11 NOTE — ANESTHESIA POSTPROCEDURE EVALUATION
Patient: Sen Vasquez    Procedure Summary     Date: 10/11/23 Room / Location: [facility]    Anesthesia Start: 1303 Anesthesia Stop: 1409    Procedure: DX-LUMBAR PUNCTURE FOR DIAGNOSIS Diagnosis:             Acute encephalopathy            Acute encephalopathy      (Other - Please Comment)      (Need anesthesia cant do bedside as patient confused. Patient was transferred requesting MRI and LP with anesthesia. MRI already done.)    Scheduled Providers: Drake Meadows M.D. Responsible Provider: Drake Meadows M.D.    Anesthesia Type: general ASA Status: 3          Final Anesthesia Type: general  Last vitals  BP   Blood Pressure : 104/54    Temp   36.7 °C (98.1 °F)    Pulse   80   Resp   18    SpO2   97 %      Anesthesia Post Evaluation    Patient participation: complete - patient cannot participate  Post-procedure mental status: back to baseline.    Airway patency: patent  Anesthetic complications: no  Cardiovascular status: hemodynamically stable  Respiratory status: acceptable  Hydration status: euvolemic    PONV: none          No notable events documented.     Nurse Pain Score: 0 (NPRS)

## 2023-10-11 NOTE — CARE PLAN
The patient is Stable - Low risk of patient condition declining or worsening    Shift Goals  Clinical Goals: safety, Q2 turns, maintain skin integ  Patient Goals: JF  Family Goals: na    Progress made toward(s) clinical / shift goals:    Problem: Safety - Medical Restraint  Goal: Remains free of injury from restraints (Restraint for Interference with Medical Device)  Description: INTERVENTIONS:  1. Determine that other, less restrictive measures have been tried or would not be effective before applying the restraint  2. Evaluate the patient's condition at the time of restraint application  3. Educate patient/family regarding the reason for restraint  4. Q2H: Monitor safety, psychosocial status, comfort, circulation, respiratory status, LOC, nutrition and hydration  Outcome: Progressing       Patient is not progressing towards the following goals:

## 2023-10-11 NOTE — PROGRESS NOTES
Assumed care of patient at bedside report from day RN. Updated on POC. Patient currently A & O x 1; on 2 L O2 via NC; up max assist with no current complaints of acute pain. Assessment completed.  Call light within reach. Whiteboard updated. Fall precautions in place. Tele sitter in place along with bilateral wrist restraints.  Bed locked and in lowest position. All questions answered. No other needs indicated at this time.

## 2023-10-11 NOTE — PROGRESS NOTES
Hospital Medicine Daily Progress Note    Date of Service  10/10/2023    Chief Complaint  Sen Vasquez is a 77 y.o. male admitted 10/4/[facility]23 [facility] and w.u for EtOH withdrawal and unexplained comfusion.    Hospital Course  No notes on file  He is being evaluated for confusion and agitated 7d out of withdrawal period.   Interval Problem Update  10/5. Patient is confused. When aroused he is combative and agitated  Ultimately placed restraints.  Spoke with Neurology as patient is 7 days out of withdrawal period. He remains on detox bag. MRI PENDING.   10/6. I reviewed the chart along with vitals, labs, imaging, test (both pending and resulted) and recommendations from specialists and interdisciplinary team.. Getting MRI with anesthesia; still confused. His son is unreachable. Consent signed.   Discussed with Social Work. [facility] and was transferred here primarily for MRI. May need to be transferred back after results. Discussed with Dr. Roblero who agreed with the MRI.  10/7. I spent a lot of time with this patient and management.  He remains confused.  MRI was done showing strokes. There is 50-70% ICA stenosis.  I talked to Neurovascular. COntinue monotherapy antiplatelet and statin. Per NEuro ICA stenosis asymptomatic, no indication for CEA or stenting right now. Strokes though not culprit to his confusion.  I spoek ke th CM/SW. They mentioned [facility]. Patient planned to be [facility] the [facility] to continue their w/u for confusion and alcohol withdrawal.  I [facility], [facility]. She mentioned that they were also wanting an LP with anesthesia aside from the MRI. We reviewed their neurologists recommendations and viral encephalitis w/u was recommended.   I ordered IR LP as patient remains confused and will need anesthesia as we did the MRI. They do not have  staff to do it over the weekend likely Monday  I ordered a batter of CSF tests. I consulted Neurology who agreed with the encephalitis panel. Ordered EEG  I noted that patient hasn't really been eating so I ordered tube feeds and Cortrak with bridle.Patient on IV detox bag   I tried calling son again. No answer.  10/9. LP planned for Wednesday.  Appreciate neuro recs  So far patient is keeping the feeding tube    10/10  LP with anesthesia planned for 10/11, can likely transfer back to [facility] after, transfer back agreement in place.  He is afebrile with normal pulse respiratory rate blood pressure, pulse ox 97% on 2 L nasal cannula.  Labs remained stable, no leukocytosis or anemia, normal platelet count, normal renal function, transferred here for MRI with anesthesia and LP.  [facility].    I have discussed this patient's plan of care and discharge plan at IDT rounds today with Case Management, Nursing, Nursing leadership, and other members of the IDT team.    Consultants/Specialty      Code Status  Full Code    Disposition  The patient is not medically cleared for discharge to home or a post-acute facility.      I have placed the appropriate orders for post-discharge needs.    Review of Systems  Review of Systems   Unable to perform ROS: Mental acuity        Physical Exam  Temp:  [36.5 °C (97.7 °F)-36.6 °C (97.9 °F)] 36.6 °C (97.9 °F)  Pulse:  [60-75] 71  Resp:  [18-20] 18  BP: ()/(49-65) 125/65  SpO2:  [95 %-98 %] 96 %    Physical Exam  Vitals and nursing note reviewed.   HENT:      Head: Normocephalic and atraumatic.      Right Ear: External ear normal.      Left Ear: External ear normal.      Nose: Nose normal.      Mouth/Throat:      Mouth: Mucous membranes are moist.      Comments: Cortrak  Eyes:      General: No scleral icterus.     Conjunctiva/sclera: Conjunctivae normal.   Cardiovascular:      Rate and Rhythm: Normal rate and regular rhythm.      Heart sounds: No murmur heard.     No  friction rub. No gallop.   Pulmonary:      Effort: Pulmonary effort is normal.      Breath sounds: Normal breath sounds.   Abdominal:      General: Abdomen is flat. Bowel sounds are normal. There is no distension.      Palpations: Abdomen is soft.      Tenderness: There is no abdominal tenderness. There is no guarding.   Musculoskeletal:         General: Normal range of motion.      Cervical back: Normal range of motion and neck supple.   Skin:     General: Skin is warm.   Neurological:      Mental Status: He is alert. He is disoriented.      Comments: On restraints  Confused, disoriented but still not making sense.   Psychiatric:      Comments: Unable to assess         Fluids    Intake/Output Summary (Last 24 hours) at 10/10/2023 2347  Last data filed at 10/10/2023 2000  Gross per 24 hour   Intake 150 ml   Output 250 ml   Net -100 ml         Laboratory  Recent Labs     10/08/23  0403 10/09/23  0310 10/10/23  0408   WBC 7.1 7.4 7.0   RBC 4.82 4.48* 4.34*   HEMOGLOBIN 15.8 15.0 14.4   HEMATOCRIT 47.1 43.9 42.8   MCV 97.7 98.0* 98.6*   MCH 32.8 33.5* 33.2*   MCHC 33.5 34.2 33.6   RDW 49.3 47.5 50.0   PLATELETCT 262 296 311   MPV 10.3 10.5 10.3       Recent Labs     10/08/23  0403 10/09/23  0310 10/10/23  0408   SODIUM 141 139 140   POTASSIUM 4.2 4.1 4.0   CHLORIDE 101 101 101   CO2 27 28 32   GLUCOSE 88 131* 124*   BUN 5* 8 9   CREATININE 0.50 0.50 0.60   CALCIUM 9.2 8.9 8.7                         Imaging  [id number] VIEW   Final Result      Enteric tube tip projects over the second portion of the duodenum                  DX-ABDOMEN FOR TUBE PLACEMENT   Final Result         Gastric drainage tube with tip projecting over the expected area of the stomach.      EC-ECHOCARDIOGRAM COMPLETE W/O CONT   Final Result      CT-CTA NECK WITH & W/O-POST PROCESSING   Final Result         1.  Atherosclerosis with mural thrombus versus soft plaque at the left carotid bulb resulting in 50-70% stenosis. Similar to prior study.          CT-CTA HEAD WITH & W/O-POST PROCESS   Final Result         1.  No large vessel occlusion or aneurysm identified      MR-BRAIN-W/O   Final Result   Addendum (preliminary) 1 of 1   Addendum: There is diffuse T2 hyperintensity in the splenium of the corpus    callosum without restricted diffusion. This may represent a nonspecific    gliosis.      Final      1.  There are tiny areas of acute infarcts within the large RIGHT temporal parietal and occipital encephalomalacia.   2.  Tiny areas of chronic lacunar infarcts in the bilateral basal ganglia and LEFT cerebellum.   3.  Mild cerebral volume loss.   4.  Moderate chronic microvascular ischemic disease.      [id number] VIEW   Final Result      No findings to preclude MRI.      DX-LUMBAR PUNCTURE FOR DIAGNOSIS    (Results Pending)          Assessment/Plan  * Alcohol withdrawal syndrome, with delirium, CVA, prolonged delirium (HCC)  Assessment & Plan  On detox bag  PRN ATivan  MRI showed CVA  CVA not culprit to confusion per Neurology  Asa, Zetia and continue work-up for confusion  [facility] with [facility]ist, Neurology consulted. Ordered LP and EEG ordered LP studies including viral panel. LP planned w/ anesthesia Wednesday  Now has Cortak and on restraints    CVA (cerebral vascular accident) (HCC)  Assessment & Plan  Seen on MRI  Started aspirin, (has allergy to statin will try Zatia), ordered lipid p[leon and A1c  COnsulting NEurovasculary query if we need CTA HnN versus carotid Dopplers.  Neurovascular felt ICA stenosis not symptomatic. No further workup. Continue aspirin and Zetia.      Acute encephalopathy- (present on admission)  Assessment & Plan  Frequent neurochecks every 4 hours  Transferred here for MRI brain without contrast with sedation  Check TSH, ammonia, sed rate, B12, procalcitonin, cortisol  Trial of Narcan given pinpoint pupils  Consider neurology consult in a.m.  Thiamine, folic acid, multivitamins  Fall, seizure, aspiration  precautions      ETOH abuse- (present on admission)  Assessment & Plan  [facility] protocol  Thiamine, folic acid, multivitamins  Monitor for withdrawal         VTE prophylaxis: Lovenox ppx planned for tomorrow, stroke w/u being done    I have performed a physical exam and reviewed and updated ROS and Plan today (10/10/2023). In review of yesterday's note (10/9/2023), there are no changes except as documented above.    Patient is has a high medical complexity, complex decision making and is at high risk for complication, morbidity, and mortality, thus requiring the highest level of my preparedness for sudden, emergent intervention. Medical decision making is therefore complex. I provided  services, which included ordering labs and/or imaging, and discussing the case with various consultants.medication orders, frequent reevaluations of the patient's condition and response to treatment. Time was also devoted to counseling and coordinating care including review of records, pertinent lab data and studies, as well as discussing diagnostic evaluation and work up, planned therapeutic interventions and future disposition of care. Where indicated, the assessment and plan reflect discussion of patient with consultants, other healthcare providers, family members, and additional research needed to obtain further information in formulating the plan of care for Sen Vasquez. Total time spent was 85 minutes.

## 2023-10-11 NOTE — OR NURSING
Pt is aaox1, which is his baseline while in the hospital, resting comfortably in [facility] on 2lpm nasal cannula. His respirations are equal and unlabored, he is in no acute distress. He does not complain of pain while in pacu. He responds to voice and rests. Procedure site is clean, dry and soft to palpation upon assessments. Will continue to monitor.     Handoff to Irasema [facility] and aware of pt pending arrival to her floor.

## 2023-10-11 NOTE — ANESTHESIA PROCEDURE NOTES
Airway    Date/Time: 10/11/2023 1:16 PM    Performed by: Drake Meadows M.D.  Authorized by: Drake Meadows M.D.    Location:  OR  Urgency:  Elective  Difficult Airway: No    Indications for Airway Management:  Anesthesia      Spontaneous Ventilation: absent    Sedation Level:  Deep  Preoxygenated: Yes    Patient Position:  Sniffing  Mask Difficulty Assessment:  0 - not attempted  Final Airway Type:  Endotracheal airway  Final Endotracheal Airway:  ETT  Cuffed: Yes    Technique Used for Successful ETT Placement:  Direct laryngoscopy    Insertion Site:  Oral  Blade Type:  Christopher  Laryngoscope Blade/Videolaryngoscope Blade Size:  3  ETT Size (mm):  7.5  Measured from:  Teeth  ETT to Teeth (cm):  24  Placement Verified by: auscultation and capnometry    Cormack-Lehane Classification:  Grade IIa - partial view of glottis  Number of Attempts at Approach:  1   Atraumatic DLx1

## 2023-10-11 NOTE — ANESTHESIA PREPROCEDURE EVALUATION
Date/Time: 10/11/23 1300    Scheduled providers: Drake Meadows M.D.    Procedure: DX-LUMBAR PUNCTURE FOR DIAGNOSIS    Diagnosis:             Acute encephalopathy [G93.40]            Acute encephalopathy [G93.40]    Indications:       Other - Please Comment      Need anesthesia cant do bedside as patient confused. Patient was transferred requesting MRI and LP with anesthesia. MRI already done.    Location: [facility]      77 y/o with unexplained altered mental status here for LP with anesthesia. Patient had MRI a few days ago with anesthesia without issue.   Due to patient status information obtained from chart review.      Relevant Problems   NEURO   (positive) CVA (cerebral vascular accident) (HCC)      CARDIAC   (positive) CAD (coronary artery disease)      Other   (positive) Acute encephalopathy   (positive) Alcohol withdrawal syndrome, with delirium, CVA, prolonged delirium (HCC)   (positive) ETOH abuse       Physical Exam    Airway   Mallampati: II  TM distance: >3 FB  Neck ROM: full       Cardiovascular - normal exam  Rhythm: regular  Rate: normal  (-) murmur     Dental - normal exam      Very poor dentition   Pulmonary - normal exam  Breath sounds clear to auscultation     Abdominal    Neurological - abnormal exam    Comments: Disorientated            Anesthesia Plan    ASA 3   ASA physical status 3 criteria: CVA or TIA - history (> 3 months)    Plan - general       Airway plan will be ETT          Induction: intravenous    Postoperative Plan: Postoperative administration of opioids is intended.    Pertinent diagnostic labs and testing reviewed    Informed Consent:    Anesthetic plan and risks discussed with healthcare power of  (son).

## 2023-10-11 NOTE — DISCHARGE PLANNING
Case discussed in IDT rounds:  LP with anesthesia scheduled for today; Anticipating medical clearance for transfer back to [facility] tomorrow.

## 2023-10-11 NOTE — ANESTHESIA TIME REPORT
Anesthesia Start and Stop Event Times     Date Time Event    10/11/2023 1303 Anesthesia Start     1305 Ready for Procedure     1409 Anesthesia Stop        Responsible Staff  10/11/23    Name Role Begin End    Drake Meadows M.D. Anesth 1303 1409        Overtime Reason:  no overtime (within assigned shift)    Comments:

## 2023-10-11 NOTE — PROGRESS NOTES
Pt presents to [facility]. Son was consented by MD at bedside, confirmed by this RN and consent at bedside. Anesthesia consent confirmed and at bedside. Anesthesia care provided by .Pt transferred to [place] [id number]  table in prone position. Patient underwent a lumbar puncture by Dr. Olson. Procedure site was marked by MD and verified using imaging guidance. Lumbar puncture aborted by MD before sample could be obtained. Pt placed on monitor, prepped and draped in a sterile fashion. All vital signs monitoring and medication administration by anesthesia services - See anesthesia flowsheet for details. Report called Ramo  PACU RN. Pt transported by bakari with RN to .

## 2023-10-11 NOTE — CARE PLAN
The patient is Stable - Low risk of patient condition declining or worsening    Shift Goals  Clinical Goals: monitor neuros/lumbar puncture  Patient Goals: JF  Family Goals: na    Progress made toward(s) clinical / shift goals:    Problem: Knowledge Deficit - Standard  Goal: Patient and family/care givers will demonstrate understanding of plan of care, disease process/condition, diagnostic tests and medications  Description: Target End Date:  1-3 days or as soon as patient condition allows    Document in Patient Education    1.  Patient and family/caregiver oriented to unit, equipment, visitation policy and means for communicating concern  2.  Complete/review Learning Assessment  3.  Assess knowledge level of disease process/condition, treatment plan, diagnostic tests and medications  4.  Explain disease process/condition, treatment plan, diagnostic tests and medications  Outcome: Progressing     Problem: Optimal Care for Alcohol Withdrawal  Goal: Optimal Care for the alcohol withdrawal patient  Description: Target End Date:  1 to 3 days    1.  Alcohol history screening done on admission  2.  CIWA-AR score assessment (includes assessment of nausea/vomiting, tremor, sweats, anxiety, agitation, tactile, visual and auditory disturbances, headache and orientation/sensorium).  Document on CIWA flowsheet.  3.  Follow CIWA-AR score protocol  4.  Frequent reorientation  5.  Monitor for fluid and electrolyte imbalance.  6.  Assess for respiratory depression due to sedation (pulse ox)  7.  Consider thiamine, multivitamins, folic acid and magnesium sulfate per provider order  8.  Collaborate with Social Workers/Case Management  9.  Collaborate with mental health  Outcome: Progressing     Problem: Seizure Precautions  Goal: Implementation of seizure precautions  Description: Target End Date:  Prior to discharge or change in level of care    1.  Padded side rails up at all times  2.  Suction equipment and oxygen delivery system  at bedside  3.  Continuous pulse oximeter in use  4.  Implement fall precautions, bed alarm on, bed in lowest position  5.  IV access (per order)  6.  Provide low stimulus environment, avoid exposure to triggers  7.  Instruct patient to use call light/seizure button if having warning signs of impending seizure  Outcome: Progressing     Problem: Lifestyle Changes  Goal: Patient's ability to identify lifestyle changes and available resources to help reduce recurrence of condition will improve  Description: Target End Date:  1 to 3 days    1.  Discuss recommended lifestyle changes  2.  Identify available resources and support systems  3.  Consider referral to substance abuse program  Outcome: Progressing     Problem: Psychosocial  Goal: Patient's level of anxiety will decrease  Description: Target End Date:  1-3 days or as soon as patient condition allows    1.  Collaborate with patient and family/caregiver to identify triggers and develop strategies to cope with anxiety  2.  Implement stimuli reduction, calming techniques  3.  Pharmacologic management per provider order  4.  Encourage patient/family/care giver participation  5.  Collaborate with interdisciplinary team including Psychologist or Behavioral Health Team as needed  Outcome: Progressing  Goal: Spiritual and cultural needs incorporated into hospitalization  Description: Target End Date:  End of day 1    1.  Encourage verbalization of feelings, concerns, expectations and needs  2.  Collaborate with Case Management/  3.  Collaborate with Pastoral Care to meet spiritual needs  Outcome: Progressing     Problem: Risk for Aspiration  Goal: Patient's risk for aspiration will be absent or decrease  Description: Target End Date:  Prior to discharge or change in level of care    1.   Complete dysphagia screening on admission  2.   NPO until dysphagia screening complete or medically cleared  3.   Collaborate with Speech Therapy, Clinical Dietitian and  interdisciplinary team  4.   Implement aspiration precautions  5.   Assist patient up to chair for meals  6.   Elevate head of bed 90 degrees if patient is unable to get out of bed  7.   Encourage small bites  8.   Ensure foods/liquids are of appropriate consistency  9.   Assess for any signs/symptoms of aspiration  10. Assess breath sounds and vital signs after oral intake  Outcome: Progressing     Problem: Hemodynamics  Goal: Patient's hemodynamics, fluid balance and neurologic status will be stable or improve  Description: Target End Date:  Prior to discharge or change in level of care    Document on Assessment and I/O flowsheet templates    1.  Monitor vital signs, pulse oximetry and cardiac monitor per provider order and/or policy  2.  Maintain blood pressure per provider order  3.  Hemodynamic monitoring per provider order  4.  Manage IV fluids and IV infusions  5.  Monitor intake and output  6.  Daily weights per unit policy or provider order  7.  Assess peripheral pulses and capillary refill  8.  Assess color and body temperature  9.  Position patient for maximum circulation/cardiac output  10. Monitor for signs/symptoms of excessive bleeding  11. Assess mental status, restlessness and changes in level of consciousness  12. Monitor temperature and report fever or hypothermia to provider immediately. Consideration of targeted temperature management.  Outcome: Progressing     Problem: Fluid Volume  Goal: Fluid volume balance will be maintained  Description: Target End Date:  Prior to discharge or change in level of care    Document on I/O flowsheet    1.  Monitor intake and output as ordered  2.  Promote oral intake as appropriate  3.  Report inadequate intake or output to physician  4.  Administer IV therapy as ordered  5.  Weights per provider order  6.  Assess for signs and symptoms of bleeding  7.  Monitor for signs of fluid overload (respiratory changes, edema, weight gain, increased abdominal girth)  8.   Monitor of signs for inadequate fluid volume (poor skin turgor, dry mucous membranes)  9.  Instruct patient on adherence to fluid restrictions  Outcome: Progressing     Problem: Urinary - Renal Perfusion  Goal: Ability to achieve and maintain adequate renal perfusion and functioning will improve  Description: Target End Date:  Prior to discharge or change in level of care    Document on I/O and Assessment flowsheet    1.  Urine output will remain greater than 0.5ml/Kg/HR  2.  Monitor amount and/or characteristics of urine per order/policy. Specific gravity per order/policy  3.  Assess signs and symptoms of renal dysfunction  Outcome: Progressing     Problem: Respiratory  Goal: Patient will achieve/maintain optimum respiratory ventilation and gas exchange  Description: Target End Date:  Prior to discharge or change in level of care    Document on Assessment flowsheet    1.  Assess and monitor rate, rhythm, depth and effort of respiration  2.  Breath sounds assessed qshift and/or as needed  3.  Assess O2 saturation, administer/titrate oxygen as ordered  4.  Position patient for maximum ventilatory efficiency  5.  Turn, cough, and deep breath with splinting to improve effectiveness  6.  Collaborate with RT to administer medication/treatments per order  7.  Encourage use of incentive spirometer and encourage patient to cough after use and utilize splinting techniques if applicable  8.  Airway suctioning  9.  Monitor sputum production for changes in color, consistency and frequency  10. Perform frequent oral hygiene  11. Alternate physical activity with rest periods  Outcome: Progressing     Problem: Physical Regulation  Goal: Diagnostic test results will improve  Description: Target End Date:  Prior to discharge or change in level of care    1.  Monitor lactic acid levels  2.  Monitor ABG's  3.  Monitor diagnostic test results  Outcome: Progressing  Goal: Signs and symptoms of infection will decrease  Description:  Target End Date:  Prior to discharge or change in level of care    1.  Remove potential routes of infection, such as central lines and urinary catheter  2.  Follow facility protocol for changing IV tubing and sites  3.  Collaborate with Infectious Disease  4.  Antibiotic therapy per provider order  5.  Note drug effects and monitor for antibiotic toxicity  Outcome: Progressing     Problem: Skin Integrity  Goal: Skin integrity is maintained or improved  Description: Target End Date:  Prior to discharge or change in level of care    Document interventions on Skin Risk/Edy flowsheet groups and corresponding LDA    1.  Assess and monitor skin integrity, appearance and/or temperature  2.  Assess risk factors for impaired skin integrity and/or pressures ulcers  3.  Implement precautions to protect skin integrity in collaboration with interdisciplinary team  4.  Implement pressure ulcer prevention protocol if at risk for skin breakdown  5.  Confirm wound care consult if at risk for skin breakdown  6.  Ensure patient use of pressure relieving devices  (Low air loss bed, waffle overlay, heel protectors, ROHO cushion, etc)  Outcome: Progressing     Problem: Fall Risk  Goal: Patient will remain free from falls  Description: Target End Date:  Prior to discharge or change in level of care    Document interventions on the Richards Eze Fall Risk Assessment    1.  Assess for fall risk factors  2.  Implement fall precautions  Outcome: Progressing     Problem: Safety - Medical Restraint  Goal: Remains free of injury from restraints (Restraint for Interference with Medical Device)  Description: INTERVENTIONS:  1. Determine that other, less restrictive measures have been tried or would not be effective before applying the restraint  2. Evaluate the patient's condition at the time of restraint application  3. Educate patient/family regarding the reason for restraint  4. Q2H: Monitor safety, psychosocial status, comfort, circulation,  respiratory status, LOC, nutrition and hydration  Outcome: Progressing  Goal: Free from restraint(s) (Restraint for Interference with Medical Device)  Description: INTERVENTIONS:  1.  ONCE/SHIFT or MINIMUM Q12H: Assess and document the continuing need for restraints  2.  Q24H: Continued use of restraint requires LIP to perform face to face examination and written order  3.  Identify and implement measures to help patient regain control  4.  Educate patient/family on discontinuation criteria   5.  Assess patient's understanding and retention of education provided  6.  Assess readiness for release & initiate progressive release per protocol  7.  Identify and document criteria for restraints  Outcome: Progressing     Problem: Optimal Care of the Stroke Patient  Goal: Optimal emergency care for the stroke patient  Description: Target End Date:  End of day 1    Time of Onset    1.  Time of last known well obtained  2.  Patient and family/caregiver verbalize understanding of diagnosis, medications and testing  3.  NIHSS performed and documented, including date and time, for ischemic stroke patients prior to any acute recanalization therapy (thrombolytics or mechanical) or within 12 hours of arrival if no intervention is warranted  4.  Consults and referrals placed to appropriate departments    Medications Administration as Ordered:    1.  Implement appropriate reversal agents for INR greater than 1.5  2.  Pre-alteplase administration of antihypertensives for SBP >185 DBP >110  3.  Post-alteplase administration of antihypertensives for SBP >185, DBP >105  4.  Thrombolytic Therapy for qualifying ischemic stroke patients who arrive within 4.5 hours of time of Last Known Well. Thrombolytic therapy administered within 30 minutes or a documented reason for delay  Outcome: Progressing  Goal: Optimal acute care for the stroke patient  Description: Target End Date:  1 to 3 days    - Vital signs and neuro checks performed and  documented per order  - NIHSS completed and documented per order  - Continuous telemetry monitoring for 72 hours or until discontinued by provider  - Head CT without contrast obtained  - Consideration of MRI/MRA  - MRI screening form completed in worklist if MRI ordered  - Echocardiogram with Bubble Study ordered/completed with consideration of SUNNI  - Carotid Doppler ordered/completed (Not required if CTA of neck completed in ED)  - Lipid Panel obtained within 48 hours of admission  - PT, PTT, INR obtained per Anticoagulation orders (if applicable)  - Antithrombotic therapy by end of hospital day 2 for ischemic stroke. Provider must document reason if contraindicated.  - Venous Thromboembolism (VTE) Prophylaxis by end of hospital day 2 for ischemic and hemorrhagic stroke. Provider must document reason if contraindicated  - Dysphagia screen completed and documented prior to any PO intake. Patient to remain NPO until Speech Therapy evaluation if thrombolytic or thrombectomy performed  - Rehabilitation assessment including PT/OT/SLP evaluations for referral to Physical Medicine and Rehabilitation services. If none needed, provider needs to document reason  - Neurology consult placed  - Consideration of cardiology consult for cryptogenic strokes  Outcome: Progressing     Problem: Knowledge Deficit - Stroke Education  Goal: Patient's knowledge of stroke and risk factors will improve  Description: Target End Date:  1-3 days or as soon as patient condition allows    Document in Patient Education    1.  Stroke education booklet provided  2.  Education regarding EMS activation, need for follow up, medication prescribed at discharge, risk factors for stroke/lifestyle modifications, warning signs and symptoms of stroke provided  Outcome: Progressing     Problem: Psychosocial - Patient Condition  Goal: Patient's ability to verbalize feelings about condition will improve  Description: Target End Date:  Prior to discharge or  change in level of care    1.  Discuss coping with medical condition and its effects  2.  Encourage patient participation in care  3.  Encourage acknowledgement of body changes and accompanying emotions  4.  Perform depression screening  Outcome: Progressing  Goal: Patient's ability to re-evaluate and adapt role responsibilities will improve  Description: Target End Date:  Prior to discharge or change in level of care    1.  Assess family support  2.  Encourage support system participation in care  3.  Encouraged verbalization of feelings regarding caregiver responsibilities  4.  Discuss changes in role and responsibilities caused by patient's condition  Outcome: Progressing     Problem: Discharge Planning - Stroke  Goal: Ensure Stroke Core Measures are met prior to discharge  Description: Target End Date:  Prior to discharge or change in level of care    1. Patient discharged on antithrombotic therapy (Ischemic Stroke)  2. Patient discharged on intensive statin if LDL is greater than or equal to 70 mg/dl (Ischemic Stroke)  3. Patient discharged on anticoagulation therapy for patients with atrial fibrillation/flutter (Ischemic Stroke)  4. Smoking education/cessation provided if applicable  Outcome: Progressing  Goal: Patient’s continuum of care needs will be met  Description: Target End Date:  Prior to discharge or change in level of care    1.  Potential discharge barriers identified upon admission and throughout hospital stay  2.  Ensure appropriate referrals in place for follow up with specialists after discharge  3.  Ensure appropriate referrals in place for DMEs if applicable  4.  Collaboration with transitional care team and interdisciplinary team to meet discharge needs  5.  Involve patient and family/caregiver in prioritizing goals for discharge planning  6.  Educate patient and caregiver about discharge instructions, medications, and follow up appointments  7.  Referral placed to [facility]  8.   Assure orders and follow up appointments are made for outpatient extended cardiac monitoring if appropriate  Outcome: Progressing     Problem: Neuro Status  Goal: Neuro status will remain stable or improve  Description: Target End Date:  Prior to discharge or change in level of care    Document on Neuro assessment in the Assessment flowsheet    1.  Assess and monitor neurologic status per provider order/protocol/unit policy  2.  Assess level of consciousness and orientation  3.  Assess for speech, dysarthria, dysphagia, facial symmetry  4.  Assess visual field, eye movements, gaze preference, pupil reaction and size  5.  Assess muscle strength and motor response in all four extremities  6.  Assess for sensation (numbness and tingling)  7.  Assess basic neuro reflexes (cough, gag, corneal)  8.  Identify changes in neuro status and report to provider for testing/treatment orders  Outcome: Progressing     Problem: Hemodynamic Monitoring  Goal: Patient's hemodynamics, fluid balance and neurologic status will be stable or improve  Description: Target End Date:  Prior to discharge or change in level of care    1.  Vital signs, pulse oximetry and cardiac monitor per provider order and/or policy  2.  Frequent pulse checks performed post thrombectomy  3.  Frequent monitoring for signs of bleeding post TPA administration  4.  Proper management of IV infusions  5.  Intake and output monitored per provider order  6.  Daily weight obtained per unit policy or provider order  7.  Peripheral pulses and capillary refill assessed as needed  8.  Monitor for signs/symptoms of excessive bleeding  9.  Body temperature assessed and fevers treated  10. Patient positioned for maximum circulation/cardiac output  Outcome: Progressing     Problem: Respiratory - Stroke Patient  Goal: Patient will achieve/maintain optimum respiratory rate/effort  Description: Target End Date:  Prior to discharge or change in level of care    Document on  Assessment flowsheet    1.  Assess and monitor respiratory rate, rhythm, depth and effort of respiration  2.  Oxygenation assessed throughout shift (recommendation of >94% for new stroke patients)  3.  Oxygen administered and/or titrated per order  4.  Collaboration with RT to administer medication/treatments per order  5.  Patient educated on importance of turning, coughing, and deep breathing  6.  Patient positioned for maximum ventilatory efficiency  7.  Airway suctioning provided as needed  8.  Incentive spirometry encouraged 5-10 times every hour or while awake  Outcome: Progressing     Problem: Dysphagia  Goal: Dysphagia will improve  Description: Target End Date:  Prior to discharge or change in level of care    1.  Assess and monitor ability to swallow  2.  Collaborate with Speech Therapy to determine appropriate adaptation for safe administration of medications and oral nutrition  3.  Elevate head of bed to 90 degrees during feedings and for 30 minutes after each feeding  4.  Encourage proper swallowing techniques  5.  Screening on admission or as soon as possible  Outcome: Progressing     Problem: Urinary Elimination  Goal: Establish and maintain regular urinary output  Description: Target End Date:  Prior to discharge or change in level of care    Document on I/O and Assessment flowsheets    1.  Evaluate need to continue indwelling catheter every shift  2.  Assess signs and symptoms of urinary retention  3.  Assess post-void residual volumes  4.  Implement bladder training program  5.  Encourage scheduled voidings  6.  Assist patient to sit on bedside commode or toilet for voiding  7.  Educate patient and family/caregiver on use and purpose of urine collection devices (document in Patient Education)  Outcome: Progressing     Problem: Bowel Elimination  Goal: Establish and maintain regular bowel function  Description: Target End Date:  Prior to discharge or change in level of care    1.   Note date of last  BM  2.   Educate about diet, fluid intake, medication and activity to promote bowel function  3.   Educate signs and symptoms of constipation and interventions to implement  4.   Pharmacologic bowel management per provider order  5.   Regular toileting schedule  6.   Upright position for toileting  7.   High fiber diet  8.   Encourage hydration  9.   Collaborate with Clinical Dietician  10. Care and maintenance of ostomy if applicable  Outcome: Progressing     Problem: Mobility - Stroke  Goal: Patient's capacity to carry out activities will improve  Description: Target End Date:  Prior to discharge or change in level of care    1.  Assess for barriers to mobility/activity  2.  Implement activity per interdisciplinary team recommendations  3.  Target activity level identified and patient/family/caregiver aware of goal  4.  Provide assistive devices  5.  Instruct patient/caregiver on proper use of assistive/adaptive devices  6.  Schedule activities and rest periods to decrease effects of fatigue  7.  Encourage mobilization to extent of ability  8.  Maintain proper body alignment  9.  Provide adequate pain management to allow progressive mobilization  10. Implement pace maker precautions as needed  Outcome: Progressing  Goal: Spasticity will be prevented or improved  Description: Target End Date:  Prior to discharge or change in level of care    1.  Muscle relaxing agents considered or administered per order  2.  Splints applied properly and used accordingly to therapist's recommendations  3.  Assistance with stretching and range of motion exercises provided  Outcome: Progressing  Goal: Subluxation will be prevented or improved  Description: Target End Date:  Prior to discharge or change in level of care    1.   Ensure proper handling during transfers, ambulation, and repositioning in bed  2.   Reduce traction to affected limb and provide adequate support of weight  3.   Perform passive range of motion  4.  Assist with  active range of motion  Outcome: Progressing     Problem: Self Care  Goal: Patient will have the ability to perform ADLs independently or with assistance (bathe, groom, dress, toilet and feed)  Description: Target End Date:  Prior to discharge or change in level of care    Document on ADL flowsheet    1.  Assess the capability and level of deficiency to perform ADLs  2.  Encourage family/care giver involvement  3.  Provide assistive devices  4.  Consider PT/OT evaluations  5.  Maintain support, give positive feedback, encourage self-care allowing extra time and verbal cuing as needed  6.  Avoid doing something for patients they can do themselves, but provide assistance as needed  7.  Assist in anticipating/planning individual needs  8.  Collaborate with Case Management and  to meet discharge needs  Outcome: Progressing     Problem: Pain - Standard  Goal: Alleviation of pain or a reduction in pain to the patient’s comfort goal  Description: Target End Date:  Prior to discharge or change in level of care    Document on Vitals flowsheet    1.  Document pain using the appropriate pain scale per order or unit policy  2.  Educate and implement non-pharmacologic comfort measures (i.e. relaxation, distraction, massage, cold/heat therapy, etc.)  3.  Pain management medications as ordered  4.  Reassess pain after pain med administration per policy  5.  If opiods administered assess patient's response to pain medication is appropriate per POSS sedation scale  6.  Follow pain management plan developed in collaboration with patient and interdisciplinary team (including palliative care or pain specialists if applicable)  Outcome: Progressing       Patient is not progressing towards the following goals:

## 2023-10-12 ENCOUNTER — APPOINTMENT (OUTPATIENT)
Dept: RADIOLOGY | Facility: MEDICAL CENTER | Age: 78
DRG: 070 | End: 2023-10-12
Attending: STUDENT IN AN ORGANIZED HEALTH CARE EDUCATION/TRAINING PROGRAM
Payer: COMMERCIAL

## 2023-10-12 LAB
ALBUMIN SERPL BCP-MCNC: 3.2 G/DL (ref 3.2–4.9)
ALBUMIN/GLOB SERPL: 0.9 G/DL
ALP SERPL-CCNC: 96 U/L (ref 30–99)
ALT SERPL-CCNC: 9 U/L (ref 2–50)
ANION GAP SERPL CALC-SCNC: 8 MMOL/L (ref 7–16)
AST SERPL-CCNC: 15 U/L (ref 12–45)
BILIRUB SERPL-MCNC: 0.4 MG/DL (ref 0.1–1.5)
BUN SERPL-MCNC: 9 MG/DL (ref 8–22)
CALCIUM ALBUM COR SERPL-MCNC: 9.8 MG/DL (ref 8.5–10.5)
CALCIUM SERPL-MCNC: 9.2 MG/DL (ref 8.5–10.5)
CHLORIDE SERPL-SCNC: 99 MMOL/L (ref 96–112)
CO2 SERPL-SCNC: 29 MMOL/L (ref 20–33)
CREAT SERPL-MCNC: 0.46 MG/DL (ref 0.5–1.4)
ERYTHROCYTE [DISTWIDTH] IN BLOOD BY AUTOMATED COUNT: 49.3 FL (ref 35.9–50)
GFR SERPLBLD CREATININE-BSD FMLA CKD-EPI: 108 ML/MIN/1.73 M 2
GLOBULIN SER CALC-MCNC: 3.7 G/DL (ref 1.9–3.5)
GLUCOSE SERPL-MCNC: 110 MG/DL (ref 65–99)
HCT VFR BLD AUTO: 47.2 % (ref 42–52)
HGB BLD-MCNC: 15.7 G/DL (ref 14–18)
MAGNESIUM SERPL-MCNC: 1.9 MG/DL (ref 1.5–2.5)
MCH RBC QN AUTO: 33 PG (ref 27–33)
MCHC RBC AUTO-ENTMCNC: 33.3 G/DL (ref 32.3–36.5)
MCV RBC AUTO: 99.2 FL (ref 81.4–97.8)
PHOSPHATE SERPL-MCNC: 3.7 MG/DL (ref 2.5–4.5)
PLATELET # BLD AUTO: 340 K/UL (ref 164–446)
PMV BLD AUTO: 9.9 FL (ref 9–12.9)
POTASSIUM SERPL-SCNC: 4.2 MMOL/L (ref 3.6–5.5)
PROT SERPL-MCNC: 6.9 G/DL (ref 6–8.2)
RBC # BLD AUTO: 4.76 M/UL (ref 4.7–6.1)
SODIUM SERPL-SCNC: 136 MMOL/L (ref 135–145)
WBC # BLD AUTO: 6.5 K/UL (ref 4.8–10.8)

## 2023-10-12 PROCEDURE — 700111 HCHG RX REV CODE 636 W/ 250 OVERRIDE (IP): Mod: JZ | Performed by: INTERNAL MEDICINE

## 2023-10-12 PROCEDURE — 84100 ASSAY OF PHOSPHORUS: CPT

## 2023-10-12 PROCEDURE — 700102 HCHG RX REV CODE 250 W/ 637 OVERRIDE(OP): Performed by: INTERNAL MEDICINE

## 2023-10-12 PROCEDURE — 85027 COMPLETE CBC AUTOMATED: CPT

## 2023-10-12 PROCEDURE — 36415 COLL VENOUS BLD VENIPUNCTURE: CPT

## 2023-10-12 PROCEDURE — A9270 NON-COVERED ITEM OR SERVICE: HCPCS | Performed by: INTERNAL MEDICINE

## 2023-10-12 PROCEDURE — 99232 SBSQ HOSP IP/OBS MODERATE 35: CPT | Performed by: STUDENT IN AN ORGANIZED HEALTH CARE EDUCATION/TRAINING PROGRAM

## 2023-10-12 PROCEDURE — 770001 HCHG ROOM/CARE - MED/SURG/GYN PRIV*

## 2023-10-12 PROCEDURE — 80053 COMPREHEN METABOLIC PANEL: CPT

## 2023-10-12 PROCEDURE — 97530 THERAPEUTIC ACTIVITIES: CPT

## 2023-10-12 PROCEDURE — 83735 ASSAY OF MAGNESIUM: CPT

## 2023-10-12 PROCEDURE — 97112 NEUROMUSCULAR REEDUCATION: CPT

## 2023-10-12 RX ADMIN — EZETIMIBE 10 MG: 10 TABLET ORAL at 04:47

## 2023-10-12 RX ADMIN — DOCUSATE SODIUM 50 MG AND SENNOSIDES 8.6 MG 2 TABLET: 8.6; 5 TABLET, FILM COATED ORAL at 04:47

## 2023-10-12 RX ADMIN — ENOXAPARIN SODIUM 40 MG: 100 INJECTION SUBCUTANEOUS at 18:32

## 2023-10-12 RX ADMIN — DOCUSATE SODIUM 50 MG AND SENNOSIDES 8.6 MG 2 TABLET: 8.6; 5 TABLET, FILM COATED ORAL at 18:28

## 2023-10-12 ASSESSMENT — COGNITIVE AND FUNCTIONAL STATUS - GENERAL
MOVING TO AND FROM BED TO CHAIR: UNABLE
MOVING FROM LYING ON BACK TO SITTING ON SIDE OF FLAT BED: UNABLE
STANDING UP FROM CHAIR USING ARMS: TOTAL
MOBILITY SCORE: 6
WALKING IN HOSPITAL ROOM: TOTAL
TURNING FROM BACK TO SIDE WHILE IN FLAT BAD: UNABLE
CLIMB 3 TO 5 STEPS WITH RAILING: TOTAL
SUGGESTED CMS G CODE MODIFIER MOBILITY: CN

## 2023-10-12 ASSESSMENT — GAIT ASSESSMENTS: GAIT LEVEL OF ASSIST: UNABLE TO PARTICIPATE

## 2023-10-12 ASSESSMENT — FIBROSIS 4 INDEX: FIB4 SCORE: 0.91

## 2023-10-12 ASSESSMENT — PAIN DESCRIPTION - PAIN TYPE: TYPE: ACUTE PAIN

## 2023-10-12 NOTE — THERAPY
"Physical Therapy   Daily Treatment     Patient Name: Sen Vasquez  Age:  78 y.o., Sex:  male  Medical Record #: [id number]  Today's Date: 10/12/2023     Precautions  Precautions: Fall Risk;Swallow Precautions  Comments: impulsive    Assessment    Patient is 77 y.o. male admitted w/ acute encephalopathy.  MRI indicates tiny infarcts right temporal, parietal and occipital.  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  Hx of ETOH, GERD, mediastinal mass.     Pt alert, however, pt had difficulty keeping eyes open throughout session, continued VC to sit up and eyes open. Pt not consistent with simple commands. Pt EOB for sitting tolerance, preference left leaning, however with tactile and verbal cueing will shift over to the right. Unable to hold self up unassisted. Several STS were attempted, able to clear butt on first attempt and initiate through BLE however fatigued quickly and unable to clear on other attempts. Recommend post-acute rehab placement, will continue to follow.    Plan    Treatment Plan Status: Continue Current Treatment Plan  Type of Treatment: Bed Mobility, Gait Training, Neuro Re-Education / Balance, Self Care / Home Evaluation, Therapeutic Activities, Therapeutic Exercise  Treatment Frequency: 3 Times per Week  Treatment Duration: Until Therapy Goals Met    DC Equipment Recommendations: Unable to determine at this time  Discharge Recommendations: Recommend post-acute placement for additional physical therapy services prior to discharge home      Subjective    \"I want my puppies and kitties\"     Objective       10/12/23 1442    Services   Is patient using  services for this encounter? No   Precautions   Precautions Fall Risk;Swallow Precautions   Comments impulsive   Vitals   Pulse 77   Blood Pressure  123/68   O2 (LPM) 2   O2 Delivery Device Silicone Nasal Cannula   Cognition    Cognition / Consciousness X   Speech/ Communication Delayed Responses;Dysarthric   Level of " Consciousness Alert   Ability To Follow Commands Unable to Follow 1 Step Commands   Safety Awareness Impaired;Impulsive   New Learning Impaired   Attention Impaired   Sequencing Impaired   Initiation Impaired   Balance   Sitting Balance (Static) Poor   Sitting Balance (Dynamic) Poor -   Standing Balance (Static) Dependent   Weight Shift Sitting Poor   Skilled Intervention Tactile Cuing;Verbal Cuing;Facilitation   Comments Pt pushing to left side leaninng on bed, VC to lean to the right pt was able to move trunk over. Initially maxA for static posture, able to progress to SBA-Davina with cuing. Pt quick to fatigue and had LOB w/removal of trunk for support.   Bed Mobility    Supine to Sit Maximal Assist   Sit to Supine Maximal Assist   Scooting Maximal Assist   Rolling Maximum Assist to Lt.  (total assist hygiene after bowel incontinence)   Skilled Intervention Tactile Cuing;Verbal Cuing   Comments poor initiation however able to facilitate with RUE rolling to left.   Gait Analysis   Gait Level Of Assist Unable to Participate   Functional Mobility   Sit to Stand Maximal Assist   Mobility EOB   Comments 3 trials of STS, pt able to clear hips on first try, however fatigued through other trials, difficulty keeping feet under   How much difficulty does the patient currently have...   Turning over in bed (including adjusting bedclothes, sheets and blankets)? 1   Sitting down on and standing up from a chair with arms (e.g., wheelchair, bedside commode, etc.) 1   Moving from lying on back to sitting on the side of the bed? 1   How much help from another person does the patient currently need...   Moving to and from a bed to a chair (including a wheelchair)? 1   Need to walk in a hospital room? 1   Climbing 3-5 steps with a railing? 1   6 clicks Mobility Score 6   Activity Tolerance   Sitting Edge of Bed 10 min   Short Term Goals    Short Term Goal # 1 Pt to move supine to/from eob w/ spv in 6 visits to improve fxl indep    Goal Outcome # 1 goal not met   Short Term Goal # 2 Pt to move sit to/from stand w/ spv in 6 visits to improve fxl indep   Goal Outcome # 2 Goal not met   Short Term Goal # 3 Pt to ambulate 75 ft w/ spv in 6 visits to improve fxl indep   Goal Outcome # 3 Goal not met   Education Group   Education Provided Role of Physical Therapist   Role of Physical Therapist Patient Response Patient;Acceptance;Explanation;Verbal Demonstration   Physical Therapy Treatment Plan   Physical Therapy Treatment Plan Continue Current Treatment Plan   Treatment Plan  Bed Mobility;Gait Training;Neuro Re-Education / Balance;Self Care / Home Evaluation;Therapeutic Activities;Therapeutic Exercise   Treatment Frequency 3 Times per Week   Duration Until Therapy Goals Met   Anticipated Discharge Equipment and Recommendations   DC Equipment Recommendations Unable to determine at this time   Discharge Recommendations Recommend post-acute placement for additional physical therapy services prior to discharge home   Interdisciplinary Plan of Care Collaboration   IDT Collaboration with  Nursing   Patient Position at End of Therapy In Bed;Bed Alarm On;Wrist Restraints Applied;Tray Table within Reach   Collaboration Comments Nurse in room post-session   Session Information   Date / Session Number  10/12-2(2/3 1011)     Mariya Silva, SPT

## 2023-10-12 NOTE — CARE PLAN
The patient is Stable - Low risk of patient condition declining or worsening    Shift Goals  Clinical Goals: monitor neuro status  Patient Goals: JF  Family Goals: na    Progress made toward(s) clinical / shift goals:  Patient is alert and confuse . Turned q2, restraints maintained.  Isolated agitation incident noted while moving him. No acute change overnight.     Patient is not progressing towards the following goals:

## 2023-10-12 NOTE — PROGRESS NOTES
Hospital Medicine Daily Progress Note    Date of Service  10/11/2023    Chief Complaint  Sen Vasquez is a 77 y.o. male admitted 10/4/2023 [facility] with [facility] and w.u for EtOH withdrawal and unexplained comfusion.    Hospital Course  No notes on file  He is being evaluated for confusion and agitated 7d out of withdrawal period.   Interval Problem Update  10/5. Patient is confused. When aroused he is combative and agitated  Ultimately placed restraints.  Spoke with Neurology as patient is 7 days out of withdrawal period. He remains on detox bag. MRI PENDING.   10/6. I reviewed the chart along with vitals, labs, imaging, test (both pending and resulted) and recommendations from specialists and interdisciplinary team.. Getting MRI with anesthesia; still confused. His son is unreachable. Consent signed.   Discussed with Social Work. [facility] and was transferred here primarily for MRI. May need to be transferred back after results. Discussed with Dr. Roblero who agreed with the MRI.  10/7. I spent a lot of time with this patient and management.  He remains confused.  MRI was done showing strokes. There is 50-70% ICA stenosis.  I talked to Neurovascular. COntinue monotherapy antiplatelet and statin. Per NEuro ICA stenosis asymptomatic, no indication for CEA or stenting right now. Strokes though not culprit to his confusion.  I spoek ke th CM/SW. They mentioned [facility] with [facility]. Patient planned to be [facility] to [facility] to continue their w/u for confusion and alcohol withdrawal.  [facility], [facility]. She mentioned that they were also wanting an LP with anesthesia aside from the MRI. We reviewed their neurologists recommendations and viral encephalitis w/u was recommended.   I ordered IR LP as patient remains confused and will need anesthesia as we did the MRI. They do not have  staff to do it over the weekend likely Monday  I ordered a batter of CSF tests. I consulted Neurology who agreed with the encephalitis panel. Ordered EEG  I noted that patient hasn't really been eating so I ordered tube feeds and Cortrak with bridle.Patient on IV detox bag   I tried calling son again. No answer.  10/9. LP planned for Wednesday.  Appreciate neuro recs  So far patient is keeping the feeding tube    10/10  LP with anesthesia planned for 10/11, can likely transfer back to [facility] after, transfer back agreement in place.  He is afebrile with normal pulse respiratory rate blood pressure, pulse ox 97% on 2 L nasal cannula.  Labs remained stable, no leukocytosis or anemia, normal platelet count, normal renal function, transferred here for MRI with anesthesia and LP.  [facility].    10/11 AO x1, follow simple commands  ON NG tube  On restraint    Unsuccessful multiple attempts of LP by IR    I have discussed this patient's plan of care and discharge plan at IDT rounds today with Case Management, Nursing, Nursing leadership, and other members of the IDT team.    Consultants/Specialty      Code Status  Full Code    Disposition  The patient is not medically cleared for discharge to home or a post-acute facility.      I have placed the appropriate orders for post-discharge needs.    Review of Systems  Review of Systems   Unable to perform ROS: Mental acuity        Physical Exam  Temp:  [36.4 °C (97.6 °F)-36.7 °C (98.1 °F)] 36.4 °C (97.6 °F)  Pulse:  [66-81] 79  Resp:  [12-18] 16  BP: ()/(54-77) 127/65  SpO2:  [91 %-100 %] 94 %    Physical Exam  Vitals and nursing note reviewed.   HENT:      Head: Normocephalic and atraumatic.      Right Ear: External ear normal.      Left Ear: External ear normal.      Nose: Nose normal.      Mouth/Throat:      Mouth: Mucous membranes are moist.      Comments: Cortrak  Eyes:      General: No scleral icterus.     Conjunctiva/sclera: Conjunctivae normal.    Cardiovascular:      Rate and Rhythm: Normal rate and regular rhythm.      Heart sounds: No murmur heard.     No friction rub. No gallop.   Pulmonary:      Effort: Pulmonary effort is normal.      Breath sounds: Normal breath sounds.   Abdominal:      General: Abdomen is flat. Bowel sounds are normal. There is no distension.      Palpations: Abdomen is soft.      Tenderness: There is no abdominal tenderness. There is no guarding.   Musculoskeletal:         General: Normal range of motion.      Cervical back: Normal range of motion and neck supple.   Skin:     General: Skin is warm.   Neurological:      Mental Status: He is alert. He is disoriented.      Comments: On restraints  Confused, disoriented but still not making sense.   Psychiatric:      Comments: Unable to assess         Fluids    Intake/Output Summary (Last 24 hours) at 10/11/2023 1749  Last data filed at 10/11/2023 1429  Gross per 24 hour   Intake 630 ml   Output 1 ml   Net 629 ml       Laboratory  Recent Labs     10/09/23  0310 10/10/23  0408 10/11/23  0414   WBC 7.4 7.0 8.8   RBC 4.48* 4.34* 4.42*   HEMOGLOBIN 15.0 14.4 14.8   HEMATOCRIT 43.9 42.8 43.8   MCV 98.0* 98.6* 99.1*   MCH 33.5* 33.2* 33.5*   MCHC 34.2 33.6 33.8   RDW 47.5 50.0 49.9   PLATELETCT 296 311 335   MPV 10.5 10.3 10.3     Recent Labs     10/09/23  0310 10/10/23  0408 10/11/23  0414   SODIUM 139 140 137   POTASSIUM 4.1 4.0 4.2   CHLORIDE 101 101 98   CO2 28 32 30   GLUCOSE 131* 124* 101*   BUN 8 9 10   CREATININE 0.50 0.60 0.41*   CALCIUM 8.9 8.7 9.0                       Imaging  DX-LUMBAR PUNCTURE FOR DIAGNOSIS   Final Result      Failed fluoroscopic-guided lumbar puncture as described above.      [id number] VIEW   Final Result      Enteric tube tip projects over the second portion of the duodenum                  DX-ABDOMEN FOR TUBE PLACEMENT   Final Result         Gastric drainage tube with tip projecting over the expected area of the stomach.      EC-ECHOCARDIOGRAM COMPLETE  W/O CONT   Final Result      CT-CTA NECK WITH & W/O-POST PROCESSING   Final Result         1.  Atherosclerosis with mural thrombus versus soft plaque at the left carotid bulb resulting in 50-70% stenosis. Similar to prior study.         CT-CTA HEAD WITH & W/O-POST PROCESS   Final Result         1.  No large vessel occlusion or aneurysm identified      MR-BRAIN-W/O   Final Result   Addendum (preliminary) 1 of 1   Addendum: There is diffuse T2 hyperintensity in the splenium of the corpus    callosum without restricted diffusion. This may represent a nonspecific    gliosis.      Final      1.  There are tiny areas of acute infarcts within the large RIGHT temporal parietal and occipital encephalomalacia.   2.  Tiny areas of chronic lacunar infarcts in the bilateral basal ganglia and LEFT cerebellum.   3.  Mild cerebral volume loss.   4.  Moderate chronic microvascular ischemic disease.      [id number] VIEW   Final Result      No findings to preclude MRI.             Assessment/Plan  * Alcohol withdrawal syndrome, with delirium, CVA, prolonged delirium (HCC)  Assessment & Plan  On detox bag  PRN ATivan  MRI showed CVA  CVA not culprit to confusion per Neurology  Asa, Zetia and continue work-up for confusion  [facility], Neurology consulted. Ordered LP and EEG ordered LP studies including viral panel. LP planned w/ anesthesia Wednesday  Now has Cortak and on restraints    CVA (cerebral vascular accident) (HCC)  Assessment & Plan  Seen on MRI  Started aspirin, (has allergy to statin will try Zatia), ordered lipid p[leon and A1c  COnsulting NEurovasculary query if we need CTA HnN versus carotid Dopplers.  Neurovascular felt ICA stenosis not symptomatic. No further workup. Continue aspirin and Zetia.      Acute encephalopathy- (present on admission)  Assessment & Plan  Frequent neurochecks every 4 hours  Transferred here for MRI brain without contrast with sedation  Check TSH, ammonia, sed rate, B12,  procalcitonin, cortisol  Trial of Narcan given pinpoint pupils  Consider neurology consult in a.m.  Thiamine, folic acid, multivitamins  Fall, seizure, aspiration precautions    10/11 Unsuccessful multiple attempts of LP by IR      ETOH abuse- (present on admission)  Assessment & Plan  [facility] protocol  Thiamine, folic acid, multivitamins  Monitor for withdrawal         VTE prophylaxis: Lovenox ppx planned for tomorrow, stroke w/u being done    I have performed a physical exam and reviewed and updated ROS and Plan today (10/11/2023). In review of yesterday's note (10/10/2023), there are no changes except as documented above.    .

## 2023-10-12 NOTE — PROGRESS NOTES
While turning and changing sheets patient suddenly kick ever on her left upper chest ,this happened without any reason or agitation. Redirected the patient. The CNA claimed she was fine but looked visible shook. Midas report done. Charge nurse and supervisor made known about the incident.

## 2023-10-12 NOTE — DISCHARGE PLANNING
Case discussed in IDT rounds earlier today:  Patient not yet medically cleared for transfer back to [facility]; Failed attempt at LP by IR yesterday, plan for LP tomorrow.

## 2023-10-13 ENCOUNTER — ANESTHESIA (OUTPATIENT)
Dept: RADIOLOGY | Facility: MEDICAL CENTER | Age: 78
DRG: 070 | End: 2023-10-13
Payer: COMMERCIAL

## 2023-10-13 ENCOUNTER — APPOINTMENT (OUTPATIENT)
Dept: RADIOLOGY | Facility: MEDICAL CENTER | Age: 78
DRG: 070 | End: 2023-10-13
Attending: STUDENT IN AN ORGANIZED HEALTH CARE EDUCATION/TRAINING PROGRAM
Payer: COMMERCIAL

## 2023-10-13 ENCOUNTER — ANESTHESIA EVENT (OUTPATIENT)
Dept: RADIOLOGY | Facility: MEDICAL CENTER | Age: 78
DRG: 070 | End: 2023-10-13
Payer: COMMERCIAL

## 2023-10-13 LAB
ALBUMIN SERPL BCP-MCNC: 3.4 G/DL (ref 3.2–4.9)
ALBUMIN/GLOB SERPL: 1 G/DL
ALP SERPL-CCNC: 101 U/L (ref 30–99)
ALT SERPL-CCNC: 15 U/L (ref 2–50)
ANION GAP SERPL CALC-SCNC: 10 MMOL/L (ref 7–16)
AST SERPL-CCNC: 24 U/L (ref 12–45)
BILIRUB SERPL-MCNC: 0.4 MG/DL (ref 0.1–1.5)
BUN SERPL-MCNC: 9 MG/DL (ref 8–22)
BURR CELLS/RBC NFR CSF MANUAL: 0 %
C GATTII+NEOFOR DNA CSF QL NAA+NON-PROBE: NOT DETECTED
CALCIUM ALBUM COR SERPL-MCNC: 9.8 MG/DL (ref 8.5–10.5)
CALCIUM SERPL-MCNC: 9.3 MG/DL (ref 8.5–10.5)
CHLORIDE SERPL-SCNC: 100 MMOL/L (ref 96–112)
CLARITY CSF: CLEAR
CMV DNA CSF QL NAA+NON-PROBE: NOT DETECTED
CO2 SERPL-SCNC: 30 MMOL/L (ref 20–33)
COLOR CSF: COLORLESS
COLOR SPUN CSF: COLORLESS
CREAT SERPL-MCNC: 0.54 MG/DL (ref 0.5–1.4)
CSF COMMENTS 1658: NORMAL
E COLI K1 DNA CSF QL NAA+NON-PROBE: NOT DETECTED
ERYTHROCYTE [DISTWIDTH] IN BLOOD BY AUTOMATED COUNT: 48.9 FL (ref 35.9–50)
EV RNA CSF QL NAA+NON-PROBE: NOT DETECTED
GFR SERPLBLD CREATININE-BSD FMLA CKD-EPI: 103 ML/MIN/1.73 M 2
GLOBULIN SER CALC-MCNC: 3.5 G/DL (ref 1.9–3.5)
GLUCOSE CSF-MCNC: 64 MG/DL (ref 40–80)
GLUCOSE SERPL-MCNC: 101 MG/DL (ref 65–99)
GP B STREP DNA CSF QL NAA+NON-PROBE: NOT DETECTED
GRAM STN SPEC: NORMAL
HAEM INFLU DNA CSF QL NAA+NON-PROBE: NOT DETECTED
HCT VFR BLD AUTO: 46.9 % (ref 42–52)
HGB BLD-MCNC: 15.7 G/DL (ref 14–18)
HHV6 DNA CSF QL NAA+NON-PROBE: NOT DETECTED
HSV1 DNA CSF QL NAA+NON-PROBE: NOT DETECTED
HSV2 DNA CSF QL NAA+NON-PROBE: NOT DETECTED
L MONOCYTOG DNA CSF QL NAA+NON-PROBE: NOT DETECTED
MAGNESIUM SERPL-MCNC: 1.7 MG/DL (ref 1.5–2.5)
MCH RBC QN AUTO: 32.9 PG (ref 27–33)
MCHC RBC AUTO-ENTMCNC: 33.5 G/DL (ref 32.3–36.5)
MCV RBC AUTO: 98.3 FL (ref 81.4–97.8)
N MEN DNA CSF QL NAA+NON-PROBE: NOT DETECTED
NUC CELL # CSF: 3 CELLS/UL (ref 0–10)
PARECHOVIRUS A RNA CSF QL NAA+NON-PROBE: NOT DETECTED
PHOSPHATE SERPL-MCNC: 3.8 MG/DL (ref 2.5–4.5)
PLATELET # BLD AUTO: 331 K/UL (ref 164–446)
PMV BLD AUTO: 10.1 FL (ref 9–12.9)
POTASSIUM SERPL-SCNC: 4.4 MMOL/L (ref 3.6–5.5)
PROT CSF-MCNC: 25 MG/DL (ref 15–45)
PROT SERPL-MCNC: 6.9 G/DL (ref 6–8.2)
RBC # BLD AUTO: 4.77 M/UL (ref 4.7–6.1)
RBC # CSF: 767 CELLS/UL
S PNEUM DNA CSF QL NAA+NON-PROBE: NOT DETECTED
SIGNIFICANT IND 70042: NORMAL
SITE SITE: NORMAL
SODIUM SERPL-SCNC: 140 MMOL/L (ref 135–145)
SOURCE SOURCE: NORMAL
SPECIMEN VOL CSF: 15 ML
TUBE # CSF: 3
TUBE # CSF: NORMAL
VZV DNA CSF QL NAA+NON-PROBE: NOT DETECTED
WBC # BLD AUTO: 5.7 K/UL (ref 4.8–10.8)

## 2023-10-13 PROCEDURE — 83735 ASSAY OF MAGNESIUM: CPT

## 2023-10-13 PROCEDURE — 86635 COCCIDIOIDES ANTIBODY: CPT

## 2023-10-13 PROCEDURE — 62328 DX LMBR SPI PNXR W/FLUOR/CT: CPT

## 2023-10-13 PROCEDURE — 82945 GLUCOSE OTHER FLUID: CPT

## 2023-10-13 PROCEDURE — 84100 ASSAY OF PHOSPHORUS: CPT

## 2023-10-13 PROCEDURE — 87205 SMEAR GRAM STAIN: CPT

## 2023-10-13 PROCEDURE — 87070 CULTURE OTHR SPECIMN AEROBIC: CPT

## 2023-10-13 PROCEDURE — 86618 LYME DISEASE ANTIBODY: CPT

## 2023-10-13 PROCEDURE — 86592 SYPHILIS TEST NON-TREP QUAL: CPT

## 2023-10-13 PROCEDURE — 700101 HCHG RX REV CODE 250: Performed by: ANESTHESIOLOGY

## 2023-10-13 PROCEDURE — 700111 HCHG RX REV CODE 636 W/ 250 OVERRIDE (IP): Mod: JZ | Performed by: INTERNAL MEDICINE

## 2023-10-13 PROCEDURE — 99232 SBSQ HOSP IP/OBS MODERATE 35: CPT | Performed by: STUDENT IN AN ORGANIZED HEALTH CARE EDUCATION/TRAINING PROGRAM

## 2023-10-13 PROCEDURE — 84157 ASSAY OF PROTEIN OTHER: CPT

## 2023-10-13 PROCEDURE — 160002 HCHG RECOVERY MINUTES (STAT)

## 2023-10-13 PROCEDURE — 770001 HCHG ROOM/CARE - MED/SURG/GYN PRIV*

## 2023-10-13 PROCEDURE — 36415 COLL VENOUS BLD VENIPUNCTURE: CPT

## 2023-10-13 PROCEDURE — 86788 WEST NILE VIRUS AB IGM: CPT

## 2023-10-13 PROCEDURE — A9270 NON-COVERED ITEM OR SERVICE: HCPCS | Performed by: INTERNAL MEDICINE

## 2023-10-13 PROCEDURE — 86341 ISLET CELL ANTIBODY: CPT

## 2023-10-13 PROCEDURE — 80053 COMPREHEN METABOLIC PANEL: CPT

## 2023-10-13 PROCEDURE — 87483 CNS DNA AMP PROBE TYPE 12-25: CPT

## 2023-10-13 PROCEDURE — 700111 HCHG RX REV CODE 636 W/ 250 OVERRIDE (IP): Performed by: ANESTHESIOLOGY

## 2023-10-13 PROCEDURE — 61055 INJECTION INTO BRAIN CANAL: CPT

## 2023-10-13 PROCEDURE — 86255 FLUORESCENT ANTIBODY SCREEN: CPT | Mod: 91

## 2023-10-13 PROCEDURE — 700102 HCHG RX REV CODE 250 W/ 637 OVERRIDE(OP): Performed by: INTERNAL MEDICINE

## 2023-10-13 PROCEDURE — 85027 COMPLETE CBC AUTOMATED: CPT

## 2023-10-13 PROCEDURE — 86789 WEST NILE VIRUS ANTIBODY: CPT

## 2023-10-13 PROCEDURE — 009U3ZX DRAINAGE OF SPINAL CANAL, PERCUTANEOUS APPROACH, DIAGNOSTIC: ICD-10-PCS | Performed by: RADIOLOGY

## 2023-10-13 PROCEDURE — 160035 HCHG PACU - 1ST 60 MINS PHASE I

## 2023-10-13 PROCEDURE — 89051 BODY FLUID CELL COUNT: CPT

## 2023-10-13 RX ORDER — OXYCODONE HCL 5 MG/5 ML
5 SOLUTION, ORAL ORAL
Status: DISCONTINUED | OUTPATIENT
Start: 2023-10-13 | End: 2023-10-13 | Stop reason: HOSPADM

## 2023-10-13 RX ORDER — HYDROMORPHONE HYDROCHLORIDE 1 MG/ML
0.2 INJECTION, SOLUTION INTRAMUSCULAR; INTRAVENOUS; SUBCUTANEOUS
Status: DISCONTINUED | OUTPATIENT
Start: 2023-10-13 | End: 2023-10-13 | Stop reason: HOSPADM

## 2023-10-13 RX ORDER — DIPHENHYDRAMINE HYDROCHLORIDE 50 MG/ML
12.5 INJECTION INTRAMUSCULAR; INTRAVENOUS
Status: DISCONTINUED | OUTPATIENT
Start: 2023-10-13 | End: 2023-10-13 | Stop reason: HOSPADM

## 2023-10-13 RX ORDER — ONDANSETRON 2 MG/ML
4 INJECTION INTRAMUSCULAR; INTRAVENOUS
Status: DISCONTINUED | OUTPATIENT
Start: 2023-10-13 | End: 2023-10-13 | Stop reason: HOSPADM

## 2023-10-13 RX ORDER — SODIUM CHLORIDE, SODIUM LACTATE, POTASSIUM CHLORIDE, CALCIUM CHLORIDE 600; 310; 30; 20 MG/100ML; MG/100ML; MG/100ML; MG/100ML
INJECTION, SOLUTION INTRAVENOUS CONTINUOUS
Status: DISCONTINUED | OUTPATIENT
Start: 2023-10-13 | End: 2023-10-13 | Stop reason: HOSPADM

## 2023-10-13 RX ORDER — HALOPERIDOL 5 MG/ML
1 INJECTION INTRAMUSCULAR
Status: DISCONTINUED | OUTPATIENT
Start: 2023-10-13 | End: 2023-10-13 | Stop reason: HOSPADM

## 2023-10-13 RX ORDER — LIDOCAINE HYDROCHLORIDE 10 MG/ML
INJECTION, SOLUTION INFILTRATION; PERINEURAL
Status: DISPENSED
Start: 2023-10-13 | End: 2023-10-14

## 2023-10-13 RX ORDER — LIDOCAINE HYDROCHLORIDE 20 MG/ML
INJECTION, SOLUTION EPIDURAL; INFILTRATION; INTRACAUDAL; PERINEURAL PRN
Status: DISCONTINUED | OUTPATIENT
Start: 2023-10-13 | End: 2023-10-19 | Stop reason: SURG

## 2023-10-13 RX ORDER — HYDROMORPHONE HYDROCHLORIDE 1 MG/ML
0.4 INJECTION, SOLUTION INTRAMUSCULAR; INTRAVENOUS; SUBCUTANEOUS
Status: DISCONTINUED | OUTPATIENT
Start: 2023-10-13 | End: 2023-10-13 | Stop reason: HOSPADM

## 2023-10-13 RX ORDER — MEPERIDINE HYDROCHLORIDE 25 MG/ML
6.25 INJECTION INTRAMUSCULAR; INTRAVENOUS; SUBCUTANEOUS
Status: DISCONTINUED | OUTPATIENT
Start: 2023-10-13 | End: 2023-10-13 | Stop reason: HOSPADM

## 2023-10-13 RX ORDER — HYDROMORPHONE HYDROCHLORIDE 1 MG/ML
0.1 INJECTION, SOLUTION INTRAMUSCULAR; INTRAVENOUS; SUBCUTANEOUS
Status: DISCONTINUED | OUTPATIENT
Start: 2023-10-13 | End: 2023-10-13 | Stop reason: HOSPADM

## 2023-10-13 RX ORDER — OXYCODONE HCL 5 MG/5 ML
10 SOLUTION, ORAL ORAL
Status: DISCONTINUED | OUTPATIENT
Start: 2023-10-13 | End: 2023-10-13 | Stop reason: HOSPADM

## 2023-10-13 RX ADMIN — LIDOCAINE HYDROCHLORIDE 100 MG: 20 INJECTION, SOLUTION EPIDURAL; INFILTRATION; INTRACAUDAL at 12:26

## 2023-10-13 RX ADMIN — ENOXAPARIN SODIUM 40 MG: 100 INJECTION SUBCUTANEOUS at 17:47

## 2023-10-13 RX ADMIN — PROPOFOL 200 MG: 10 INJECTION, EMULSION INTRAVENOUS at 12:26

## 2023-10-13 RX ADMIN — Medication 100 MG: at 12:26

## 2023-10-13 RX ADMIN — EZETIMIBE 10 MG: 10 TABLET ORAL at 05:18

## 2023-10-13 ASSESSMENT — PAIN DESCRIPTION - PAIN TYPE
TYPE: SURGICAL PAIN

## 2023-10-13 NOTE — PROGRESS NOTES
Attempted multiple times to reach to Sen Vasquez Jr for consent for Lumbar Puncture unsuccessfully between 1630 and 1700.

## 2023-10-13 NOTE — CARE PLAN
Problem: Safety - Medical Restraint  Goal: Remains free of injury from restraints (Restraint for Interference with Medical Device)  Outcome: Progressing       Problem: Safety - Medical Restraint  Goal: Free from restraint(s) (Restraint for Interference with Medical Device)  Outcome: Not Progressing

## 2023-10-13 NOTE — OR NURSING
*This is a Running Note*    1341 Pt to PACU 10 B from OR. Bedside report from anesthesiologist and RN.  Attached to monitoring, VSS, breathing is calm and unlabored, Patient is asleep currently. Pt has a dressing on L side of ear and Lower back and CDI. Remains on 6 L oxygen via mask with an Oral airway in place.      1356 Oral Airway out.Pt Now on RA. Verified Pt restraint order, and is in 24 hours, see flow sheets.    1418 Called and gave report to Freddy COX.    1423 Pt is trying to communicate to me, about his restrains and how he needs them off. When I released his right hand he went for his feeding tube and tried to pull of his mask and tube. I was bale to stop him before he did anything. I explained to him that this is why he needs these restraints.    1425 in route to room with transport, RN notified

## 2023-10-13 NOTE — ANESTHESIA PROCEDURE NOTES
Airway    Date/Time: 10/13/2023 12:27 PM    Performed by: Calixto Burns M.D.  Authorized by: Calixto Burns M.D.    Location:  OR  Urgency:  Elective  Indications for Airway Management:  Anesthesia      Spontaneous Ventilation: absent    Sedation Level:  Deep  Preoxygenated: Yes    Patient Position:  Sniffing  Final Airway Type:  Endotracheal airway  Final Endotracheal Airway:  ETT  Cuffed: Yes    Technique Used for Successful ETT Placement:  Direct laryngoscopy    Insertion Site:  Oral  Blade Type:  Christopher  Laryngoscope Blade/Videolaryngoscope Blade Size:  4  ETT Size (mm):  8.0  Measured from:  Teeth  ETT to Teeth (cm):  21  Placement Verified by: auscultation and capnometry    Cormack-Lehane Classification:  Grade I - full view of glottis  Number of Attempts at Approach:  1

## 2023-10-13 NOTE — OR SURGEON
Immediate Post- Operative Note        Findings: C1-2 Puncture performed. 15 cc clear CSF obtained.      Procedure(s): Lumbar puncture at L2-3 and L5-S1 followed by C1-2 puncture      Estimated Blood Loss: Less than 5 ml        Complications: None            10/13/2023     1:24 PM     Kamar Cordova M.D.

## 2023-10-13 NOTE — ANESTHESIA PREPROCEDURE EVALUATION
Date/Time: 10/13/23 1115    Scheduled providers: Kamar Cordova M.D.; Calixto Burns M.D.    Procedure: IR-MYELO-LUMBAR WITH LP    Diagnosis:             Acute encephalopathy [G93.40]            Acute encephalopathy [G93.40]    Indications:       LP, failed IR LP by IR yesterday. Retry      LP, failed IR LP by IR yesterday. Retry    Location: [facility]          Relevant Problems   NEURO   (positive) CVA (cerebral vascular accident) (HCC)      CARDIAC   (positive) CAD (coronary artery disease)       Physical Exam    Airway   Mallampati: II  TM distance: >3 FB  Neck ROM: full       Cardiovascular - normal exam  Rhythm: regular  Rate: normal  (-) murmur     Dental - normal exam           Pulmonary - normal exam  Breath sounds clear to auscultation     Abdominal    Neurological - normal exam                 Anesthesia Plan    ASA 3   ASA physical status 3 criteria: alcohol and/or substance dependence or abuse    Plan - general       Airway plan will be ETT          Induction: intravenous    Postoperative Plan: Postoperative administration of opioids is intended.    Pertinent diagnostic labs and testing reviewed    Informed Consent:    Anesthetic plan and risks discussed with patient.    Use of blood products discussed with: patient whom consented to blood products.

## 2023-10-13 NOTE — DIETARY
Nutrition support weekly update:  Day 9 of admit.  Sen Vasquez is a 78 y.o. male with admitting DX of Acute encephalopathy.      Tube feeding initiated on 10/7. Currently NPO for LP by IR. TF via NGT is Jevity 1.2 at goal rate of 65 ml/hr providing 1872 kcals. 87 g protein, and 1259 ml fluids x 24 hrs.     Assessment:  Weight on 10/12: 72.5 kg via unknown scale. This is consistent with weight used in tube feed calculations. Re-estimate of nutritional needs is not indicated at this time.      Evaluation:   Pt has tolerating Jevity 1.2 at goal rate of 65 ml/hr-currently stopped for LP by IR.   AO x1, follows simple commands  Labs reviewed.   Meds: senna  +BM: 10/13  Current feeding remains appropriate at this neeru e    Malnutrition risk: No risks identified     Recommendations/Plan:  When medically feasible, restart tube feeds of Jevity 1.2 at a goal rate of 65 ml/hr.   Fluids per MD   Monitor weights    RD following.

## 2023-10-13 NOTE — THERAPY
Speech Language Therapy Contact Note    Patient Name: Sen Vasquez  Age:  78 y.o., Sex:  male  Medical Record #: [id number]  Today's Date: 10/13/2023    Attempted to see if pt was improved w/ regard to mentation for a swallow re-check- pt restless and yelling out incoherently; provided oral care and suction d/t bloody secretions in his mouth. Pt is not appropriate for PO trials.

## 2023-10-13 NOTE — PROGRESS NOTES
Hospital Medicine Daily Progress Note    Date of Service  10/13/2023    Chief Complaint  Sen Vasquez is a 77 y.o. male admitted 10/4/[facility] and w.u for EtOH withdrawal and unexplained comfusion.    Hospital Course  No notes on file  He is being evaluated for confusion and agitated 7d out of withdrawal period.   Interval Problem Update  10/5. Patient is confused. When aroused he is combative and agitated  Ultimately placed restraints.  Spoke with Neurology as patient is 7 days out of withdrawal period. He remains on detox bag. MRI PENDING.   10/6. I reviewed the chart along with vitals, labs, imaging, test (both pending and resulted) and recommendations from specialists and interdisciplinary team.. Getting MRI with anesthesia; still confused. His son is unreachable. Consent signed.   Discussed with Social Work. [facility] and was transferred here primarily for MRI. May need to be transferred back after results. Discussed with Dr. Roblero who agreed with the MRI.  10/7. I spent a lot of time with this patient and management.  He remains confused.  MRI was done showing strokes. There is 50-70% ICA stenosis.  I talked to Neurovascular. COntinue monotherapy antiplatelet and statin. Per NEuro ICA stenosis asymptomatic, no indication for CEA or stenting right now. Strokes though not culprit to his confusion.  I spoek ke th CM/SW. They mentioned [facility]. Patient planned to be [facility] to continue their w/u for confusion and alcohol withdrawal.  I [facility], [facility]. She mentioned that they were also wanting an LP with anesthesia aside from the MRI. We reviewed their neurologists recommendations and viral encephalitis w/u was recommended.   I ordered IR LP as patient remains confused and will need anesthesia as we did the MRI. They do not have  staff to do it over the weekend likely Monday  I ordered a batter of CSF tests. I consulted Neurology who agreed with the encephalitis panel. Ordered EEG  I noted that patient hasn't really been eating so I ordered tube feeds and Cortrak with bridle.Patient on IV detox bag   I tried calling son again. No answer.  10/9. LP planned for Wednesday.  Appreciate neuro recs  So far patient is keeping the feeding tube    10/10  LP with anesthesia planned for 10/11, can likely [facility] back to [facility] after, transfer back agreement in place.  He is afebrile with normal pulse respiratory rate blood pressure, pulse ox 97% on 2 L nasal cannula.  Labs remained stable, no leukocytosis or anemia, normal platelet count, normal renal function, transferred here for MRI with anesthesia and LP.  [facility].    10/11 AO x1, follow simple commands  Unsuccessful multiple attempts of LP by IR 10/11    10/12 not able to reach the son for consent. Will try LP tomorrow  NPO midnight  VS and labs reviewed, stable  AO X 1-2  On restraint.     I have discussed this patient's plan of care and discharge plan at IDT rounds today with Case Management, Nursing, Nursing leadership, and other members of the IDT team.    Consultants/Specialty      Code Status  Full Code    Disposition  The patient is not medically cleared for discharge to home or a post-acute facility.      I have placed the appropriate orders for post-discharge needs.    Review of Systems  Review of Systems   Unable to perform ROS: Mental acuity        Physical Exam  Temp:  [36.1 °C (96.9 °F)-36.4 °C (97.5 °F)] 36.2 °C (97.2 °F)  Pulse:  [61-96] 83  Resp:  [14-18] 16  BP: (102-161)/(55-97) 110/67  SpO2:  [89 %-98 %] 89 %    Physical Exam  Vitals and nursing note reviewed.   HENT:      Head: Normocephalic and atraumatic.      Right Ear: External ear normal.      Left Ear: External ear normal.      Nose: Nose normal.      Mouth/Throat:      Mouth: Mucous membranes are moist.       Comments: Cortrak  Eyes:      General: No scleral icterus.     Conjunctiva/sclera: Conjunctivae normal.   Cardiovascular:      Rate and Rhythm: Normal rate and regular rhythm.      Heart sounds: No murmur heard.     No friction rub. No gallop.   Pulmonary:      Effort: Pulmonary effort is normal.      Breath sounds: Normal breath sounds.   Abdominal:      General: Abdomen is flat. Bowel sounds are normal. There is no distension.      Palpations: Abdomen is soft.      Tenderness: There is no abdominal tenderness. There is no guarding.   Musculoskeletal:         General: Normal range of motion.      Cervical back: Normal range of motion and neck supple.   Skin:     General: Skin is warm.   Neurological:      Mental Status: He is alert. He is disoriented.      Comments: On restraints  Confused, disoriented but still not making sense.   Psychiatric:      Comments: Unable to assess         Fluids    Intake/Output Summary (Last 24 hours) at 10/13/2023 2106  Last data filed at 10/13/2023 1829  Gross per 24 hour   Intake 30 ml   Output 700 ml   Net -670 ml       Laboratory  Recent Labs     10/11/23  0414 10/12/23  0501 10/13/23  0536   WBC 8.8 6.5 5.7   RBC 4.42* 4.76 4.77   HEMOGLOBIN 14.8 15.7 15.7   HEMATOCRIT 43.8 47.2 46.9   MCV 99.1* 99.2* 98.3*   MCH 33.5* 33.0 32.9   MCHC 33.8 33.3 33.5   RDW 49.9 49.3 48.9   PLATELETCT 335 340 331   MPV 10.3 9.9 10.1     Recent Labs     10/11/23  0414 10/12/23  0717 10/13/23  0536   SODIUM 137 136 140   POTASSIUM 4.2 4.2 4.4   CHLORIDE 98 99 100   CO2 30 29 30   GLUCOSE 101* 110* 101*   BUN 10 9 9   CREATININE 0.41* 0.46* 0.54   CALCIUM 9.0 9.2 9.3                       Imaging  DX-LUMBAR PUNCTURE FOR DIAGNOSIS   Final Result      Failed fluoroscopic-guided lumbar puncture as described above.      IT-XSCAVRM-2 VIEW   Final Result      Enteric tube tip projects over the second portion of the duodenum                  DX-ABDOMEN FOR TUBE PLACEMENT   Final Result         Gastric  drainage tube with tip projecting over the expected area of the stomach.      EC-ECHOCARDIOGRAM COMPLETE W/O CONT   Final Result      CT-CTA NECK WITH & W/O-POST PROCESSING   Final Result         1.  Atherosclerosis with mural thrombus versus soft plaque at the left carotid bulb resulting in 50-70% stenosis. Similar to prior study.         CT-CTA HEAD WITH & W/O-POST PROCESS   Final Result         1.  No large vessel occlusion or aneurysm identified      MR-BRAIN-W/O   Final Result   Addendum (preliminary) 1 of 1   Addendum: There is diffuse T2 hyperintensity in the splenium of the corpus    callosum without restricted diffusion. This may represent a nonspecific    gliosis.      Final      1.  There are tiny areas of acute infarcts within the large RIGHT temporal parietal and occipital encephalomalacia.   2.  Tiny areas of chronic lacunar infarcts in the bilateral basal ganglia and LEFT cerebellum.   3.  Mild cerebral volume loss.   4.  Moderate chronic microvascular ischemic disease.      DX-RKNBNVM-7 VIEW   Final Result      No findings to preclude MRI.      IR-MYELO-LUMBAR WITH LP    (Results Pending)          Assessment/Plan  * Alcohol withdrawal syndrome, with delirium, CVA, prolonged delirium (HCC)  Assessment & Plan  On detox bag  PRN ATivan  MRI showed CVA  CVA not culprit to confusion per Neurology  Asa, Zetia and continue work-up for confusion  [facility], Neurology consulted. Ordered LP and EEG ordered LP studies including viral panel. LP planned w/ anesthesia Wednesday  Now has Cortak and on restraints    CVA (cerebral vascular accident) (HCC)  Assessment & Plan  Seen on MRI  Started aspirin, (has allergy to statin will try Zatia), ordered lipid p[leon and A1c  COnsulting NEurovasculary query if we need CTA HnN versus carotid Dopplers.  Neurovascular felt ICA stenosis not symptomatic. No further workup. Continue aspirin and Zetia.      Acute encephalopathy- (present on  admission)  Assessment & Plan  Frequent neurochecks every 4 hours  Transferred here for MRI brain without contrast with sedation  Check TSH, ammonia, sed rate, B12, procalcitonin, cortisol  Trial of Narcan given pinpoint pupils  Consider neurology consult in a.m.  Thiamine, folic acid, multivitamins  Fall, seizure, aspiration precautions    10/11 Unsuccessful multiple attempts of LP by IR  10/13 underwent LP by IR  Per IR, very difficult LP, C1-2 Puncture performed  I reviewed previous neuro notes, ordered cell count, protein, glucose, culture, [place]ile serologies, meningitis panel, autoimmune encephalitis panel, lyme panel, and VDRL        ETOH abuse- (present on admission)  Assessment & Plan  [facility] protocol  Thiamine, folic acid, multivitamins  Monitor for withdrawal         VTE prophylaxis: Lovenox ppx planned for tomorrow, stroke w/u being done    I have performed a physical exam and reviewed and updated ROS and Plan today (10/13/2023). In review of yesterday's note (10/12/2023), there are no changes except as documented above.    .

## 2023-10-13 NOTE — PROGRESS NOTES
Pt presents to IR 1. Pt's son (Sen Galicia) was consented by MD over the phone, confirmed by this RN and Mya COX consent over the phone. Anesthesia consent confirmed over the phone with son by this Rn and Mya COX. Anesthesia care provided by .Pt transferred to IR table in prone position. Patient underwent a lumbar and cervical spinal puncture by Dr. Cordova. Fluid obtained from C1,2.  Procedure site was marked by MD and verified using imaging guidance. Pt placed on monitor, prepped and draped in a sterile fashion. All vital signs monitoring and medication administration by anesthesia services - See anesthesia flowsheet for details. Report called to PACU RN. Pt transported by bakari with RN to PACU.     Specimen: 12 ml of CSF fluid hand delivered to lab.

## 2023-10-13 NOTE — CARE PLAN
The patient is Stable - Low risk of patient condition declining or worsening    Shift Goals  Clinical Goals: Monitor neuro status  Patient Goals: safety/rest  Family Goals: JF    Progress made toward(s) clinical / shift goals:  Patient is alert and oriented, agitated when being turn. He was able to answer that he is in the hospital. No acute change noted.    Patient is not progressing towards the following goals:

## 2023-10-14 LAB
ALBUMIN SERPL BCP-MCNC: 3.5 G/DL (ref 3.2–4.9)
ALBUMIN/GLOB SERPL: 1.1 G/DL
ALP SERPL-CCNC: 106 U/L (ref 30–99)
ALT SERPL-CCNC: 17 U/L (ref 2–50)
ANION GAP SERPL CALC-SCNC: 8 MMOL/L (ref 7–16)
AST SERPL-CCNC: 22 U/L (ref 12–45)
BILIRUB SERPL-MCNC: 0.4 MG/DL (ref 0.1–1.5)
BUN SERPL-MCNC: 9 MG/DL (ref 8–22)
CALCIUM ALBUM COR SERPL-MCNC: 9.7 MG/DL (ref 8.5–10.5)
CALCIUM SERPL-MCNC: 9.3 MG/DL (ref 8.5–10.5)
CHLORIDE SERPL-SCNC: 99 MMOL/L (ref 96–112)
CO2 SERPL-SCNC: 30 MMOL/L (ref 20–33)
CREAT SERPL-MCNC: 0.42 MG/DL (ref 0.5–1.4)
ERYTHROCYTE [DISTWIDTH] IN BLOOD BY AUTOMATED COUNT: 46.5 FL (ref 35.9–50)
GFR SERPLBLD CREATININE-BSD FMLA CKD-EPI: 111 ML/MIN/1.73 M 2
GLOBULIN SER CALC-MCNC: 3.2 G/DL (ref 1.9–3.5)
GLUCOSE SERPL-MCNC: 114 MG/DL (ref 65–99)
HCT VFR BLD AUTO: 44.5 % (ref 42–52)
HGB BLD-MCNC: 15.1 G/DL (ref 14–18)
MAGNESIUM SERPL-MCNC: 1.7 MG/DL (ref 1.5–2.5)
MCH RBC QN AUTO: 33.2 PG (ref 27–33)
MCHC RBC AUTO-ENTMCNC: 33.9 G/DL (ref 32.3–36.5)
MCV RBC AUTO: 97.8 FL (ref 81.4–97.8)
PHOSPHATE SERPL-MCNC: 3.3 MG/DL (ref 2.5–4.5)
PLATELET # BLD AUTO: 308 K/UL (ref 164–446)
PMV BLD AUTO: 10.4 FL (ref 9–12.9)
POTASSIUM SERPL-SCNC: 4.1 MMOL/L (ref 3.6–5.5)
PROT SERPL-MCNC: 6.7 G/DL (ref 6–8.2)
RBC # BLD AUTO: 4.55 M/UL (ref 4.7–6.1)
SODIUM SERPL-SCNC: 137 MMOL/L (ref 135–145)
WBC # BLD AUTO: 7.1 K/UL (ref 4.8–10.8)

## 2023-10-14 PROCEDURE — 99232 SBSQ HOSP IP/OBS MODERATE 35: CPT | Performed by: STUDENT IN AN ORGANIZED HEALTH CARE EDUCATION/TRAINING PROGRAM

## 2023-10-14 PROCEDURE — 84100 ASSAY OF PHOSPHORUS: CPT

## 2023-10-14 PROCEDURE — 80053 COMPREHEN METABOLIC PANEL: CPT

## 2023-10-14 PROCEDURE — 770001 HCHG ROOM/CARE - MED/SURG/GYN PRIV*

## 2023-10-14 PROCEDURE — 700102 HCHG RX REV CODE 250 W/ 637 OVERRIDE(OP): Performed by: INTERNAL MEDICINE

## 2023-10-14 PROCEDURE — 700111 HCHG RX REV CODE 636 W/ 250 OVERRIDE (IP): Mod: JZ

## 2023-10-14 PROCEDURE — 700111 HCHG RX REV CODE 636 W/ 250 OVERRIDE (IP): Mod: JZ | Performed by: INTERNAL MEDICINE

## 2023-10-14 PROCEDURE — A9270 NON-COVERED ITEM OR SERVICE: HCPCS | Performed by: INTERNAL MEDICINE

## 2023-10-14 PROCEDURE — 92526 ORAL FUNCTION THERAPY: CPT

## 2023-10-14 PROCEDURE — 85027 COMPLETE CBC AUTOMATED: CPT

## 2023-10-14 PROCEDURE — 83735 ASSAY OF MAGNESIUM: CPT

## 2023-10-14 PROCEDURE — 36415 COLL VENOUS BLD VENIPUNCTURE: CPT

## 2023-10-14 RX ADMIN — DOCUSATE SODIUM 50 MG AND SENNOSIDES 8.6 MG 2 TABLET: 8.6; 5 TABLET, FILM COATED ORAL at 17:19

## 2023-10-14 RX ADMIN — EZETIMIBE 10 MG: 10 TABLET ORAL at 05:58

## 2023-10-14 RX ADMIN — HALOPERIDOL LACTATE 5 MG: 5 INJECTION, SOLUTION INTRAMUSCULAR at 09:55

## 2023-10-14 RX ADMIN — HALOPERIDOL LACTATE 5 MG: 5 INJECTION, SOLUTION INTRAMUSCULAR at 22:52

## 2023-10-14 RX ADMIN — DOCUSATE SODIUM 50 MG AND SENNOSIDES 8.6 MG 2 TABLET: 8.6; 5 TABLET, FILM COATED ORAL at 05:58

## 2023-10-14 RX ADMIN — ENOXAPARIN SODIUM 40 MG: 100 INJECTION SUBCUTANEOUS at 17:19

## 2023-10-14 ASSESSMENT — PAIN DESCRIPTION - PAIN TYPE
TYPE: ACUTE PAIN
TYPE: ACUTE PAIN

## 2023-10-14 NOTE — THERAPY
"Speech Language Pathology   Daily Treatment     Patient Name: Sen Vasquez  AGE:  78 y.o., SEX:  male  Medical Record #: [id number]  Date of Service: 10/14/2023      Precautions: NGT, swallow precautions, fall risk, AMS      Subjective  Pt agreeable and cooperative with SLP tx tasks. Confused. \"Every body is my friend\" and \"Watch out for those girls, don't let them get you.\"    Seen per RN request; pt was apparently \"begging\" for PO earlier this date.      Assessment  Pt seen for dysphagia management. On 2L NC, repositioned to [facility] by RN and SLP, in bilateral wrist restraints. WBC count WFL. No recent CXR.    Trials of LQ and TN by straw provided. Appropriate oral bolus stripping and containment. Total AP transit and effective bolus formation.  No overt s/sx of aspiration noted w/ single and sequential sips of TN0 water over ~5 oz. No increase in WOB, no cough/clear, no change in vocal quality. One to two swallows appreciated per bolus.       Clinical Impressions  Pt with improved participation and performance with PO this date. Considering risks associated with ongoing NPO status related to ongoing NGT placement, would recommend cautious initiation of PO with MM/TN and 1:1 spv. Please HOLD PO with difficulty; pt may still benefit from dx evaluation.       Recommendations  Minced and moist with thin liquids   HOLD PO with difficulty and contact SLP  Instrumentation: Diagnostic evaluation with difficulty  Medication: Crush with applesauce, as appropriate  Supervision: 1:1 feeding with constant supervision, Careful 1:1 hand feeding  Positioning: Fully upright and midline during oral intake  Risk Management : Small bites/sips, Slow rate of intake  Oral Care: Q4h      SLP Treatment Plan  Treatment Plan: Dysphagia Treatment, Cognitive Treatment, Patient/Family/Caregiver Training  SLP Frequency: 3x Per Week  Estimated Duration: Until Therapy Goals Met      Anticipated Discharge Needs  Discharge Recommendations: Recommend " "post-acute placement for additional speech therapy services prior to discharge home  Therapy Recommendations Upon DC: Dysphagia Training, Community Re-Integration, Patient / Family / Caregiver Education      Patient / Family Goals  Patient / Family Goal #1: \"Take these off\" (the restraints)  Goal #1 Outcome: Progressing as expected  Short Term Goals  Short Term Goal # 1: Pt will consume prefeeding trials w/ oral acceptance over ~ 5 minutes to demo readiness for an instrumental exam  Goal Outcome # 1: Goal met, new goal added  Short Term Goal # 1 B : Pt will consume MMTN given 1:1 spv and diligent oral care with no worsening of respiratory status.  Short Term Goal # 2: Pt will participate in an instrumental swallow exam to define swallow physiology and determine ST POC  Goal Outcome # 2 : Progressing as expected      MATHEW rCistobal  "

## 2023-10-14 NOTE — CARE PLAN
The patient is Stable - Low risk of patient condition declining or worsening    Shift Goals  Clinical Goals: monitor neuro status  Patient Goals: rest, safety  Family Goals: not present    Progress made toward(s) clinical / shift goals:        Problem: Knowledge Deficit - Standard  Goal: Patient and family/care givers will demonstrate understanding of plan of care, disease process/condition, diagnostic tests and medications  Outcome: Progressing     Problem: Seizure Precautions  Goal: Implementation of seizure precautions  Outcome: Progressing     Problem: Risk for Aspiration  Goal: Patient's risk for aspiration will be absent or decrease  Outcome: Progressing     Problem: Urinary - Renal Perfusion  Goal: Ability to achieve and maintain adequate renal perfusion and functioning will improve  Outcome: Progressing     Problem: Skin Integrity  Goal: Skin integrity is maintained or improved  Outcome: Progressing       Patient is not progressing towards the following goals:

## 2023-10-14 NOTE — PROGRESS NOTES
Hospital Medicine Daily Progress Note    Date of Service  10/14/2023    Chief Complaint  Sen Vasquez is a 77 y.o. male admitted 10/4/2023 [facility] with [facility] and w.u for EtOH withdrawal and unexplained comfusion.    Hospital Course  No notes on file  He is being evaluated for confusion and agitated 7d out of withdrawal period.   Interval Problem Update  10/5. Patient is confused. When aroused he is combative and agitated  Ultimately placed restraints.  Spoke with Neurology as patient is 7 days out of withdrawal period. He remains on detox bag. MRI PENDING.   10/6. I reviewed the chart along with vitals, labs, imaging, test (both pending and resulted) and recommendations from specialists and interdisciplinary team.. Getting MRI with anesthesia; still confused. His son is unreachable. Consent signed.   Discussed with Social Work. [facility] and was transferred here primarily for MRI. May need to be transferred back after results. Discussed with Dr. Roblero who agreed with the MRI.  10/7. I spent a lot of time with this patient and management.  He remains confused.  MRI was done showing strokes. There is 50-70% ICA stenosis.  I talked to Neurovascular. COntinue monotherapy antiplatelet and statin. Per NEuro ICA stenosis asymptomatic, no indication for CEA or stenting right now. Strokes though not culprit to his confusion.  I spoek ke th CM/SW. They mentioned [facility] with [facility]. Patient planned to be [facility] to continue their w/u for confusion and alcohol withdrawal.  I [facility], [facility]. She mentioned that they were also wanting an LP with anesthesia aside from the MRI. We reviewed their neurologists recommendations and viral encephalitis w/u was recommended.   I ordered IR LP as patient remains confused and will need anesthesia as we did the MRI. They do not have  staff to do it over the weekend likely Monday  I ordered a batter of CSF tests. I consulted Neurology who agreed with the encephalitis panel. Ordered EEG  I noted that patient hasn't really been eating so I ordered tube feeds and Cortrak with bridle.Patient on IV detox bag   I tried calling son again. No answer.  10/9. LP planned for Wednesday.  Appreciate neuro recs  So far patient is keeping the feeding tube    10/10  LP with anesthesia planned for 10/11, [facility] to [facility] after, transfer back agreement in place.  He is afebrile with normal pulse respiratory rate blood pressure, pulse ox 97% on 2 L nasal cannula.  Labs remained stable, no leukocytosis or anemia, normal platelet count, normal renal function, transferred here for MRI with anesthesia and LP.  [facility].    10/14  s/p LP 10/13. Per IR, very difficult LP, C1-2 Puncture performed  I ordered cell count, protein, glucose, culture, [place]ile serologies, meningitis panel, autoimmune encephalitis panel, lyme panel, and VDRL, cryptococcal antigen    I notified [facility], patient is ok to transfer back to [facility] does not have neurologist on-call at weekend.  Most likely transfer on Monday    ON NG tube  On restraint      Unsuccessful multiple attempts of LP by IR 10/11  Plan to try again  NPO midnight    I have discussed this patient's plan of care and discharge plan at IDT rounds today with Case Management, Nursing, Nursing leadership, and other members of the IDT team.    Consultants/Specialty      Code Status  Full Code    Disposition  The patient is not medically cleared for discharge to home or a post-acute facility.      I have placed the appropriate orders for post-discharge needs.    Review of Systems  Review of Systems   Unable to perform ROS: Mental acuity        Physical Exam  Temp:  [36.2 °C (97.2 °F)-37 °C (98.6 °F)] 36.7 °C (98.1 °F)  Pulse:  [69-83] 79  Resp:  [16] 16  BP: (110-126)/(52-67) 124/65  SpO2:  [89 %-97 %] 92  %    Physical Exam  Vitals and nursing note reviewed.   HENT:      Head: Normocephalic and atraumatic.      Right Ear: External ear normal.      Left Ear: External ear normal.      Nose: Nose normal.      Mouth/Throat:      Mouth: Mucous membranes are moist.      Comments: Cortrak  Eyes:      General: No scleral icterus.     Conjunctiva/sclera: Conjunctivae normal.   Cardiovascular:      Rate and Rhythm: Normal rate and regular rhythm.      Heart sounds: No murmur heard.     No friction rub. No gallop.   Pulmonary:      Effort: Pulmonary effort is normal.      Breath sounds: Normal breath sounds.   Abdominal:      General: Abdomen is flat. Bowel sounds are normal. There is no distension.      Palpations: Abdomen is soft.      Tenderness: There is no abdominal tenderness. There is no guarding.   Musculoskeletal:         General: Normal range of motion.      Cervical back: Normal range of motion and neck supple.   Skin:     General: Skin is warm.   Neurological:      Mental Status: He is alert. He is disoriented.      Comments: On restraints  Confused, disoriented but still not making sense.   Psychiatric:      Comments: Unable to assess         Fluids    Intake/Output Summary (Last 24 hours) at 10/14/2023 1637  Last data filed at 10/14/2023 0800  Gross per 24 hour   Intake 150 ml   Output 425 ml   Net -275 ml       Laboratory  Recent Labs     10/12/23  0501 10/13/23  0536 10/14/23  0941   WBC 6.5 5.7 7.1   RBC 4.76 4.77 4.55*   HEMOGLOBIN 15.7 15.7 15.1   HEMATOCRIT 47.2 46.9 44.5   MCV 99.2* 98.3* 97.8   MCH 33.0 32.9 33.2*   MCHC 33.3 33.5 33.9   RDW 49.3 48.9 46.5   PLATELETCT 340 331 308   MPV 9.9 10.1 10.4     Recent Labs     10/12/23  0717 10/13/23  0536 10/14/23  0941   SODIUM 136 140 137   POTASSIUM 4.2 4.4 4.1   CHLORIDE 99 100 99   CO2 29 30 30   GLUCOSE 110* 101* 114*   BUN 9 9 9   CREATININE 0.46* 0.54 0.42*   CALCIUM 9.2 9.3 9.3                       Imaging  DX-LUMBAR PUNCTURE FOR DIAGNOSIS   Final  Result      Failed fluoroscopic-guided lumbar puncture as described above.      [id number]-7 VIEW   Final Result      Enteric tube tip projects over the second portion of the duodenum                  DX-ABDOMEN FOR TUBE PLACEMENT   Final Result         Gastric drainage tube with tip projecting over the expected area of the stomach.      EC-ECHOCARDIOGRAM COMPLETE W/O CONT   Final Result      CT-CTA NECK WITH & W/O-POST PROCESSING   Final Result         1.  Atherosclerosis with mural thrombus versus soft plaque at the left carotid bulb resulting in 50-70% stenosis. Similar to prior study.         CT-CTA HEAD WITH & W/O-POST PROCESS   Final Result         1.  No large vessel occlusion or aneurysm identified      MR-BRAIN-W/O   Final Result   Addendum (preliminary) 1 of 1   Addendum: There is diffuse T2 hyperintensity in the splenium of the corpus    callosum without restricted diffusion. This may represent a nonspecific    gliosis.      Final      1.  There are tiny areas of acute infarcts within the large RIGHT temporal parietal and occipital encephalomalacia.   2.  Tiny areas of chronic lacunar infarcts in the bilateral basal ganglia and LEFT cerebellum.   3.  Mild cerebral volume loss.   4.  Moderate chronic microvascular ischemic disease.      RP-[id number]-7 VIEW   Final Result      No findings to preclude MRI.      IR-MYELO-LUMBAR WITH LP    (Results Pending)          Assessment/Plan  * Alcohol withdrawal syndrome, with delirium, CVA, prolonged delirium (HCC)  Assessment & Plan  On detox bag  PRN ATivan  MRI showed CVA  CVA not culprit to confusion per Neurology  Asa, Zetia and continue work-up for confusion  [facility] with [facility]ist, Neurology consulted. Ordered LP and EEG ordered LP studies including viral panel. LP planned w/ anesthesia Wednesday  Now has Cortak and on restraints    CVA (cerebral vascular accident) (HCC)  Assessment & Plan  Seen on MRI  Started aspirin, (has allergy to statin will try Zatia),  ordered lipid p[leon and A1c  COnsulting NEurovasculary query if we need CTA HnN versus carotid Dopplers.  Neurovascular felt ICA stenosis not symptomatic. No further workup. Continue aspirin and Zetia.      Acute encephalopathy- (present on admission)  Assessment & Plan  Frequent neurochecks every 4 hours  Transferred here for MRI brain without contrast with sedation  Check TSH, ammonia, sed rate, B12, procalcitonin, cortisol  Trial of Narcan given pinpoint pupils  Consider neurology consult in a.m.  Thiamine, folic acid, multivitamins  Fall, seizure, aspiration precautions    10/11 Unsuccessful multiple attempts of LP by IR  10/13 underwent LP by IR  Per IR, very difficult LP, C1-2 Puncture performed  I reviewed previous neuro notes, ordered cell count, protein, glucose, culture, [place] serologies, meningitis panel, autoimmune encephalitis panel, lyme panel, and VDRL        ETOH abuse- (present on admission)  Assessment & Plan  [facility] protocol  Thiamine, folic acid, multivitamins  Monitor for withdrawal         VTE prophylaxis: Lovenox ppx planned for tomorrow, stroke w/u being done    I have performed a physical exam and reviewed and updated ROS and Plan today (10/14/2023). In review of yesterday's note (10/13/2023), there are no changes except as documented above.    .

## 2023-10-15 LAB
ALBUMIN SERPL BCP-MCNC: 3.1 G/DL (ref 3.2–4.9)
ALBUMIN/GLOB SERPL: 0.8 G/DL
ALP SERPL-CCNC: 97 U/L (ref 30–99)
ALT SERPL-CCNC: 15 U/L (ref 2–50)
ANION GAP SERPL CALC-SCNC: 8 MMOL/L (ref 7–16)
AST SERPL-CCNC: 22 U/L (ref 12–45)
BILIRUB SERPL-MCNC: 0.3 MG/DL (ref 0.1–1.5)
BUN SERPL-MCNC: 12 MG/DL (ref 8–22)
CALCIUM ALBUM COR SERPL-MCNC: 10.1 MG/DL (ref 8.5–10.5)
CALCIUM SERPL-MCNC: 9.4 MG/DL (ref 8.5–10.5)
CHLORIDE SERPL-SCNC: 99 MMOL/L (ref 96–112)
CO2 SERPL-SCNC: 30 MMOL/L (ref 20–33)
CREAT SERPL-MCNC: 0.52 MG/DL (ref 0.5–1.4)
ERYTHROCYTE [DISTWIDTH] IN BLOOD BY AUTOMATED COUNT: 48.8 FL (ref 35.9–50)
GFR SERPLBLD CREATININE-BSD FMLA CKD-EPI: 104 ML/MIN/1.73 M 2
GLOBULIN SER CALC-MCNC: 3.7 G/DL (ref 1.9–3.5)
GLUCOSE SERPL-MCNC: 102 MG/DL (ref 65–99)
HCT VFR BLD AUTO: 44.9 % (ref 42–52)
HGB BLD-MCNC: 15 G/DL (ref 14–18)
MCH RBC QN AUTO: 33.3 PG (ref 27–33)
MCHC RBC AUTO-ENTMCNC: 33.4 G/DL (ref 32.3–36.5)
MCV RBC AUTO: 99.8 FL (ref 81.4–97.8)
PLATELET # BLD AUTO: 231 K/UL (ref 164–446)
PMV BLD AUTO: 10.8 FL (ref 9–12.9)
POTASSIUM SERPL-SCNC: 4.4 MMOL/L (ref 3.6–5.5)
PROT SERPL-MCNC: 6.8 G/DL (ref 6–8.2)
RBC # BLD AUTO: 4.5 M/UL (ref 4.7–6.1)
SODIUM SERPL-SCNC: 137 MMOL/L (ref 135–145)
WBC # BLD AUTO: 7.2 K/UL (ref 4.8–10.8)
WNV IGG CSF IA-ACNC: 0.06 IV
WNV IGM CSF IA-ACNC: 0 IV

## 2023-10-15 PROCEDURE — 36415 COLL VENOUS BLD VENIPUNCTURE: CPT

## 2023-10-15 PROCEDURE — 700102 HCHG RX REV CODE 250 W/ 637 OVERRIDE(OP): Performed by: INTERNAL MEDICINE

## 2023-10-15 PROCEDURE — 770001 HCHG ROOM/CARE - MED/SURG/GYN PRIV*

## 2023-10-15 PROCEDURE — A9270 NON-COVERED ITEM OR SERVICE: HCPCS | Performed by: STUDENT IN AN ORGANIZED HEALTH CARE EDUCATION/TRAINING PROGRAM

## 2023-10-15 PROCEDURE — 99233 SBSQ HOSP IP/OBS HIGH 50: CPT | Performed by: STUDENT IN AN ORGANIZED HEALTH CARE EDUCATION/TRAINING PROGRAM

## 2023-10-15 PROCEDURE — 700111 HCHG RX REV CODE 636 W/ 250 OVERRIDE (IP): Performed by: STUDENT IN AN ORGANIZED HEALTH CARE EDUCATION/TRAINING PROGRAM

## 2023-10-15 PROCEDURE — 80053 COMPREHEN METABOLIC PANEL: CPT

## 2023-10-15 PROCEDURE — 700111 HCHG RX REV CODE 636 W/ 250 OVERRIDE (IP): Mod: JZ | Performed by: INTERNAL MEDICINE

## 2023-10-15 PROCEDURE — A9270 NON-COVERED ITEM OR SERVICE: HCPCS | Performed by: INTERNAL MEDICINE

## 2023-10-15 PROCEDURE — 700102 HCHG RX REV CODE 250 W/ 637 OVERRIDE(OP): Performed by: STUDENT IN AN ORGANIZED HEALTH CARE EDUCATION/TRAINING PROGRAM

## 2023-10-15 PROCEDURE — 700111 HCHG RX REV CODE 636 W/ 250 OVERRIDE (IP): Mod: JZ

## 2023-10-15 PROCEDURE — 85027 COMPLETE CBC AUTOMATED: CPT

## 2023-10-15 RX ORDER — QUETIAPINE FUMARATE 25 MG/1
50 TABLET, FILM COATED ORAL 2 TIMES DAILY
Status: DISCONTINUED | OUTPATIENT
Start: 2023-10-15 | End: 2023-10-17 | Stop reason: HOSPADM

## 2023-10-15 RX ORDER — LORAZEPAM 2 MG/ML
1 INJECTION INTRAMUSCULAR EVERY 6 HOURS PRN
Status: DISCONTINUED | OUTPATIENT
Start: 2023-10-15 | End: 2023-10-17 | Stop reason: HOSPADM

## 2023-10-15 RX ADMIN — LORAZEPAM 1 MG: 2 INJECTION INTRAMUSCULAR; INTRAVENOUS at 13:44

## 2023-10-15 RX ADMIN — HALOPERIDOL LACTATE 5 MG: 5 INJECTION, SOLUTION INTRAMUSCULAR at 12:39

## 2023-10-15 RX ADMIN — ENOXAPARIN SODIUM 40 MG: 100 INJECTION SUBCUTANEOUS at 18:08

## 2023-10-15 RX ADMIN — EZETIMIBE 10 MG: 10 TABLET ORAL at 04:50

## 2023-10-15 RX ADMIN — HALOPERIDOL LACTATE 5 MG: 5 INJECTION, SOLUTION INTRAMUSCULAR at 06:38

## 2023-10-15 RX ADMIN — LORAZEPAM 1 MG: 2 INJECTION INTRAMUSCULAR; INTRAVENOUS at 21:19

## 2023-10-15 RX ADMIN — DOCUSATE SODIUM 50 MG AND SENNOSIDES 8.6 MG 2 TABLET: 8.6; 5 TABLET, FILM COATED ORAL at 18:08

## 2023-10-15 RX ADMIN — QUETIAPINE FUMARATE 50 MG: 25 TABLET ORAL at 16:49

## 2023-10-15 ASSESSMENT — PAIN DESCRIPTION - PAIN TYPE
TYPE: ACUTE PAIN

## 2023-10-15 NOTE — PROGRESS NOTES
Bedside report received, pt sleeping in bed. Jevity running through [employer] @ 65. Telesitter in room. Pt 's chart and orders reviewed. Q4 neuro checks and q2 turns will be performed this shift. Pt is incontx2, will continue to check for incont episodes and keep patient clean and dry.

## 2023-10-15 NOTE — CARE PLAN
The patient is Stable - Low risk of patient condition declining or worsening    Shift Goals  Clinical Goals: maintain neuro status, no decline in neuro, remain free from injuries from restraints, maintain skin integrity  Patient Goals: candelaria  Family Goals: candelaria    Progress made toward(s) clinical / shift goals:  Pt's neuro assessed q4 hours per order, no decline in assessment from previous neuro assessments. Pt is turned every 2 hours with ROM performed and signs of injury assessed for during q2 turns and repositioning. Pt is cleaned up and dried after episodes of incontinence with barrier cream applied. Redness observed in creases of groin, area cleaned and cream applied. No open skin seen.     Patient is not progressing towards the following goals:      Problem: Safety - Medical Restraint  Goal: Free from restraint(s) (Restraint for Interference with Medical Device)  Outcome: Not Progressing

## 2023-10-15 NOTE — PROGRESS NOTES
On call hospitalist Lexie contacted about patient's  restraints order. New order placed and bilateral wrist restraints placed. Q2 hour restraint checks will be performed.

## 2023-10-16 LAB
ALBUMIN SERPL BCP-MCNC: 3.1 G/DL (ref 3.2–4.9)
ALBUMIN/GLOB SERPL: 0.8 G/DL
ALP SERPL-CCNC: 102 U/L (ref 30–99)
ALT SERPL-CCNC: 16 U/L (ref 2–50)
ANION GAP SERPL CALC-SCNC: 11 MMOL/L (ref 7–16)
AST SERPL-CCNC: 22 U/L (ref 12–45)
BACTERIA CSF CULT: NORMAL
BILIRUB SERPL-MCNC: 0.3 MG/DL (ref 0.1–1.5)
BUN SERPL-MCNC: 12 MG/DL (ref 8–22)
CALCIUM ALBUM COR SERPL-MCNC: 9.9 MG/DL (ref 8.5–10.5)
CALCIUM SERPL-MCNC: 9.2 MG/DL (ref 8.5–10.5)
CHLORIDE SERPL-SCNC: 101 MMOL/L (ref 96–112)
CO2 SERPL-SCNC: 25 MMOL/L (ref 20–33)
CREAT SERPL-MCNC: 0.45 MG/DL (ref 0.5–1.4)
CRP SERPL HS-MCNC: 1.5 MG/DL (ref 0–0.75)
ERYTHROCYTE [DISTWIDTH] IN BLOOD BY AUTOMATED COUNT: 49.6 FL (ref 35.9–50)
GFR SERPLBLD CREATININE-BSD FMLA CKD-EPI: 109 ML/MIN/1.73 M 2
GLOBULIN SER CALC-MCNC: 3.7 G/DL (ref 1.9–3.5)
GLUCOSE SERPL-MCNC: 102 MG/DL (ref 65–99)
GRAM STN SPEC: NORMAL
HCT VFR BLD AUTO: 45 % (ref 42–52)
HGB BLD-MCNC: 14.2 G/DL (ref 14–18)
MCH RBC QN AUTO: 32.1 PG (ref 27–33)
MCHC RBC AUTO-ENTMCNC: 31.6 G/DL (ref 32.3–36.5)
MCV RBC AUTO: 101.6 FL (ref 81.4–97.8)
PLATELET # BLD AUTO: 269 K/UL (ref 164–446)
PMV BLD AUTO: 10.7 FL (ref 9–12.9)
POTASSIUM SERPL-SCNC: 4.5 MMOL/L (ref 3.6–5.5)
PREALB SERPL-MCNC: 15.2 MG/DL (ref 18–38)
PROT SERPL-MCNC: 6.8 G/DL (ref 6–8.2)
RBC # BLD AUTO: 4.43 M/UL (ref 4.7–6.1)
SIGNIFICANT IND 70042: NORMAL
SITE SITE: NORMAL
SODIUM SERPL-SCNC: 137 MMOL/L (ref 135–145)
SOURCE SOURCE: NORMAL
VDRL CSF QL: NON REACTIVE
WBC # BLD AUTO: 7.9 K/UL (ref 4.8–10.8)

## 2023-10-16 PROCEDURE — 700111 HCHG RX REV CODE 636 W/ 250 OVERRIDE (IP): Mod: JZ | Performed by: INTERNAL MEDICINE

## 2023-10-16 PROCEDURE — 99232 SBSQ HOSP IP/OBS MODERATE 35: CPT | Performed by: STUDENT IN AN ORGANIZED HEALTH CARE EDUCATION/TRAINING PROGRAM

## 2023-10-16 PROCEDURE — 36415 COLL VENOUS BLD VENIPUNCTURE: CPT

## 2023-10-16 PROCEDURE — 86140 C-REACTIVE PROTEIN: CPT

## 2023-10-16 PROCEDURE — 700102 HCHG RX REV CODE 250 W/ 637 OVERRIDE(OP): Performed by: STUDENT IN AN ORGANIZED HEALTH CARE EDUCATION/TRAINING PROGRAM

## 2023-10-16 PROCEDURE — 700111 HCHG RX REV CODE 636 W/ 250 OVERRIDE (IP)

## 2023-10-16 PROCEDURE — 85027 COMPLETE CBC AUTOMATED: CPT

## 2023-10-16 PROCEDURE — 700102 HCHG RX REV CODE 250 W/ 637 OVERRIDE(OP): Performed by: INTERNAL MEDICINE

## 2023-10-16 PROCEDURE — 80053 COMPREHEN METABOLIC PANEL: CPT

## 2023-10-16 PROCEDURE — A9270 NON-COVERED ITEM OR SERVICE: HCPCS | Performed by: INTERNAL MEDICINE

## 2023-10-16 PROCEDURE — 84134 ASSAY OF PREALBUMIN: CPT

## 2023-10-16 PROCEDURE — 700111 HCHG RX REV CODE 636 W/ 250 OVERRIDE (IP): Performed by: STUDENT IN AN ORGANIZED HEALTH CARE EDUCATION/TRAINING PROGRAM

## 2023-10-16 PROCEDURE — A9270 NON-COVERED ITEM OR SERVICE: HCPCS | Performed by: STUDENT IN AN ORGANIZED HEALTH CARE EDUCATION/TRAINING PROGRAM

## 2023-10-16 PROCEDURE — 770001 HCHG ROOM/CARE - MED/SURG/GYN PRIV*

## 2023-10-16 RX ORDER — HALOPERIDOL 5 MG/ML
2.5 INJECTION INTRAMUSCULAR ONCE
Status: COMPLETED | OUTPATIENT
Start: 2023-10-17 | End: 2023-10-17

## 2023-10-16 RX ORDER — DIPHENHYDRAMINE HYDROCHLORIDE 50 MG/ML
25 INJECTION INTRAMUSCULAR; INTRAVENOUS ONCE
Status: COMPLETED | OUTPATIENT
Start: 2023-10-16 | End: 2023-10-16

## 2023-10-16 RX ADMIN — LORAZEPAM 1 MG: 2 INJECTION INTRAMUSCULAR; INTRAVENOUS at 22:38

## 2023-10-16 RX ADMIN — QUETIAPINE FUMARATE 50 MG: 25 TABLET ORAL at 16:41

## 2023-10-16 RX ADMIN — QUETIAPINE FUMARATE 50 MG: 25 TABLET ORAL at 04:07

## 2023-10-16 RX ADMIN — LORAZEPAM 1 MG: 2 INJECTION INTRAMUSCULAR; INTRAVENOUS at 03:52

## 2023-10-16 RX ADMIN — LORAZEPAM 1 MG: 2 INJECTION INTRAMUSCULAR; INTRAVENOUS at 10:09

## 2023-10-16 RX ADMIN — ENOXAPARIN SODIUM 40 MG: 100 INJECTION SUBCUTANEOUS at 16:42

## 2023-10-16 RX ADMIN — DOCUSATE SODIUM 50 MG AND SENNOSIDES 8.6 MG 2 TABLET: 8.6; 5 TABLET, FILM COATED ORAL at 04:07

## 2023-10-16 RX ADMIN — DIPHENHYDRAMINE HYDROCHLORIDE 25 MG: 50 INJECTION, SOLUTION INTRAMUSCULAR; INTRAVENOUS at 01:03

## 2023-10-16 RX ADMIN — EZETIMIBE 10 MG: 10 TABLET ORAL at 04:06

## 2023-10-16 ASSESSMENT — PAIN DESCRIPTION - PAIN TYPE
TYPE: ACUTE PAIN

## 2023-10-16 NOTE — PROGRESS NOTES
Assessment/Plan:  RUFINO MARTINEZ is a 65 y.o. 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        Comprehensive Multidisciplinary Rehab Program:  - Start comprehensive rehab program, PT/OT/SLP 3 hours a day, 5 days a week.  - Precautions: falls    #L Lateral Medullary CVA  - Continue ASA and Plavix. Last dose of Plavix 5/5/21  - Continue Lipitor 80mg QHS, Fish oil  - LON 4/21/21: no cardiac source of embolism  - F/u neuro outpatient, Dr. Owen    #HTN  - Lisinopril 20mg daily    #DM  - Continue Lantus 45U QHS  - Continue Admelog 15U TID AC  - FS and ISS  - Monitor    Sleep:   - Maintain quiet hours and low stim environment.  - Melatonin PRN to maximize participation in therapy during the day.   - Monitor sleep logs    Pain Management:  - Tylenol PRN, Gabapentin, Tramadol, Lidocaine patch  - Avoid sedating medications that may interfere with cognitive recovery    GI/Bowel:  - At risk for constipation due to neurologic diagnosis, immobility and/or medication use  - Senna QHS, Miralax PRN Daily  - GI ppx: Protonix    /Bladder:   - At risk for incontinence and retention due to neurologic diagnosis and limited mobility  - Currently patient voids independently  - Continue catheter/bladder nursing protocol with bladder scans per routine with straight cath for >400cc.  - Encourage timed voids every 4 hours while awake for independence and to promote continence during therapy.    DVT ppx:  - Lovenox  - SCDs   Bedside report received, assessment completed. No neuro changes from day shift. Pt A/Ox0-1, intermittently can tell you his name. Restraints orders verified. B/L wrist restraints on, pt checked for injury, no injury visible. ROM performed, and pt repositioned. Q2 turns and ROM with restraints checks. Condom cath on patient. NGT feed through R nare. Pt on r/a with SPO2 > 90% with even and unlabored breathing. Will reorient patient frequently and provide comfort and distraction this shift. Heel float boots placed on pt due to red boggy heels. Waffle in place and inflated. Barrier cream applied to bottom and groin creases.    Assessment/Plan:  RUFINO MARTINEZ is a 65 y.o. 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        Comprehensive Multidisciplinary Rehab Program:  - Start comprehensive rehab program, PT/OT/SLP 3 hours a day, 5 days a week.  - Precautions: falls    #L Lateral Medullary CVA  - Continue ASA and Plavix. Last dose of Plavix 5/5/21  - Continue Lipitor 80mg QHS, Fish oil  - LON 4/21/21: no cardiac source of embolism  - F/u neuro outpatient, Dr. Owen    #HTN  - Lisinopril 20mg daily    #DM  - Lantus decreased to 35U QHS due to low FS  - Continue Admelog 15U TID AC  - FS and ISS  - Monitor-- hospitalist following and will adjust PRN    Sleep:   - Maintain quiet hours and low stim environment.  - Melatonin PRN to maximize participation in therapy during the day.   - Monitor sleep logs    Pain Management:  - Tylenol PRN, Gabapentin, Tramadol, Lidocaine patch  - Avoid sedating medications that may interfere with cognitive recovery    GI/Bowel:  - At risk for constipation due to neurologic diagnosis, immobility and/or medication use  - Senna QHS, Miralax PRN Daily  - GI ppx: Protonix    /Bladder:   - At risk for incontinence and retention due to neurologic diagnosis and limited mobility  -  patient voids independently--continent with low PVR      DVT ppx:  - Lovenox  - SCDs

## 2023-10-16 NOTE — PROGRESS NOTES
Pt is becoming increasingly agitated and restless. He repeatedly throws his legs over the bed rails and gets himself tangled up in his bed and in his covers. Hospitalist Katia soto.

## 2023-10-16 NOTE — DIETARY
Nutrition Services: Brief Update    Pt passed SLP eval on 10/14 and SLP recommends minced and moist diet with thin liquids. PO intake so far today since TF held this am is 50-75% of breakfast and lunch.     Discussed with RN and in agreement to pull IRIS feeding tube as pt is eating adequately.     Recommendations/Plan:  Diet per SLP.   TF was held this am after discussion with RN and pt is eating adequately. Plan to d/c TF orders and feeding tube.   Monitor PO intake for consistency.    RD following to monitor for adequate PO intake consistently.

## 2023-10-16 NOTE — CARE PLAN
Problem: Psychosocial  Goal: Patient's level of anxiety will decrease  Description: Target End Date:  1-3 days or as soon as patient condition allows    1.  Collaborate with patient and family/caregiver to identify triggers and develop strategies to cope with anxiety  2.  Implement stimuli reduction, calming techniques  3.  Pharmacologic management per provider order  4.  Encourage patient/family/care giver participation  5.  Collaborate with interdisciplinary team including Psychologist or Behavioral Health Team as needed  Outcome: Progressing   The patient is Stable - Low risk of patient condition declining or worsening    Shift Goals  Clinical Goals: maintain neuro remove restraints  Patient Goals: candelaria  Family Goals: candelaria    Progress made toward(s) clinical / shift goals:      Patient is not progressing towards the following goals:

## 2023-10-16 NOTE — DISCHARGE PLANNING
[facility] remains medically cleared [facility]; Per [facility] note, [facility] currently on [facility] and will notify us if bed becomes available.

## 2023-10-16 NOTE — THERAPY
Speech Language Therapy Contact Note    Patient Name: Sen Vasquez  Age:  78 y.o., Sex:  male  Medical Record #: [id number]  Today's Date: 10/16/2023    Attempted to see pt w/ breakfast tray for swallow tx. Despite max cueing by RN and SLP- pt too confused and restless for PO intake or tx- shifting in bed and grabbing at therapist. Unable to be redirected. Breakfast deferred, recommend to hold until pt's attention improved. RN reported pt's PO intake was good yesterday.

## 2023-10-16 NOTE — CARE PLAN
The patient is Stable - Low risk of patient condition declining or worsening    Shift Goals  Clinical Goals: Maintain stable neuro/no decline, maintain skin integrity, reorient patient as needed  Patient Goals: candelaria  Family Goals: candelaria    Progress made toward(s) clinical / shift goals:  Pt's neuro is checked every 4 hours per order, it remains stable with no decline this shift. Pt is turned and repositioned every 2 hours and sooner if needed. Skin is assessed for breakdown, redness to buttocks, sacrum, groin creases, and heels. Barrier cream applied to groin and buttocks, condom cath placed by CNA, mepilex to heels and heel float boots placed. Waffle overlay. All seizure precautions in place, no seizure activity this shift. High fall risk, bed alarm is on. Tele sitter in room for patient safety. Room next to nurses station with direct line of sight on patient at all times.     Patient is not progressing towards the following goals:

## 2023-10-16 NOTE — PROGRESS NOTES
Hospital Medicine Daily Progress Note    Date of Service  10/15/2023    Chief Complaint  Sen Vasquez is a 77 y.o. male admitted 10/4/2023 [facility] with [facility] and w.u for EtOH withdrawal and unexplained comfusion.    Hospital Course  No notes on file  He is being evaluated for confusion and agitated 7d out of withdrawal period.   Interval Problem Update  10/5. Patient is confused. When aroused he is combative and agitated  Ultimately placed restraints.  Spoke with Neurology as patient is 7 days out of withdrawal period. He remains on detox bag. MRI PENDING.   10/6. I reviewed the chart along with vitals, labs, imaging, test (both pending and resulted) and recommendations from specialists and interdisciplinary team.. Getting MRI with anesthesia; still confused. His son is unreachable. Consent signed.   Discussed with Social Work. [facility] and was transferred here primarily for MRI. May need to be transferred back after results. Discussed with Dr. Roblero who agreed with the MRI.  10/7. I spent a lot of time with this patient and management.  He remains confused.  MRI was done showing strokes. There is 50-70% ICA stenosis.  I talked to Neurovascular. COntinue monotherapy antiplatelet and statin. Per NEuro ICA stenosis asymptomatic, no indication for CEA or stenting right now. Strokes though not culprit to his confusion.  I spoek ke th CM/SW. They mentioned [facility] with [facility]. Patient planned to be [facility] to continue their w/u for confusion and alcohol withdrawal.  [facility]. She mentioned that they were also wanting an LP with anesthesia aside from the MRI. We reviewed their neurologists recommendations and viral encephalitis w/u was recommended.   I ordered IR LP as patient remains confused and will need anesthesia as we did the MRI. They do not have  staff to do it over the weekend likely Monday  I ordered a batter of CSF tests. I consulted Neurology who agreed with the encephalitis panel. Ordered EEG  I noted that patient hasn't really been eating so I ordered tube feeds and Cortrak with bridle.Patient on IV detox bag   I tried calling son again. No answer.  10/9. LP planned for Wednesday.  Appreciate neuro recs  So far patient is keeping the feeding tube    10/10  LP with anesthesia planned for 10/11, can likely transfer back to [facility] after, transfer back agreement in place.  He is afebrile with normal pulse respiratory rate blood pressure, pulse ox 97% on 2 L nasal cannula.  Labs remained stable, no leukocytosis or anemia, normal platelet count, normal renal function, transferred here for MRI with anesthesia and LP.  [facility].    10/14  s/p LP 10/13. Per IR, very difficult LP, C1-2 Puncture performed  I ordered cell count, protein, glucose, culture, west nile serologies, meningitis panel, autoimmune encephalitis panel, lyme panel, and VDRL, cryptococcal antigen  I notified RTOC, patient is ok to transfer back to [facility] does not have neurologist on-call at weekend.  Most likely transfer on Monday  ON NG tube  On restraint    10/15  Mental status very slowly improving, he is more coherent than when I saw him 4-5 days ago, more interactive but still quite delirious, still with agitation frequently screaming throughout the day. Generally redirectable for a short time. Started seroquel, tried ativan. No new complaints. He is delusional and delirious still.     I have discussed this patient's plan of care and discharge plan at IDT rounds today with Case Management, Nursing, Nursing leadership, and other members of the IDT team.    Consultants/Specialty      Code Status  Full Code    Disposition  The patient is not medically cleared for discharge to home or a post-acute facility.      I have placed the appropriate orders for post-discharge  needs.    Review of Systems  Review of Systems   Unable to perform ROS: Mental acuity        Physical Exam  Temp:  [36.4 °C (97.5 °F)-36.7 °C (98.1 °F)] 36.7 °C (98.1 °F)  Pulse:  [65-76] 67  Resp:  [16-17] 16  BP: ()/(54-65) 124/65  SpO2:  [90 %-97 %] 92 %    Physical Exam  Vitals and nursing note reviewed.   Constitutional:       Appearance: He is ill-appearing.   HENT:      Head: Normocephalic and atraumatic.      Right Ear: External ear normal.      Left Ear: External ear normal.      Nose: Nose normal. No rhinorrhea.      Mouth/Throat:      Mouth: Mucous membranes are moist.      Comments: Cortrak  Eyes:      General: No scleral icterus.     Conjunctiva/sclera: Conjunctivae normal.   Cardiovascular:      Rate and Rhythm: Normal rate and regular rhythm.      Heart sounds: No murmur heard.     No friction rub. No gallop.   Pulmonary:      Effort: Pulmonary effort is normal.      Breath sounds: Normal breath sounds.   Abdominal:      General: Abdomen is flat. Bowel sounds are normal. There is no distension.      Palpations: Abdomen is soft.      Tenderness: There is no abdominal tenderness. There is no guarding.   Musculoskeletal:         General: Normal range of motion.      Cervical back: Normal range of motion and neck supple.   Skin:     General: Skin is warm.   Neurological:      Mental Status: He is alert. He is disoriented.      Comments: On restraints  Confused, disoriented but still not making sense.   Psychiatric:      Comments: Unable to assess         Fluids    Intake/Output Summary (Last 24 hours) at 10/15/2023 2329  Last data filed at 10/15/2023 2229  Gross per 24 hour   Intake 120 ml   Output --   Net 120 ml         Laboratory  Recent Labs     10/13/23  0536 10/14/23  0941 10/15/23  0228   WBC 5.7 7.1 7.2   RBC 4.77 4.55* 4.50*   HEMOGLOBIN 15.7 15.1 15.0   HEMATOCRIT 46.9 44.5 44.9   MCV 98.3* 97.8 99.8*   MCH 32.9 33.2* 33.3*   MCHC 33.5 33.9 33.4   RDW 48.9 46.5 48.8   PLATELETCT 331 308  231   MPV 10.1 10.4 10.8       Recent Labs     10/13/23  0536 10/14/23  0941 10/15/23  0228   SODIUM 140 137 137   POTASSIUM 4.4 4.1 4.4   CHLORIDE 100 99 99   CO2 30 30 30   GLUCOSE 101* 114* 102*   BUN 9 9 12   CREATININE 0.54 0.42* 0.52   CALCIUM 9.3 9.3 9.4                         Imaging  DX-LUMBAR PUNCTURE FOR DIAGNOSIS   Final Result      Failed fluoroscopic-guided lumbar puncture as described above.      VA-[id number]-3 VIEW   Final Result      Enteric tube tip projects over the second portion of the duodenum                  DX-ABDOMEN FOR TUBE PLACEMENT   Final Result         Gastric drainage tube with tip projecting over the expected area of the stomach.      EC-ECHOCARDIOGRAM COMPLETE W/O CONT   Final Result      CT-CTA NECK WITH & W/O-POST PROCESSING   Final Result         1.  Atherosclerosis with mural thrombus versus soft plaque at the left carotid bulb resulting in 50-70% stenosis. Similar to prior study.         CT-CTA HEAD WITH & W/O-POST PROCESS   Final Result         1.  No large vessel occlusion or aneurysm identified      MR-BRAIN-W/O   Final Result   Addendum (preliminary) 1 of 1   Addendum: There is diffuse T2 hyperintensity in the splenium of the corpus    callosum without restricted diffusion. This may represent a nonspecific    gliosis.      Final      1.  There are tiny areas of acute infarcts within the large RIGHT temporal parietal and occipital encephalomalacia.   2.  Tiny areas of chronic lacunar infarcts in the bilateral basal ganglia and LEFT cerebellum.   3.  Mild cerebral volume loss.   4.  Moderate chronic microvascular ischemic disease.      OA-[id number] VIEW   Final Result      No findings to preclude MRI.      IR-MYELO-LUMBAR WITH LP    (Results Pending)          Assessment/Plan  * Alcohol withdrawal syndrome, with delirium, CVA, prolonged delirium (HCC)  Assessment & Plan  On detox bag  PRN ATivan  MRI showed CVA  CVA not culprit to confusion per Neurology  Asa, Zetia and  continue work-up for confusion  [facility] with [facility], Neurology consulted. Ordered LP and EEG ordered LP studies including viral panel. LP planned w/ anesthesia Wednesday  Now has Cortak and on restraints    CVA (cerebral vascular accident) (HCC)  Assessment & Plan  Seen on MRI  Started aspirin, (has allergy to statin will try Zatia), ordered lipid p[leon and A1c  COnsulting NEurovasculary query if we need CTA HnN versus carotid Dopplers.  Neurovascular felt ICA stenosis not symptomatic. No further workup. Continue aspirin and Zetia.      Acute encephalopathy- (present on admission)  Assessment & Plan  Frequent neurochecks every 4 hours  Transferred here for MRI brain without contrast with sedation  Check TSH, ammonia, sed rate, B12, procalcitonin, cortisol  Trial of Narcan given pinpoint pupils  Consider neurology consult in a.m.  Thiamine, folic acid, multivitamins  Fall, seizure, aspiration precautions    10/11 Unsuccessful multiple attempts of LP by IR  10/13 underwent LP by IR  Per IR, very difficult LP, C1-2 Puncture performed  I reviewed previous neuro notes, ordered cell count, protein, glucose, culture, [place] nile serologies, meningitis panel, autoimmune encephalitis panel, lyme panel, and VDRL        ETOH abuse- (present on admission)  Assessment & Plan  CIWA protocol  Thiamine, folic acid, multivitamins  Monitor for withdrawal         VTE prophylaxis: Lovenox ppx     I have performed a physical exam and reviewed and updated ROS and Plan today (10/15/2023). In review of yesterday's note (10/14/2023), there are no changes except as documented above.    Total time spent 52 minutes. I spent greater than 50% of the time for patient care, counseling, and coordination on this date, including unit/floor time, and face-to-face time with the patient as per interval events, my own review of patient's imaging and lab analysis and developing my assessment and plan above.

## 2023-10-17 VITALS
BODY MASS INDEX: 22.88 KG/M2 | RESPIRATION RATE: 18 BRPM | HEART RATE: 81 BPM | OXYGEN SATURATION: 93 % | DIASTOLIC BLOOD PRESSURE: 73 MMHG | HEIGHT: 70 IN | SYSTOLIC BLOOD PRESSURE: 132 MMHG | TEMPERATURE: 98.4 F | WEIGHT: 159.83 LBS

## 2023-10-17 LAB
ALBUMIN SERPL BCP-MCNC: 3.3 G/DL (ref 3.2–4.9)
ALBUMIN/GLOB SERPL: 0.9 G/DL
ALP SERPL-CCNC: 108 U/L (ref 30–99)
ALT SERPL-CCNC: 14 U/L (ref 2–50)
ANION GAP SERPL CALC-SCNC: 10 MMOL/L (ref 7–16)
AST SERPL-CCNC: 18 U/L (ref 12–45)
BILIRUB SERPL-MCNC: 0.3 MG/DL (ref 0.1–1.5)
BUN SERPL-MCNC: 14 MG/DL (ref 8–22)
CALCIUM ALBUM COR SERPL-MCNC: 10 MG/DL (ref 8.5–10.5)
CALCIUM SERPL-MCNC: 9.4 MG/DL (ref 8.5–10.5)
CHLORIDE SERPL-SCNC: 99 MMOL/L (ref 96–112)
CO2 SERPL-SCNC: 30 MMOL/L (ref 20–33)
CREAT SERPL-MCNC: 0.66 MG/DL (ref 0.5–1.4)
GFR SERPLBLD CREATININE-BSD FMLA CKD-EPI: 97 ML/MIN/1.73 M 2
GLOBULIN SER CALC-MCNC: 3.5 G/DL (ref 1.9–3.5)
GLUCOSE SERPL-MCNC: 94 MG/DL (ref 65–99)
POTASSIUM SERPL-SCNC: 4 MMOL/L (ref 3.6–5.5)
PROT SERPL-MCNC: 6.8 G/DL (ref 6–8.2)
SODIUM SERPL-SCNC: 139 MMOL/L (ref 135–145)
TEST NAME 95000: NORMAL

## 2023-10-17 PROCEDURE — 700102 HCHG RX REV CODE 250 W/ 637 OVERRIDE(OP): Performed by: STUDENT IN AN ORGANIZED HEALTH CARE EDUCATION/TRAINING PROGRAM

## 2023-10-17 PROCEDURE — 700102 HCHG RX REV CODE 250 W/ 637 OVERRIDE(OP): Performed by: INTERNAL MEDICINE

## 2023-10-17 PROCEDURE — 97530 THERAPEUTIC ACTIVITIES: CPT

## 2023-10-17 PROCEDURE — A9270 NON-COVERED ITEM OR SERVICE: HCPCS | Performed by: INTERNAL MEDICINE

## 2023-10-17 PROCEDURE — 36415 COLL VENOUS BLD VENIPUNCTURE: CPT

## 2023-10-17 PROCEDURE — 700111 HCHG RX REV CODE 636 W/ 250 OVERRIDE (IP): Mod: JZ

## 2023-10-17 PROCEDURE — 700111 HCHG RX REV CODE 636 W/ 250 OVERRIDE (IP): Mod: JZ | Performed by: INTERNAL MEDICINE

## 2023-10-17 PROCEDURE — 99239 HOSP IP/OBS DSCHRG MGMT >30: CPT | Performed by: STUDENT IN AN ORGANIZED HEALTH CARE EDUCATION/TRAINING PROGRAM

## 2023-10-17 PROCEDURE — 80053 COMPREHEN METABOLIC PANEL: CPT

## 2023-10-17 PROCEDURE — A9270 NON-COVERED ITEM OR SERVICE: HCPCS | Performed by: STUDENT IN AN ORGANIZED HEALTH CARE EDUCATION/TRAINING PROGRAM

## 2023-10-17 RX ADMIN — EZETIMIBE 10 MG: 10 TABLET ORAL at 06:04

## 2023-10-17 RX ADMIN — QUETIAPINE FUMARATE 50 MG: 25 TABLET ORAL at 16:23

## 2023-10-17 RX ADMIN — QUETIAPINE FUMARATE 50 MG: 25 TABLET ORAL at 06:04

## 2023-10-17 RX ADMIN — DOCUSATE SODIUM 50 MG AND SENNOSIDES 8.6 MG 2 TABLET: 8.6; 5 TABLET, FILM COATED ORAL at 16:23

## 2023-10-17 RX ADMIN — ENOXAPARIN SODIUM 40 MG: 100 INJECTION SUBCUTANEOUS at 16:23

## 2023-10-17 RX ADMIN — HALOPERIDOL LACTATE 2.5 MG: 5 INJECTION, SOLUTION INTRAMUSCULAR at 00:15

## 2023-10-17 RX ADMIN — DOCUSATE SODIUM 50 MG AND SENNOSIDES 8.6 MG 2 TABLET: 8.6; 5 TABLET, FILM COATED ORAL at 06:04

## 2023-10-17 ASSESSMENT — COGNITIVE AND FUNCTIONAL STATUS - GENERAL
MOVING FROM LYING ON BACK TO SITTING ON SIDE OF FLAT BED: UNABLE
SUGGESTED CMS G CODE MODIFIER MOBILITY: CN
MOVING TO AND FROM BED TO CHAIR: UNABLE
STANDING UP FROM CHAIR USING ARMS: TOTAL
WALKING IN HOSPITAL ROOM: TOTAL
TURNING FROM BACK TO SIDE WHILE IN FLAT BAD: UNABLE
CLIMB 3 TO 5 STEPS WITH RAILING: TOTAL
MOBILITY SCORE: 6

## 2023-10-17 ASSESSMENT — PAIN DESCRIPTION - PAIN TYPE
TYPE: ACUTE PAIN
TYPE: ACUTE PAIN

## 2023-10-17 ASSESSMENT — GAIT ASSESSMENTS: GAIT LEVEL OF ASSIST: UNABLE TO PARTICIPATE

## 2023-10-17 NOTE — PROGRESS NOTES
Hospital Medicine Daily Progress Note    Date of Service  10/16/2023    Chief Complaint  Sen Vasquez is a 77 y.o. male admitted 10/4/[facility] and w.u for EtOH withdrawal and unexplained comfusion.    Hospital Course  No notes on file  He is being evaluated for confusion and agitated 7d out of withdrawal period.   Interval Problem Update  10/5. Patient is confused. When aroused he is combative and agitated  Ultimately placed restraints.  Spoke with Neurology as patient is 7 days out of withdrawal period. He remains on detox bag. MRI PENDING.   10/6. I reviewed the chart along with vitals, labs, imaging, test (both pending and resulted) and recommendations from specialists and interdisciplinary team.. Getting MRI with anesthesia; still confused. His son is unreachable. Consent signed.   Discussed with Social Work. [facility] and was transferred here primarily for MRI. May need to be transferred back after results. Discussed with Dr. Roblero who agreed with the MRI.  10/7. I spent a lot of time with this patient and management.  He remains confused.  MRI was done showing strokes. There is 50-70% ICA stenosis.  I talked to Neurovascular. COntinue monotherapy antiplatelet and statin. Per NEuro ICA stenosis asymptomatic, no indication for CEA or stenting right now. Strokes though not culprit to his confusion.  I spoek ke th CM/SW. They mentioned [facility] with [facility]. Patient planned to be [facility] to [facility] to continue their w/u for confusion and alcohol withdrawal.  I [facility], [facility]. She mentioned that they were also wanting an LP with anesthesia aside from the MRI. We reviewed their neurologists recommendations and viral encephalitis w/u was recommended.   I ordered IR LP as patient remains confused and will need anesthesia as we did the MRI. They do not have  staff to do it over the weekend likely Monday  I ordered a batter of CSF tests. I consulted Neurology who agreed with the encephalitis panel. Ordered EEG  I noted that patient hasn't really been eating so I ordered tube feeds and Cortrak with bridle.Patient on IV detox bag   I tried calling son again. No answer.  10/9. LP planned for Wednesday.  Appreciate neuro recs  So far patient is keeping the feeding tube    10/10  LP with anesthesia planned for 10/11, can likely transfer back to [facility] after, transfer back agreement in place.  He is afebrile with normal pulse respiratory rate blood pressure, pulse ox 97% on 2 L nasal cannula.  Labs remained stable, no leukocytosis or anemia, normal platelet count, normal renal function, transferred here for MRI with anesthesia and LP.  [facility].    10/14  s/p LP 10/13. Per IR, very difficult LP, C1-2 Puncture performed  I ordered cell count, protein, glucose, culture, west nile serologies, meningitis panel, autoimmune encephalitis panel, lyme panel, and VDRL, cryptococcal antigen  I notified [facility], patient is ok to transfer back to [facility]  [facility] not have neurologist on-call at weekend.  Most likely transfer on Monday  ON NG tube  On restraint    10/15  Mental status very slowly improving, he is more coherent than when I saw him 4-5 days ago, more interactive but still quite delirious, still with agitation frequently screaming throughout the day. Generally redirectable for a short time. Started seroquel, tried ativan. No new complaints. He is delusional and delirious still.     10/16  Mental status similar to yesterday. Vitals stable WNL. Labs stable. [facility]. No acute complaints/distress.      I have discussed this patient's plan of care and discharge plan at IDT rounds today with Case Management, Nursing, Nursing leadership, and other members of the IDT team.    Consultants/Specialty      Code Status  Full Code    Disposition  The patient  is not medically cleared for discharge to home or a post-acute facility.      I have placed the appropriate orders for post-discharge needs.    Review of Systems  Review of Systems   Unable to perform ROS: Mental acuity        Physical Exam  Temp:  [36.1 °C (97 °F)-37.1 °C (98.8 °F)] 36.4 °C (97.5 °F)  Pulse:  [62-98] 73  Resp:  [14-20] 16  BP: ()/(54-78) 95/56  SpO2:  [90 %-96 %] 90 %    Physical Exam  Vitals and nursing note reviewed.   Constitutional:       Appearance: He is ill-appearing.   HENT:      Head: Normocephalic and atraumatic.      Right Ear: External ear normal.      Left Ear: External ear normal.      Nose: Nose normal. No rhinorrhea.      Mouth/Throat:      Mouth: Mucous membranes are moist.      Comments: Cortrak  Eyes:      General: No scleral icterus.     Conjunctiva/sclera: Conjunctivae normal.   Cardiovascular:      Rate and Rhythm: Normal rate and regular rhythm.      Heart sounds: No murmur heard.     No friction rub. No gallop.   Pulmonary:      Effort: Pulmonary effort is normal.      Breath sounds: Normal breath sounds.   Abdominal:      General: Abdomen is flat. Bowel sounds are normal. There is no distension.      Palpations: Abdomen is soft.      Tenderness: There is no abdominal tenderness. There is no guarding.   Musculoskeletal:         General: Normal range of motion.      Cervical back: Normal range of motion and neck supple.      Right lower leg: No edema.      Left lower leg: No edema.   Skin:     General: Skin is warm.   Neurological:      Mental Status: He is alert. He is disoriented.      Comments: On restraints  Confused, disoriented but still not making sense.   Psychiatric:      Comments: Unable to assess         Fluids    Intake/Output Summary (Last 24 hours) at 10/16/2023 2334  Last data filed at 10/16/2023 1747  Gross per 24 hour   Intake 420 ml   Output 475 ml   Net -55 ml         Laboratory  Recent Labs     10/14/23  0941 10/15/23  0228 10/16/23  0146   WBC 7.1  7.2 7.9   RBC 4.55* 4.50* 4.43*   HEMOGLOBIN 15.1 15.0 14.2   HEMATOCRIT 44.5 44.9 45.0   MCV 97.8 99.8* 101.6*   MCH 33.2* 33.3* 32.1   MCHC 33.9 33.4 31.6*   RDW 46.5 48.8 49.6   PLATELETCT 308 231 269   MPV 10.4 10.8 10.7       Recent Labs     10/14/23  0941 10/15/23  0228 10/16/23  0146   SODIUM 137 137 137   POTASSIUM 4.1 4.4 4.5   CHLORIDE 99 99 101   CO2 30 30 25   GLUCOSE 114* 102* 102*   BUN 9 12 12   CREATININE 0.42* 0.52 0.45*   CALCIUM 9.3 9.4 9.2                         Imaging  IR-MYELO-LUMBAR WITH LP   Final Result         Unsuccessful lumbar puncture at at L2-3 and L5-S1 with no flow of CSF despite excellent needle position suggesting either dehydration with severe lumbar CSF hypotension or a spinal block above the lumbar region.      Successful C1-C2 with 15 mL of CSF collected. Clear CSF samples were collected and sent to the laboratory.      DX-LUMBAR PUNCTURE FOR DIAGNOSIS   Final Result      Failed fluoroscopic-guided lumbar puncture as described above.      [id number] VIEW   Final Result      Enteric tube tip projects over the second portion of the duodenum                  DX-ABDOMEN FOR TUBE PLACEMENT   Final Result         Gastric drainage tube with tip projecting over the expected area of the stomach.      EC-ECHOCARDIOGRAM COMPLETE W/O CONT   Final Result      CT-CTA NECK WITH & W/O-POST PROCESSING   Final Result         1.  Atherosclerosis with mural thrombus versus soft plaque at the left carotid bulb resulting in 50-70% stenosis. Similar to prior study.         CT-CTA HEAD WITH & W/O-POST PROCESS   Final Result         1.  No large vessel occlusion or aneurysm identified      MR-BRAIN-W/O   Final Result   Addendum (preliminary) 1 of 1   Addendum: There is diffuse T2 hyperintensity in the splenium of the corpus    callosum without restricted diffusion. This may represent a nonspecific    gliosis.      Final      1.  There are tiny areas of acute infarcts within the large RIGHT temporal  parietal and occipital encephalomalacia.   2.  Tiny areas of chronic lacunar infarcts in the bilateral basal ganglia and LEFT cerebellum.   3.  Mild cerebral volume loss.   4.  Moderate chronic microvascular ischemic disease.      [id number] VIEW   Final Result      No findings to preclude MRI.             Assessment/Plan  * Alcohol withdrawal syndrome, with delirium, CVA, prolonged delirium (HCC)  Assessment & Plan  On detox bag  PRN ATivan  MRI showed CVA  CVA not culprit to confusion per Neurology  Asa, Zetia and continue work-up for confusion  [facility], Neurology consulted. Ordered LP and EEG ordered LP studies including viral panel. LP planned w/ anesthesia Wednesday  Now has Cortak and on restraints    CVA (cerebral vascular accident) (HCC)  Assessment & Plan  Seen on MRI  Started aspirin, (has allergy to statin will try Zatia), ordered lipid p[leon and A1c  COnsulting NEurovasculary query if we need CTA HnN versus carotid Dopplers.  Neurovascular felt ICA stenosis not symptomatic. No further workup. Continue aspirin and Zetia.      Acute encephalopathy- (present on admission)  Assessment & Plan  Frequent neurochecks every 4 hours  Transferred here for MRI brain without contrast with sedation  Check TSH, ammonia, sed rate, B12, procalcitonin, cortisol  Trial of Narcan given pinpoint pupils  Consider neurology consult in a.m.  Thiamine, folic acid, multivitamins  Fall, seizure, aspiration precautions    10/11 Unsuccessful multiple attempts of LP by IR  10/13 underwent LP by IR  Per IR, very difficult LP, C1-2 Puncture performed  I reviewed previous neuro notes, ordered cell count, protein, glucose, culture, west nile serologies, meningitis panel, autoimmune encephalitis panel, lyme panel, and VDRL        ETOH abuse- (present on admission)  Assessment & Plan  [facility] protocol  Thiamine, folic acid, multivitamins  Monitor for withdrawal         VTE prophylaxis: Lovenox ppx     I have performed a  physical exam and reviewed and updated ROS and Plan today (10/16/2023). In review of yesterday's note (10/15/2023), there are no changes except as documented above.    Total time spent 39 minutes. I spent greater than 50% of the time for patient care, counseling, and coordination on this date, including unit/floor time, and face-to-face time with the patient as per interval events, my own review of patient's imaging and lab analysis and developing my assessment and plan above.

## 2023-10-17 NOTE — THERAPY
Physical Therapy   Daily Treatment     Patient Name: Sen Vasquez  Age:  78 y.o., Sex:  male  Medical Record #: [id number]  Today's Date: 10/17/2023     Precautions  Precautions: Fall Risk;Swallow Precautions    Assessment    Pt continues to demonstrate significant confusion/impaired orientation; however, able to be re-directed this date for functional tasks. Pt currently requires modA for bed mobility and standing transfers; which, is an improvement from prior sessions requiring maxA. Opened blinds to assist with day-time orientation, and changed TV show to soft instrumental music due ongoing hallucinations. Will continue to follow and recommend post-acute rehab once medically appropriate.     Plan    Treatment Plan Status: Continue Current Treatment Plan  Type of Treatment: Bed Mobility, Gait Training, Neuro Re-Education / Balance, Self Care / Home Evaluation, Therapeutic Activities, Therapeutic Exercise  Treatment Frequency: 3 Times per Week  Treatment Duration: Until Therapy Goals Met    DC Equipment Recommendations: Unable to determine at this time  Discharge Recommendations: Recommend post-acute placement for additional physical therapy services prior to discharge home      Subjective    Pt reported wanting to get out of bed this date. Pt reports he likes doing [email or web address].     Objective       10/17/23 1136   Charge Group   PT Therapeutic Activities (Units) 2   Total Time Spent   PT Total Time Yes   PT Therapeutic Activities Time Spent (Mins) 23   PT Total Time Spent (Calculated) 23   Precautions   Precautions Fall Risk;Swallow Precautions   Vitals   Blood Pressure  113/84   O2 Delivery Device None - Room Air   Pain   Pain Scales 0 to 10 Scale    Pain 0 - 10 Group   Pain Rating Scale (NPRS) 0   Cognition    Speech/ Communication Delayed Responses;Dysarthric   Safety Awareness Impaired;Impulsive   New Learning Impaired   Attention Impaired   Sequencing Impaired   Comments Aox2(disoriented to location, situation, and  year). Restless throughout, but re-directable. Attempting to remove gown several times throughout session, but able to maintain on with cuing/reinforcement. Visual hallucinations noted throughout session. Confused, but pleasant throughout interactions.   Other Treatments   Other Treatments Provided Open blinds, lights on, and changed TV to soft/light music to assist with delirium   Balance   Sitting Balance (Static) Fair -   Standing Balance (Static) Poor -   Bed Mobility    Supine to Sit Moderate Assist  (clear leg off the bed/assist trunk upright. Fair engagement throughout task)   Sit to Supine Moderate Assist  (guide trunk/clear LLE onto the bed, fair engagement throughout task)   Scooting Maximal Assist   Skilled Intervention Verbal Cuing;Postural Facilitation   Gait Analysis   Gait Level Of Assist Unable to Participate   Functional Mobility   Sit to Stand Moderate Assist  (Completed 2x, required cues for hand/foot placement and sequencing standing (initial posterior LOB and feet slipping forward when trialing w/out assistance).)   Bed, Chair, Wheelchair Transfer Unable to Participate  (weakness)   Mobility Completed 2x 30 sec standing trials   How much difficulty does the patient currently have...   Turning over in bed (including adjusting bedclothes, sheets and blankets)? 1   Sitting down on and standing up from a chair with arms (e.g., wheelchair, bedside commode, etc.) 1   Moving from lying on back to sitting on the side of the bed? 1   How much help from another person does the patient currently need...   Moving to and from a bed to a chair (including a wheelchair)? 1   Need to walk in a hospital room? 1   Climbing 3-5 steps with a railing? 1   6 clicks Mobility Score 6   Short Term Goals    Short Term Goal # 1 Pt to move supine to/from eob w/ spv in 6 visits to improve fxl indep   Goal Outcome # 1 Progressing slower than expected   Short Term Goal # 2 Pt to move sit to/from stand w/ spv in 6 visits to  improve fxl indep   Goal Outcome # 2 Progressing slower than expected   Short Term Goal # 3 Pt to ambulate 75 ft w/ spv in 6 visits to improve fxl indep   Goal Outcome # 3 Progressing slower than expected   Education Group   Additional Comments Educated on time of day, location, situation   Anticipated Discharge Equipment and Recommendations   DC Equipment Recommendations Unable to determine at this time   Discharge Recommendations Recommend post-acute placement for additional physical therapy services prior to discharge home   Interdisciplinary Plan of Care Collaboration   IDT Collaboration with  Nursing   Patient Position at End of Therapy In Bed;Bed Alarm On;Wrist Restraints Applied;Call Light within Reach   Collaboration Comments updated RN   Session Information   Date / Session Number  10/17-3(1/3, 10/22)

## 2023-10-17 NOTE — DISCHARGE PLANNING
[facility].  Packet prepared for transfer.  It is incomplete and [facility] has been made aware.

## 2023-10-17 NOTE — DISCHARGE SUMMARY
Discharge Summary    CHIEF COMPLAINT ON ADMISSION  No chief complaint on file.      Reason for Admission  encephalopathy    Admission Date  10/4/2023     CODE STATUS  Full Code    HPI & HOSPITAL COURSE  Sen Vasquez is a 77 y.o. male admitted 10/4/2023 [facility] with [facility] and w.u for EtOH withdrawal and unexplained confusion.    He is being evaluated for confusion and agitated 7d out of withdrawal period.     10/5. confused. When aroused he is combative and agitated  Ultimately placed restraints.  Spoke with Neurology as patient is 7 days out of withdrawal period. given detox bag. MRI PENDING.   10/6. I reviewed the chart along with vitals, labs, imaging, test (both pending and resulted) and recommendations from specialists and interdisciplinary team.. Getting MRI with anesthesia; still confused. His son is unreachable. Consent signed.   Discussed with Social Work. [facility] and was transferred here primarily for MRI. May need to be transferred back after results. Discussed with Dr. Roblero who agreed with the MRI.  10/7. He remains confused.  MRI was done showing strokes. There is 50-70% ICA stenosis. 1.  There are tiny areas of acute infarcts within the large RIGHT temporal parietal and occipital encephalomalacia.  2.  Tiny areas of chronic lacunar infarcts in the bilateral basal ganglia and LEFT cerebellum.  3.  Mild cerebral volume loss.  4.  Moderate chronic microvascular ischemic disease.    Talked to Neurovascular. Continue monotherapy antiplatelet and statin. Per NEuro ICA stenosis asymptomatic, no indication for CEA or stenting right now. Strokes though not to be culprit of his confusion.  [facility], [facility]. She mentioned that they were also wanting an LP with anesthesia aside from the MRI. We reviewed their neurologists recommendations and viral encephalitis w/u was recommended.   IR LP ordered as patient remains  confused and will need anesthesia as we did the MRI. They do not have staff to do it over the weekend likely Monday  CSF studies sent. consulted Neurology who agreed with the encephalitis panel. Ordered EEG  Not eating had tube feeds and Cortrak with bridle.    10/10 He is afebrile with normal pulse respiratory rate blood pressure, pulse ox 97% on 2 L nasal cannula.  Labs remained stable, no leukocytosis or anemia, normal platelet count, normal renal function, transferred here for MRI with anesthesia and LP.      10/14  s/p LP 10/13. Per IR, very difficult LP, C1-2 Puncture performed  I ordered cell count wnl, protein 25, glucose 64, culture NGTD, west nile serologies negative, meningitis panel negative, autoimmune encephalitis panel pending, lyme panel pending, and VDRL non reactive, cryptococcal antigen pending  10/15  Mental status very slowly improving, he is more coherent than when I saw him 4-5 days ago, more interactive but still quite delirious, still with agitation frequently screaming throughout the day. Generally redirectable for a short time. Started seroquel, tried ativan. No new complaints. He is delusional and delirious still.   10/16  Mental status similar to yesterday. Vitals stable WNL. Labs stable. [facility]. No acute complaints/distress.     10/17 Afebrile, normal heart and respiratory rate, -132, SpO2 93-94% on room air. He is alert and still quite confused. He is disoriented, delusional. LP studies listed above a few still pending such as autoimmune encephalitis. Query wernicke's  encephalopathy?     Patient accepted for transfer back        Therefore, he is discharged in guarded and stable condition to a short-term general hosptial for inpatient care.    The patient met 2-midnight criteria for an inpatient stay at the time of discharge.      FOLLOW UP ITEMS POST DISCHARGE  Pending CSF studies autoimmune encephalitis panel pending, lyme panel pending, cryptococcal  antigen pending    DISCHARGE DIAGNOSES  Principal Problem:    Alcohol withdrawal syndrome, with delirium, CVA, prolonged delirium (HCC) (POA: Unknown)  Active Problems:    ETOH abuse (POA: Yes)    Acute encephalopathy (POA: Yes)    CVA (cerebral vascular accident) (HCC) (POA: Unknown)  Resolved Problems:    * No resolved hospital problems. *      FOLLOW UP  No future appointments.  No follow-up provider specified.    MEDICATIONS ON DISCHARGE     Medication List      You have not been prescribed any medications.         Allergies  Allergies   Allergen Reactions    Pravastatin Hives and Swelling       DIET  Orders Placed This Encounter   Procedures    Diet Order Diet: Level 5 - Minced and Moist (meds crushed. HOLD AND CONTACT SLP W DIFFICULTY); Liquid level: Level 0 - Thin; Tray Modifications (optional): SLP - 1:1 Supervision by Nursing, SLP - Deliver to Nursing Station     Standing Status:   Standing     Number of Occurrences:   1     Order Specific Question:   Diet:     Answer:   Level 5 - Minced and Moist [24]     Comments:   meds crushed. HOLD AND CONTACT SLP W DIFFICULTY     Order Specific Question:   Liquid level     Answer:   Level 0 - Thin     Order Specific Question:   Tray Modifications (optional)     Answer:   SLP - 1:1 Supervision by Nursing     Order Specific Question:   Tray Modifications (optional)     Answer:   SLP - Deliver to Nursing Station       ACTIVITY  As tolerated.  Weight bearing as tolerated    LINES, DRAINS, AND WOUNDS  This is an automated list. Peripheral IVs will be removed prior to discharge.  Peripheral IV 10/06/23 20 G Anterior;Right Forearm (Active)   Site Assessment Dry;Clean;Intact 10/17/23 0800   Dressing Type Transparent 10/17/23 0800   Line Status Flushed;Scrubbed the hub prior to access;Saline locked 10/17/23 0800   Dressing Status Clean;Dry;Intact 10/17/23 0800   Dressing Intervention N/A 10/17/23 0800   Dressing Change Due 10/21/23 10/15/23 2010   Infil[place] Grading [place]  [facility] 10/17/23 0800   Phlebitis Scale ([facility] Only) 0 10/17/23 0800      ETT (Active)        Peripheral IV 10/06/23 20 G Anterior;Right Forearm (Active)   Site Assessment Dry;Clean;Intact 10/17/23 0800   Dressing Type Transparent 10/17/23 0800   Line Status Flushed;Scrubbed the hub prior to access;Saline locked 10/17/23 0800   Dressing Status Clean;Dry;Intact 10/17/23 0800   Dressing Intervention N/A 10/17/23 0800   Dressing Change Due 10/21/23 10/15/23 2010   [facility] ([facility] 0 10/17/23 0800   Phlebitis Scale ([facility] Only) 0 10/17/23 0800               MENTAL STATUS ON TRANSFER      A&Ox1 confused, disoriented, delusional, hallucinating at times       CONSULTATIONS  Neuro, IR      PROCEDURES  LP, EEG    LABORATORY  Lab Results   Component Value Date    SODIUM 139 10/17/2023    POTASSIUM 4.0 10/17/2023    CHLORIDE 99 10/17/2023    CO2 30 10/17/2023    GLUCOSE 94 10/17/2023    BUN 14 10/17/2023    CREATININE 0.66 10/17/2023        Lab Results   Component Value Date    WBC 7.9 10/16/2023    HEMOGLOBIN 14.2 10/16/2023    HEMATOCRIT 45.0 10/16/2023    PLATELETCT 269 10/16/2023        Total time of the discharge process exceeds 35 minutes.

## 2023-10-17 NOTE — PROGRESS NOTES
NOC HOSPITALIST CROSS COVER    Notified by RN regarding patient becoming acutely agitated and attempting to get out of bed frequently, as well as pulling at lines. He is not redirectable.       Vitals:    10/17/23 0300   BP: 124/66   Pulse: 76   Resp: 18   Temp: 36.7 °C (98 °F)   SpO2: 93%          Plan:  #Agitation  -Bilat soft wrist restraints - de-escalate as soon as possible  -Haldol 2.5 mg IV once        -----------------------------------------------------------------------------------------------------------    Electronically signed by:  Francesca Montalvo, DNP, APRN, JAKEP-BC  Hospitalist Services

## 2023-10-18 LAB
C IMMITIS AB CSF QL ID: NOT DETECTED
C IMMITIS AB TITR CSF CF: NORMAL {TITER}
C IMMITIS IGG CSF-ACNC: 0 IV
C IMMITIS IGM CSF-ACNC: 0 IV

## 2023-10-18 NOTE — DISCHARGE INSTRUCTIONS
Discharge Instructions    Discharged to other by ambulance with escort. Discharged via ambulance, hospital escort: Yes.  Special equipment needed: Not Applicable    Be sure to schedule a follow-up appointment with your primary care doctor or any specialists as instructed.     Discharge Plan:   Diet Plan: Discussed  Activity Level: Discussed  Confirmed Follow up Appointment: Patient to Call and Schedule Appointment  Confirmed Symptoms Management: Discussed  Medication Reconciliation Updated: Yes  Influenza Vaccine Indication: Patient Refuses    I understand that a diet low in cholesterol, fat, and sodium is recommended for good health. Unless I have been given specific instructions below for another diet, I accept this instruction as my diet prescription.   Other diet: Minced and moist diet, thin liquids    Special Instructions:     Stroke/CVA/TIA/Hemorrhagic Ischemia Discharge Instructions  You have had a stroke. Your risk factors have been identified as follows:  Age - Over 55  Gender - Men are at a higher risk than women  It is important that you reduce your risk factors to avoid another stroke in the future. Here are some general guidelines to follow:  Eat healthy - avoid food high in fat.  Get regular exercise.  Maintain a healthy weight.  Avoid smoking.  Avoid alcohol and illegal drug use.  Take your medications as directed.  For more information regarding risk factors, refer to pages 17-19 in your Stroke Patient Education Guide. Stroke Education Guide was given to patient.    Warning signs of a stroke include (which can also be found on page 3 of your Stroke Patient Education Guide):  Sudden numbness of weakness of the face, arm or leg (especially on one side of the body).  Sudden confusion, trouble speaking or understanding.  Sudden trouble seeing in one or both eyes.  Sudden trouble walking, dizziness, loss of balance or coordination.  Sudden severe headache with no known cause.  It is very important to get  treatment quickly when a stroke occurs. If you experience any of the above warning signs, call [phone] immediately.     Some patients who have had a stroke will be going home on a blood thinner medication called Warfarin (Coumadin).  This medication requires very close monitoring and follow up.  This follow up can be provided by either your Primary Care Physician or by [facility] Outpatient Anticoagulation Service.  The Outpatient Anticoagulation Service is located at the [facility] at [facility] ([facility]  If you do not know when your follow up appointment is scheduled, call [phone] to verify your appointment time.    -Is this patient being discharged with medication to prevent blood clots?  No    Is patient discharged on Warfarin / Coumadin?   No

## 2023-10-18 NOTE — PROGRESS NOTES
Patient transported with his belongings via stretcher by EMT to [facility] in stable condition. Vital signs checked before transport.

## 2023-10-19 NOTE — ANESTHESIA TIME REPORT
Anesthesia Start and Stop Event Times     Date Time Event    10/13/2023 1200 Ready for Procedure     1200 Anesthesia Start     1345 Anesthesia Stop        Responsible Staff  10/13/23    Name Role Begin End    Calixto Burns M.D. Anesth 1200 1345        Overtime Reason:  no overtime (within assigned shift)    Comments:

## 2023-10-19 NOTE — ANESTHESIA POSTPROCEDURE EVALUATION
Patient: Sen Vasquez    Procedure Summary     Date: 10/13/23 Room / Location: [facility] - Interventional - [facility]    Anesthesia Start: 1200 Anesthesia Stop:     Procedure: IR-MYELO-LUMBAR WITH LP Diagnosis:             Acute encephalopathy            Acute encephalopathy      (LP, failed IR LP by IR yesterday. Retry)      (LP, failed IR LP by IR yesterday. Retry)    Scheduled Providers: Kamar Cordova M.D.; Calixto Burns M.D. Responsible Provider: Calixto Burns M.D.    Anesthesia Type: general ASA Status: 3          Final Anesthesia Type: general  Last vitals  BP   Blood Pressure : 132/73    Temp   36.9 °C (98.4 °F)    Pulse   81   Resp   18    SpO2   93 %      Anesthesia Post Evaluation    Patient location during evaluation: PACU  Patient participation: complete - patient participated  Level of consciousness: awake and alert    Airway patency: patent  Anesthetic complications: no  Cardiovascular status: hemodynamically stable  Respiratory status: acceptable  Hydration status: euvolemic    PONV: none          No notable events documented.     Nurse Pain Score: 0 (NPRS)

## 2023-10-27 LAB — TEST NAME 95000: NORMAL

## 2025-01-01 ENCOUNTER — APPOINTMENT (OUTPATIENT)
Dept: RADIOLOGY | Facility: MEDICAL CENTER | Age: 80
DRG: 025 | End: 2025-01-01
Attending: NURSE PRACTITIONER
Payer: COMMERCIAL

## 2025-01-01 ENCOUNTER — ANESTHESIA (OUTPATIENT)
Dept: SURGERY | Facility: MEDICAL CENTER | Age: 80
DRG: 025 | End: 2025-01-01
Payer: COMMERCIAL

## 2025-01-01 ENCOUNTER — APPOINTMENT (OUTPATIENT)
Dept: RADIOLOGY | Facility: MEDICAL CENTER | Age: 80
DRG: 025 | End: 2025-01-01
Attending: INTERNAL MEDICINE
Payer: COMMERCIAL

## 2025-01-01 ENCOUNTER — ANESTHESIA EVENT (OUTPATIENT)
Dept: SURGERY | Facility: MEDICAL CENTER | Age: 80
DRG: 025 | End: 2025-01-01
Payer: COMMERCIAL

## 2025-01-01 ENCOUNTER — APPOINTMENT (OUTPATIENT)
Dept: RADIOLOGY | Facility: MEDICAL CENTER | Age: 80
DRG: 025 | End: 2025-01-01
Attending: EMERGENCY MEDICINE
Payer: COMMERCIAL

## 2025-01-01 ENCOUNTER — APPOINTMENT (OUTPATIENT)
Dept: RADIOLOGY | Facility: MEDICAL CENTER | Age: 80
DRG: 025 | End: 2025-01-01
Attending: STUDENT IN AN ORGANIZED HEALTH CARE EDUCATION/TRAINING PROGRAM
Payer: COMMERCIAL

## 2025-01-01 ENCOUNTER — ANESTHESIA (OUTPATIENT)
Dept: RADIOLOGY | Facility: MEDICAL CENTER | Age: 80
DRG: 025 | End: 2025-01-01
Payer: COMMERCIAL

## 2025-01-01 ENCOUNTER — HOSPITAL ENCOUNTER (INPATIENT)
Facility: MEDICAL CENTER | Age: 80
LOS: 11 days | DRG: 025 | End: 2025-04-25
Attending: EMERGENCY MEDICINE | Admitting: STUDENT IN AN ORGANIZED HEALTH CARE EDUCATION/TRAINING PROGRAM
Payer: COMMERCIAL

## 2025-01-01 ENCOUNTER — ANESTHESIA EVENT (OUTPATIENT)
Dept: RADIOLOGY | Facility: MEDICAL CENTER | Age: 80
DRG: 025 | End: 2025-01-01
Payer: COMMERCIAL

## 2025-01-01 VITALS
BODY MASS INDEX: 25.69 KG/M2 | OXYGEN SATURATION: 55 % | RESPIRATION RATE: 19 BRPM | HEIGHT: 66 IN | TEMPERATURE: 96.2 F | DIASTOLIC BLOOD PRESSURE: 51 MMHG | SYSTOLIC BLOOD PRESSURE: 87 MMHG | WEIGHT: 159.83 LBS

## 2025-01-01 DIAGNOSIS — S06.5XAA SUBDURAL HEMATOMA (HCC): ICD-10-CM

## 2025-01-01 DIAGNOSIS — G40.901 STATUS EPILEPTICUS (HCC): ICD-10-CM

## 2025-01-01 LAB
ALBUMIN SERPL BCP-MCNC: 2.5 G/DL (ref 3.2–4.9)
ALBUMIN SERPL BCP-MCNC: 2.6 G/DL (ref 3.2–4.9)
ALBUMIN SERPL BCP-MCNC: 2.7 G/DL (ref 3.2–4.9)
ALBUMIN SERPL BCP-MCNC: 2.7 G/DL (ref 3.2–4.9)
ALBUMIN SERPL BCP-MCNC: 2.9 G/DL (ref 3.2–4.9)
ALBUMIN SERPL BCP-MCNC: 3 G/DL (ref 3.2–4.9)
ALBUMIN SERPL BCP-MCNC: 3.1 G/DL (ref 3.2–4.9)
ALBUMIN SERPL BCP-MCNC: 3.7 G/DL (ref 3.2–4.9)
ALBUMIN/GLOB SERPL: 0.7 G/DL
ALBUMIN/GLOB SERPL: 0.8 G/DL
ALBUMIN/GLOB SERPL: 0.8 G/DL
ALBUMIN/GLOB SERPL: 0.9 G/DL
ALP SERPL-CCNC: 109 U/L (ref 30–99)
ALP SERPL-CCNC: 123 U/L (ref 30–99)
ALP SERPL-CCNC: 129 U/L (ref 30–99)
ALP SERPL-CCNC: 141 U/L (ref 30–99)
ALP SERPL-CCNC: 146 U/L (ref 30–99)
ALP SERPL-CCNC: 66 U/L (ref 30–99)
ALP SERPL-CCNC: 68 U/L (ref 30–99)
ALP SERPL-CCNC: 71 U/L (ref 30–99)
ALP SERPL-CCNC: 72 U/L (ref 30–99)
ALP SERPL-CCNC: 75 U/L (ref 30–99)
ALP SERPL-CCNC: 75 U/L (ref 30–99)
ALT SERPL-CCNC: 10 U/L (ref 2–50)
ALT SERPL-CCNC: 10 U/L (ref 2–50)
ALT SERPL-CCNC: 11 U/L (ref 2–50)
ALT SERPL-CCNC: 21 U/L (ref 2–50)
ALT SERPL-CCNC: 6 U/L (ref 2–50)
ALT SERPL-CCNC: 9 U/L (ref 2–50)
ALT SERPL-CCNC: 9 U/L (ref 2–50)
ALT SERPL-CCNC: <5 U/L (ref 2–50)
AMMONIA PLAS-SCNC: 18 UMOL/L (ref 11–45)
AMPHET UR QL SCN: NEGATIVE
ANION GAP SERPL CALC-SCNC: 10 MMOL/L (ref 7–16)
ANION GAP SERPL CALC-SCNC: 11 MMOL/L (ref 7–16)
ANION GAP SERPL CALC-SCNC: 13 MMOL/L (ref 7–16)
ANION GAP SERPL CALC-SCNC: 15 MMOL/L (ref 7–16)
ANION GAP SERPL CALC-SCNC: 7 MMOL/L (ref 7–16)
ANION GAP SERPL CALC-SCNC: 8 MMOL/L (ref 7–16)
ANION GAP SERPL CALC-SCNC: 9 MMOL/L (ref 7–16)
APPEARANCE UR: ABNORMAL
APTT PPP: 26 SEC (ref 24.7–36)
AST SERPL-CCNC: 12 U/L (ref 12–45)
AST SERPL-CCNC: 14 U/L (ref 12–45)
AST SERPL-CCNC: 15 U/L (ref 12–45)
AST SERPL-CCNC: 17 U/L (ref 12–45)
AST SERPL-CCNC: 17 U/L (ref 12–45)
AST SERPL-CCNC: 18 U/L (ref 12–45)
AST SERPL-CCNC: 19 U/L (ref 12–45)
AST SERPL-CCNC: 20 U/L (ref 12–45)
AST SERPL-CCNC: 23 U/L (ref 12–45)
AST SERPL-CCNC: 9 U/L (ref 12–45)
AST SERPL-CCNC: 9 U/L (ref 12–45)
BACTERIA #/AREA URNS HPF: ABNORMAL /HPF
BACTERIA BLD CULT: ABNORMAL
BACTERIA BLD CULT: NORMAL
BACTERIA BLD CULT: NORMAL
BACTERIA UR CULT: NORMAL
BARBITURATES UR QL SCN: NEGATIVE
BASE EXCESS BLDA CALC-SCNC: 1 MMOL/L (ref -4–3)
BASE EXCESS BLDA CALC-SCNC: 1 MMOL/L (ref -4–3)
BASE EXCESS BLDA CALC-SCNC: 2 MMOL/L (ref -4–3)
BASOPHILS # BLD AUTO: 0.2 % (ref 0–1.8)
BASOPHILS # BLD AUTO: 0.2 % (ref 0–1.8)
BASOPHILS # BLD AUTO: 0.3 % (ref 0–1.8)
BASOPHILS # BLD AUTO: 0.3 % (ref 0–1.8)
BASOPHILS # BLD AUTO: 0.4 % (ref 0–1.8)
BASOPHILS # BLD AUTO: 0.4 % (ref 0–1.8)
BASOPHILS # BLD AUTO: 0.6 % (ref 0–1.8)
BASOPHILS # BLD AUTO: 0.7 % (ref 0–1.8)
BASOPHILS # BLD: 0.02 K/UL (ref 0–0.12)
BASOPHILS # BLD: 0.02 K/UL (ref 0–0.12)
BASOPHILS # BLD: 0.04 K/UL (ref 0–0.12)
BASOPHILS # BLD: 0.05 K/UL (ref 0–0.12)
BASOPHILS # BLD: 0.06 K/UL (ref 0–0.12)
BASOPHILS # BLD: 0.07 K/UL (ref 0–0.12)
BENZODIAZ UR QL SCN: NEGATIVE
BILIRUB SERPL-MCNC: 0.2 MG/DL (ref 0.1–1.5)
BILIRUB SERPL-MCNC: 0.3 MG/DL (ref 0.1–1.5)
BILIRUB SERPL-MCNC: 0.6 MG/DL (ref 0.1–1.5)
BILIRUB SERPL-MCNC: 0.6 MG/DL (ref 0.1–1.5)
BILIRUB SERPL-MCNC: 1.2 MG/DL (ref 0.1–1.5)
BILIRUB SERPL-MCNC: <0.2 MG/DL (ref 0.1–1.5)
BILIRUB SERPL-MCNC: <0.2 MG/DL (ref 0.1–1.5)
BILIRUB UR QL STRIP.AUTO: NEGATIVE
BODY TEMPERATURE: ABNORMAL DEGREES
BREATHS SETTING VENT: 12
BREATHS SETTING VENT: 16
BREATHS SETTING VENT: 20
BUN SERPL-MCNC: 11 MG/DL (ref 8–22)
BUN SERPL-MCNC: 13 MG/DL (ref 8–22)
BUN SERPL-MCNC: 15 MG/DL (ref 8–22)
BUN SERPL-MCNC: 15 MG/DL (ref 8–22)
BUN SERPL-MCNC: 16 MG/DL (ref 8–22)
BUN SERPL-MCNC: 21 MG/DL (ref 8–22)
BUN SERPL-MCNC: 26 MG/DL (ref 8–22)
BUN SERPL-MCNC: 8 MG/DL (ref 8–22)
BUN SERPL-MCNC: 9 MG/DL (ref 8–22)
BZE UR QL SCN: NEGATIVE
CALCIUM ALBUM COR SERPL-MCNC: 10.2 MG/DL (ref 8.5–10.5)
CALCIUM ALBUM COR SERPL-MCNC: 9.6 MG/DL (ref 8.5–10.5)
CALCIUM ALBUM COR SERPL-MCNC: 9.6 MG/DL (ref 8.5–10.5)
CALCIUM ALBUM COR SERPL-MCNC: 9.7 MG/DL (ref 8.5–10.5)
CALCIUM ALBUM COR SERPL-MCNC: 9.8 MG/DL (ref 8.5–10.5)
CALCIUM ALBUM COR SERPL-MCNC: 9.9 MG/DL (ref 8.5–10.5)
CALCIUM ALBUM COR SERPL-MCNC: 9.9 MG/DL (ref 8.5–10.5)
CALCIUM SERPL-MCNC: 10 MG/DL (ref 8.5–10.5)
CALCIUM SERPL-MCNC: 7.8 MG/DL (ref 8.5–10.5)
CALCIUM SERPL-MCNC: 8.6 MG/DL (ref 8.5–10.5)
CALCIUM SERPL-MCNC: 8.7 MG/DL (ref 8.5–10.5)
CALCIUM SERPL-MCNC: 8.9 MG/DL (ref 8.5–10.5)
CALCIUM SERPL-MCNC: 9 MG/DL (ref 8.5–10.5)
CALCIUM SERPL-MCNC: 9 MG/DL (ref 8.5–10.5)
CALCIUM SERPL-MCNC: 9.1 MG/DL (ref 8.5–10.5)
CALCIUM SERPL-MCNC: 9.2 MG/DL (ref 8.5–10.5)
CALCIUM SERPL-MCNC: 9.4 MG/DL (ref 8.5–10.5)
CANNABINOIDS UR QL SCN: NEGATIVE
CASTS URNS QL MICRO: ABNORMAL /LPF (ref 0–2)
CFT BLD TEG: 4.5 MIN (ref 4.6–9.1)
CFT BLD TEG: 4.7 MIN (ref 4.6–9.1)
CFT P HPASE BLD TEG: 3.7 MIN (ref 4.3–8.3)
CFT P HPASE BLD TEG: 3.9 MIN (ref 4.3–8.3)
CHLORIDE SERPL-SCNC: 100 MMOL/L (ref 96–112)
CHLORIDE SERPL-SCNC: 101 MMOL/L (ref 96–112)
CHLORIDE SERPL-SCNC: 101 MMOL/L (ref 96–112)
CHLORIDE SERPL-SCNC: 103 MMOL/L (ref 96–112)
CHLORIDE SERPL-SCNC: 105 MMOL/L (ref 96–112)
CHLORIDE SERPL-SCNC: 107 MMOL/L (ref 96–112)
CHLORIDE SERPL-SCNC: 94 MMOL/L (ref 96–112)
CHLORIDE SERPL-SCNC: 97 MMOL/L (ref 96–112)
CHLORIDE SERPL-SCNC: 98 MMOL/L (ref 96–112)
CHLORIDE SERPL-SCNC: 99 MMOL/L (ref 96–112)
CHLORIDE SERPL-SCNC: 99 MMOL/L (ref 96–112)
CLOT ANGLE BLD TEG: 79.1 DEGREES (ref 63–78)
CLOT ANGLE BLD TEG: 79.5 DEGREES (ref 63–78)
CO2 BLDA-SCNC: 25 MMOL/L (ref 32–48)
CO2 BLDA-SCNC: 27 MMOL/L (ref 32–48)
CO2 BLDA-SCNC: 28 MMOL/L (ref 32–48)
CO2 SERPL-SCNC: 23 MMOL/L (ref 20–33)
CO2 SERPL-SCNC: 23 MMOL/L (ref 20–33)
CO2 SERPL-SCNC: 24 MMOL/L (ref 20–33)
CO2 SERPL-SCNC: 24 MMOL/L (ref 20–33)
CO2 SERPL-SCNC: 25 MMOL/L (ref 20–33)
CO2 SERPL-SCNC: 26 MMOL/L (ref 20–33)
CO2 SERPL-SCNC: 27 MMOL/L (ref 20–33)
CO2 SERPL-SCNC: 27 MMOL/L (ref 20–33)
CO2 SERPL-SCNC: 28 MMOL/L (ref 20–33)
CO2 SERPL-SCNC: 28 MMOL/L (ref 20–33)
CO2 SERPL-SCNC: 30 MMOL/L (ref 20–33)
COLOR UR: ABNORMAL
CREAT SERPL-MCNC: 0.33 MG/DL (ref 0.5–1.4)
CREAT SERPL-MCNC: 0.34 MG/DL (ref 0.5–1.4)
CREAT SERPL-MCNC: 0.35 MG/DL (ref 0.5–1.4)
CREAT SERPL-MCNC: 0.36 MG/DL (ref 0.5–1.4)
CREAT SERPL-MCNC: 0.39 MG/DL (ref 0.5–1.4)
CREAT SERPL-MCNC: 0.4 MG/DL (ref 0.5–1.4)
CREAT SERPL-MCNC: 0.41 MG/DL (ref 0.5–1.4)
CREAT SERPL-MCNC: 0.42 MG/DL (ref 0.5–1.4)
CREAT SERPL-MCNC: 0.43 MG/DL (ref 0.5–1.4)
CREAT SERPL-MCNC: 0.46 MG/DL (ref 0.5–1.4)
CREAT SERPL-MCNC: 0.48 MG/DL (ref 0.5–1.4)
CREAT SERPL-MCNC: 0.48 MG/DL (ref 0.5–1.4)
CREAT SERPL-MCNC: 0.52 MG/DL (ref 0.5–1.4)
CRP SERPL HS-MCNC: 3.23 MG/DL (ref 0–0.75)
CRP SERPL HS-MCNC: 6.08 MG/DL (ref 0–0.75)
CT.EXTRINSIC BLD ROTEM: 0.8 MIN (ref 0.8–2.1)
CT.EXTRINSIC BLD ROTEM: 0.8 MIN (ref 0.8–2.1)
DELSYS IDSYS: ABNORMAL
EKG IMPRESSION: NORMAL
END TIDAL CARBON DIOXIDE IECO2: 26 MMHG
END TIDAL CARBON DIOXIDE IECO2: 28 MMHG
END TIDAL CARBON DIOXIDE IECO2: 41 MMHG
EOSINOPHIL # BLD AUTO: 0.01 K/UL (ref 0–0.51)
EOSINOPHIL # BLD AUTO: 0.13 K/UL (ref 0–0.51)
EOSINOPHIL # BLD AUTO: 0.16 K/UL (ref 0–0.51)
EOSINOPHIL # BLD AUTO: 0.25 K/UL (ref 0–0.51)
EOSINOPHIL # BLD AUTO: 0.3 K/UL (ref 0–0.51)
EOSINOPHIL # BLD AUTO: 0.35 K/UL (ref 0–0.51)
EOSINOPHIL # BLD AUTO: 0.35 K/UL (ref 0–0.51)
EOSINOPHIL # BLD AUTO: 0.36 K/UL (ref 0–0.51)
EOSINOPHIL # BLD AUTO: 0.38 K/UL (ref 0–0.51)
EOSINOPHIL # BLD AUTO: 0.43 K/UL (ref 0–0.51)
EOSINOPHIL # BLD AUTO: 0.56 K/UL (ref 0–0.51)
EOSINOPHIL NFR BLD: 0.1 % (ref 0–6.9)
EOSINOPHIL NFR BLD: 1 % (ref 0–6.9)
EOSINOPHIL NFR BLD: 2 % (ref 0–6.9)
EOSINOPHIL NFR BLD: 2.3 % (ref 0–6.9)
EOSINOPHIL NFR BLD: 2.5 % (ref 0–6.9)
EOSINOPHIL NFR BLD: 3.4 % (ref 0–6.9)
EOSINOPHIL NFR BLD: 3.7 % (ref 0–6.9)
EOSINOPHIL NFR BLD: 4.2 % (ref 0–6.9)
EOSINOPHIL NFR BLD: 4.4 % (ref 0–6.9)
EOSINOPHIL NFR BLD: 4.5 % (ref 0–6.9)
EOSINOPHIL NFR BLD: 5 % (ref 0–6.9)
EPITHELIAL CELLS 1715: ABNORMAL /HPF (ref 0–5)
ERYTHROCYTE [DISTWIDTH] IN BLOOD BY AUTOMATED COUNT: 47.2 FL (ref 35.9–50)
ERYTHROCYTE [DISTWIDTH] IN BLOOD BY AUTOMATED COUNT: 48.4 FL (ref 35.9–50)
ERYTHROCYTE [DISTWIDTH] IN BLOOD BY AUTOMATED COUNT: 48.7 FL (ref 35.9–50)
ERYTHROCYTE [DISTWIDTH] IN BLOOD BY AUTOMATED COUNT: 49.1 FL (ref 35.9–50)
ERYTHROCYTE [DISTWIDTH] IN BLOOD BY AUTOMATED COUNT: 49.5 FL (ref 35.9–50)
ERYTHROCYTE [DISTWIDTH] IN BLOOD BY AUTOMATED COUNT: 50.4 FL (ref 35.9–50)
ERYTHROCYTE [DISTWIDTH] IN BLOOD BY AUTOMATED COUNT: 50.5 FL (ref 35.9–50)
ERYTHROCYTE [DISTWIDTH] IN BLOOD BY AUTOMATED COUNT: 50.8 FL (ref 35.9–50)
ERYTHROCYTE [DISTWIDTH] IN BLOOD BY AUTOMATED COUNT: 50.9 FL (ref 35.9–50)
ERYTHROCYTE [DISTWIDTH] IN BLOOD BY AUTOMATED COUNT: 51.4 FL (ref 35.9–50)
ERYTHROCYTE [DISTWIDTH] IN BLOOD BY AUTOMATED COUNT: 51.4 FL (ref 35.9–50)
ERYTHROCYTE [DISTWIDTH] IN BLOOD BY AUTOMATED COUNT: 51.8 FL (ref 35.9–50)
ERYTHROCYTE [DISTWIDTH] IN BLOOD BY AUTOMATED COUNT: 52.8 FL (ref 35.9–50)
ETHANOL BLD-MCNC: <10.1 MG/DL
FENTANYL UR QL: NEGATIVE
GFR SERPLBLD CREATININE-BSD FMLA CKD-EPI: 103 ML/MIN/1.73 M 2
GFR SERPLBLD CREATININE-BSD FMLA CKD-EPI: 106 ML/MIN/1.73 M 2
GFR SERPLBLD CREATININE-BSD FMLA CKD-EPI: 106 ML/MIN/1.73 M 2
GFR SERPLBLD CREATININE-BSD FMLA CKD-EPI: 107 ML/MIN/1.73 M 2
GFR SERPLBLD CREATININE-BSD FMLA CKD-EPI: 110 ML/MIN/1.73 M 2
GFR SERPLBLD CREATININE-BSD FMLA CKD-EPI: 110 ML/MIN/1.73 M 2
GFR SERPLBLD CREATININE-BSD FMLA CKD-EPI: 111 ML/MIN/1.73 M 2
GFR SERPLBLD CREATININE-BSD FMLA CKD-EPI: 112 ML/MIN/1.73 M 2
GFR SERPLBLD CREATININE-BSD FMLA CKD-EPI: 113 ML/MIN/1.73 M 2
GFR SERPLBLD CREATININE-BSD FMLA CKD-EPI: 116 ML/MIN/1.73 M 2
GFR SERPLBLD CREATININE-BSD FMLA CKD-EPI: 117 ML/MIN/1.73 M 2
GFR SERPLBLD CREATININE-BSD FMLA CKD-EPI: 118 ML/MIN/1.73 M 2
GFR SERPLBLD CREATININE-BSD FMLA CKD-EPI: 119 ML/MIN/1.73 M 2
GLOBULIN SER CALC-MCNC: 3.3 G/DL (ref 1.9–3.5)
GLOBULIN SER CALC-MCNC: 3.4 G/DL (ref 1.9–3.5)
GLOBULIN SER CALC-MCNC: 3.5 G/DL (ref 1.9–3.5)
GLOBULIN SER CALC-MCNC: 3.6 G/DL (ref 1.9–3.5)
GLOBULIN SER CALC-MCNC: 3.7 G/DL (ref 1.9–3.5)
GLOBULIN SER CALC-MCNC: 3.8 G/DL (ref 1.9–3.5)
GLOBULIN SER CALC-MCNC: 3.8 G/DL (ref 1.9–3.5)
GLOBULIN SER CALC-MCNC: 4 G/DL (ref 1.9–3.5)
GLOBULIN SER CALC-MCNC: 4.3 G/DL (ref 1.9–3.5)
GLUCOSE BLD STRIP.AUTO-MCNC: 98 MG/DL (ref 65–99)
GLUCOSE SERPL-MCNC: 101 MG/DL (ref 65–99)
GLUCOSE SERPL-MCNC: 101 MG/DL (ref 65–99)
GLUCOSE SERPL-MCNC: 102 MG/DL (ref 65–99)
GLUCOSE SERPL-MCNC: 105 MG/DL (ref 65–99)
GLUCOSE SERPL-MCNC: 107 MG/DL (ref 65–99)
GLUCOSE SERPL-MCNC: 109 MG/DL (ref 65–99)
GLUCOSE SERPL-MCNC: 111 MG/DL (ref 65–99)
GLUCOSE SERPL-MCNC: 115 MG/DL (ref 65–99)
GLUCOSE SERPL-MCNC: 116 MG/DL (ref 65–99)
GLUCOSE SERPL-MCNC: 128 MG/DL (ref 65–99)
GLUCOSE SERPL-MCNC: 130 MG/DL (ref 65–99)
GLUCOSE SERPL-MCNC: 94 MG/DL (ref 65–99)
GLUCOSE SERPL-MCNC: 96 MG/DL (ref 65–99)
GLUCOSE UR STRIP.AUTO-MCNC: NEGATIVE MG/DL
HCO3 BLDA-SCNC: 24 MMOL/L (ref 21–28)
HCO3 BLDA-SCNC: 25.6 MMOL/L (ref 21–28)
HCO3 BLDA-SCNC: 26.6 MMOL/L (ref 21–28)
HCT VFR BLD AUTO: 29.1 % (ref 42–52)
HCT VFR BLD AUTO: 30 % (ref 42–52)
HCT VFR BLD AUTO: 30.1 % (ref 42–52)
HCT VFR BLD AUTO: 30.2 % (ref 42–52)
HCT VFR BLD AUTO: 30.4 % (ref 42–52)
HCT VFR BLD AUTO: 30.6 % (ref 42–52)
HCT VFR BLD AUTO: 31.7 % (ref 42–52)
HCT VFR BLD AUTO: 31.9 % (ref 42–52)
HCT VFR BLD AUTO: 33.3 % (ref 42–52)
HCT VFR BLD AUTO: 33.8 % (ref 42–52)
HCT VFR BLD AUTO: 34.2 % (ref 42–52)
HCT VFR BLD AUTO: 34.5 % (ref 42–52)
HCT VFR BLD AUTO: 38.1 % (ref 42–52)
HGB BLD-MCNC: 10 G/DL (ref 14–18)
HGB BLD-MCNC: 10 G/DL (ref 14–18)
HGB BLD-MCNC: 10.1 G/DL (ref 14–18)
HGB BLD-MCNC: 10.6 G/DL (ref 14–18)
HGB BLD-MCNC: 10.6 G/DL (ref 14–18)
HGB BLD-MCNC: 11.2 G/DL (ref 14–18)
HGB BLD-MCNC: 11.4 G/DL (ref 14–18)
HGB BLD-MCNC: 11.7 G/DL (ref 14–18)
HGB BLD-MCNC: 12.6 G/DL (ref 14–18)
HGB BLD-MCNC: 9.6 G/DL (ref 14–18)
HGB BLD-MCNC: 9.6 G/DL (ref 14–18)
HGB BLD-MCNC: 9.8 G/DL (ref 14–18)
HGB BLD-MCNC: 9.9 G/DL (ref 14–18)
HOROWITZ INDEX BLDA+IHG-RTO: 307 MM[HG]
HOROWITZ INDEX BLDA+IHG-RTO: 370 MM[HG]
HOROWITZ INDEX BLDA+IHG-RTO: 500 MM[HG]
IMM GRANULOCYTES # BLD AUTO: 0.02 K/UL (ref 0–0.11)
IMM GRANULOCYTES # BLD AUTO: 0.03 K/UL (ref 0–0.11)
IMM GRANULOCYTES # BLD AUTO: 0.04 K/UL (ref 0–0.11)
IMM GRANULOCYTES # BLD AUTO: 0.06 K/UL (ref 0–0.11)
IMM GRANULOCYTES # BLD AUTO: 0.06 K/UL (ref 0–0.11)
IMM GRANULOCYTES # BLD AUTO: 0.08 K/UL (ref 0–0.11)
IMM GRANULOCYTES # BLD AUTO: 0.08 K/UL (ref 0–0.11)
IMM GRANULOCYTES # BLD AUTO: 0.09 K/UL (ref 0–0.11)
IMM GRANULOCYTES # BLD AUTO: 0.11 K/UL (ref 0–0.11)
IMM GRANULOCYTES # BLD AUTO: 0.12 K/UL (ref 0–0.11)
IMM GRANULOCYTES # BLD AUTO: 0.16 K/UL (ref 0–0.11)
IMM GRANULOCYTES NFR BLD AUTO: 0.3 % (ref 0–0.9)
IMM GRANULOCYTES NFR BLD AUTO: 0.3 % (ref 0–0.9)
IMM GRANULOCYTES NFR BLD AUTO: 0.4 % (ref 0–0.9)
IMM GRANULOCYTES NFR BLD AUTO: 0.5 % (ref 0–0.9)
IMM GRANULOCYTES NFR BLD AUTO: 0.8 % (ref 0–0.9)
IMM GRANULOCYTES NFR BLD AUTO: 1 % (ref 0–0.9)
IMM GRANULOCYTES NFR BLD AUTO: 1.2 % (ref 0–0.9)
IMM GRANULOCYTES NFR BLD AUTO: 1.4 % (ref 0–0.9)
IMM GRANULOCYTES NFR BLD AUTO: 1.7 % (ref 0–0.9)
INR PPP: 1.15 (ref 0.87–1.13)
KETONES UR STRIP.AUTO-MCNC: 80 MG/DL
LACTATE BLD-SCNC: 0.4 MMOL/L (ref 0.5–2)
LACTATE BLD-SCNC: 0.4 MMOL/L (ref 0.5–2)
LACTATE BLD-SCNC: 0.7 MMOL/L (ref 0.5–2)
LACTATE SERPL-SCNC: 1.7 MMOL/L (ref 0.5–2)
LEUKOCYTE ESTERASE UR QL STRIP.AUTO: NEGATIVE
LYMPHOCYTES # BLD AUTO: 0.61 K/UL (ref 1–4.8)
LYMPHOCYTES # BLD AUTO: 0.74 K/UL (ref 1–4.8)
LYMPHOCYTES # BLD AUTO: 0.78 K/UL (ref 1–4.8)
LYMPHOCYTES # BLD AUTO: 0.83 K/UL (ref 1–4.8)
LYMPHOCYTES # BLD AUTO: 0.87 K/UL (ref 1–4.8)
LYMPHOCYTES # BLD AUTO: 0.9 K/UL (ref 1–4.8)
LYMPHOCYTES # BLD AUTO: 0.95 K/UL (ref 1–4.8)
LYMPHOCYTES # BLD AUTO: 1 K/UL (ref 1–4.8)
LYMPHOCYTES # BLD AUTO: 1 K/UL (ref 1–4.8)
LYMPHOCYTES # BLD AUTO: 1.1 K/UL (ref 1–4.8)
LYMPHOCYTES # BLD AUTO: 1.2 K/UL (ref 1–4.8)
LYMPHOCYTES NFR BLD: 10 % (ref 22–41)
LYMPHOCYTES NFR BLD: 10.2 % (ref 22–41)
LYMPHOCYTES NFR BLD: 10.8 % (ref 22–41)
LYMPHOCYTES NFR BLD: 11.4 % (ref 22–41)
LYMPHOCYTES NFR BLD: 11.9 % (ref 22–41)
LYMPHOCYTES NFR BLD: 6.2 % (ref 22–41)
LYMPHOCYTES NFR BLD: 6.7 % (ref 22–41)
LYMPHOCYTES NFR BLD: 7.3 % (ref 22–41)
LYMPHOCYTES NFR BLD: 7.5 % (ref 22–41)
LYMPHOCYTES NFR BLD: 8 % (ref 22–41)
LYMPHOCYTES NFR BLD: 9.9 % (ref 22–41)
MAGNESIUM SERPL-MCNC: 1.5 MG/DL (ref 1.5–2.5)
MAGNESIUM SERPL-MCNC: 1.6 MG/DL (ref 1.5–2.5)
MAGNESIUM SERPL-MCNC: 1.7 MG/DL (ref 1.5–2.5)
MAGNESIUM SERPL-MCNC: 1.8 MG/DL (ref 1.5–2.5)
MAGNESIUM SERPL-MCNC: 2 MG/DL (ref 1.5–2.5)
MAGNESIUM SERPL-MCNC: 2.1 MG/DL (ref 1.5–2.5)
MAGNESIUM SERPL-MCNC: 2.1 MG/DL (ref 1.5–2.5)
MCF BLD TEG: 64.3 MM (ref 52–69)
MCF BLD TEG: 64.6 MM (ref 52–69)
MCF.PLATELET INHIB BLD ROTEM: 31.4 MM (ref 15–32)
MCF.PLATELET INHIB BLD ROTEM: 33.5 MM (ref 15–32)
MCH RBC QN AUTO: 30.4 PG (ref 27–33)
MCH RBC QN AUTO: 30.4 PG (ref 27–33)
MCH RBC QN AUTO: 30.5 PG (ref 27–33)
MCH RBC QN AUTO: 30.6 PG (ref 27–33)
MCH RBC QN AUTO: 30.8 PG (ref 27–33)
MCH RBC QN AUTO: 31 PG (ref 27–33)
MCH RBC QN AUTO: 31 PG (ref 27–33)
MCH RBC QN AUTO: 31.1 PG (ref 27–33)
MCH RBC QN AUTO: 31.1 PG (ref 27–33)
MCH RBC QN AUTO: 31.2 PG (ref 27–33)
MCH RBC QN AUTO: 31.3 PG (ref 27–33)
MCH RBC QN AUTO: 31.4 PG (ref 27–33)
MCH RBC QN AUTO: 31.6 PG (ref 27–33)
MCHC RBC AUTO-ENTMCNC: 31.3 G/DL (ref 32.3–36.5)
MCHC RBC AUTO-ENTMCNC: 31.6 G/DL (ref 32.3–36.5)
MCHC RBC AUTO-ENTMCNC: 31.8 G/DL (ref 32.3–36.5)
MCHC RBC AUTO-ENTMCNC: 32.5 G/DL (ref 32.3–36.5)
MCHC RBC AUTO-ENTMCNC: 32.9 G/DL (ref 32.3–36.5)
MCHC RBC AUTO-ENTMCNC: 33 G/DL (ref 32.3–36.5)
MCHC RBC AUTO-ENTMCNC: 33 G/DL (ref 32.3–36.5)
MCHC RBC AUTO-ENTMCNC: 33.1 G/DL (ref 32.3–36.5)
MCHC RBC AUTO-ENTMCNC: 33.1 G/DL (ref 32.3–36.5)
MCHC RBC AUTO-ENTMCNC: 33.3 G/DL (ref 32.3–36.5)
MCHC RBC AUTO-ENTMCNC: 33.3 G/DL (ref 32.3–36.5)
MCHC RBC AUTO-ENTMCNC: 33.4 G/DL (ref 32.3–36.5)
MCHC RBC AUTO-ENTMCNC: 33.9 G/DL (ref 32.3–36.5)
MCV RBC AUTO: 92.5 FL (ref 81.4–97.8)
MCV RBC AUTO: 93 FL (ref 81.4–97.8)
MCV RBC AUTO: 93.2 FL (ref 81.4–97.8)
MCV RBC AUTO: 93.3 FL (ref 81.4–97.8)
MCV RBC AUTO: 93.6 FL (ref 81.4–97.8)
MCV RBC AUTO: 94.4 FL (ref 81.4–97.8)
MCV RBC AUTO: 94.5 FL (ref 81.4–97.8)
MCV RBC AUTO: 94.9 FL (ref 81.4–97.8)
MCV RBC AUTO: 96 FL (ref 81.4–97.8)
MCV RBC AUTO: 96.2 FL (ref 81.4–97.8)
MCV RBC AUTO: 97 FL (ref 81.4–97.8)
METHADONE UR QL SCN: NEGATIVE
MICRO URNS: ABNORMAL
MODE IMODE: ABNORMAL
MONOCYTES # BLD AUTO: 0.47 K/UL (ref 0–0.85)
MONOCYTES # BLD AUTO: 0.64 K/UL (ref 0–0.85)
MONOCYTES # BLD AUTO: 0.76 K/UL (ref 0–0.85)
MONOCYTES # BLD AUTO: 0.77 K/UL (ref 0–0.85)
MONOCYTES # BLD AUTO: 0.78 K/UL (ref 0–0.85)
MONOCYTES # BLD AUTO: 0.79 K/UL (ref 0–0.85)
MONOCYTES # BLD AUTO: 0.87 K/UL (ref 0–0.85)
MONOCYTES # BLD AUTO: 0.89 K/UL (ref 0–0.85)
MONOCYTES # BLD AUTO: 0.92 K/UL (ref 0–0.85)
MONOCYTES # BLD AUTO: 0.93 K/UL (ref 0–0.85)
MONOCYTES # BLD AUTO: 1.03 K/UL (ref 0–0.85)
MONOCYTES NFR BLD AUTO: 11.4 % (ref 0–13.4)
MONOCYTES NFR BLD AUTO: 12.3 % (ref 0–13.4)
MONOCYTES NFR BLD AUTO: 5.3 % (ref 0–13.4)
MONOCYTES NFR BLD AUTO: 6 % (ref 0–13.4)
MONOCYTES NFR BLD AUTO: 6.5 % (ref 0–13.4)
MONOCYTES NFR BLD AUTO: 6.8 % (ref 0–13.4)
MONOCYTES NFR BLD AUTO: 7.3 % (ref 0–13.4)
MONOCYTES NFR BLD AUTO: 7.6 % (ref 0–13.4)
MONOCYTES NFR BLD AUTO: 8 % (ref 0–13.4)
MONOCYTES NFR BLD AUTO: 8.8 % (ref 0–13.4)
MONOCYTES NFR BLD AUTO: 9.1 % (ref 0–13.4)
NEUTROPHILS # BLD AUTO: 10.85 K/UL (ref 1.82–7.42)
NEUTROPHILS # BLD AUTO: 14.17 K/UL (ref 1.82–7.42)
NEUTROPHILS # BLD AUTO: 5.07 K/UL (ref 1.82–7.42)
NEUTROPHILS # BLD AUTO: 5.84 K/UL (ref 1.82–7.42)
NEUTROPHILS # BLD AUTO: 5.85 K/UL (ref 1.82–7.42)
NEUTROPHILS # BLD AUTO: 7.26 K/UL (ref 1.82–7.42)
NEUTROPHILS # BLD AUTO: 7.67 K/UL (ref 1.82–7.42)
NEUTROPHILS # BLD AUTO: 7.79 K/UL (ref 1.82–7.42)
NEUTROPHILS # BLD AUTO: 8.31 K/UL (ref 1.82–7.42)
NEUTROPHILS # BLD AUTO: 8.59 K/UL (ref 1.82–7.42)
NEUTROPHILS # BLD AUTO: 8.92 K/UL (ref 1.82–7.42)
NEUTROPHILS NFR BLD: 69.7 % (ref 44–72)
NEUTROPHILS NFR BLD: 72 % (ref 44–72)
NEUTROPHILS NFR BLD: 75.5 % (ref 44–72)
NEUTROPHILS NFR BLD: 76 % (ref 44–72)
NEUTROPHILS NFR BLD: 76.9 % (ref 44–72)
NEUTROPHILS NFR BLD: 78.4 % (ref 44–72)
NEUTROPHILS NFR BLD: 79.3 % (ref 44–72)
NEUTROPHILS NFR BLD: 80.2 % (ref 44–72)
NEUTROPHILS NFR BLD: 84.2 % (ref 44–72)
NEUTROPHILS NFR BLD: 85.6 % (ref 44–72)
NEUTROPHILS NFR BLD: 86.6 % (ref 44–72)
NITRITE UR QL STRIP.AUTO: NEGATIVE
NRBC # BLD AUTO: 0 K/UL
NRBC BLD-RTO: 0 /100 WBC (ref 0–0.2)
O2/TOTAL GAS SETTING VFR VENT: 100 %
O2/TOTAL GAS SETTING VFR VENT: 30 %
O2/TOTAL GAS SETTING VFR VENT: 30 %
OPIATES UR QL SCN: NEGATIVE
OXYCODONE UR QL SCN: NEGATIVE
PA AA BLD-ACNC: 0 % (ref 0–11)
PA AA BLD-ACNC: 5.5 % (ref 0–11)
PA ADP BLD-ACNC: 0 % (ref 0–17)
PA ADP BLD-ACNC: 10.3 % (ref 0–17)
PCO2 BLDA: 31.4 MMHG (ref 32–48)
PCO2 BLDA: 34.2 MMHG (ref 32–48)
PCO2 BLDA: 48.1 MMHG (ref 32–48)
PCO2 TEMP ADJ BLDA: 29.3 MMHG (ref 32–48)
PCO2 TEMP ADJ BLDA: 32.3 MMHG (ref 32–48)
PCO2 TEMP ADJ BLDA: 47.3 MMHG (ref 32–48)
PCP UR QL SCN: NEGATIVE
PEEP END EXPIRATORY PRESSURE IPEEP: 8 CMH20
PH BLDA: 7.35 [PH] (ref 7.35–7.45)
PH BLDA: 7.48 [PH] (ref 7.35–7.45)
PH BLDA: 7.49 [PH] (ref 7.35–7.45)
PH TEMP ADJ BLDA: 7.36 [PH] (ref 7.35–7.45)
PH TEMP ADJ BLDA: 7.5 [PH] (ref 7.35–7.45)
PH TEMP ADJ BLDA: 7.52 [PH] (ref 7.35–7.45)
PH UR STRIP.AUTO: 7 [PH] (ref 5–8)
PHOSPHATE SERPL-MCNC: 2.5 MG/DL (ref 2.5–4.5)
PHOSPHATE SERPL-MCNC: 2.7 MG/DL (ref 2.5–4.5)
PHOSPHATE SERPL-MCNC: 2.7 MG/DL (ref 2.5–4.5)
PHOSPHATE SERPL-MCNC: 2.8 MG/DL (ref 2.5–4.5)
PHOSPHATE SERPL-MCNC: 2.9 MG/DL (ref 2.5–4.5)
PHOSPHATE SERPL-MCNC: 3.1 MG/DL (ref 2.5–4.5)
PHOSPHATE SERPL-MCNC: 3.2 MG/DL (ref 2.5–4.5)
PHOSPHATE SERPL-MCNC: 3.3 MG/DL (ref 2.5–4.5)
PHOSPHATE SERPL-MCNC: 3.4 MG/DL (ref 2.5–4.5)
PHOSPHATE SERPL-MCNC: 3.6 MG/DL (ref 2.5–4.5)
PHOSPHATE SERPL-MCNC: 3.7 MG/DL (ref 2.5–4.5)
PHOSPHATE SERPL-MCNC: 3.7 MG/DL (ref 2.5–4.5)
PLATELET # BLD AUTO: 232 K/UL (ref 164–446)
PLATELET # BLD AUTO: 252 K/UL (ref 164–446)
PLATELET # BLD AUTO: 274 K/UL (ref 164–446)
PLATELET # BLD AUTO: 282 K/UL (ref 164–446)
PLATELET # BLD AUTO: 285 K/UL (ref 164–446)
PLATELET # BLD AUTO: 290 K/UL (ref 164–446)
PLATELET # BLD AUTO: 297 K/UL (ref 164–446)
PLATELET # BLD AUTO: 300 K/UL (ref 164–446)
PLATELET # BLD AUTO: 305 K/UL (ref 164–446)
PLATELET # BLD AUTO: 309 K/UL (ref 164–446)
PLATELET # BLD AUTO: 313 K/UL (ref 164–446)
PLATELET # BLD AUTO: 334 K/UL (ref 164–446)
PLATELET # BLD AUTO: 356 K/UL (ref 164–446)
PMV BLD AUTO: 10 FL (ref 9–12.9)
PMV BLD AUTO: 10.1 FL (ref 9–12.9)
PMV BLD AUTO: 10.2 FL (ref 9–12.9)
PMV BLD AUTO: 10.2 FL (ref 9–12.9)
PMV BLD AUTO: 10.3 FL (ref 9–12.9)
PMV BLD AUTO: 10.4 FL (ref 9–12.9)
PMV BLD AUTO: 9.3 FL (ref 9–12.9)
PMV BLD AUTO: 9.5 FL (ref 9–12.9)
PMV BLD AUTO: 9.6 FL (ref 9–12.9)
PMV BLD AUTO: 9.7 FL (ref 9–12.9)
PMV BLD AUTO: 9.8 FL (ref 9–12.9)
PO2 BLDA: 111 MMHG (ref 83–108)
PO2 BLDA: 92 MMHG (ref 83–108)
PO2 BLDA: >500 MMHG (ref 83–108)
PO2 TEMP ADJ BLDA: 101 MMHG (ref 83–108)
PO2 TEMP ADJ BLDA: 89 MMHG (ref 83–108)
PO2 TEMP ADJ BLDA: >500 MMHG (ref 83–108)
POTASSIUM SERPL-SCNC: 3.5 MMOL/L (ref 3.6–5.5)
POTASSIUM SERPL-SCNC: 3.6 MMOL/L (ref 3.6–5.5)
POTASSIUM SERPL-SCNC: 3.8 MMOL/L (ref 3.6–5.5)
POTASSIUM SERPL-SCNC: 3.8 MMOL/L (ref 3.6–5.5)
POTASSIUM SERPL-SCNC: 3.9 MMOL/L (ref 3.6–5.5)
POTASSIUM SERPL-SCNC: 4 MMOL/L (ref 3.6–5.5)
POTASSIUM SERPL-SCNC: 4.1 MMOL/L (ref 3.6–5.5)
POTASSIUM SERPL-SCNC: 4.2 MMOL/L (ref 3.6–5.5)
POTASSIUM SERPL-SCNC: 4.3 MMOL/L (ref 3.6–5.5)
POTASSIUM SERPL-SCNC: 4.9 MMOL/L (ref 3.6–5.5)
PREALB SERPL-MCNC: 9 MG/DL (ref 18–38)
PREALB SERPL-MCNC: 9.3 MG/DL (ref 18–38)
PROPOXYPH UR QL SCN: NEGATIVE
PROT SERPL-MCNC: 5.9 G/DL (ref 6–8.2)
PROT SERPL-MCNC: 6.2 G/DL (ref 6–8.2)
PROT SERPL-MCNC: 6.2 G/DL (ref 6–8.2)
PROT SERPL-MCNC: 6.3 G/DL (ref 6–8.2)
PROT SERPL-MCNC: 6.4 G/DL (ref 6–8.2)
PROT SERPL-MCNC: 6.5 G/DL (ref 6–8.2)
PROT SERPL-MCNC: 6.6 G/DL (ref 6–8.2)
PROT SERPL-MCNC: 7 G/DL (ref 6–8.2)
PROT SERPL-MCNC: 8 G/DL (ref 6–8.2)
PROT UR QL STRIP: 30 MG/DL
PROTHROMBIN TIME: 14.7 SEC (ref 12–14.6)
RBC # BLD AUTO: 3.08 M/UL (ref 4.7–6.1)
RBC # BLD AUTO: 3.16 M/UL (ref 4.7–6.1)
RBC # BLD AUTO: 3.16 M/UL (ref 4.7–6.1)
RBC # BLD AUTO: 3.19 M/UL (ref 4.7–6.1)
RBC # BLD AUTO: 3.2 M/UL (ref 4.7–6.1)
RBC # BLD AUTO: 3.28 M/UL (ref 4.7–6.1)
RBC # BLD AUTO: 3.29 M/UL (ref 4.7–6.1)
RBC # BLD AUTO: 3.41 M/UL (ref 4.7–6.1)
RBC # BLD AUTO: 3.47 M/UL (ref 4.7–6.1)
RBC # BLD AUTO: 3.58 M/UL (ref 4.7–6.1)
RBC # BLD AUTO: 3.67 M/UL (ref 4.7–6.1)
RBC # BLD AUTO: 3.73 M/UL (ref 4.7–6.1)
RBC # BLD AUTO: 4.07 M/UL (ref 4.7–6.1)
RBC # URNS HPF: ABNORMAL /HPF (ref 0–2)
RBC UR QL AUTO: NEGATIVE
SAO2 % BLDA: 100 % (ref 93–99)
SAO2 % BLDA: 97 % (ref 93–99)
SAO2 % BLDA: 99 % (ref 93–99)
SCCMEC + MECA PNL NOSE NAA+PROBE: NEGATIVE
SIGNIFICANT IND 70042: ABNORMAL
SIGNIFICANT IND 70042: NORMAL
SITE SITE: ABNORMAL
SITE SITE: NORMAL
SODIUM SERPL-SCNC: 134 MMOL/L (ref 135–145)
SODIUM SERPL-SCNC: 135 MMOL/L (ref 135–145)
SODIUM SERPL-SCNC: 136 MMOL/L (ref 135–145)
SODIUM SERPL-SCNC: 136 MMOL/L (ref 135–145)
SODIUM SERPL-SCNC: 137 MMOL/L (ref 135–145)
SODIUM SERPL-SCNC: 137 MMOL/L (ref 135–145)
SODIUM SERPL-SCNC: 138 MMOL/L (ref 135–145)
SODIUM SERPL-SCNC: 138 MMOL/L (ref 135–145)
SODIUM SERPL-SCNC: 141 MMOL/L (ref 135–145)
SOURCE SOURCE: ABNORMAL
SOURCE SOURCE: NORMAL
SP GR UR STRIP.AUTO: 1.02
SPECIMEN DRAWN FROM PATIENT: ABNORMAL
T PALLIDUM AB SER QL IA: NORMAL
TEG ALGORITHM TGALG: ABNORMAL
TEG ALGORITHM TGALG: ABNORMAL
TIDAL VOLUME IVT: 390 ML
TRIGL SERPL-MCNC: 70 MG/DL (ref 0–149)
TRIGL SERPL-MCNC: 89 MG/DL (ref 0–149)
TSH SERPL-ACNC: 0.9 UIU/ML (ref 0.38–5.33)
UROBILINOGEN UR STRIP.AUTO-MCNC: 4 EU/DL
VIT B12 SERPL-MCNC: 373 PG/ML (ref 211–911)
WBC # BLD AUTO: 10.1 K/UL (ref 4.8–10.8)
WBC # BLD AUTO: 10.2 K/UL (ref 4.8–10.8)
WBC # BLD AUTO: 10.4 K/UL (ref 4.8–10.8)
WBC # BLD AUTO: 11.3 K/UL (ref 4.8–10.8)
WBC # BLD AUTO: 11.3 K/UL (ref 4.8–10.8)
WBC # BLD AUTO: 12.9 K/UL (ref 4.8–10.8)
WBC # BLD AUTO: 16.5 K/UL (ref 4.8–10.8)
WBC # BLD AUTO: 6.5 K/UL (ref 4.8–10.8)
WBC # BLD AUTO: 7.3 K/UL (ref 4.8–10.8)
WBC # BLD AUTO: 8.1 K/UL (ref 4.8–10.8)
WBC # BLD AUTO: 8.4 K/UL (ref 4.8–10.8)
WBC # BLD AUTO: 9.5 K/UL (ref 4.8–10.8)
WBC # BLD AUTO: 9.9 K/UL (ref 4.8–10.8)
WBC #/AREA URNS HPF: ABNORMAL /HPF

## 2025-01-01 PROCEDURE — 700105 HCHG RX REV CODE 258: Performed by: STUDENT IN AN ORGANIZED HEALTH CARE EDUCATION/TRAINING PROGRAM

## 2025-01-01 PROCEDURE — A9270 NON-COVERED ITEM OR SERVICE: HCPCS | Performed by: NURSE PRACTITIONER

## 2025-01-01 PROCEDURE — 95714 VEEG EA 12-26 HR UNMNTR: CPT | Performed by: STUDENT IN AN ORGANIZED HEALTH CARE EDUCATION/TRAINING PROGRAM

## 2025-01-01 PROCEDURE — 94799 UNLISTED PULMONARY SVC/PX: CPT

## 2025-01-01 PROCEDURE — 95700 EEG CONT REC W/VID EEG TECH: CPT | Performed by: STUDENT IN AN ORGANIZED HEALTH CARE EDUCATION/TRAINING PROGRAM

## 2025-01-01 PROCEDURE — 70450 CT HEAD/BRAIN W/O DYE: CPT

## 2025-01-01 PROCEDURE — 770022 HCHG ROOM/CARE - ICU (200)

## 2025-01-01 PROCEDURE — 84478 ASSAY OF TRIGLYCERIDES: CPT

## 2025-01-01 PROCEDURE — 95718 EEG PHYS/QHP 2-12 HR W/VEEG: CPT | Performed by: STUDENT IN AN ORGANIZED HEALTH CARE EDUCATION/TRAINING PROGRAM

## 2025-01-01 PROCEDURE — 700111 HCHG RX REV CODE 636 W/ 250 OVERRIDE (IP): Mod: JZ | Performed by: INTERNAL MEDICINE

## 2025-01-01 PROCEDURE — 95720 EEG PHY/QHP EA INCR W/VEEG: CPT | Performed by: STUDENT IN AN ORGANIZED HEALTH CARE EDUCATION/TRAINING PROGRAM

## 2025-01-01 PROCEDURE — 85027 COMPLETE CBC AUTOMATED: CPT

## 2025-01-01 PROCEDURE — 700111 HCHG RX REV CODE 636 W/ 250 OVERRIDE (IP): Performed by: NURSE PRACTITIONER

## 2025-01-01 PROCEDURE — 85347 COAGULATION TIME ACTIVATED: CPT

## 2025-01-01 PROCEDURE — 84100 ASSAY OF PHOSPHORUS: CPT

## 2025-01-01 PROCEDURE — A9270 NON-COVERED ITEM OR SERVICE: HCPCS | Performed by: INTERNAL MEDICINE

## 2025-01-01 PROCEDURE — 700102 HCHG RX REV CODE 250 W/ 637 OVERRIDE(OP): Performed by: INTERNAL MEDICINE

## 2025-01-01 PROCEDURE — 160035 HCHG PACU - 1ST 60 MINS PHASE I: Performed by: NEUROLOGICAL SURGERY

## 2025-01-01 PROCEDURE — C9254 INJECTION, LACOSAMIDE: HCPCS | Performed by: INTERNAL MEDICINE

## 2025-01-01 PROCEDURE — 99232 SBSQ HOSP IP/OBS MODERATE 35: CPT | Mod: 25 | Performed by: STUDENT IN AN ORGANIZED HEALTH CARE EDUCATION/TRAINING PROGRAM

## 2025-01-01 PROCEDURE — 4A10X4Z MONITORING OF CENTRAL NERVOUS ELECTRICAL ACTIVITY, EXTERNAL APPROACH: ICD-10-PCS | Performed by: STUDENT IN AN ORGANIZED HEALTH CARE EDUCATION/TRAINING PROGRAM

## 2025-01-01 PROCEDURE — 84134 ASSAY OF PREALBUMIN: CPT

## 2025-01-01 PROCEDURE — 83735 ASSAY OF MAGNESIUM: CPT

## 2025-01-01 PROCEDURE — 700111 HCHG RX REV CODE 636 W/ 250 OVERRIDE (IP): Performed by: STUDENT IN AN ORGANIZED HEALTH CARE EDUCATION/TRAINING PROGRAM

## 2025-01-01 PROCEDURE — 700102 HCHG RX REV CODE 250 W/ 637 OVERRIDE(OP): Performed by: NURSE PRACTITIONER

## 2025-01-01 PROCEDURE — 80053 COMPREHEN METABOLIC PANEL: CPT

## 2025-01-01 PROCEDURE — 99291 CRITICAL CARE FIRST HOUR: CPT | Performed by: INTERNAL MEDICINE

## 2025-01-01 PROCEDURE — 87077 CULTURE AEROBIC IDENTIFY: CPT

## 2025-01-01 PROCEDURE — 87040 BLOOD CULTURE FOR BACTERIA: CPT

## 2025-01-01 PROCEDURE — 82607 VITAMIN B-12: CPT

## 2025-01-01 PROCEDURE — 99291 CRITICAL CARE FIRST HOUR: CPT

## 2025-01-01 PROCEDURE — 700102 HCHG RX REV CODE 250 W/ 637 OVERRIDE(OP): Performed by: STUDENT IN AN ORGANIZED HEALTH CARE EDUCATION/TRAINING PROGRAM

## 2025-01-01 PROCEDURE — 4A10X4Z MONITORING OF CENTRAL NERVOUS ELECTRICAL ACTIVITY, EXTERNAL APPROACH: ICD-10-PCS | Performed by: PSYCHIATRY & NEUROLOGY

## 2025-01-01 PROCEDURE — 82140 ASSAY OF AMMONIA: CPT

## 2025-01-01 PROCEDURE — 700111 HCHG RX REV CODE 636 W/ 250 OVERRIDE (IP): Performed by: INTERNAL MEDICINE

## 2025-01-01 PROCEDURE — 71045 X-RAY EXAM CHEST 1 VIEW: CPT

## 2025-01-01 PROCEDURE — 83605 ASSAY OF LACTIC ACID: CPT

## 2025-01-01 PROCEDURE — 85025 COMPLETE CBC W/AUTO DIFF WBC: CPT

## 2025-01-01 PROCEDURE — 31500 INSERT EMERGENCY AIRWAY: CPT

## 2025-01-01 PROCEDURE — 81001 URINALYSIS AUTO W/SCOPE: CPT

## 2025-01-01 PROCEDURE — A9270 NON-COVERED ITEM OR SERVICE: HCPCS | Performed by: STUDENT IN AN ORGANIZED HEALTH CARE EDUCATION/TRAINING PROGRAM

## 2025-01-01 PROCEDURE — 99223 1ST HOSP IP/OBS HIGH 75: CPT | Mod: GC | Performed by: FAMILY MEDICINE

## 2025-01-01 PROCEDURE — 92610 EVALUATE SWALLOWING FUNCTION: CPT

## 2025-01-01 PROCEDURE — 99233 SBSQ HOSP IP/OBS HIGH 50: CPT | Mod: 25 | Performed by: NURSE PRACTITIONER

## 2025-01-01 PROCEDURE — 87641 MR-STAPH DNA AMP PROBE: CPT

## 2025-01-01 PROCEDURE — 700101 HCHG RX REV CODE 250: Performed by: INTERNAL MEDICINE

## 2025-01-01 PROCEDURE — C1729 CATH, DRAINAGE: HCPCS | Performed by: NEUROLOGICAL SURGERY

## 2025-01-01 PROCEDURE — 302214 INTUBATION BOX: Performed by: STUDENT IN AN ORGANIZED HEALTH CARE EDUCATION/TRAINING PROGRAM

## 2025-01-01 PROCEDURE — 99291 CRITICAL CARE FIRST HOUR: CPT | Performed by: NURSE PRACTITIONER

## 2025-01-01 PROCEDURE — 99232 SBSQ HOSP IP/OBS MODERATE 35: CPT | Performed by: PSYCHIATRY & NEUROLOGY

## 2025-01-01 PROCEDURE — 94003 VENT MGMT INPAT SUBQ DAY: CPT

## 2025-01-01 PROCEDURE — 99232 SBSQ HOSP IP/OBS MODERATE 35: CPT | Mod: 25 | Performed by: PSYCHIATRY & NEUROLOGY

## 2025-01-01 PROCEDURE — 93005 ELECTROCARDIOGRAM TRACING: CPT | Mod: TC | Performed by: NEUROLOGICAL SURGERY

## 2025-01-01 PROCEDURE — 87150 DNA/RNA AMPLIFIED PROBE: CPT

## 2025-01-01 PROCEDURE — 84443 ASSAY THYROID STIM HORMONE: CPT

## 2025-01-01 PROCEDURE — 700111 HCHG RX REV CODE 636 W/ 250 OVERRIDE (IP): Mod: JZ | Performed by: NURSE PRACTITIONER

## 2025-01-01 PROCEDURE — 95714 VEEG EA 12-26 HR UNMNTR: CPT | Performed by: PSYCHIATRY & NEUROLOGY

## 2025-01-01 PROCEDURE — 700105 HCHG RX REV CODE 258: Performed by: NURSE PRACTITIONER

## 2025-01-01 PROCEDURE — 99292 CRITICAL CARE ADDL 30 MIN: CPT | Mod: 25 | Performed by: INTERNAL MEDICINE

## 2025-01-01 PROCEDURE — 700105 HCHG RX REV CODE 258: Performed by: EMERGENCY MEDICINE

## 2025-01-01 PROCEDURE — 99291 CRITICAL CARE FIRST HOUR: CPT | Mod: 25 | Performed by: INTERNAL MEDICINE

## 2025-01-01 PROCEDURE — 700101 HCHG RX REV CODE 250: Performed by: NEUROLOGICAL SURGERY

## 2025-01-01 PROCEDURE — 700111 HCHG RX REV CODE 636 W/ 250 OVERRIDE (IP): Performed by: HOSPITALIST

## 2025-01-01 PROCEDURE — 5A1955Z RESPIRATORY VENTILATION, GREATER THAN 96 CONSECUTIVE HOURS: ICD-10-PCS | Performed by: INTERNAL MEDICINE

## 2025-01-01 PROCEDURE — 700111 HCHG RX REV CODE 636 W/ 250 OVERRIDE (IP): Mod: JZ | Performed by: STUDENT IN AN ORGANIZED HEALTH CARE EDUCATION/TRAINING PROGRAM

## 2025-01-01 PROCEDURE — 80048 BASIC METABOLIC PNL TOTAL CA: CPT

## 2025-01-01 PROCEDURE — 700101 HCHG RX REV CODE 250

## 2025-01-01 PROCEDURE — 95720 EEG PHY/QHP EA INCR W/VEEG: CPT | Performed by: PSYCHIATRY & NEUROLOGY

## 2025-01-01 PROCEDURE — 93010 ELECTROCARDIOGRAM REPORT: CPT | Performed by: INTERNAL MEDICINE

## 2025-01-01 PROCEDURE — 99232 SBSQ HOSP IP/OBS MODERATE 35: CPT | Mod: GC | Performed by: FAMILY MEDICINE

## 2025-01-01 PROCEDURE — 96365 THER/PROPH/DIAG IV INF INIT: CPT

## 2025-01-01 PROCEDURE — 85576 BLOOD PLATELET AGGREGATION: CPT

## 2025-01-01 PROCEDURE — 85384 FIBRINOGEN ACTIVITY: CPT

## 2025-01-01 PROCEDURE — 36415 COLL VENOUS BLD VENIPUNCTURE: CPT

## 2025-01-01 PROCEDURE — 31500 INSERT EMERGENCY AIRWAY: CPT | Performed by: INTERNAL MEDICINE

## 2025-01-01 PROCEDURE — 87086 URINE CULTURE/COLONY COUNT: CPT

## 2025-01-01 PROCEDURE — 36556 INSERT NON-TUNNEL CV CATH: CPT

## 2025-01-01 PROCEDURE — 82962 GLUCOSE BLOOD TEST: CPT

## 2025-01-01 PROCEDURE — 82803 BLOOD GASES ANY COMBINATION: CPT

## 2025-01-01 PROCEDURE — 86780 TREPONEMA PALLIDUM: CPT

## 2025-01-01 PROCEDURE — 99223 1ST HOSP IP/OBS HIGH 75: CPT | Performed by: HOSPITALIST

## 2025-01-01 PROCEDURE — 36556 INSERT NON-TUNNEL CV CATH: CPT | Performed by: INTERNAL MEDICINE

## 2025-01-01 PROCEDURE — 700101 HCHG RX REV CODE 250: Performed by: STUDENT IN AN ORGANIZED HEALTH CARE EDUCATION/TRAINING PROGRAM

## 2025-01-01 PROCEDURE — 99291 CRITICAL CARE FIRST HOUR: CPT | Performed by: STUDENT IN AN ORGANIZED HEALTH CARE EDUCATION/TRAINING PROGRAM

## 2025-01-01 PROCEDURE — 85730 THROMBOPLASTIN TIME PARTIAL: CPT

## 2025-01-01 PROCEDURE — 99233 SBSQ HOSP IP/OBS HIGH 50: CPT | Mod: 25 | Performed by: PSYCHIATRY & NEUROLOGY

## 2025-01-01 PROCEDURE — 700111 HCHG RX REV CODE 636 W/ 250 OVERRIDE (IP): Mod: JZ | Performed by: EMERGENCY MEDICINE

## 2025-01-01 PROCEDURE — 96366 THER/PROPH/DIAG IV INF ADDON: CPT

## 2025-01-01 PROCEDURE — 96375 TX/PRO/DX INJ NEW DRUG ADDON: CPT

## 2025-01-01 PROCEDURE — 700111 HCHG RX REV CODE 636 W/ 250 OVERRIDE (IP): Mod: JZ

## 2025-01-01 PROCEDURE — 95711 VEEG 2-12 HR UNMONITORED: CPT | Performed by: STUDENT IN AN ORGANIZED HEALTH CARE EDUCATION/TRAINING PROGRAM

## 2025-01-01 PROCEDURE — 85610 PROTHROMBIN TIME: CPT

## 2025-01-01 PROCEDURE — 36600 WITHDRAWAL OF ARTERIAL BLOOD: CPT

## 2025-01-01 PROCEDURE — 304538 HCHG NG TUBE

## 2025-01-01 PROCEDURE — C1751 CATH, INF, PER/CENT/MIDLINE: HCPCS

## 2025-01-01 PROCEDURE — 160009 HCHG ANES TIME/MIN: Performed by: NEUROLOGICAL SURGERY

## 2025-01-01 PROCEDURE — 86140 C-REACTIVE PROTEIN: CPT

## 2025-01-01 PROCEDURE — 160048 HCHG OR STATISTICAL LEVEL 1-5: Performed by: NEUROLOGICAL SURGERY

## 2025-01-01 PROCEDURE — 700111 HCHG RX REV CODE 636 W/ 250 OVERRIDE (IP): Performed by: NEUROLOGICAL SURGERY

## 2025-01-01 PROCEDURE — 160041 HCHG SURGERY MINUTES - EA ADDL 1 MIN LEVEL 4: Performed by: NEUROLOGICAL SURGERY

## 2025-01-01 PROCEDURE — 97602 WOUND(S) CARE NON-SELECTIVE: CPT

## 2025-01-01 PROCEDURE — 97163 PT EVAL HIGH COMPLEX 45 MIN: CPT

## 2025-01-01 PROCEDURE — 85576 BLOOD PLATELET AGGREGATION: CPT | Mod: 91

## 2025-01-01 PROCEDURE — 99233 SBSQ HOSP IP/OBS HIGH 50: CPT | Performed by: NURSE PRACTITIONER

## 2025-01-01 PROCEDURE — 99222 1ST HOSP IP/OBS MODERATE 55: CPT | Mod: 25 | Performed by: STUDENT IN AN ORGANIZED HEALTH CARE EDUCATION/TRAINING PROGRAM

## 2025-01-01 PROCEDURE — 99292 CRITICAL CARE ADDL 30 MIN: CPT | Performed by: INTERNAL MEDICINE

## 2025-01-01 PROCEDURE — 160015 HCHG STAT PREOP MINUTES: Performed by: NEUROLOGICAL SURGERY

## 2025-01-01 PROCEDURE — 02HV33Z INSERTION OF INFUSION DEVICE INTO SUPERIOR VENA CAVA, PERCUTANEOUS APPROACH: ICD-10-PCS | Performed by: INTERNAL MEDICINE

## 2025-01-01 PROCEDURE — 94002 VENT MGMT INPAT INIT DAY: CPT

## 2025-01-01 PROCEDURE — 0BH17EZ INSERTION OF ENDOTRACHEAL AIRWAY INTO TRACHEA, VIA NATURAL OR ARTIFICIAL OPENING: ICD-10-PCS | Performed by: INTERNAL MEDICINE

## 2025-01-01 PROCEDURE — 009400Z DRAINAGE OF INTRACRANIAL SUBDURAL SPACE WITH DRAINAGE DEVICE, OPEN APPROACH: ICD-10-PCS | Performed by: NEUROLOGICAL SURGERY

## 2025-01-01 PROCEDURE — 110454 HCHG SHELL REV 250: Performed by: NEUROLOGICAL SURGERY

## 2025-01-01 PROCEDURE — 80307 DRUG TEST PRSMV CHEM ANLYZR: CPT

## 2025-01-01 PROCEDURE — 82077 ASSAY SPEC XCP UR&BREATH IA: CPT

## 2025-01-01 PROCEDURE — 85384 FIBRINOGEN ACTIVITY: CPT | Mod: 91

## 2025-01-01 PROCEDURE — 99233 SBSQ HOSP IP/OBS HIGH 50: CPT | Mod: GC | Performed by: FAMILY MEDICINE

## 2025-01-01 PROCEDURE — 160029 HCHG SURGERY MINUTES - 1ST 30 MINS LEVEL 4: Performed by: NEUROLOGICAL SURGERY

## 2025-01-01 PROCEDURE — 160002 HCHG RECOVERY MINUTES (STAT): Performed by: NEUROLOGICAL SURGERY

## 2025-01-01 PROCEDURE — 87040 BLOOD CULTURE FOR BACTERIA: CPT | Mod: 91

## 2025-01-01 PROCEDURE — C1713 ANCHOR/SCREW BN/BN,TIS/BN: HCPCS | Performed by: NEUROLOGICAL SURGERY

## 2025-01-01 PROCEDURE — 700101 HCHG RX REV CODE 250: Performed by: NURSE PRACTITIONER

## 2025-01-01 PROCEDURE — 97166 OT EVAL MOD COMPLEX 45 MIN: CPT

## 2025-01-01 DEVICE — GRAFT DURAMATRIX ONLAY PLUS 1IN X 1IN OR 2.7CM X 2.75CM: Type: IMPLANTABLE DEVICE | Site: CRANIAL | Status: FUNCTIONAL

## 2025-01-01 DEVICE — SCREW STRYK NC 1.5X5MM (6NCX40=240) (80EA/PK) CONSIGNED QTY 240 PRE-LOAD: Type: IMPLANTABLE DEVICE | Site: CRANIAL | Status: FUNCTIONAL

## 2025-01-01 DEVICE — PLATE SHUNT LARGE GAP 14MM WITH TAB (6NCX2=12): Type: IMPLANTABLE DEVICE | Site: CRANIAL | Status: FUNCTIONAL

## 2025-01-01 DEVICE — PLATE NC DOGBONE 2-HOLE RIGID GOLD 0.6MM (6NCX4=24): Type: IMPLANTABLE DEVICE | Site: CRANIAL | Status: FUNCTIONAL

## 2025-01-01 RX ORDER — AMOXICILLIN 250 MG
1 CAPSULE ORAL
Status: DISCONTINUED | OUTPATIENT
Start: 2025-01-01 | End: 2025-01-01

## 2025-01-01 RX ORDER — AMOXICILLIN 250 MG
1 CAPSULE ORAL NIGHTLY
Status: DISCONTINUED | OUTPATIENT
Start: 2025-01-01 | End: 2025-01-01

## 2025-01-01 RX ORDER — DIAZEPAM 10 MG/2ML
5 INJECTION, SOLUTION INTRAMUSCULAR; INTRAVENOUS
Status: DISCONTINUED | OUTPATIENT
Start: 2025-01-01 | End: 2025-04-26 | Stop reason: HOSPADM

## 2025-01-01 RX ORDER — DEXAMETHASONE SODIUM PHOSPHATE 4 MG/ML
4 INJECTION, SOLUTION INTRA-ARTICULAR; INTRALESIONAL; INTRAMUSCULAR; INTRAVENOUS; SOFT TISSUE
Status: DISCONTINUED | OUTPATIENT
Start: 2025-01-01 | End: 2025-01-01

## 2025-01-01 RX ORDER — BACITRACIN ZINC AND POLYMYXIN B SULFATE 500; 1000 [USP'U]/G; [USP'U]/G
OINTMENT TOPICAL 2 TIMES DAILY
Status: DISCONTINUED | OUTPATIENT
Start: 2025-01-01 | End: 2025-04-26 | Stop reason: HOSPADM

## 2025-01-01 RX ORDER — HYDROMORPHONE HYDROCHLORIDE 1 MG/ML
0.4 INJECTION, SOLUTION INTRAMUSCULAR; INTRAVENOUS; SUBCUTANEOUS
Status: DISCONTINUED | OUTPATIENT
Start: 2025-01-01 | End: 2025-01-01 | Stop reason: HOSPADM

## 2025-01-01 RX ORDER — HYDRALAZINE HYDROCHLORIDE 20 MG/ML
10 INJECTION INTRAMUSCULAR; INTRAVENOUS
Status: DISCONTINUED | OUTPATIENT
Start: 2025-01-01 | End: 2025-01-01

## 2025-01-01 RX ORDER — LABETALOL HYDROCHLORIDE 5 MG/ML
10 INJECTION, SOLUTION INTRAVENOUS
Status: DISCONTINUED | OUTPATIENT
Start: 2025-01-01 | End: 2025-01-01

## 2025-01-01 RX ORDER — CLOBAZAM 10 MG/1
15 TABLET ORAL 2 TIMES DAILY
Status: DISCONTINUED | OUTPATIENT
Start: 2025-01-01 | End: 2025-04-26 | Stop reason: HOSPADM

## 2025-01-01 RX ORDER — MIDAZOLAM HYDROCHLORIDE 1 MG/ML
5 INJECTION INTRAMUSCULAR; INTRAVENOUS
Status: DISCONTINUED | OUTPATIENT
Start: 2025-01-01 | End: 2025-01-01

## 2025-01-01 RX ORDER — MAGNESIUM SULFATE HEPTAHYDRATE 40 MG/ML
2 INJECTION, SOLUTION INTRAVENOUS ONCE
Status: COMPLETED | OUTPATIENT
Start: 2025-01-01 | End: 2025-01-01

## 2025-01-01 RX ORDER — BISACODYL 10 MG
10 SUPPOSITORY, RECTAL RECTAL
Status: DISCONTINUED | OUTPATIENT
Start: 2025-01-01 | End: 2025-01-01

## 2025-01-01 RX ORDER — SODIUM CHLORIDE 9 MG/ML
1000 INJECTION, SOLUTION INTRAVENOUS ONCE
Status: COMPLETED | OUTPATIENT
Start: 2025-01-01 | End: 2025-01-01

## 2025-01-01 RX ORDER — AMOXICILLIN 250 MG
2 CAPSULE ORAL EVERY EVENING
Status: DISCONTINUED | OUTPATIENT
Start: 2025-01-01 | End: 2025-01-01

## 2025-01-01 RX ORDER — DEXAMETHASONE SODIUM PHOSPHATE 4 MG/ML
INJECTION, SOLUTION INTRA-ARTICULAR; INTRALESIONAL; INTRAMUSCULAR; INTRAVENOUS; SOFT TISSUE PRN
Status: DISCONTINUED | OUTPATIENT
Start: 2025-01-01 | End: 2025-01-01 | Stop reason: SURG

## 2025-01-01 RX ORDER — FAMOTIDINE 20 MG/1
20 TABLET, FILM COATED ORAL EVERY 12 HOURS
Status: DISCONTINUED | OUTPATIENT
Start: 2025-01-01 | End: 2025-01-01

## 2025-01-01 RX ORDER — POLYETHYLENE GLYCOL 3350 17 G/17G
1 POWDER, FOR SOLUTION ORAL 2 TIMES DAILY PRN
Status: DISCONTINUED | OUTPATIENT
Start: 2025-01-01 | End: 2025-01-01

## 2025-01-01 RX ORDER — CLONIDINE HYDROCHLORIDE 0.1 MG/1
0.1 TABLET ORAL EVERY 4 HOURS PRN
Status: DISCONTINUED | OUTPATIENT
Start: 2025-01-01 | End: 2025-01-01

## 2025-01-01 RX ORDER — DEXTROSE, SODIUM CHLORIDE, SODIUM LACTATE, POTASSIUM CHLORIDE, AND CALCIUM CHLORIDE 5; .6; .31; .03; .02 G/100ML; G/100ML; G/100ML; G/100ML; G/100ML
INJECTION, SOLUTION INTRAVENOUS CONTINUOUS
Status: DISCONTINUED | OUTPATIENT
Start: 2025-01-01 | End: 2025-01-01

## 2025-01-01 RX ORDER — ENOXAPARIN SODIUM 100 MG/ML
40 INJECTION SUBCUTANEOUS DAILY
Status: DISCONTINUED | OUTPATIENT
Start: 2025-01-01 | End: 2025-04-26 | Stop reason: HOSPADM

## 2025-01-01 RX ORDER — DEXTROSE MONOHYDRATE AND SODIUM CHLORIDE 5; .45 G/100ML; G/100ML
INJECTION, SOLUTION INTRAVENOUS CONTINUOUS
Status: DISCONTINUED | OUTPATIENT
Start: 2025-01-01 | End: 2025-01-01

## 2025-01-01 RX ORDER — THIAMINE HYDROCHLORIDE 100 MG/ML
200 INJECTION, SOLUTION INTRAMUSCULAR; INTRAVENOUS EVERY 8 HOURS
Status: DISCONTINUED | OUTPATIENT
Start: 2025-01-01 | End: 2025-01-01

## 2025-01-01 RX ORDER — LACOSAMIDE 100 MG/1
200 TABLET ORAL 2 TIMES DAILY
Status: DISCONTINUED | OUTPATIENT
Start: 2025-01-01 | End: 2025-04-26 | Stop reason: HOSPADM

## 2025-01-01 RX ORDER — LORAZEPAM 2 MG/ML
2 INJECTION INTRAMUSCULAR ONCE
Status: COMPLETED | OUTPATIENT
Start: 2025-01-01 | End: 2025-01-01

## 2025-01-01 RX ORDER — LACOSAMIDE 10 MG/ML
200 INJECTION, SOLUTION INTRAVENOUS 2 TIMES DAILY
Status: DISCONTINUED | OUTPATIENT
Start: 2025-01-01 | End: 2025-01-01

## 2025-01-01 RX ORDER — POLYETHYLENE GLYCOL 3350 17 G/17G
1 POWDER, FOR SOLUTION ORAL
Status: DISCONTINUED | OUTPATIENT
Start: 2025-01-01 | End: 2025-04-26 | Stop reason: HOSPADM

## 2025-01-01 RX ORDER — ONDANSETRON 2 MG/ML
INJECTION INTRAMUSCULAR; INTRAVENOUS PRN
Status: DISCONTINUED | OUTPATIENT
Start: 2025-01-01 | End: 2025-01-01 | Stop reason: SURG

## 2025-01-01 RX ORDER — THIAMINE HYDROCHLORIDE 100 MG/ML
200 INJECTION, SOLUTION INTRAMUSCULAR; INTRAVENOUS EVERY 8 HOURS
Status: COMPLETED | OUTPATIENT
Start: 2025-01-01 | End: 2025-01-01

## 2025-01-01 RX ORDER — NOREPINEPHRINE BITARTRATE 0.03 MG/ML
0-1 INJECTION, SOLUTION INTRAVENOUS CONTINUOUS
Status: DISCONTINUED | OUTPATIENT
Start: 2025-01-01 | End: 2025-04-26 | Stop reason: HOSPADM

## 2025-01-01 RX ORDER — BISACODYL 10 MG
10 SUPPOSITORY, RECTAL RECTAL
Status: DISCONTINUED | OUTPATIENT
Start: 2025-01-01 | End: 2025-04-26 | Stop reason: HOSPADM

## 2025-01-01 RX ORDER — CLOBAZAM 10 MG/1
5 TABLET ORAL ONCE
Status: COMPLETED | OUTPATIENT
Start: 2025-01-01 | End: 2025-01-01

## 2025-01-01 RX ORDER — DIPHENHYDRAMINE HYDROCHLORIDE 50 MG/ML
25 INJECTION, SOLUTION INTRAMUSCULAR; INTRAVENOUS EVERY 6 HOURS PRN
Status: DISCONTINUED | OUTPATIENT
Start: 2025-01-01 | End: 2025-01-01

## 2025-01-01 RX ORDER — ALBUTEROL SULFATE 5 MG/ML
2.5 SOLUTION RESPIRATORY (INHALATION)
Status: DISCONTINUED | OUTPATIENT
Start: 2025-01-01 | End: 2025-01-01 | Stop reason: HOSPADM

## 2025-01-01 RX ORDER — SODIUM PHOSPHATE,MONO-DIBASIC 19G-7G/118
1 ENEMA (ML) RECTAL
Status: DISCONTINUED | OUTPATIENT
Start: 2025-01-01 | End: 2025-04-26 | Stop reason: HOSPADM

## 2025-01-01 RX ORDER — ETOMIDATE 2 MG/ML
20 INJECTION INTRAVENOUS ONCE
Status: COMPLETED | OUTPATIENT
Start: 2025-01-01 | End: 2025-01-01

## 2025-01-01 RX ORDER — GAUZE BANDAGE 2" X 2"
100 BANDAGE TOPICAL DAILY
Status: DISCONTINUED | OUTPATIENT
Start: 2025-01-01 | End: 2025-01-01

## 2025-01-01 RX ORDER — LEVETIRACETAM 500 MG/5ML
1000 INJECTION, SOLUTION, CONCENTRATE INTRAVENOUS EVERY 12 HOURS
Status: DISCONTINUED | OUTPATIENT
Start: 2025-01-01 | End: 2025-01-01

## 2025-01-01 RX ORDER — DOCUSATE SODIUM 100 MG/1
100 CAPSULE, LIQUID FILLED ORAL 2 TIMES DAILY
Status: DISCONTINUED | OUTPATIENT
Start: 2025-01-01 | End: 2025-01-01

## 2025-01-01 RX ORDER — DOCUSATE SODIUM 50 MG/5ML
100 LIQUID ORAL 2 TIMES DAILY
Status: DISCONTINUED | OUTPATIENT
Start: 2025-01-01 | End: 2025-01-01

## 2025-01-01 RX ORDER — DIPHENHYDRAMINE HYDROCHLORIDE 50 MG/ML
12.5 INJECTION, SOLUTION INTRAMUSCULAR; INTRAVENOUS
Status: DISCONTINUED | OUTPATIENT
Start: 2025-01-01 | End: 2025-01-01 | Stop reason: HOSPADM

## 2025-01-01 RX ORDER — LEVETIRACETAM 500 MG/5ML
1000 INJECTION, SOLUTION, CONCENTRATE INTRAVENOUS ONCE
Status: COMPLETED | OUTPATIENT
Start: 2025-01-01 | End: 2025-01-01

## 2025-01-01 RX ORDER — CEFAZOLIN SODIUM 1 G/3ML
INJECTION, POWDER, FOR SOLUTION INTRAMUSCULAR; INTRAVENOUS
Status: DISCONTINUED | OUTPATIENT
Start: 2025-01-01 | End: 2025-01-01 | Stop reason: HOSPADM

## 2025-01-01 RX ORDER — LEVETIRACETAM 500 MG/5ML
1500 INJECTION, SOLUTION, CONCENTRATE INTRAVENOUS EVERY 12 HOURS
Status: DISCONTINUED | OUTPATIENT
Start: 2025-01-01 | End: 2025-01-01

## 2025-01-01 RX ORDER — CLOBAZAM 10 MG/1
10 TABLET ORAL 2 TIMES DAILY
Status: DISCONTINUED | OUTPATIENT
Start: 2025-01-01 | End: 2025-01-01

## 2025-01-01 RX ORDER — THIAMINE HYDROCHLORIDE 100 MG/ML
100 INJECTION, SOLUTION INTRAMUSCULAR; INTRAVENOUS ONCE
Status: COMPLETED | OUTPATIENT
Start: 2025-01-01 | End: 2025-01-01

## 2025-01-01 RX ORDER — LIDOCAINE HYDROCHLORIDE 20 MG/ML
INJECTION, SOLUTION EPIDURAL; INFILTRATION; INTRACAUDAL; PERINEURAL PRN
Status: DISCONTINUED | OUTPATIENT
Start: 2025-01-01 | End: 2025-01-01 | Stop reason: SURG

## 2025-01-01 RX ORDER — POLYETHYLENE GLYCOL 3350 17 G/17G
1 POWDER, FOR SOLUTION ORAL
Status: DISCONTINUED | OUTPATIENT
Start: 2025-01-01 | End: 2025-01-01

## 2025-01-01 RX ORDER — MIDAZOLAM HYDROCHLORIDE 1 MG/ML
4 INJECTION INTRAMUSCULAR; INTRAVENOUS ONCE
Status: COMPLETED | OUTPATIENT
Start: 2025-01-01 | End: 2025-01-01

## 2025-01-01 RX ORDER — OXYCODONE HCL 5 MG/5 ML
10 SOLUTION, ORAL ORAL
Status: DISCONTINUED | OUTPATIENT
Start: 2025-01-01 | End: 2025-01-01 | Stop reason: HOSPADM

## 2025-01-01 RX ORDER — NOREPINEPHRINE BITARTRATE 0.03 MG/ML
0-1 INJECTION, SOLUTION INTRAVENOUS CONTINUOUS
Status: DISCONTINUED | OUTPATIENT
Start: 2025-01-01 | End: 2025-01-01

## 2025-01-01 RX ORDER — LEVETIRACETAM 500 MG/1
1500 TABLET ORAL 2 TIMES DAILY
Status: DISCONTINUED | OUTPATIENT
Start: 2025-01-01 | End: 2025-04-26 | Stop reason: HOSPADM

## 2025-01-01 RX ORDER — FOLIC ACID 1 MG/1
1 TABLET ORAL DAILY
Status: DISCONTINUED | OUTPATIENT
Start: 2025-01-01 | End: 2025-04-26 | Stop reason: HOSPADM

## 2025-01-01 RX ORDER — PHENYLEPHRINE HCL IN 0.9% NACL 1 MG/10 ML
SYRINGE (ML) INTRAVENOUS PRN
Status: DISCONTINUED | OUTPATIENT
Start: 2025-01-01 | End: 2025-01-01 | Stop reason: SURG

## 2025-01-01 RX ORDER — AMOXICILLIN 250 MG
2 CAPSULE ORAL 2 TIMES DAILY
Status: DISCONTINUED | OUTPATIENT
Start: 2025-01-01 | End: 2025-04-26 | Stop reason: HOSPADM

## 2025-01-01 RX ORDER — MIDAZOLAM HYDROCHLORIDE 1 MG/ML
1 INJECTION INTRAMUSCULAR; INTRAVENOUS
Status: DISCONTINUED | OUTPATIENT
Start: 2025-01-01 | End: 2025-01-01

## 2025-01-01 RX ORDER — SODIUM CHLORIDE, SODIUM LACTATE, POTASSIUM CHLORIDE, AND CALCIUM CHLORIDE .6; .31; .03; .02 G/100ML; G/100ML; G/100ML; G/100ML
1000 INJECTION, SOLUTION INTRAVENOUS ONCE
Status: COMPLETED | OUTPATIENT
Start: 2025-01-01 | End: 2025-01-01

## 2025-01-01 RX ORDER — FOLIC ACID 1 MG/1
1 TABLET ORAL DAILY
Status: DISCONTINUED | OUTPATIENT
Start: 2025-01-01 | End: 2025-01-01

## 2025-01-01 RX ORDER — ROCURONIUM BROMIDE 10 MG/ML
INJECTION, SOLUTION INTRAVENOUS PRN
Status: DISCONTINUED | OUTPATIENT
Start: 2025-01-01 | End: 2025-01-01 | Stop reason: SURG

## 2025-01-01 RX ORDER — LABETALOL HYDROCHLORIDE 5 MG/ML
5 INJECTION, SOLUTION INTRAVENOUS
Status: DISCONTINUED | OUTPATIENT
Start: 2025-01-01 | End: 2025-01-01 | Stop reason: HOSPADM

## 2025-01-01 RX ORDER — ACETAMINOPHEN 325 MG/1
650 TABLET ORAL EVERY 6 HOURS PRN
Status: DISCONTINUED | OUTPATIENT
Start: 2025-01-01 | End: 2025-04-26 | Stop reason: HOSPADM

## 2025-01-01 RX ORDER — FUROSEMIDE 10 MG/ML
40 INJECTION INTRAMUSCULAR; INTRAVENOUS ONCE
Status: COMPLETED | OUTPATIENT
Start: 2025-01-01 | End: 2025-01-01

## 2025-01-01 RX ORDER — MORPHINE SULFATE 4 MG/ML
4 INJECTION INTRAVENOUS
Status: DISCONTINUED | OUTPATIENT
Start: 2025-01-01 | End: 2025-04-26 | Stop reason: HOSPADM

## 2025-01-01 RX ORDER — SODIUM CHLORIDE 9 MG/ML
INJECTION, SOLUTION INTRAVENOUS
Status: DISCONTINUED | OUTPATIENT
Start: 2025-01-01 | End: 2025-01-01 | Stop reason: SURG

## 2025-01-01 RX ORDER — SODIUM CHLORIDE, SODIUM LACTATE, POTASSIUM CHLORIDE, CALCIUM CHLORIDE 600; 310; 30; 20 MG/100ML; MG/100ML; MG/100ML; MG/100ML
INJECTION, SOLUTION INTRAVENOUS CONTINUOUS
Status: DISCONTINUED | OUTPATIENT
Start: 2025-01-01 | End: 2025-01-01 | Stop reason: HOSPADM

## 2025-01-01 RX ORDER — MAGNESIUM SULFATE HEPTAHYDRATE 40 MG/ML
4 INJECTION, SOLUTION INTRAVENOUS ONCE
Status: COMPLETED | OUTPATIENT
Start: 2025-01-01 | End: 2025-01-01

## 2025-01-01 RX ORDER — FUROSEMIDE 10 MG/ML
40 INJECTION INTRAMUSCULAR; INTRAVENOUS 2 TIMES DAILY
Status: DISCONTINUED | OUTPATIENT
Start: 2025-01-01 | End: 2025-04-26 | Stop reason: HOSPADM

## 2025-01-01 RX ORDER — ONDANSETRON 2 MG/ML
4 INJECTION INTRAMUSCULAR; INTRAVENOUS
Status: DISCONTINUED | OUTPATIENT
Start: 2025-01-01 | End: 2025-01-01 | Stop reason: HOSPADM

## 2025-01-01 RX ORDER — HYDRALAZINE HYDROCHLORIDE 20 MG/ML
5 INJECTION INTRAMUSCULAR; INTRAVENOUS
Status: DISCONTINUED | OUTPATIENT
Start: 2025-01-01 | End: 2025-01-01 | Stop reason: HOSPADM

## 2025-01-01 RX ORDER — LEVETIRACETAM 500 MG/5ML
3000 INJECTION, SOLUTION, CONCENTRATE INTRAVENOUS ONCE
Status: COMPLETED | OUTPATIENT
Start: 2025-01-01 | End: 2025-01-01

## 2025-01-01 RX ORDER — HYDRALAZINE HYDROCHLORIDE 20 MG/ML
10 INJECTION INTRAMUSCULAR; INTRAVENOUS EVERY 4 HOURS PRN
Status: DISCONTINUED | OUTPATIENT
Start: 2025-01-01 | End: 2025-04-26 | Stop reason: HOSPADM

## 2025-01-01 RX ORDER — HALOPERIDOL 5 MG/ML
1 INJECTION INTRAMUSCULAR
Status: DISCONTINUED | OUTPATIENT
Start: 2025-01-01 | End: 2025-01-01 | Stop reason: HOSPADM

## 2025-01-01 RX ORDER — EPHEDRINE SULFATE 50 MG/ML
5 INJECTION, SOLUTION INTRAVENOUS
Status: DISCONTINUED | OUTPATIENT
Start: 2025-01-01 | End: 2025-01-01 | Stop reason: HOSPADM

## 2025-01-01 RX ORDER — SCOPOLAMINE 1 MG/3D
1 PATCH, EXTENDED RELEASE TRANSDERMAL
Status: DISCONTINUED | OUTPATIENT
Start: 2025-01-01 | End: 2025-01-01

## 2025-01-01 RX ORDER — ROCURONIUM BROMIDE 10 MG/ML
100 INJECTION, SOLUTION INTRAVENOUS ONCE
Status: COMPLETED | OUTPATIENT
Start: 2025-01-01 | End: 2025-01-01

## 2025-01-01 RX ORDER — MIDAZOLAM HYDROCHLORIDE 1 MG/ML
4 INJECTION INTRAMUSCULAR; INTRAVENOUS
Status: DISCONTINUED | OUTPATIENT
Start: 2025-01-01 | End: 2025-01-01

## 2025-01-01 RX ORDER — ONDANSETRON 2 MG/ML
4 INJECTION INTRAMUSCULAR; INTRAVENOUS EVERY 4 HOURS PRN
Status: DISCONTINUED | OUTPATIENT
Start: 2025-01-01 | End: 2025-04-26 | Stop reason: HOSPADM

## 2025-01-01 RX ORDER — HYDROMORPHONE HYDROCHLORIDE 1 MG/ML
0.1 INJECTION, SOLUTION INTRAMUSCULAR; INTRAVENOUS; SUBCUTANEOUS
Status: DISCONTINUED | OUTPATIENT
Start: 2025-01-01 | End: 2025-01-01 | Stop reason: HOSPADM

## 2025-01-01 RX ORDER — MIDAZOLAM HYDROCHLORIDE 1 MG/ML
2 INJECTION INTRAMUSCULAR; INTRAVENOUS
Status: DISCONTINUED | OUTPATIENT
Start: 2025-01-01 | End: 2025-01-01

## 2025-01-01 RX ORDER — GAUZE BANDAGE 2" X 2"
100 BANDAGE TOPICAL DAILY
Status: DISCONTINUED | OUTPATIENT
Start: 2025-01-01 | End: 2025-04-26 | Stop reason: HOSPADM

## 2025-01-01 RX ORDER — ATROPINE SULFATE 10 MG/ML
2 SOLUTION/ DROPS OPHTHALMIC EVERY 4 HOURS PRN
Status: DISCONTINUED | OUTPATIENT
Start: 2025-01-01 | End: 2025-04-26 | Stop reason: HOSPADM

## 2025-01-01 RX ORDER — OXYCODONE HCL 5 MG/5 ML
5 SOLUTION, ORAL ORAL
Status: DISCONTINUED | OUTPATIENT
Start: 2025-01-01 | End: 2025-01-01 | Stop reason: HOSPADM

## 2025-01-01 RX ORDER — HYDROMORPHONE HYDROCHLORIDE 1 MG/ML
0.2 INJECTION, SOLUTION INTRAMUSCULAR; INTRAVENOUS; SUBCUTANEOUS
Status: DISCONTINUED | OUTPATIENT
Start: 2025-01-01 | End: 2025-01-01 | Stop reason: HOSPADM

## 2025-01-01 RX ADMIN — PROPOFOL 80 MCG/KG/MIN: 10 INJECTION, EMULSION INTRAVENOUS at 19:43

## 2025-01-01 RX ADMIN — SODIUM CHLORIDE, SODIUM LACTATE, POTASSIUM CHLORIDE, CALCIUM CHLORIDE AND DEXTROSE MONOHYDRATE: 5; 600; 310; 30; 20 INJECTION, SOLUTION INTRAVENOUS at 20:50

## 2025-01-01 RX ADMIN — PROPOFOL 80 MCG/KG/MIN: 10 INJECTION, EMULSION INTRAVENOUS at 10:55

## 2025-01-01 RX ADMIN — MAGNESIUM SULFATE HEPTAHYDRATE 2 G: 2 INJECTION, SOLUTION INTRAVENOUS at 08:39

## 2025-01-01 RX ADMIN — FAMOTIDINE 20 MG: 10 INJECTION, SOLUTION INTRAVENOUS at 09:48

## 2025-01-01 RX ADMIN — ENOXAPARIN SODIUM 40 MG: 100 INJECTION SUBCUTANEOUS at 17:04

## 2025-01-01 RX ADMIN — Medication 100 MG: at 05:53

## 2025-01-01 RX ADMIN — DIAZEPAM 5 MG: 10 INJECTION, SOLUTION INTRAMUSCULAR; INTRAVENOUS at 20:00

## 2025-01-01 RX ADMIN — FOLIC ACID 1 MG: 1 TABLET ORAL at 05:33

## 2025-01-01 RX ADMIN — MAGNESIUM SULFATE HEPTAHYDRATE 2 G: 2 INJECTION, SOLUTION INTRAVENOUS at 08:19

## 2025-01-01 RX ADMIN — SENNOSIDES AND DOCUSATE SODIUM 2 TABLET: 50; 8.6 TABLET ORAL at 17:31

## 2025-01-01 RX ADMIN — MORPHINE SULFATE 8 MG: 10 INJECTION INTRAVENOUS at 13:55

## 2025-01-01 RX ADMIN — PROPOFOL 80 MCG/KG/MIN: 10 INJECTION, EMULSION INTRAVENOUS at 01:36

## 2025-01-01 RX ADMIN — MIDAZOLAM 3 MG/HR: 5 INJECTION INTRAMUSCULAR; INTRAVENOUS at 08:42

## 2025-01-01 RX ADMIN — LEVETIRACETAM 1500 MG: 500 TABLET, FILM COATED ORAL at 05:23

## 2025-01-01 RX ADMIN — HYDRALAZINE HYDROCHLORIDE 10 MG: 20 INJECTION, SOLUTION INTRAMUSCULAR; INTRAVENOUS at 23:02

## 2025-01-01 RX ADMIN — THIAMINE HYDROCHLORIDE 100 MG: 100 INJECTION, SOLUTION INTRAMUSCULAR; INTRAVENOUS at 14:42

## 2025-01-01 RX ADMIN — FAMOTIDINE 20 MG: 20 TABLET, FILM COATED ORAL at 05:35

## 2025-01-01 RX ADMIN — MIDAZOLAM 5 MG/HR: 5 INJECTION INTRAMUSCULAR; INTRAVENOUS at 06:00

## 2025-01-01 RX ADMIN — NOREPINEPHRINE BITARTRATE 0.2 MCG/KG/MIN: 1 INJECTION, SOLUTION, CONCENTRATE INTRAVENOUS at 06:35

## 2025-01-01 RX ADMIN — CLOBAZAM 15 MG: 10 TABLET ORAL at 17:26

## 2025-01-01 RX ADMIN — PROPOFOL 80 MCG/KG/MIN: 10 INJECTION, EMULSION INTRAVENOUS at 16:37

## 2025-01-01 RX ADMIN — LIDOCAINE HYDROCHLORIDE 50 MG: 20 INJECTION, SOLUTION EPIDURAL; INFILTRATION; INTRACAUDAL; PERINEURAL at 08:35

## 2025-01-01 RX ADMIN — PROPOFOL 80 MCG/KG/MIN: 10 INJECTION, EMULSION INTRAVENOUS at 07:35

## 2025-01-01 RX ADMIN — NOREPINEPHRINE BITARTRATE 0.07 MCG/KG/MIN: 1 INJECTION, SOLUTION, CONCENTRATE INTRAVENOUS at 19:51

## 2025-01-01 RX ADMIN — HYDRALAZINE HYDROCHLORIDE 10 MG: 20 INJECTION, SOLUTION INTRAMUSCULAR; INTRAVENOUS at 12:08

## 2025-01-01 RX ADMIN — ENOXAPARIN SODIUM 40 MG: 100 INJECTION SUBCUTANEOUS at 17:05

## 2025-01-01 RX ADMIN — ENOXAPARIN SODIUM 40 MG: 100 INJECTION SUBCUTANEOUS at 17:44

## 2025-01-01 RX ADMIN — LEVETIRACETAM 1500 MG: 500 TABLET, FILM COATED ORAL at 05:16

## 2025-01-01 RX ADMIN — CLOBAZAM 15 MG: 10 TABLET ORAL at 05:33

## 2025-01-01 RX ADMIN — Medication 100 MG: at 06:06

## 2025-01-01 RX ADMIN — LACOSAMIDE 200 MG: 100 TABLET, FILM COATED ORAL at 06:05

## 2025-01-01 RX ADMIN — CLOBAZAM 15 MG: 10 TABLET ORAL at 18:06

## 2025-01-01 RX ADMIN — ENOXAPARIN SODIUM 40 MG: 100 INJECTION SUBCUTANEOUS at 17:26

## 2025-01-01 RX ADMIN — MIDAZOLAM 5 MG/HR: 5 INJECTION INTRAMUSCULAR; INTRAVENOUS at 07:04

## 2025-01-01 RX ADMIN — LACOSAMIDE 200 MG: 100 TABLET, FILM COATED ORAL at 17:04

## 2025-01-01 RX ADMIN — SENNOSIDES AND DOCUSATE SODIUM 2 TABLET: 50; 8.6 TABLET ORAL at 05:08

## 2025-01-01 RX ADMIN — ATROPINE SULFATE 2 DROP: 10 SOLUTION/ DROPS OPHTHALMIC at 14:15

## 2025-01-01 RX ADMIN — PROPOFOL 50 MCG/KG/MIN: 10 INJECTION, EMULSION INTRAVENOUS at 21:12

## 2025-01-01 RX ADMIN — MAGNESIUM SULFATE HEPTAHYDRATE 4 G: 4 INJECTION, SOLUTION INTRAVENOUS at 11:03

## 2025-01-01 RX ADMIN — MAGNESIUM SULFATE HEPTAHYDRATE 2 G: 2 INJECTION, SOLUTION INTRAVENOUS at 08:03

## 2025-01-01 RX ADMIN — LEVETIRACETAM 3000 MG: 100 INJECTION, SOLUTION INTRAVENOUS at 18:27

## 2025-01-01 RX ADMIN — THERA TABS 1 TABLET: TAB at 05:08

## 2025-01-01 RX ADMIN — LACOSAMIDE 200 MG: 100 TABLET, FILM COATED ORAL at 05:16

## 2025-01-01 RX ADMIN — FOLIC ACID 1 MG: 1 TABLET ORAL at 14:32

## 2025-01-01 RX ADMIN — FUROSEMIDE 40 MG: 10 INJECTION INTRAMUSCULAR; INTRAVENOUS at 06:05

## 2025-01-01 RX ADMIN — VANCOMYCIN HYDROCHLORIDE 1500 MG: 5 INJECTION, POWDER, LYOPHILIZED, FOR SOLUTION INTRAVENOUS at 07:33

## 2025-01-01 RX ADMIN — FOLIC ACID 1 MG: 1 TABLET ORAL at 05:18

## 2025-01-01 RX ADMIN — LACOSAMIDE 200 MG: 10 INJECTION INTRAVENOUS at 18:07

## 2025-01-01 RX ADMIN — Medication 1 EACH: at 17:56

## 2025-01-01 RX ADMIN — CLOBAZAM 15 MG: 10 TABLET ORAL at 17:05

## 2025-01-01 RX ADMIN — SODIUM CHLORIDE 1000 ML: 9 INJECTION, SOLUTION INTRAVENOUS at 08:49

## 2025-01-01 RX ADMIN — DEXAMETHASONE SODIUM PHOSPHATE 8 MG: 4 INJECTION INTRA-ARTICULAR; INTRALESIONAL; INTRAMUSCULAR; INTRAVENOUS; SOFT TISSUE at 08:52

## 2025-01-01 RX ADMIN — ATROPINE SULFATE 2 DROP: 10 SOLUTION/ DROPS OPHTHALMIC at 19:43

## 2025-01-01 RX ADMIN — LEVETIRACETAM 1500 MG: 500 TABLET, FILM COATED ORAL at 17:44

## 2025-01-01 RX ADMIN — MORPHINE SULFATE 8 MG: 10 INJECTION INTRAVENOUS at 19:40

## 2025-01-01 RX ADMIN — LEVETIRACETAM 1500 MG: 500 TABLET, FILM COATED ORAL at 17:04

## 2025-01-01 RX ADMIN — THERA TABS 1 TABLET: TAB at 05:52

## 2025-01-01 RX ADMIN — Medication 200 MCG: at 08:46

## 2025-01-01 RX ADMIN — LACOSAMIDE 200 MG: 10 INJECTION INTRAVENOUS at 05:08

## 2025-01-01 RX ADMIN — THERA TABS 1 TABLET: TAB at 05:35

## 2025-01-01 RX ADMIN — LEVETIRACETAM 1500 MG: 100 INJECTION, SOLUTION INTRAVENOUS at 05:08

## 2025-01-01 RX ADMIN — LEVETIRACETAM 1500 MG: 100 INJECTION, SOLUTION INTRAVENOUS at 05:35

## 2025-01-01 RX ADMIN — PROPOFOL 80 MCG/KG/MIN: 10 INJECTION, EMULSION INTRAVENOUS at 11:00

## 2025-01-01 RX ADMIN — CLOBAZAM 15 MG: 10 TABLET ORAL at 17:04

## 2025-01-01 RX ADMIN — FOLIC ACID 1 MG: 1 TABLET ORAL at 06:06

## 2025-01-01 RX ADMIN — LACOSAMIDE 200 MG: 10 INJECTION INTRAVENOUS at 05:34

## 2025-01-01 RX ADMIN — FAMOTIDINE 20 MG: 10 INJECTION, SOLUTION INTRAVENOUS at 17:46

## 2025-01-01 RX ADMIN — CLOBAZAM 15 MG: 10 TABLET ORAL at 17:29

## 2025-01-01 RX ADMIN — ROCURONIUM BROMIDE 100 MG: 10 INJECTION INTRAVENOUS at 22:20

## 2025-01-01 RX ADMIN — LEVETIRACETAM 1500 MG: 100 INJECTION, SOLUTION INTRAVENOUS at 05:04

## 2025-01-01 RX ADMIN — LEVETIRACETAM 1500 MG: 100 INJECTION, SOLUTION INTRAVENOUS at 18:07

## 2025-01-01 RX ADMIN — PROPOFOL 80 MCG/KG/MIN: 10 INJECTION, EMULSION INTRAVENOUS at 08:14

## 2025-01-01 RX ADMIN — DIAZEPAM 5 MG: 10 INJECTION, SOLUTION INTRAMUSCULAR; INTRAVENOUS at 17:12

## 2025-01-01 RX ADMIN — ENOXAPARIN SODIUM 40 MG: 100 INJECTION SUBCUTANEOUS at 18:07

## 2025-01-01 RX ADMIN — CEFAZOLIN 2 G: 2 INJECTION, POWDER, FOR SOLUTION INTRAMUSCULAR; INTRAVENOUS at 19:04

## 2025-01-01 RX ADMIN — SENNOSIDES AND DOCUSATE SODIUM 2 TABLET: 50; 8.6 TABLET ORAL at 17:04

## 2025-01-01 RX ADMIN — THIAMINE HYDROCHLORIDE 200 MG: 100 INJECTION, SOLUTION INTRAMUSCULAR; INTRAVENOUS at 22:26

## 2025-01-01 RX ADMIN — SODIUM CHLORIDE, SODIUM LACTATE, POTASSIUM CHLORIDE, CALCIUM CHLORIDE AND DEXTROSE MONOHYDRATE: 5; 600; 310; 30; 20 INJECTION, SOLUTION INTRAVENOUS at 20:18

## 2025-01-01 RX ADMIN — FOLIC ACID 1 MG: 1 TABLET ORAL at 05:35

## 2025-01-01 RX ADMIN — LORAZEPAM 2 MG: 2 INJECTION INTRAMUSCULAR; INTRAVENOUS at 18:25

## 2025-01-01 RX ADMIN — FAMOTIDINE 20 MG: 20 TABLET, FILM COATED ORAL at 17:21

## 2025-01-01 RX ADMIN — PROPOFOL 80 MCG/KG/MIN: 10 INJECTION, EMULSION INTRAVENOUS at 04:36

## 2025-01-01 RX ADMIN — NOREPINEPHRINE BITARTRATE 0.18 MCG/KG/MIN: 1 INJECTION, SOLUTION, CONCENTRATE INTRAVENOUS at 17:30

## 2025-01-01 RX ADMIN — SODIUM CHLORIDE 1000 ML: 9 INJECTION, SOLUTION INTRAVENOUS at 10:31

## 2025-01-01 RX ADMIN — DIAZEPAM 5 MG: 10 INJECTION, SOLUTION INTRAMUSCULAR; INTRAVENOUS at 20:01

## 2025-01-01 RX ADMIN — MINERAL OIL, PETROLATUM 1 APPLICATION: 425; 568 OINTMENT OPHTHALMIC at 14:35

## 2025-01-01 RX ADMIN — NOREPINEPHRINE BITARTRATE 0.12 MCG/KG/MIN: 1 INJECTION, SOLUTION, CONCENTRATE INTRAVENOUS at 19:01

## 2025-01-01 RX ADMIN — SODIUM CHLORIDE, POTASSIUM CHLORIDE, SODIUM LACTATE AND CALCIUM CHLORIDE 1000 ML: 600; 310; 30; 20 INJECTION, SOLUTION INTRAVENOUS at 13:44

## 2025-01-01 RX ADMIN — LACOSAMIDE 200 MG: 10 INJECTION INTRAVENOUS at 17:31

## 2025-01-01 RX ADMIN — FENTANYL CITRATE 50 MCG: 50 INJECTION, SOLUTION INTRAMUSCULAR; INTRAVENOUS at 09:41

## 2025-01-01 RX ADMIN — PROPOFOL 80 MCG/KG/MIN: 10 INJECTION, EMULSION INTRAVENOUS at 13:56

## 2025-01-01 RX ADMIN — POTASSIUM BICARBONATE 50 MEQ: 978 TABLET, EFFERVESCENT ORAL at 08:24

## 2025-01-01 RX ADMIN — MAGNESIUM HYDROXIDE 30 ML: 2400 SUSPENSION ORAL at 17:32

## 2025-01-01 RX ADMIN — DEXTROSE AND SODIUM CHLORIDE: 5; 450 INJECTION, SOLUTION INTRAVENOUS at 17:45

## 2025-01-01 RX ADMIN — SENNOSIDES AND DOCUSATE SODIUM 2 TABLET: 50; 8.6 TABLET ORAL at 17:21

## 2025-01-01 RX ADMIN — LEVETIRACETAM 1500 MG: 500 TABLET, FILM COATED ORAL at 17:26

## 2025-01-01 RX ADMIN — THIAMINE HYDROCHLORIDE 200 MG: 100 INJECTION, SOLUTION INTRAMUSCULAR; INTRAVENOUS at 05:24

## 2025-01-01 RX ADMIN — SODIUM CHLORIDE, POTASSIUM CHLORIDE, SODIUM LACTATE AND CALCIUM CHLORIDE 1000 ML: 600; 310; 30; 20 INJECTION, SOLUTION INTRAVENOUS at 09:13

## 2025-01-01 RX ADMIN — PROPOFOL 80 MCG/KG/MIN: 10 INJECTION, EMULSION INTRAVENOUS at 22:37

## 2025-01-01 RX ADMIN — PROPOFOL 40 MCG/KG/MIN: 10 INJECTION, EMULSION INTRAVENOUS at 03:28

## 2025-01-01 RX ADMIN — CLOBAZAM 15 MG: 10 TABLET ORAL at 05:18

## 2025-01-01 RX ADMIN — THERA TABS 1 TABLET: TAB at 05:18

## 2025-01-01 RX ADMIN — THIAMINE HYDROCHLORIDE 200 MG: 100 INJECTION, SOLUTION INTRAMUSCULAR; INTRAVENOUS at 05:04

## 2025-01-01 RX ADMIN — MAGNESIUM SULFATE HEPTAHYDRATE 2 G: 2 INJECTION, SOLUTION INTRAVENOUS at 08:08

## 2025-01-01 RX ADMIN — DEXTROSE AND SODIUM CHLORIDE: 5; 450 INJECTION, SOLUTION INTRAVENOUS at 16:24

## 2025-01-01 RX ADMIN — FOLIC ACID 1 MG: 1 TABLET ORAL at 05:16

## 2025-01-01 RX ADMIN — CLOBAZAM 10 MG: 10 TABLET ORAL at 08:37

## 2025-01-01 RX ADMIN — FUROSEMIDE 40 MG: 10 INJECTION INTRAMUSCULAR; INTRAVENOUS at 07:49

## 2025-01-01 RX ADMIN — LACOSAMIDE 200 MG: 100 TABLET, FILM COATED ORAL at 17:05

## 2025-01-01 RX ADMIN — PROPOFOL 80 MCG/KG/MIN: 10 INJECTION, EMULSION INTRAVENOUS at 23:08

## 2025-01-01 RX ADMIN — MIDAZOLAM HYDROCHLORIDE 4 MG: 1 INJECTION, SOLUTION INTRAMUSCULAR; INTRAVENOUS at 18:35

## 2025-01-01 RX ADMIN — POTASSIUM BICARBONATE 50 MEQ: 978 TABLET, EFFERVESCENT ORAL at 14:40

## 2025-01-01 RX ADMIN — NOREPINEPHRINE BITARTRATE 0.1 MCG/KG/MIN: 1 INJECTION, SOLUTION, CONCENTRATE INTRAVENOUS at 00:26

## 2025-01-01 RX ADMIN — MORPHINE SULFATE 8 MG: 10 INJECTION INTRAVENOUS at 17:47

## 2025-01-01 RX ADMIN — THIAMINE HYDROCHLORIDE 200 MG: 100 INJECTION, SOLUTION INTRAMUSCULAR; INTRAVENOUS at 21:21

## 2025-01-01 RX ADMIN — DIAZEPAM 5 MG: 10 INJECTION, SOLUTION INTRAMUSCULAR; INTRAVENOUS at 04:40

## 2025-01-01 RX ADMIN — MAGNESIUM SULFATE HEPTAHYDRATE 2 G: 2 INJECTION, SOLUTION INTRAVENOUS at 08:45

## 2025-01-01 RX ADMIN — PROPOFOL 80 MCG/KG/MIN: 10 INJECTION, EMULSION INTRAVENOUS at 01:59

## 2025-01-01 RX ADMIN — SENNOSIDES AND DOCUSATE SODIUM 2 TABLET: 50; 8.6 TABLET ORAL at 17:26

## 2025-01-01 RX ADMIN — LEVETIRACETAM 1000 MG: 100 INJECTION, SOLUTION INTRAVENOUS at 17:14

## 2025-01-01 RX ADMIN — DIAZEPAM 5 MG: 10 INJECTION, SOLUTION INTRAMUSCULAR; INTRAVENOUS at 13:09

## 2025-01-01 RX ADMIN — MAGNESIUM SULFATE HEPTAHYDRATE 2 G: 2 INJECTION, SOLUTION INTRAVENOUS at 08:31

## 2025-01-01 RX ADMIN — LEVETIRACETAM 1500 MG: 100 INJECTION, SOLUTION INTRAVENOUS at 17:47

## 2025-01-01 RX ADMIN — FOLIC ACID 1 MG: 1 TABLET ORAL at 05:23

## 2025-01-01 RX ADMIN — FUROSEMIDE 40 MG: 10 INJECTION INTRAMUSCULAR; INTRAVENOUS at 18:03

## 2025-01-01 RX ADMIN — CLOBAZAM 15 MG: 10 TABLET ORAL at 17:44

## 2025-01-01 RX ADMIN — LACOSAMIDE 200 MG: 100 TABLET, FILM COATED ORAL at 17:26

## 2025-01-01 RX ADMIN — Medication 1 EACH: at 06:07

## 2025-01-01 RX ADMIN — POLYETHYLENE GLYCOL 3350 1 PACKET: 17 POWDER, FOR SOLUTION ORAL at 17:21

## 2025-01-01 RX ADMIN — LEVETIRACETAM 1500 MG: 500 TABLET, FILM COATED ORAL at 05:18

## 2025-01-01 RX ADMIN — MAGNESIUM SULFATE HEPTAHYDRATE 2 G: 2 INJECTION, SOLUTION INTRAVENOUS at 09:34

## 2025-01-01 RX ADMIN — CLOBAZAM 15 MG: 10 TABLET ORAL at 05:52

## 2025-01-01 RX ADMIN — PROPOFOL 70 MCG/KG/MIN: 10 INJECTION, EMULSION INTRAVENOUS at 15:27

## 2025-01-01 RX ADMIN — FUROSEMIDE 40 MG: 10 INJECTION INTRAMUSCULAR; INTRAVENOUS at 08:03

## 2025-01-01 RX ADMIN — LACOSAMIDE 200 MG: 10 INJECTION INTRAVENOUS at 05:33

## 2025-01-01 RX ADMIN — LACOSAMIDE 200 MG: 10 INJECTION INTRAVENOUS at 17:17

## 2025-01-01 RX ADMIN — Medication 100 MCG: at 09:16

## 2025-01-01 RX ADMIN — NOREPINEPHRINE BITARTRATE 0.05 MCG/KG/MIN: 1 INJECTION, SOLUTION, CONCENTRATE INTRAVENOUS at 14:51

## 2025-01-01 RX ADMIN — PROPOFOL 80 MCG/KG/MIN: 10 INJECTION, EMULSION INTRAVENOUS at 20:15

## 2025-01-01 RX ADMIN — THERA TABS 1 TABLET: TAB at 05:33

## 2025-01-01 RX ADMIN — THERA TABS 1 TABLET: TAB at 06:05

## 2025-01-01 RX ADMIN — CLOBAZAM 15 MG: 10 TABLET ORAL at 05:08

## 2025-01-01 RX ADMIN — THIAMINE HYDROCHLORIDE 200 MG: 100 INJECTION, SOLUTION INTRAMUSCULAR; INTRAVENOUS at 14:34

## 2025-01-01 RX ADMIN — ETOMIDATE 20 MG: 2 INJECTION, SOLUTION INTRAVENOUS at 22:20

## 2025-01-01 RX ADMIN — FAMOTIDINE 20 MG: 10 INJECTION, SOLUTION INTRAVENOUS at 17:45

## 2025-01-01 RX ADMIN — POLYETHYLENE GLYCOL 3350 1 PACKET: 17 POWDER, FOR SOLUTION ORAL at 05:07

## 2025-01-01 RX ADMIN — Medication 200 MCG: at 09:05

## 2025-01-01 RX ADMIN — LEVETIRACETAM 1500 MG: 100 INJECTION, SOLUTION INTRAVENOUS at 05:33

## 2025-01-01 RX ADMIN — LACOSAMIDE 200 MG: 100 TABLET, FILM COATED ORAL at 05:23

## 2025-01-01 RX ADMIN — LACOSAMIDE 200 MG: 10 INJECTION INTRAVENOUS at 05:04

## 2025-01-01 RX ADMIN — THERA TABS 1 TABLET: TAB at 05:04

## 2025-01-01 RX ADMIN — MORPHINE SULFATE 8 MG: 10 INJECTION INTRAVENOUS at 15:00

## 2025-01-01 RX ADMIN — Medication 100 MG: at 05:17

## 2025-01-01 RX ADMIN — ENOXAPARIN SODIUM 40 MG: 100 INJECTION SUBCUTANEOUS at 17:29

## 2025-01-01 RX ADMIN — THIAMINE HYDROCHLORIDE 200 MG: 100 INJECTION, SOLUTION INTRAMUSCULAR; INTRAVENOUS at 13:47

## 2025-01-01 RX ADMIN — PROPOFOL 100 MG: 10 INJECTION, EMULSION INTRAVENOUS at 08:35

## 2025-01-01 RX ADMIN — LEVETIRACETAM 1500 MG: 500 TABLET, FILM COATED ORAL at 17:29

## 2025-01-01 RX ADMIN — THERA TABS 1 TABLET: TAB at 14:32

## 2025-01-01 RX ADMIN — MORPHINE SULFATE 4 MG: 4 INJECTION, SOLUTION INTRAMUSCULAR; INTRAVENOUS at 12:45

## 2025-01-01 RX ADMIN — LACOSAMIDE 200 MG: 100 TABLET, FILM COATED ORAL at 17:29

## 2025-01-01 RX ADMIN — THERA TABS 1 TABLET: TAB at 05:23

## 2025-01-01 RX ADMIN — FENTANYL CITRATE 50 MCG: 50 INJECTION, SOLUTION INTRAMUSCULAR; INTRAVENOUS at 08:35

## 2025-01-01 RX ADMIN — FOLIC ACID 1 MG: 1 TABLET ORAL at 05:52

## 2025-01-01 RX ADMIN — CLOBAZAM 15 MG: 10 TABLET ORAL at 05:21

## 2025-01-01 RX ADMIN — FOLIC ACID 1 MG: 1 TABLET ORAL at 05:08

## 2025-01-01 RX ADMIN — CLOBAZAM 15 MG: 10 TABLET ORAL at 17:31

## 2025-01-01 RX ADMIN — BISACODYL 10 MG: 10 SUPPOSITORY RECTAL at 06:47

## 2025-01-01 RX ADMIN — CLOBAZAM 10 MG: 10 TABLET ORAL at 05:35

## 2025-01-01 RX ADMIN — LEVETIRACETAM 1500 MG: 500 TABLET, FILM COATED ORAL at 05:53

## 2025-01-01 RX ADMIN — LEVETIRACETAM 1000 MG: 100 INJECTION, SOLUTION INTRAVENOUS at 18:12

## 2025-01-01 RX ADMIN — Medication 100 MG: at 05:33

## 2025-01-01 RX ADMIN — PROPOFOL 80 MCG/KG/MIN: 10 INJECTION, EMULSION INTRAVENOUS at 13:38

## 2025-01-01 RX ADMIN — PROPOFOL 80 MCG/KG/MIN: 10 INJECTION, EMULSION INTRAVENOUS at 13:35

## 2025-01-01 RX ADMIN — ROCURONIUM BROMIDE 50 MG: 10 INJECTION INTRAVENOUS at 08:35

## 2025-01-01 RX ADMIN — POTASSIUM BICARBONATE 25 MEQ: 978 TABLET, EFFERVESCENT ORAL at 07:58

## 2025-01-01 RX ADMIN — SODIUM CHLORIDE, SODIUM LACTATE, POTASSIUM CHLORIDE, CALCIUM CHLORIDE AND DEXTROSE MONOHYDRATE: 5; 600; 310; 30; 20 INJECTION, SOLUTION INTRAVENOUS at 11:03

## 2025-01-01 RX ADMIN — SODIUM CHLORIDE: 9 INJECTION, SOLUTION INTRAVENOUS at 08:24

## 2025-01-01 RX ADMIN — CLOBAZAM 5 MG: 10 TABLET ORAL at 10:14

## 2025-01-01 RX ADMIN — SENNOSIDES AND DOCUSATE SODIUM 2 TABLET: 50; 8.6 TABLET ORAL at 18:09

## 2025-01-01 RX ADMIN — POTASSIUM BICARBONATE 25 MEQ: 978 TABLET, EFFERVESCENT ORAL at 09:12

## 2025-01-01 RX ADMIN — CEFAZOLIN 2 G: 2 INJECTION, POWDER, FOR SOLUTION INTRAMUSCULAR; INTRAVENOUS at 12:10

## 2025-01-01 RX ADMIN — PROPOFOL 80 MCG/KG/MIN: 10 INJECTION, EMULSION INTRAVENOUS at 04:44

## 2025-01-01 RX ADMIN — MORPHINE SULFATE 4 MG: 4 INJECTION, SOLUTION INTRAMUSCULAR; INTRAVENOUS at 13:03

## 2025-01-01 RX ADMIN — PROPOFOL 80 MCG/KG/MIN: 10 INJECTION, EMULSION INTRAVENOUS at 17:17

## 2025-01-01 RX ADMIN — CLOBAZAM 15 MG: 10 TABLET ORAL at 05:16

## 2025-01-01 RX ADMIN — MAGNESIUM HYDROXIDE 30 ML: 2400 SUSPENSION ORAL at 05:07

## 2025-01-01 RX ADMIN — FOLIC ACID 1 MG: 1 TABLET ORAL at 05:04

## 2025-01-01 RX ADMIN — LACOSAMIDE 200 MG: 100 TABLET, FILM COATED ORAL at 05:18

## 2025-01-01 RX ADMIN — FAMOTIDINE 20 MG: 10 INJECTION, SOLUTION INTRAVENOUS at 06:05

## 2025-01-01 RX ADMIN — THIAMINE HYDROCHLORIDE 200 MG: 100 INJECTION, SOLUTION INTRAMUSCULAR; INTRAVENOUS at 05:34

## 2025-01-01 RX ADMIN — LACOSAMIDE 200 MG: 100 TABLET, FILM COATED ORAL at 05:53

## 2025-01-01 RX ADMIN — SENNOSIDES AND DOCUSATE SODIUM 2 TABLET: 50; 8.6 TABLET ORAL at 05:33

## 2025-01-01 RX ADMIN — DIAZEPAM 5 MG: 10 INJECTION, SOLUTION INTRAMUSCULAR; INTRAVENOUS at 20:32

## 2025-01-01 RX ADMIN — FOLIC ACID 1 MG: 5 INJECTION, SOLUTION INTRAMUSCULAR; INTRAVENOUS; SUBCUTANEOUS at 14:38

## 2025-01-01 RX ADMIN — Medication 100 MCG: at 09:19

## 2025-01-01 RX ADMIN — PROPOFOL 40 MCG/KG/MIN: 10 INJECTION, EMULSION INTRAVENOUS at 22:45

## 2025-01-01 RX ADMIN — Medication 100 MG: at 05:18

## 2025-01-01 RX ADMIN — THIAMINE HYDROCHLORIDE 200 MG: 100 INJECTION, SOLUTION INTRAMUSCULAR; INTRAVENOUS at 14:16

## 2025-01-01 RX ADMIN — LEVETIRACETAM 1500 MG: 100 INJECTION, SOLUTION INTRAVENOUS at 17:32

## 2025-01-01 RX ADMIN — PROPOFOL 80 MCG/KG/MIN: 10 INJECTION, EMULSION INTRAVENOUS at 08:22

## 2025-01-01 RX ADMIN — Medication 100 MG: at 05:08

## 2025-01-01 RX ADMIN — PROPOFOL 40 MCG/KG/MIN: 10 INJECTION, EMULSION INTRAVENOUS at 02:07

## 2025-01-01 RX ADMIN — VANCOMYCIN HYDROCHLORIDE 1500 MG: 5 INJECTION, POWDER, LYOPHILIZED, FOR SOLUTION INTRAVENOUS at 08:24

## 2025-01-01 RX ADMIN — CLOBAZAM 15 MG: 10 TABLET ORAL at 06:05

## 2025-01-01 RX ADMIN — THIAMINE HYDROCHLORIDE 200 MG: 100 INJECTION, SOLUTION INTRAMUSCULAR; INTRAVENOUS at 21:57

## 2025-01-01 RX ADMIN — SENNOSIDES AND DOCUSATE SODIUM 2 TABLET: 50; 8.6 TABLET ORAL at 05:34

## 2025-01-01 RX ADMIN — FAMOTIDINE 20 MG: 10 INJECTION, SOLUTION INTRAVENOUS at 06:00

## 2025-01-01 RX ADMIN — LEVETIRACETAM 1500 MG: 500 TABLET, FILM COATED ORAL at 06:06

## 2025-01-01 RX ADMIN — LACOSAMIDE 200 MG: 10 INJECTION INTRAVENOUS at 23:35

## 2025-01-01 RX ADMIN — LACOSAMIDE 200 MG: 100 TABLET, FILM COATED ORAL at 17:44

## 2025-01-01 RX ADMIN — LEVETIRACETAM 1500 MG: 500 TABLET, FILM COATED ORAL at 17:05

## 2025-01-01 RX ADMIN — NOREPINEPHRINE BITARTRATE 0.14 MCG/KG/MIN: 1 INJECTION, SOLUTION, CONCENTRATE INTRAVENOUS at 05:06

## 2025-01-01 RX ADMIN — CLOBAZAM 10 MG: 10 TABLET ORAL at 17:33

## 2025-01-01 RX ADMIN — SUGAMMADEX 200 MG: 100 INJECTION, SOLUTION INTRAVENOUS at 09:31

## 2025-01-01 RX ADMIN — MIDAZOLAM HYDROCHLORIDE 1 MG: 1 INJECTION, SOLUTION INTRAMUSCULAR; INTRAVENOUS at 12:51

## 2025-01-01 RX ADMIN — THERA TABS 1 TABLET: TAB at 05:17

## 2025-01-01 RX ADMIN — Medication 100 MG: at 05:23

## 2025-01-01 RX ADMIN — ONDANSETRON 4 MG: 2 INJECTION INTRAMUSCULAR; INTRAVENOUS at 09:27

## 2025-01-01 RX ADMIN — FAMOTIDINE 20 MG: 20 TABLET, FILM COATED ORAL at 05:04

## 2025-01-01 RX ADMIN — SENNOSIDES AND DOCUSATE SODIUM 2 TABLET: 50; 8.6 TABLET ORAL at 18:07

## 2025-01-01 ASSESSMENT — PAIN DESCRIPTION - PAIN TYPE
TYPE: ACUTE PAIN

## 2025-01-01 ASSESSMENT — COGNITIVE AND FUNCTIONAL STATUS - GENERAL
DRESSING REGULAR LOWER BODY CLOTHING: A LOT
MOVING FROM LYING ON BACK TO SITTING ON SIDE OF FLAT BED: A LOT
TURNING FROM BACK TO SIDE WHILE IN FLAT BAD: A LOT
STANDING UP FROM CHAIR USING ARMS: A LITTLE
EATING MEALS: A LOT
MOVING TO AND FROM BED TO CHAIR: A LOT
SUGGESTED CMS G CODE MODIFIER MOBILITY: CL
TOILETING: A LOT
PERSONAL GROOMING: A LOT
HELP NEEDED FOR BATHING: A LOT
DAILY ACTIVITIY SCORE: 12
SUGGESTED CMS G CODE MODIFIER DAILY ACTIVITY: CL
CLIMB 3 TO 5 STEPS WITH RAILING: A LOT
WALKING IN HOSPITAL ROOM: A LITTLE
MOBILITY SCORE: 14
DRESSING REGULAR UPPER BODY CLOTHING: A LOT

## 2025-01-01 ASSESSMENT — COPD QUESTIONNAIRES
COPD SCREENING SCORE: 4
HAVE YOU SMOKED AT LEAST 100 CIGARETTES IN YOUR ENTIRE LIFE: YES
DURING THE PAST 4 WEEKS HOW MUCH DID YOU FEEL SHORT OF BREATH: NONE/LITTLE OF THE TIME
DO YOU EVER COUGH UP ANY MUCUS OR PHLEGM?: NO/ONLY WITH OCCASIONAL COLDS OR INFECTIONS

## 2025-01-01 ASSESSMENT — FIBROSIS 4 INDEX
FIB4 SCORE: 1.36
FIB4 SCORE: 1.53
FIB4 SCORE: 1.53
FIB4 SCORE: 1.32
FIB4 SCORE: 1.19
FIB4 SCORE: 2.61

## 2025-01-01 ASSESSMENT — GAIT ASSESSMENTS: GAIT LEVEL OF ASSIST: MINIMAL ASSIST

## 2025-01-01 ASSESSMENT — PAIN SCALES - GENERAL: PAIN_LEVEL: 0

## 2025-04-14 PROBLEM — S06.5XAA SUBDURAL HEMATOMA (HCC): Status: ACTIVE | Noted: 2025-01-01

## 2025-04-14 NOTE — ED PROVIDER NOTES
ED Provider Note    Scribed for Calixto Nunez M.D. by Calixto Nunez M.D.. 4/14/2025  1:18 PM    Primary care provider: Pcp Pt States None  Means of arrival: EMS    CHIEF COMPLAINT  Chief Complaint   Patient presents with    ALOC     Pt resides at [facility]. Found patient ALOC and called EMS.        EXTERNAL RECORDS REVIEWED  Hospitalist discharge summary from October 2023 reviewed for encephalopathy.  He had presented with alcohol withdrawal and unexplained confusion.  He was agitated for 7 days during the withdrawal period.  On MRI there were small strokes in the basal ganglia bilaterally in the left cerebellum and there were small areas of acute infarcts in the right temporal parietal region and occipital encephalomalacia.  He was treated with monotherapy antiplatelet and statin.  50 to 70% ICA stenosis.  EEG and LP performed.  He was started on Seroquel and Ativan was attempted.  [facility]  He was diagnosed with alcohol withdrawal with delirium, alcohol abuse, acute encephalopathy and stroke.    DOMINICK Vasquez is a 79 y.o. male with a history of EtOH withdrawal and encephalopathy who presents to the Emergency Department from nursing facility for increased confusion. The patient was reported to be A&O x 4, but suddenly was unaware to self, situation, or events as of this morning. The patient reports he has been drinking, and has chest pain. He denies headache or fever. No further history obtained.     LIMITATION TO HISTORY   Confusion.  He is not able to provide reliable history    OUTSIDE HISTORIAN(S):  EMS described what occurred in his nursing facility today    REVIEW OF SYSTEMS  Pertinent positives include: Chest pain.  Pertinent negatives include: Headache, fever.      PAST MEDICAL HISTORY  Past Medical History:   Diagnosis Date    Alcohol abuse     Coronary arteriosclerosis     Dyslipidemia     GERD (gastroesophageal reflux disease)     Mediastinal mass     Osteopenia   "      FAMILY HISTORY  History reviewed. No pertinent family history.    SOCIAL HISTORY  Social History     Tobacco Use    Smoking status: Some Days     Types: Cigarettes    Smokeless tobacco: Never   Substance Use Topics    Alcohol use: Yes     Comment: 400 ml Arabella daily    Drug use: No     Social History     Substance and Sexual Activity   Drug Use No       SURGICAL HISTORY  Past Surgical History:   Procedure Laterality Date    CORONARY ARTERY BYPASS, 1         CURRENT MEDICATIONS  No current facility-administered medications for this encounter.  No current outpatient medications on file.    ALLERGIES  Allergies   Allergen Reactions    Pravastatin Hives and Swelling       PHYSICAL EXAM  VITAL SIGNS: BP (!) 154/72   Pulse 72   Temp 36.3 °C (97.3 °F) (Temporal)   Resp 18   Ht 1.778 m (5' 10\")   Wt 61.2 kg (135 lb)   SpO2 93%   BMI 19.37 kg/m²   Reviewed and .cvhypertens  Constitutional: Well developed, Well nourished, confused.  HENT: Normocephalic, atraumatic, bilateral external ears normal, No intraoral erythema, edema, exudate.  Resists neck exam but no nuchal rigidity or meningismus.  Eyes: PERRLA, conjunctiva pink, no scleral icterus.   Cardiovascular: Regular rate and rhythm. No murmurs, rubs or gallops.  No dependent edema or calf tenderness  Respiratory: Lungs clear to auscultation bilaterally. No wheezes, rales, or rhonchi.  Abdominal:  Abdomen soft, non-tender, non distended. No rebound, or guarding.    Skin: No erythema, no rash. No wounds or bruising.  Genitourinary: No costovertebral angle tenderness.   Musculoskeletal: no deformities.   Neurologic: Awake but not oriented to person place or time, cranial nerves 2-12 intact by passive exam.  Patient moves all extremities with symmetric strength no focal deficit noted.  Psychiatric: Affect normal, Judgment normal, Mood normal.     LABS Ordered and Reviewed by Me:  Results for orders placed or performed during the hospital encounter of 04/14/25 "   TSH    Collection Time: 04/14/25  1:10 PM   Result Value Ref Range    TSH 0.895 0.380 - 5.330 uIU/mL   DIAGNOSTIC ALCOHOL    Collection Time: 04/14/25  1:10 PM   Result Value Ref Range    Diagnostic Alcohol <10.1 <10.1 mg/dL   URINE DRUG SCREEN    Collection Time: 04/14/25  1:10 PM   Result Value Ref Range    Amphetamines Urine Negative Negative    Barbiturates Negative Negative    Benzodiazepines Negative Negative    Cocaine Metabolite Negative Negative    Fentanyl, Urine Negative Negative    Methadone Negative Negative    Opiates Negative Negative    Oxycodone Negative Negative    Phencyclidine -Pcp Negative Negative    Propoxyphene Negative Negative    Cannabinoid Metab Negative Negative   Lactic Acid    Collection Time: 04/14/25  1:10 PM   Result Value Ref Range    Lactic Acid 1.7 0.5 - 2.0 mmol/L   CBC with Differential    Collection Time: 04/14/25  1:10 PM   Result Value Ref Range    WBC 6.5 4.8 - 10.8 K/uL    RBC 4.07 (L) 4.70 - 6.10 M/uL    Hemoglobin 12.6 (L) 14.0 - 18.0 g/dL    Hematocrit 38.1 (L) 42.0 - 52.0 %    MCV 93.6 81.4 - 97.8 fL    MCH 31.0 27.0 - 33.0 pg    MCHC 33.1 32.3 - 36.5 g/dL    RDW 49.5 35.9 - 50.0 fL    Platelet Count 305 164 - 446 K/uL    MPV 9.8 9.0 - 12.9 fL    Neutrophils-Polys 78.40 (H) 44.00 - 72.00 %    Lymphocytes 11.40 (L) 22.00 - 41.00 %    Monocytes 7.30 0.00 - 13.40 %    Eosinophils 2.00 0.00 - 6.90 %    Basophils 0.60 0.00 - 1.80 %    Immature Granulocytes 0.30 0.00 - 0.90 %    Nucleated RBC 0.00 0.00 - 0.20 /100 WBC    Neutrophils (Absolute) 5.07 1.82 - 7.42 K/uL    Lymphs (Absolute) 0.74 (L) 1.00 - 4.80 K/uL    Monos (Absolute) 0.47 0.00 - 0.85 K/uL    Eos (Absolute) 0.13 0.00 - 0.51 K/uL    Baso (Absolute) 0.04 0.00 - 0.12 K/uL    Immature Granulocytes (abs) 0.02 0.00 - 0.11 K/uL    NRBC (Absolute) 0.00 K/uL   Complete Metabolic Panel    Collection Time: 04/14/25  1:10 PM   Result Value Ref Range    Sodium 138 135 - 145 mmol/L    Potassium 4.9 3.6 - 5.5 mmol/L     Chloride 99 96 - 112 mmol/L    Co2 24 20 - 33 mmol/L    Anion Gap 15.0 7.0 - 16.0    Glucose 96 65 - 99 mg/dL    Bun 13 8 - 22 mg/dL    Creatinine 0.52 0.50 - 1.40 mg/dL    Calcium 10.0 8.5 - 10.5 mg/dL    Correct Calcium 10.2 8.5 - 10.5 mg/dL    AST(SGOT) 17 12 - 45 U/L    ALT(SGPT) 10 2 - 50 U/L    Alkaline Phosphatase 75 30 - 99 U/L    Total Bilirubin 1.2 0.1 - 1.5 mg/dL    Albumin 3.7 3.2 - 4.9 g/dL    Total Protein 8.0 6.0 - 8.2 g/dL    Globulin 4.3 (H) 1.9 - 3.5 g/dL    A-G Ratio 0.9 g/dL   Urinalysis    Collection Time: 04/14/25  1:10 PM    Specimen: Urine   Result Value Ref Range    Color Dark Yellow     Character Turbid (A)     Specific Gravity 1.022 <1.035    Ph 7.0 5.0 - 8.0    Glucose Negative Negative mg/dL    Ketones 80 (A) Negative mg/dL    Protein 30 (A) Negative mg/dL    Bilirubin Negative Negative    Urobilinogen, Urine 4.0 (A) <=1.0 EU/dL    Nitrite Negative Negative    Leukocyte Esterase Negative Negative    Occult Blood Negative Negative    Micro Urine Req Microscopic    Blood Culture - Draw one from central line and one from peripheral site    Collection Time: 04/14/25  1:10 PM    Specimen: Line; Blood   Result Value Ref Range    Significant Indicator NEG     Source BLD     Site Peripheral     Culture Result       No Growth  Note: Blood cultures are incubated for 5 days and  are monitored continuously.Positive blood cultures  are called to the RN and reported as soon as  they are identified.     VITAMIN B12    Collection Time: 04/14/25  1:10 PM   Result Value Ref Range    Vitamin B12 -True Cobalamin 373 211 - 911 pg/mL   URINE MICROSCOPIC (W/UA)    Collection Time: 04/14/25  1:10 PM   Result Value Ref Range    WBC 0-2 /hpf    RBC 3-5 (A) 0 - 2 /hpf    Bacteria None Seen None /hpf    Epithelial Cells 0-2 0 - 5 /hpf    Urine Casts 0-2 0 - 2 /lpf   ESTIMATED GFR    Collection Time: 04/14/25  1:10 PM   Result Value Ref Range    GFR (CKD-EPI) 103 >60 mL/min/1.73 m 2     Coags and TEG  pending    RADIOLOGY  I have independently interpreted the CXR associated with this visit demonstrating atelectasis at the left base.  I am awaiting the final reading from the radiologist.     Final Radiology Report  CT-HEAD W/O   Final Result      1. Acute on chronic left holohemispheric subdural hematoma with a maximum thickness 2.1 cm. There is 5.7 mm subfalcine herniation to the left and mass effect on the left lateral ventricle.   2. Stable right temporal and right posterior parietal cortical encephalomalacia.   3. Atrophy and diffuse chronic microangiopathic white matter changes versus demyelination or gliosis.      Findings were conveyed to the ordering physician at the time of this interpretation on 4/14/2025 via an electronic messaging system.         DX-CHEST-PORTABLE (1 VIEW)   Final Result      1. No acute findings.   2. Stable bibasal subsegmental atelectasis or scarring.        Radiologist interpretation have been reviewed by me.     4:32 PM  CT results discussed with radiologist by text    COURSE & MEDICAL DECISION MAKING  1:18 PM - Patient seen and examined at bedside.     INTERVENTIONS BY ME:  Medications   LR (Bolus) infusion 1,000 mL (1,000 mL Intravenous New Bag 4/14/25 1344)   thiamine (B-1) injection 100 mg (100 mg Intravenous Given 4/14/25 1442)   folic acid 1 mg in NS 50 mL IVPB (1 mg Intravenous New Bag 4/14/25 1438)       4:32 PM -neurosurgery paged    4:38 PM discussed admission with the intensivist.    5:00 PM  Management discussed with Dr. Wallis.  He planned surgery tomorrow.  He asked me to have social work reach out to family for consent.  I discussed this with social work.    ASSESSMENT, COURSE AND PLAN:  PROBLEMS EVALUATED THIS VISIT:    This patient with a history of alcohol abuse, alcohol withdrawal and unexplained prolonged encephalopathy that was probably due to his alcohol use presents confused again.  There is no evidence of urosepsis.  He is had no fever.  There is no reported  history of trauma but on head CT he has acute and chronic subdural hematoma with midline shift..  He is not intoxicated at this time.  There is no drug use.  Is no evidence he has cirrhosis.  Clinically meningitis and encephalitis are unlikely and his LP was negative the last time he presented like this.  It is possible he is postictal after the seizure but he is not known to have alcohol-related seizures.      Measures: HYDRATION: Intravenous fluids were medically necessary given risk for Warnicke Korsakoff syndrome.       DISPOSITION AND DISCUSSIONS    I have discussed management of the patient with the following physicians and sources:    Dr. Wallis    RISK:  High given need for ICU admission    PLAN:  Admission to the ICU for neurosurgical consultation and probable emergency surgery    CONDITION: Guarded, critical care time 34 minutes given need for prompt neurosurgical consultation.     FINAL IMPRESSION  1. Subdural hematoma (HCC)     Critical care        SCRIBE ATTESTATION  I have personally obtained the above HPI, ROS from the patient/family/guardian and have personally examined the patient and agree with the above findings documented in this note by my scribe.  All reasonable efforts have been made to identify and correct grammatical and syntax errors, yet please expect errors to persist.   This chart has been electronically signed by Laura Ryan, on 4/14/2025 at 2:29 PM      The note accurately reflects work and decisions made by me.  Calixto Nunez M.D.  4/14/2025  4:37 PM

## 2025-04-14 NOTE — ED NOTES
Rounded on pt. Pt is resting on gurney. Bed locked and in lowest position. Call light within reach. Respirations equal and unlabored on RA. No further needs at this time.

## 2025-04-14 NOTE — CONSULTS
Critical Care Consultation    Date of consult: 4/14/2025    Referring Physician  Calixto Nunez M.D.    Reason for Consultation  Subdural hematoma    History of Presenting Illness  79 y.o. male PMH EtOH use disorder and history of admissions for EtOH withdrawal who presented 4/14/2025 from a nursing facility for increased confusion. Prior to this morning was AAOx4 but this morning was noted to be unaware to self, situation or events as of 4/14 morning. In the ED CT HEAD showed acute on chronic left holohemispheric subdural hematoma with 6mm subfalcine hernation with mass effect on the left lateral ventricle. Neurosurgery was consulted (Dr Wallis) who is planning on surgical evacuation tomorrow followed by middle meningeal artery embolization.    On exam, patient with GCS of 9 (opens eyes to verbal command, no verbal response, localizes pain in all 4 extremities). He cannot give any meaningful history. History obtained from chart and from ERP.    In the ER, given keppra 1g, thiamine, folate and started on D5 1/2NS.    Code Status  Full Code    Review of Systems  Review of Systems   Reason unable to perform ROS: critical illness.       Past Medical History   has a past medical history of Alcohol abuse, Coronary arteriosclerosis, Dyslipidemia, GERD (gastroesophageal reflux disease), Mediastinal mass, and Osteopenia.    Surgical History   has a past surgical history that includes coronary artery bypass, 1.    Family History  family history is not on file.    Social History   reports that he has been smoking cigarettes. He has never used smokeless tobacco. He reports current alcohol use. He reports that he does not use drugs.    Medications  Home Medications       Reviewed by Eladia Bower (Pharmacy Tech) on 04/14/25 at 1658  Med List Status: Complete     Medication Last Dose Status        Patient Emil Taking any Medications                         Audit from Redirected Encounters    **Home medications have not yet  been reviewed for this encounter**       Current Facility-Administered Medications   Medication Dose Route Frequency Provider Last Rate Last Admin    Respiratory Therapy Consult   Nebulization Continuous RT Adrian Ramirez M.D.        midazolam (Versed) injection 1 mg  1 mg Intravenous Q HOUR PRN Adrian Ramirez M.D.        Or    midazolam (Versed) injection 2 mg  2 mg Intravenous Q HOUR PRN Adrian Ramirez M.D.        Or    midazolam (Versed) injection 4 mg  4 mg Intravenous Q HOUR PRN Adrian Ramirez M.D.        Or    midazolam (Versed) injection 5 mg  5 mg Intravenous Q HOUR PRN Adrian Ramirez M.D.        D5LR infusion   Intravenous Continuous Adrian Ramirez M.D.         No current outpatient medications on file.       Allergies  Allergies   Allergen Reactions    Pravastatin Hives and Swelling       Vital Signs last 24 hours  Temp:  [36.3 °C (97.3 °F)] 36.3 °C (97.3 °F)  Pulse:  [56-76] 58  Resp:  [14-18] 15  BP: (136-154)/(63-72) 150/68  SpO2:  [86 %-100 %] 99 %    Physical Exam  Physical Exam  Constitutional:       Appearance: He is ill-appearing.   HENT:      Head: Normocephalic.   Eyes:      Pupils: Pupils are equal, round, and reactive to light.   Cardiovascular:      Rate and Rhythm: Normal rate and regular rhythm.   Pulmonary:      Effort: Pulmonary effort is normal.      Breath sounds: Normal breath sounds.   Abdominal:      General: Abdomen is flat.      Palpations: Abdomen is soft.   Musculoskeletal:      Right lower leg: No edema.      Left lower leg: No edema.   Skin:     General: Skin is warm and dry.      Capillary Refill: Capillary refill takes less than 2 seconds.   Neurological:      Comments: GCS = 9  opens eyes to verbal command  no verbal response  localizes pain in all 4 extremities  PERRL  Gag intact  Oculocephalic intact         Fluids  No intake or output data in the 24 hours ending 04/14/25 2006    Laboratory  Recent Results (from the past 48 hours)   TSH    Collection Time: 04/14/25  1:10 PM    Result Value Ref Range    TSH 0.895 0.380 - 5.330 uIU/mL   DIAGNOSTIC ALCOHOL    Collection Time: 04/14/25  1:10 PM   Result Value Ref Range    Diagnostic Alcohol <10.1 <10.1 mg/dL   URINE DRUG SCREEN    Collection Time: 04/14/25  1:10 PM   Result Value Ref Range    Amphetamines Urine Negative Negative    Barbiturates Negative Negative    Benzodiazepines Negative Negative    Cocaine Metabolite Negative Negative    Fentanyl, Urine Negative Negative    Methadone Negative Negative    Opiates Negative Negative    Oxycodone Negative Negative    Phencyclidine -Pcp Negative Negative    Propoxyphene Negative Negative    Cannabinoid Metab Negative Negative   Lactic Acid    Collection Time: 04/14/25  1:10 PM   Result Value Ref Range    Lactic Acid 1.7 0.5 - 2.0 mmol/L   CBC with Differential    Collection Time: 04/14/25  1:10 PM   Result Value Ref Range    WBC 6.5 4.8 - 10.8 K/uL    RBC 4.07 (L) 4.70 - 6.10 M/uL    Hemoglobin 12.6 (L) 14.0 - 18.0 g/dL    Hematocrit 38.1 (L) 42.0 - 52.0 %    MCV 93.6 81.4 - 97.8 fL    MCH 31.0 27.0 - 33.0 pg    MCHC 33.1 32.3 - 36.5 g/dL    RDW 49.5 35.9 - 50.0 fL    Platelet Count 305 164 - 446 K/uL    MPV 9.8 9.0 - 12.9 fL    Neutrophils-Polys 78.40 (H) 44.00 - 72.00 %    Lymphocytes 11.40 (L) 22.00 - 41.00 %    Monocytes 7.30 0.00 - 13.40 %    Eosinophils 2.00 0.00 - 6.90 %    Basophils 0.60 0.00 - 1.80 %    Immature Granulocytes 0.30 0.00 - 0.90 %    Nucleated RBC 0.00 0.00 - 0.20 /100 WBC    Neutrophils (Absolute) 5.07 1.82 - 7.42 K/uL    Lymphs (Absolute) 0.74 (L) 1.00 - 4.80 K/uL    Monos (Absolute) 0.47 0.00 - 0.85 K/uL    Eos (Absolute) 0.13 0.00 - 0.51 K/uL    Baso (Absolute) 0.04 0.00 - 0.12 K/uL    Immature Granulocytes (abs) 0.02 0.00 - 0.11 K/uL    NRBC (Absolute) 0.00 K/uL   Complete Metabolic Panel    Collection Time: 04/14/25  1:10 PM   Result Value Ref Range    Sodium 138 135 - 145 mmol/L    Potassium 4.9 3.6 - 5.5 mmol/L    Chloride 99 96 - 112 mmol/L    Co2 24 20 - 33  mmol/L    Anion Gap 15.0 7.0 - 16.0    Glucose 96 65 - 99 mg/dL    Bun 13 8 - 22 mg/dL    Creatinine 0.52 0.50 - 1.40 mg/dL    Calcium 10.0 8.5 - 10.5 mg/dL    Correct Calcium 10.2 8.5 - 10.5 mg/dL    AST(SGOT) 17 12 - 45 U/L    ALT(SGPT) 10 2 - 50 U/L    Alkaline Phosphatase 75 30 - 99 U/L    Total Bilirubin 1.2 0.1 - 1.5 mg/dL    Albumin 3.7 3.2 - 4.9 g/dL    Total Protein 8.0 6.0 - 8.2 g/dL    Globulin 4.3 (H) 1.9 - 3.5 g/dL    A-G Ratio 0.9 g/dL   Urinalysis    Collection Time: 04/14/25  1:10 PM    Specimen: Urine   Result Value Ref Range    Color Dark Yellow     Character Turbid (A)     Specific Gravity 1.022 <1.035    Ph 7.0 5.0 - 8.0    Glucose Negative Negative mg/dL    Ketones 80 (A) Negative mg/dL    Protein 30 (A) Negative mg/dL    Bilirubin Negative Negative    Urobilinogen, Urine 4.0 (A) <=1.0 EU/dL    Nitrite Negative Negative    Leukocyte Esterase Negative Negative    Occult Blood Negative Negative    Micro Urine Req Microscopic    Blood Culture - Draw one from central line and one from peripheral site    Collection Time: 04/14/25  1:10 PM    Specimen: Line; Blood   Result Value Ref Range    Significant Indicator NEG     Source BLD     Site Peripheral     Culture Result       No Growth  Note: Blood cultures are incubated for 5 days and  are monitored continuously.Positive blood cultures  are called to the RN and reported as soon as  they are identified.     VITAMIN B12    Collection Time: 04/14/25  1:10 PM   Result Value Ref Range    Vitamin B12 -True Cobalamin 373 211 - 911 pg/mL   URINE MICROSCOPIC (W/UA)    Collection Time: 04/14/25  1:10 PM   Result Value Ref Range    WBC 0-2 /hpf    RBC 3-5 (A) 0 - 2 /hpf    Bacteria None Seen None /hpf    Epithelial Cells 0-2 0 - 5 /hpf    Urine Casts 0-2 0 - 2 /lpf   ESTIMATED GFR    Collection Time: 04/14/25  1:10 PM   Result Value Ref Range    GFR (CKD-EPI) 103 >60 mL/min/1.73 m 2   APTT    Collection Time: 04/14/25  1:10 PM   Result Value Ref Range    APTT  26.0 24.7 - 36.0 sec   Prothrombin Time    Collection Time: 04/14/25  1:10 PM   Result Value Ref Range    PT 14.7 (H) 12.0 - 14.6 sec    INR 1.15 (H) 0.87 - 1.13   T.PALLIDUM AB RAFAEL (SCREENING)    Collection Time: 04/14/25  5:16 PM   Result Value Ref Range    Syphilis, Treponemal Qual Non-Reactive Non-Reactive   AMMONIA    Collection Time: 04/14/25  5:16 PM   Result Value Ref Range    Ammonia 18 11 - 45 umol/L   PLATELET MAPPING WITH BASIC TEG    Collection Time: 04/14/25  5:16 PM   Result Value Ref Range    Reaction Time Initial-R 4.5 (L) 4.6 - 9.1 min    React Time Initial Hep 3.7 (L) 4.3 - 8.3 min    Clot Kinetics-K 0.8 0.8 - 2.1 min    Clot Angle-Angle 79.1 (H) 63.0 - 78.0 degrees    Maximum Clot Strength-MA 64.3 52.0 - 69.0 mm    TEG Functional Fibrinogen(MA) 33.5 (H) 15.0 - 32.0 mm    % Inhibition ADP 0.0 0.0 - 17.0 %    % Inhibition AA 0.0 0.0 - 11.0 %    TEG Algorithm Link Algorithm        Imaging  CTH personally reviewed and with large L subdural hematoma    Assessment/Plan  * Subdural hematoma (HCC)- (present on admission)  Assessment & Plan  SBP <160  Q2 neuro checks  TEG is unremarkable, no anticoagulation to reverse  Elevate HOB  Repeat head CT in 6 hours from intial  Neurosurgery consult --> plan for OR 4/15  NPO     Patient is protecting his airway and coughing well at time of my assessment with a GCS of 9 but continued protection of airway will need to continually be assessed in the ICU.    ETOH abuse- (present on admission)  Assessment & Plan  Monitor with CPOT protocol and PRN versed        DVT ppx - SCDs  GI ppx - none  Bowel reg - yes  Diet - strict NPO  Glucose - at goal  Code Status - full      Discussed patient condition and risk of morbidity and/or mortality with RN.      The patient remains critically ill.  Critical care time = 65 minutes in directly providing and coordinating critical care and extensive data review.  No time overlap and excludes procedures.    Adrian Ramirez MD  Pulmonary and  Critical Care Medicine  [facility]

## 2025-04-14 NOTE — ED TRIAGE NOTES
"Chief Complaint   Patient presents with    ALOC     Pt resides at [facility]. Found patient ALOC and called EMS.       Pt has lived there for a couple of weeks. Per Staff at the [facility] pt baseline is A&Ox 4.     80 yo male brought in by EMS for above complaint. GCS 12, A&Ox0.     Patient was given no meds en route. Blood glucose  116    BP (!) 154/72   Pulse 72   Temp 36.3 °C (97.3 °F) (Temporal)   Resp 18   Ht 1.778 m (5' 10\")   Wt 61.2 kg (135 lb)   SpO2 93%   BMI 19.37 kg/m²     Past Medical History:   Diagnosis Date    Alcohol abuse     Coronary arteriosclerosis     Dyslipidemia     GERD (gastroesophageal reflux disease)     Mediastinal mass     Osteopenia        "

## 2025-04-14 NOTE — ED NOTES
Med rec updated and complete. Allergies reviewed.    Pt arrived with a face sheet from the [facility].  Placed call to [facility] @ [phone].  Spoke to [employer]. Pt is not administered any medications at the facility.  No OTC, no supplements no prescriptions.  Per med tech they administer medications and pts do not administer their own medications.

## 2025-04-15 PROBLEM — K21.9 GASTROESOPHAGEAL REFLUX DISEASE: Status: ACTIVE | Noted: 2025-01-01

## 2025-04-15 PROBLEM — E43 SEVERE PROTEIN-CALORIE MALNUTRITION (HCC): Status: ACTIVE | Noted: 2025-01-01

## 2025-04-15 NOTE — DISCHARGE PLANNING
Late Entry    SW was asked by ERP to call family for consents for procedure. SW called pt's son Sen Vasquez Jr [phone] who stated he was en route to hospital and will speak with ERP regarding consents. ERP updated.

## 2025-04-15 NOTE — ASSESSMENT & PLAN NOTE
History of ETOH abuse. No reported recent falls. Not on AC  Serial neurologic exams-->no change to neuro exam  SBP goal < 160  HOB > 30  Maintain normonatremia, normothermia, normoglycemia, euvolemia  Neurosurgery following. S/p craniotomy with evacuation 4/15. Subdural drain removed 4/16  PT OT SLP  Aspiration and Fall Precautions  OK for Lovenox VTE per NSG 4/19  Holding off on MMA for now. If patient improves and transfers out of ICU, can consider doing before discharge, or have done OP once discharged. (Discussed with IR 4/21).  Repeat CT head on 4/24

## 2025-04-15 NOTE — PROGRESS NOTES
0945: Pt arrived from OR post craniotomy under anesthesia. Pt is rousable to voice. Site dressing is CDI to subdural drain. Cardiac rhythm appears to be SR.     1007: Report to BROOKE Bryan.     1013: Pt back to floor via bed with RN. On a monitor for transport.

## 2025-04-15 NOTE — DIETARY
"Nutrition Services: Initial Assessment     Day 1 of admit. Sen Vasquez is a 79 y.o. male with admitting DX of Subdural hematoma.     Consult received for TF.     Current Hospital Problems List:    GERD  SDH  ETOH abuse       Nutrition Assessment:      Height: 168 cm (5' 6.14\")  Weight: 64 kg (141 lb 1.5 oz)  Weight to Use in Calculations: 64 kg (141 lb 1.5 oz)  Weight taken via bed scale  Body mass index is 22.68 kg/m². BMI classification: normal.  Wt Readings from Last 6 Encounters:   25 64 kg (141 lb 1.5 oz)   10/12/23 72.5 kg (159 lb 13.3 oz)   23 80.2 kg (176 lb 12.9 oz)   19 91 kg (200 lb 9.9 oz)        Calculation Equation: REE per MSJ x 1.3 = 1705 kcals  Total Calories / day: 1700 - 2000  (Calories / k - 31)  Total Grams Protein / day: 77 - 96  (Grams Protein / k.2 - 1.5)    Objective:   Pertinent Medical Hx: alcohol abuse, coronary arteriosclerosis, dyslipidemia, GERD, mediastinal mass, osteopenia  Indication for Nutrition Support: Pt is lethargic, unable to start oral diet.  Enteral Access: IRIS placed and is pending confirmation of placement. Not in flowsheets yet.     Pt is on 2 L NC.   Pertinent Labs: glucose 101, Creat 0.39, last A1C is 4.8 on 25  Pertinent Meds: colace, mag sulfate, senna, thiamine, bowel meds and anti-emetics prn, D5LR  Skin/Wounds: L head incision, no edema noted.   Food Allergies: NKA  Last BM:   Formula based on RD Eval: Standard formula is indicated to meet estimated needs.    Current Diet Order/Intake:   NPO, TF    Subjective:   Pt is lethargic. Unable to speak with but able to clearly visualize muscle and fat tissue with RN presence who reports pt is cachectic. Unable to see lower body due to blankets.    Nutrition Focused Physical Exam (NFPE)  Weight Loss: Last weight is > 1 year ago. Weight loss of 18 lbs in > 1 year is notable but not clinically significant.  Muscle Mass: Severe wasting to temples, triceps, clavicles, and " scapulas.  Subcutaneous Fat: Severe wasting to temples, triceps, and clavicles.  Fluid Accumulation: No edema noted.  Reduced  Strength: N/A in acute care setting.    Nutrition Diagnosis:      Inadequate oral intake r/t lethargy as evidenced by need for nutrition support.    Severe malnutrition in context of social or environmental circumstances related to alcohol abuse as evidenced by severe muscle and severe fat wasting.    Discussed severe malnutrition dx with ARACELI Mcgregor.     Nutrition Interventions:      Initiate TF Jevity 1.2 at 25 ml/hr and advance by 10 ml Q 12 hours to goal rate of 70 ml/hr to provide 2016 kcals, 93 gm protein, and 1356 ml free water per day.   Recommends CMP with Mag and Phos daily for 5 days. Will order Mag and Phos to monitor for high refeeding syndrome risk, replace prn. Thiamine 100 mg daily x 7 days is recommended and is currently on MAR.   Fluids per MD.   Patient aware of active plan of care as appropriate.       Nutrition Monitoring and Evaluation:      Monitor for TF advancement to goal rate and tolerance.  Monitor nutrition POC.   Additional fluids per MD/DO  Monitor vital signs pertinent to nutrition.    RD following and will provide updated recommendations as indicated.

## 2025-04-15 NOTE — PROGRESS NOTES
4 Eyes Skin Assessment Completed by BROOKE Adkins and BROOKE Engle.    Head- occipital region redness, blanching   Ears WDL  Nose WDL  Mouth - cracked lips, missing teeth  Neck Redness and Blanching, discoloration, scabs posterior neck  Breast/Chest Redness and Blanching, discoloration  Shoulder Blades Redness and Blanching  Spine WDL  (R) Arm/Elbow/Hand Redness and Blanching slow to brian, abrasion, bruising  (L) Arm/Elbow/Hand Redness and Blanching  Abdomen Redness and Blanching umbilicus  Groin WDL  Scrotum/Coccyx/Buttocks Redness, Blanching, and Excoriation scrotum, discoloration coccyx, trace bleeding  (R) Leg Abrasion knee, discoloration  (L) Leg discoloration  (R) Heel/Foot/Toe- dry, discoloration, heels: red, blanching, boggy  (L) Heel/Foot/Toe- dry, discoloration, heels: red, blanching, boggy          Devices In Places ECG, Blood Pressure Cuff, Pulse Ox, SCD's, and Condom Cath      Interventions In Place TAP System, Pillows, Q2 Turns, Low Air Loss Mattress, ZFlo Pillow, Dri-David Pads, and Heels Loaded W/Pillows    Possible Skin Injury Yes    Pictures Uploaded Into Epic Yes  Wound Consult Placed N/A  RN Wound Prevention Protocol Ordered No

## 2025-04-15 NOTE — ANESTHESIA PREPROCEDURE EVALUATION
Case: [id number] Date/Time: 04/15/25 0815    Procedure: CRANIOTOMY (Left)    Location: [facility] OR 06 / SURGERY [facility]    Surgeons: Marcelo Wallis M.D.          79M w/[facility] EtOH use disorder and history of admissions for EtOH withdrawal, CVA, CAD s/p CABG who presented 4/14/2025 from a nursing facility for increased confusion from normal baseline of AAOx4.    ED CT HEAD showed acute on chronic left holohemispheric subdural hematoma with 6mm subfalcine hernation with mass effect on the left lateral ventricle.     Relevant Problems   NEURO   (positive) CVA (cerebral vascular accident) (HCC)      CARDIAC   (positive) CAD (coronary artery disease)      GI   (positive) Gastroesophageal reflux disease      Other   (positive) Acute encephalopathy   (positive) Alcohol withdrawal syndrome, with delirium, CVA, prolonged delirium (HCC)   (positive) ETOH abuse   (positive) Subdural hematoma (HCC)     Lab Results   Component Value Date/Time    WBC 7.3 04/15/2025 07:25 AM    RBC 3.16 (L) 04/15/2025 07:25 AM    HEMOGLOBIN 10.0 (L) 04/15/2025 07:25 AM    HEMATOCRIT 30.0 (L) 04/15/2025 07:25 AM    MCV 94.9 04/15/2025 07:25 AM    MCH 31.6 04/15/2025 07:25 AM    MCHC 33.3 04/15/2025 07:25 AM    RDW 49.1 04/15/2025 07:25 AM    PLATELETCT 232 04/15/2025 07:25 AM    MPV 10.4 04/15/2025 07:25 AM    NEUTSPOLYS 78.40 (H) 04/14/2025 01:10 PM    LYMPHOCYTES 11.40 (L) 04/14/2025 01:10 PM    MONOCYTES 7.30 04/14/2025 01:10 PM    EOSINOPHILS 2.00 04/14/2025 01:10 PM    BASOPHILS 0.60 04/14/2025 01:10 PM        Lab Results   Component Value Date/Time    SODIUM 138 04/14/2025 01:10 PM    POTASSIUM 4.9 04/14/2025 01:10 PM    CHLORIDE 99 04/14/2025 01:10 PM    CO2 24 04/14/2025 01:10 PM    GLUCOSE 96 04/14/2025 01:10 PM    BUN 13 04/14/2025 01:10 PM    CREATININE 0.52 04/14/2025 01:10 PM    BUNCREATRAT 8.6 (L) 01/06/2025 04:39 AM    eGFR >60    EKG  Results for orders placed or performed during the hospital encounter of 04/14/25   EKG   Result Value  Ref Range    Report       [facility]    Test Date:  2025-04-15  Pt Name:    KENRICK THOMAS                  Department: [facility]  MRN:        [id number]                      Room:       R112  Gender:     Male                         Technician: ROSARIO  :        1945                   Requested By:RASHID TRINH  Order #:    [id number]                    Reading MD: Demetrio Akers MD    Measurements  Intervals                                Axis  Rate:       62                           P:          80  IN:         261                          QRS:        76  QRSD:       89                           T:          61  QT:         434  QTc:        441    Interpretive Statements  Sinus rhythm  First degree AV block  Nonspecific ST-T wave changes  Electronically Signed On 04- 06:17:41 PDT by Demetrio Akers MD        Echo 10/2023  Technically difficult study. Normal LV systolic function. LVEF 60%   visually. No signfiicant valvular disease visualized on this exam. No   prior study is available for comparison.    Physical Exam    Airway - unable to assess       Cardiovascular - normal exam  Rhythm: regular  Rate: normal     Dental   (+) lower dentures    Unable to assess dental  Very poor dentition     Pulmonary - normal exam  Breath sounds clear to auscultation     Abdominal    Neurological - normal exam         Other findings: Unable to assess airway due to patient cooperation              Anesthesia Plan    ASA 3- EMERGENT   ASA physical status 3 criteria: CVA or TIA - history (> 3 months)ASA physical status emergent criteria: neurologic compromise requiring immediate intervention    Plan - general       Airway plan will be ETT          Induction: intravenous    Postoperative Plan: Postoperative administration of opioids is intended.    Pertinent diagnostic labs and testing reviewed    Informed Consent:    Anesthetic plan and risks discussed with healthcare power of .    Use of blood products discussed with:  healthcare power of  whom consented to blood products.     Blood Products Consent Comment: Pt AAOx1, consent preformed with son  Risks of general anesthesia discussed including sore throat, damage to lips/teeth, anaphylaxis/drug reaction, aspiration, awareness, post-op nausea/vomiting, post-op delirium, myocardial infarction, cerebral vascular accident up to and including death.    FULL CODE  Blood products OKAY

## 2025-04-15 NOTE — ANESTHESIA TIME REPORT
Anesthesia Start and Stop Event Times       Date Time Event    4/15/2025 0806 Ready for Procedure     0824 Anesthesia Start     0951 Anesthesia Stop          Responsible Staff  04/15/25      Name Role Begin End    Maury Longoria M.D. Anesth 0824 0951          Overtime Reason:  no overtime (within assigned shift)    Comments:

## 2025-04-15 NOTE — OP REPORT
DATE OF SERVICE:  04/15/2025     PREOPERATIVE DIAGNOSIS:  Left hemispheric acute and chronic subdural hematoma.     POSTOPERATIVE DIAGNOSIS:  Left hemispheric acute and chronic subdural   hematoma.     OPERATIONS:  Left frontoparietal craniotomy and evacuation of left hemispheric   chronic and acute subdural hematoma, takedown of membranes.     SURGEON:  Marcelo Wallis MD     ASSISTANT:  No assistant     ANESTHESIA:  General endotracheal.     ANESTHESIOLOGIST:  Maury Longoria MD     PREPARATION:  ChloraPrep.     MEDICATIONS:  The patient was given Ancef.     INDICATIONS:  Indications are in my consultation.     DESCRIPTION OF PROCEDURE:  The patient was brought to the operating room.    Peripheral venous line was in place.  The patient was intubated.  A large roll   was placed underneath the left shoulder.  The patient had minimal neck   motion, positioned left side of the head up.  The left side of the head was   then shaved, doubly prepped with ChloraPrep.  My incision was along the   coronal suture just off the midline to a couple of fingerbreadths above the   ear.  Linear incision was marked.  This was infiltrated with local and incised   with a scalpel.  The superior aspect of the temporalis muscle and fascia was   opened in line with the incision.  Akhil clips applied to scalp margins for   hemostasis.  A self-retaining retractor was placed.  A single douglas hole was   placed inferiorly with the .  Then, using the Midas Shahid drill, the   B1 foot plate, a 5-cm free flap was turned.  Dura was opened in a cruciate   fashion.  Outer membranes were taken down.  Chronic subdural fluid with acute   blood was resected with suction and irrigation, irrigating in the four   quadrants anteriorly, superiorly, inferiorly, and posteriorly.  Copious   amounts of saline was used until return was clearing.  No active bleeders were   identified.  The brain remained where it was, pulsatile, cortical surface   being  discolored.  A ventricular catheter with the tip cutoff was placed in   the subdural space, brought out through a separate stab incision just above   the zygoma using the trocar.  The dura was closed with 4-0 Nurolon and   epidural Duragen.  Bone flaps secured with a medium douglas hole cover and two   2-hole clips secured with 5-mm screws.  The galea was then closed with 2-0   Vicryl, scalp with staples, drained suture in place at the exit site with 2-0   Vicryl connected closed drainage system.  A sterile dressing was placed.  The   patient laid supine on the bed, head of the bed elevated, extubated, and taken   to the recovery room in satisfactory condition.  The patient tolerated the   procedure well without apparent complications.     ESTIMATED BLOOD LOSS:  50 mL.     In the recovery room, the patient's neuro exam is what it was preoperatively.        ______________________________  MD PIYUSH GEIGER/MAIK    DD:  04/15/2025 10:27  DT:  04/15/2025 11:22    Job#:  [id number]

## 2025-04-15 NOTE — CARE PLAN
The patient is Watcher - Medium risk of patient condition declining or worsening    Shift Goals  Clinical Goals: SBP < 160, Q2 Neuro, Monitor for ETOH w/d, Monitor airway protection  Patient Goals: JF - Pt RASS -4  Family Goals: Updates    Progress made toward(s) clinical / shift goals:      Problem: Optimal Care for Alcohol Withdrawal  Goal: Optimal Care for the alcohol withdrawal patient  Outcome: Progressing     Problem: Seizure Precautions  Goal: Implementation of seizure precautions  Outcome: Progressing     Problem: Risk for Aspiration  Goal: Patient's risk for aspiration will be absent or decrease  Outcome: Progressing     Problem: Pain - Standard  Goal: Alleviation of pain or a reduction in pain to the patient’s comfort goal  Outcome: Progressing     Problem: Neuro Status  Goal: Neuro status will remain stable or improve  Outcome: Progressing     Problem: Respiratory  Goal: Patient will achieve/maintain optimum respiratory ventilation and gas exchange  Outcome: Progressing

## 2025-04-15 NOTE — DOCUMENTATION QUERY
"                                                                         [facility]                                                                       Query Response Note      PATIENT:               SEN THOMAS  ACCT #:                  [id number]  MRN:                     [id number]  :                      1945  ADMIT DATE:       2025 12:39 PM  DISCH DATE:          RESPONDING  PROVIDER #:        [id number]           QUERY TEXT:    Altered mental status is documented in the record. Can a more specific diagnosis be provided?    The patient's Clinical Indicators include:  Clinical Findings:    4/15- ICU PN Meche Richards APRN-  \"presented from [facility] living where he resides  with altered mental status.  \"  \"  Reportedly at baseline patient is A and O x 4.\"  \" Head CT showed acute on chronic left holohemispheric subdural hematoma  with 6 mm subfalcine herniation and mass effect on the left lateral ventricle.\"  \"Neuro (preop): Opens eyes to pain, nonverbal, does not follow commands. \"    - Anesthesia preprocedure evaluation- Dr. Longoria:  \"increased confusion from normal baseline of AAOx4.\"  \"(positive)  Acute encephalopathy\"    - ED note: Dr. Nunez  \"Sen Thomas is a 79 y.o. male with a history of EtOH withdrawal and encephalopathy who presents to the Emergency Department from nursing facility for increased confusion. The patient was reported to be A&O x 4, but suddenly was unaware to  self, situation, or events as of this morning. \"    Risk factors: altered mental status; subdural hematoma; mass effect on left lateral ventricle; alcohol abuse; severe protein calorie malnutrition;     Treatment: D5LR IVF at 50 ml/hr; LR IV bolus 1L given ;  folic acid, thiamine IV, folic acid IV; Keppra; Neuro checks every 4 hours, CT head; ICU monitoring    Please contact me with any questions:    Emma BRADLEY RN CCDS  [facility]  [email or web address]  Emma rWay via Voalte    Note: If " you agree with a diagnosis listed above, please remember to include it in your concurrent daily documentation and onto the Discharge Summary.  Options provided:   -- Acute metabolic encephalopathy   -- Acute toxic encephalopathy   -- Acute encephalopathy due to other medical condition, (please specify other medical condition)   -- Delirium due to general medical condition   -- Other explanation, (please specify other explanation)      Query created by: Emma Wray on 4/15/2025 4:10 PM    RESPONSE TEXT:    Acute toxic encephalopathy          Electronically signed by:  SUMIT SOLER 4/15/2025 4:15 PM

## 2025-04-15 NOTE — CARE PLAN
The patient is Watcher - Medium risk of patient condition declining or worsening    Shift Goals  Clinical Goals: SBP <160, improve neuro exam  Patient Goals: candelaria  Family Goals: candelaria    Progress made toward(s) clinical / shift goals:      Problem: Pain - Standard  Goal: Alleviation of pain or a reduction in pain to the patient’s comfort goal  Outcome: Progressing     Problem: Respiratory  Goal: Patient will achieve/maintain optimum respiratory ventilation and gas exchange  Outcome: Progressing  - pt was on 6L when pt brought back to ICU from PACU.  Now on room air with O2 sats in the mid90s.       Patient is not progressing towards the following goals:      Problem: Knowledge Deficit - Standard  Goal: Patient and family/care givers will demonstrate understanding of plan of care, disease process/condition, diagnostic tests and medications  Outcome: Not Progressing     Problem: Neuro Status  Goal: Neuro status will remain stable or improve  Outcome: Not Progressing - Pt not following commands, but will move all extremities spontaneously.  Pt non-verbal, unable to assess orientation and has right sided neglect.     Problem: Skin Integrity  Goal: Skin integrity is maintained or improved  Outcome: Not Progressing

## 2025-04-15 NOTE — DISCHARGE PLANNING
Case Management Discharge Planning    Admission Date: 4/14/2025  GMLOS: 6.5  ALOS: 1    6-Clicks ADL Score:    6-Clicks Mobility Score:    PT and/or OT Eval ordered: Yes  Post-acute Referrals Ordered: No  Post-acute Choice Obtained: No  Has referral(s) been sent to post-acute provider:  NA    Anticipated Discharge Dispo: Discharge Disposition: Discharged to home/self care (01)    DME Needed: No    Action(s) Taken:   Chart review was completed. Patient was discussed during IDT rounds.    Pt underwent craniotomy procedure this morning. Following for PT/OT evaluations post-procedure.     PC was placed to pt's son Sen Lee re: discharge assessment. Sen was provided with this RNCM's contact information for a call back at a later time as requested.     Escalations Completed: None    Medically Clear: No    Next Steps:  CM RN to follow up with medical team to discuss discharge barriers or needs.     Barriers to Discharge: Medical clearance and Pending PT Evaluation

## 2025-04-15 NOTE — PROGRESS NOTES
Case d/w Dr Nunez.  Lt C/A SDH large enough it should be removed surgically followed by MMA embolization.  Will schedule surgery for tomorrow.  NPO after MN  x po Rx with sips H2O.  Need family for consent.

## 2025-04-15 NOTE — PROGRESS NOTES
Updated pt's son via phone. Will provide updates for any major changes in pt overnight. Son to call in AM for updates and updated CT results.

## 2025-04-15 NOTE — PROGRESS NOTES
Critical Care Progress Note    Date of admission  4/14/2025    Chief Complaint  SDH    Hospital Course  Sen Vasquez is a 79-year-old male with PMH significant for EtOH abuse, CVA 10/2023, ischial rectal abscess, and CAD [facility] 1/5/2025 where he was treated for colitis who presented from [facility] where he resides 4/14 with altered mental status.  Reportedly at baseline patient is A and O x 4.  Staff at the assisted living noted patient was A/O x 0 with GCS of 14.  Head CT showed acute on chronic left holohemispheric subdural hematoma with 6 mm subfalcine herniation and mass effect on the left lateral ventricle.  Neuroexam here showed a GCS of 9 (3/1/5) Dr. Wallis from neurosurgery was consulted with plan to take to the OR 4/15.    Interval Problem Update  Reviewed last 24 hour events:  Admitted to ICU overnight  Temp 98  SB/SR 50-60  SBP .  No drips  Neuro (preop): Opens eyes to pain, nonverbal, does not follow commands.  Moving all spontaneously.  Right sided neglect, does not cross midline.  Left eye ptosis  RASS -3 to -4.  No sedation  NPO for OR  PIV, condom cath  I/O: 600/165  Mobility 1.  Okay to advance postop    To OR this AM Dr. Wallis.   this am  D5LR @ 50    Review of Systems  Review of Systems   Unable to perform ROS: Mental acuity        Vital Signs for last 24 hours   Temp:  [35.8 °C (96.4 °F)-36.7 °C (98 °F)] 35.9 °C (96.7 °F)  Pulse:  [51-68] 65  Resp:  [13-34] 34  BP: ()/(52-75) 95/53  SpO2:  [86 %-100 %] 97 %    Hemodynamic parameters for last 24 hours       Respiratory Information for the last 24 hours       Physical Exam   Physical Exam  Vitals and nursing note reviewed.   Constitutional:       General: He is sleeping. He is not in acute distress.     Appearance: He is underweight. He is ill-appearing. He is not toxic-appearing.      Comments: Unkempt/disheveled   HENT:      Head: Normocephalic and atraumatic.       Nose: Nose normal.      Mouth/Throat:      Mouth: Mucous membranes are dry.   Cardiovascular:      Rate and Rhythm: Normal rate and regular rhythm.      Pulses: Normal pulses.      Heart sounds: Normal heart sounds.   Musculoskeletal:      Right lower leg: No edema.      Left lower leg: No edema.   Skin:     General: Skin is warm and dry.      Capillary Refill: Capillary refill takes less than 2 seconds.      Comments: Dry/flaky   Neurological:      GCS: GCS eye subscore is 3. GCS verbal subscore is 1. GCS motor subscore is 5.      Sensory: Sensory deficit present.   Psychiatric:         Speech: He is noncommunicative.      Comments: Unable to assess         Medications  Current Facility-Administered Medications   Medication Dose Route Frequency Provider Last Rate Last Admin    MD Alert...ICU Electrolyte Replacement per Pharmacy   Other PHARMACY TO DOSE DAWOOD Serna        Pharmacy Consult Request ...Pain Management Review 1 Each  1 Each Other PHARMACY TO DOSE BRAULIO ArguetaP.FABI AMARAL ALERT...DO NOT ADMINISTER NSAIDS or ASPIRIN unless ORDERED By Neurosurgery 1 Each  1 Each Other PRN ERROL Argueta.P.N.        ondansetron (Zofran) syringe/vial injection 4 mg  4 mg Intravenous Q4HRS PRN ERROL Argueta.P.N.        diphenhydrAMINE (Benadryl) injection 25 mg  25 mg Intravenous Q6HRS PRN Evy Remy, A.P.N.        scopolamine (Transderm-Scop) patch 1 Patch  1 Patch Transdermal Q72HRS PRN ERROL Argueta.P.N.        labetalol (Normodyne/Trandate) injection 10 mg  10 mg Intravenous Q HOUR PRN Evy Remy, A.P.N.        hydrALAZINE (Apresoline) injection 10 mg  10 mg Intravenous Q HOUR PRN ERROL Argueta.P.N.   10 mg at 04/15/25 1208    bisacodyl (Dulcolax) suppository 10 mg  10 mg Rectal Q24HRS PRN ERROL Argueta.P.N.        sodium phosphate enema 1 Each  1 Each Rectal Once PRN ERROL Argueta.P.N.        artificial tears (Eye Lubricant) ophth ointment 1  Application  1 Application Both Eyes Q8HRS BRAULIO ArguetaP.NSunday        ceFAZolin (Ancef) 2 g in  mL IVPB  2 g Intravenous Q8HR BRAULIO ArguetaP.N.   Stopped at 04/15/25 1240    magnesium sulfate IVPB premix 4 g  4 g Intravenous Once Meche Richards A.P.R.N. 25 mL/hr at 04/15/25 1103 4 g at 04/15/25 1103    Pharmacy Consult: Enteral tube insertion - review meds/change route/product selection  1 Each Other PHARMACY TO DOSE Meche Richards, A.P.R.N.        thiamine (B-1) injection 200 mg  200 mg Intravenous Q8HRS Meche Richards, A.P.R.N.        Followed by    [START ON 4/19/2025] thiamine (Vitamin B-1) tablet 100 mg  100 mg Enteral Tube DAILY Meche Richards, A.P.R.N.        cloNIDine (Catapres) tablet 0.1 mg  0.1 mg Enteral Tube Q4HRS PRN Meche Richards, A.P.R.N.        magnesium hydroxide (Milk Of Magnesia) suspension 30 mL  30 mL Enteral Tube QDAY PRN Meche Andradeer, A.P.R.N.        polyethylene glycol/lytes (Miralax) Packet 1 Packet  1 Packet Enteral Tube BID PRN Meche Richards, A.P.R.N.        multivitamin tablet 1 Tablet  1 Tablet Enteral Tube DAILY Meche Richards, A.P.R.N.        folic acid (Folvite) tablet 1 mg  1 mg Enteral Tube DAILY Meche Richards, A.P.R.N.        senna-docusate (Pericolace Or Senokot S) 8.6-50 MG per tablet 2 Tablet  2 Tablet Enteral Tube Q EVENING Meche Andradeer, A.P.R.N.        And    polyethylene glycol/lytes (Miralax) Packet 1 Packet  1 Packet Enteral Tube QDAY PRN Meche Andradeer, A.P.R.N.        Respiratory Therapy Consult   Nebulization Continuous RT Adrian Ramirez M.D.        midazolam (Versed) injection 1 mg  1 mg Intravenous Q HOUR PRN Adrian Ramirez M.D.   1 mg at 04/15/25 1251    Or    midazolam (Versed) injection 2 mg  2 mg Intravenous Q HOUR PRN Adrian Ramirez M.D.        Or    midazolam (Versed) injection 4 mg  4 mg Intravenous Q HOUR PRN Adrian Ramirez M.D.        Or    midazolam (Versed) injection 5 mg  5 mg Intravenous Q HOUR PRN Adrian Ramirez M.D.         D5LR infusion   Intravenous Continuous Adrian Ramirez M.D. 50 mL/hr at 04/15/25 1103 New Bag at 04/15/25 1103       Fluids    Intake/Output Summary (Last 24 hours) at 4/15/2025 1421  Last data filed at 4/15/2025 1240  Gross per 24 hour   Intake 3227.8 ml   Output 232 ml   Net 2995.8 ml       Laboratory          Recent Labs     04/14/25  1310 04/15/25  0725   SODIUM 138 135   POTASSIUM 4.9 3.6   CHLORIDE 99 103   CO2 24 23   BUN 13 9   CREATININE 0.52 0.39*   MAGNESIUM  --  1.5   PHOSPHORUS  --  2.7   CALCIUM 10.0 7.8*     Recent Labs     04/14/25  1310 04/15/25  0725   ALTSGPT 10  --    ASTSGOT 17  --    ALKPHOSPHAT 75  --    TBILIRUBIN 1.2  --    GLUCOSE 96 101*     Recent Labs     04/14/25  1310 04/15/25  0725   WBC 6.5 7.3   NEUTSPOLYS 78.40*  --    LYMPHOCYTES 11.40*  --    MONOCYTES 7.30  --    EOSINOPHILS 2.00  --    BASOPHILS 0.60  --    ASTSGOT 17  --    ALTSGPT 10  --    ALKPHOSPHAT 75  --    TBILIRUBIN 1.2  --      Recent Labs     04/14/25  1310 04/15/25  0725   RBC 4.07* 3.16*   HEMOGLOBIN 12.6* 10.0*   HEMATOCRIT 38.1* 30.0*   PLATELETCT 305 232   PROTHROMBTM 14.7*  --    APTT 26.0  --    INR 1.15*  --        Imaging  CT:    Reviewed    Assessment/Plan  * Subdural hematoma (HCC)- (present on admission)  Assessment & Plan  -history of ETOH abuse. No reported recent falls. Not on AC  -TEG unremarkable. No indications for reversal  -serial neurologic exams  -SBP goal < 160  -HOB > 30  -goal normonatremia, normothermia, normoglycemia, euvolemia  -Neurosurgery consulted. Plan for OR today  -PT OT SLP  -Aspiration and Fall Precautions  -SCD's for DVT prophylaxis    Severe protein-calorie malnutrition (HCC)- (present on admission)  Assessment & Plan  -Body mass index is 22.68 kg/m².  -thiamine, vitamins  -monitor for refeeding  -dietary consult    ETOH abuse- (present on admission)  Assessment & Plan  -uncertain chronicity or last known ingestion  -high-dose Thiamine  -monitor for withdrawal  -Seizure and  Aspiration Precautions  -cessation education and counseling when able         VTE:  Contraindicated  Ulcer: Not Indicated  Lines: None    I have performed a physical exam and reviewed and updated ROS and Plan today (4/15/2025). In review of yesterday's note (4/14/2025), there are no changes except as documented above.     Discussed patient condition and risk of morbidity and/or mortality with RN, RT, , Patient, and my attending Dr. Robles    Please note that this dictation was created using voice recognition software. I have made every reasonable attempt to correct obvious errors, but there may be errors of grammar and possibly content that I did not discover before finalizing the note.    AVA Serna.

## 2025-04-15 NOTE — ANESTHESIA POSTPROCEDURE EVALUATION
Patient: Sen Vasquez    Procedure Summary       Date: 04/15/25 Room / Location: [facility] OR 06 / SURGERY [facility]    Anesthesia Start: 0824 Anesthesia Stop: 0951    Procedure: CRANIOTOMY (Left: Head) Diagnosis: (LEFT HEMISPHERIC SUBDURAL HEMATOMA)    Surgeons: Marcelo Wallis M.D. Responsible Provider: Maury Longoria M.D.    Anesthesia Type: general ASA Status: 3 - Emergent            Final Anesthesia Type: general  Last vitals  BP   Blood Pressure : 119/56    Temp   36.6 °C (97.8 °F)    Pulse   (!) 55   Resp   13    SpO2   99 %      Anesthesia Post Evaluation    Patient location during evaluation: PACU  Patient participation: complete - patient participated  Level of consciousness: awake and alert  Pain score: 0    Airway patency: patent  Anesthetic complications: no  Cardiovascular status: hemodynamically stable  Respiratory status: acceptable  Hydration status: euvolemic    PONV: none          No notable events documented.     Nurse Pain Score: 0 (NPRS)

## 2025-04-15 NOTE — CONSULTS
DATE OF SERVICE:  04/15/2025     NEUROSURGICAL CONSULTATION     HISTORY:  The patient came to the ER from a nursing home.  Apparently, he is   not functioning as he was.  Normally, he is awake, alert.  The patient had a   CT of the head, which shows mixed chronic left hemispheric subdural hematoma   with acute blood mainly anteriorly sizable, but because of the patient's brain   atrophy is not causing dramatic mass effect. The patient is on   anticoagulation.  The protime is slightly elevated.  There is no inhibition on   his TEG, AA or ADP.  The patient provides no history.  I spoke with his son.    Apparently, he did have a fall.  [facility]  History of a stent placed many years   ago, ____.     PHYSICAL EXAMINATION:  On exam, the patient opens his eyes.  He is globally   aphasic.  I cannot understand any intelligible word nor can he follow   commands.  The patient has increased tone in his right arm.  He moves the left   side purposefully.  The right side is spastic.  Pupils equally round and   react to light.     IMPRESSION:  Sizable left hemispheric chronic subdural hematoma with acute   component mainly anteriorly.  The patient has neurologic deficit.     PLAN:  I spoke with the son he is a candidate for surgical evacuation of the   clot to keep him from getting worse and optimize the potential for recovery.    Major risks and complications felt to be roughly 1% such as neurologic   worsening, chance for need for repeat surgery for recurrence 15% to 20%.  I   told the patient's son I would recommend postoperatively do a left MMA   embolization to minimize that risk.  The patient will need to be kept off his   anticoagulants, small risk of infection, and seizures.  The son is   understanding and gave permission to proceed.        ______________________________  MD PIYUSH GEIGER/MAIK    DD:  04/15/2025 10:22  DT:  04/15/2025  13:54    Job#:  [id number]

## 2025-04-16 PROBLEM — Z99.11 ON MECHANICALLY ASSISTED VENTILATION (HCC): Status: ACTIVE | Noted: 2025-01-01

## 2025-04-16 PROBLEM — G40.901 STATUS EPILEPTICUS (HCC): Status: ACTIVE | Noted: 2025-01-01

## 2025-04-16 NOTE — CARE PLAN
The patient is Stable - Low risk of patient condition declining or worsening    Shift Goals  Clinical Goals: SBP < 160  Patient Goals: JF  Family Goals: JF    Progress made toward(s) clinical / shift goals:      Problem: Pain - Standard  Goal: Alleviation of pain or a reduction in pain to the patient’s comfort goal  Outcome: Progressing     Problem: Early Mobilization - Post Surgery  Goal: Early mobilization post surgery  Outcome: Progressing - pt up to edge of bed and ambulated with PT/OT to hallways.     Problem: Surgical Drain Management  Goal: Proper management/care of surgical drains will be maintained  Outcome: Met - subdural drain dc'd by Kristina CHARLES      Problem: Neuro Status  Goal: Neuro status will remain stable or improve  Outcome: Progressing - Pt moving all extremities spontaneously (but not to command).  Continues to have global aphasia, saying yes/ok to questions asked but inappropriately.  Pt no longer having left gaze preference.          Patient is not progressing towards the following goals:    Problem: Knowledge Deficit - Standard  Goal: Patient and family/care givers will demonstrate understanding of plan of care, disease process/condition, diagnostic tests and medications  Outcome: Not Progressing      Problem: Safety - Medical Restraint  Goal: Remains free of injury from restraints (Restraint for Interference with Medical Device)  Outcome: Not Progressing - Remains restrained as removed tubes yesterday.

## 2025-04-16 NOTE — ASSESSMENT & PLAN NOTE
Was a resident at an SNF prior to this admission  Thiamine  Folate  MVI  Follow and replace K, Mg, Phos as appropriate

## 2025-04-16 NOTE — PROGRESS NOTES
Neurosurgery Progress Note    Subjective:  Gaze preference improved and more verbal per nursing.      Exam:  Spont EO.  Mumbles unintelligable speech, yes/no clear; however does not correlate to questions being asked.  Does not follow, BOSCH briskly to pain. Perrl 3mm bilat. CDI with SDD.  0 out overnight, 30 yesterday.  Drain dropped no output.     BP  Min: 95/54  Max: 163/75  Pulse  Av.7  Min: 53  Max: 71  Resp  Av.8  Min: 13  Max: 46  Temp  Av.1 °C (96.9 °F)  Min: 35.8 °C (96.4 °F)  Max: 36.6 °C (97.8 °F)  SpO2  Av.7 %  Min: 92 %  Max: 100 %    No data recorded    Recent Labs     25  1310 04/15/25  0725 25  0401   WBC 6.5 7.3 9.9   RBC 4.07* 3.16* 3.73*   HEMOGLOBIN 12.6* 10.0* 11.7*   HEMATOCRIT 38.1* 30.0* 34.5*   MCV 93.6 94.9 92.5   MCH 31.0 31.6 31.4   MCHC 33.1 33.3 33.9   RDW 49.5 49.1 47.2   PLATELETCT 305 232 297   MPV 9.8 10.4 9.3     Recent Labs     25  1310 04/15/25  0725 25  0401   SODIUM 138 135 136   POTASSIUM 4.9 3.6 4.2   CHLORIDE 99 103 100   CO2 24 23 26   GLUCOSE 96 101* 130*   BUN 13 9 8   CREATININE 0.52 0.39* 0.43*   CALCIUM 10.0 7.8* 9.0     Recent Labs     25  1310   APTT 26.0   INR 1.15*     Recent Labs     25  1716   REACTMIN 4.5*   CLOTKINET 0.8   CLOTANGL 79.1*   MAXCLOTS 64.3   PRCINADP 0.0   PRCINAA 0.0       Intake/Output                         04/15/25 07 - 25 0659 25 07 - 25 06 Total 7418-0148 3016-8683 Total                 Intake    I.V.  1272.7  596.2 1868.9  100  -- 100    Magnesium Sulfate Volume 95.8 -- 95.8 -- -- --    Volume (mL) (D5LR infusion) 376.9 596.2 973.2 100 -- 100    Volume (mL) (NS infusion) 800 -- 800 -- -- --    Other  180  -- 180  --  -- --    Medications (PO/Enteral Liquids) 180 -- 180 -- -- --    NG/GT  --  100 100  --  -- --    Intake (mL) (Enteral Tube 04/15/25 Cortrak - Gastric 10 Fr. Right nare) -- 100 100 -- -- --    IV Piggyback  375  -- 375  --  --  --    Volume (mL) (vancomycin (Vancocin) 1,500 mg in  mL IVPB) 275 -- 275 -- -- --    Volume (mL) (ceFAZolin (Ancef) 2 g in  mL IVPB) 100 -- 100 -- -- --    Enteral  60  -- 60  --  -- --    Free Water / Tube Flush 30 -- 30 -- -- --    Enteral Volume (Enteral Tube 04/15/25 Cortrak - Gastric 10 Fr. Right nare) 30 -- 30 -- -- --    Total Intake 1887.7 696.2 2583.9 100 -- 100       Output    Urine  --  25 25  --  -- --    Number of Times Voided -- 4 x 4 x -- -- --    Number of Times Incontinent of Urine 1 x -- 1 x -- -- --    Urine Void (mL) -- 25 25 -- -- --    Drains  17  0 17  --  -- --    Output (mL) ([REMOVED] ICP/Ventriculostomy Left 04/15/25 1030) 0 -- 0 -- -- --    Output (mL) (Subdural Drain Group Left) 17 0 17 -- -- --    Stool  --  -- --  --  -- --    Number of Times Stooled 1 x 1 x 2 x -- -- --    Blood  50  -- 50  --  -- --    Est. Blood Loss 50 -- 50 -- -- --    Total Output 67 25 92 -- -- --       Net I/O     1820.7 671.2 2491.9 100 -- 100              Intake/Output Summary (Last 24 hours) at 4/16/2025 0932  Last data filed at 4/16/2025 0800  Gross per 24 hour   Intake 2654.78 ml   Output 42 ml   Net 2612.78 ml             MD Alert...Adult ICU Electrolyte Replacement per Pharmacy   PHARMACY TO DOSE    Pharmacy Consult Request  1 Each PHARMACY TO DOSE    MD ALERT...DO NOT ADMINISTER NSAIDS or ASPIRIN unless ORDERED By Neurosurgery  1 Each PRN    ondansetron  4 mg Q4HRS PRN    diphenhydrAMINE  25 mg Q6HRS PRN    scopolamine  1 Patch Q72HRS PRN    labetalol  10 mg Q HOUR PRN    hydrALAZINE  10 mg Q HOUR PRN    bisacodyl  10 mg Q24HRS PRN    sodium phosphate  1 Each Once PRN    artificial tears  1 Application Q8HRS    Pharmacy  1 Each PHARMACY TO DOSE    thiamine  200 mg Q8HRS    Followed by    [START ON 4/19/2025] thiamine  100 mg DAILY    cloNIDine  0.1 mg Q4HRS PRN    magnesium hydroxide  30 mL QDAY PRN    polyethylene glycol/lytes  1 Packet BID PRN    multivitamin  1 Tablet DAILY    folic acid   1 mg DAILY    senna-docusate  2 Tablet Q EVENING    And    polyethylene glycol/lytes  1 Packet QDAY PRN    Respiratory Therapy Consult   Continuous RT    midazolam  1 mg Q HOUR PRN    Or    midazolam  2 mg Q HOUR PRN    Or    midazolam  4 mg Q HOUR PRN    Or    midazolam  5 mg Q HOUR PRN    D5LR   Continuous       Assessment and Plan:  POD #1 Crani for SD  Prophylactic anticoagulation: no         Start date/time: tbd     Ok to floor  Left MMA ordered  Will DC SDD

## 2025-04-16 NOTE — CONSULTS
Hospital Medicine Consultation    Date of Service  4/16/2025    Referring Physician  GERALDO Robles    Consulting Physician  José Holloway D.O.    Reason for Consultation  SDH    History of Presenting Illness  79 y.o. male who presented 4/14/2025 with a history of ETOH, CAD, GERD.  He was sent to us from a SNF on 4/14 when he was found to be confused.  Head CT shwoing an acute on chronic wholohemispheric SDH with 6mm of subfalcine herniation and mass effect on the L ventricle.  He was loaded with Keppra and admtted to the ICU with consultation from Neuro Srgry.  He went for craniotomy and drainage on 4/15.      Review of Systems  Review of Systems   Unable to perform ROS: Mental status change       Past Medical History   has a past medical history of Alcohol abuse, Coronary arteriosclerosis, Dyslipidemia, GERD (gastroesophageal reflux disease), Mediastinal mass, and Osteopenia.    Surgical History   has a past surgical history that includes coronary artery bypass, 1 and craniotomy (Left, 4/15/2025).    Family History  family history is not on file.    Social History   reports that he has been smoking cigarettes. He has never used smokeless tobacco. He reports current alcohol use. He reports that he does not use drugs.    Medications  None       Allergies  Allergies   Allergen Reactions    Pravastatin Hives and Swelling       Physical Exam  Temp:  [35.8 °C (96.4 °F)-36.4 °C (97.5 °F)] 36 °C (96.8 °F)  Pulse:  [53-71] 66  Resp:  [13-46] 16  BP: ()/(53-66) 101/55  SpO2:  [92 %-100 %] 94 %    Physical Exam  Constitutional:       General: He is not in acute distress.     Appearance: He is well-developed. He is not diaphoretic.   HENT:      Head: Normocephalic and atraumatic.   Eyes:      Conjunctiva/sclera: Conjunctivae normal.   Neck:      Vascular: No JVD.   Cardiovascular:      Rate and Rhythm: Normal rate.      Heart sounds: No murmur heard.     No gallop.   Pulmonary:      Effort: Pulmonary effort is normal.  "No respiratory distress.      Breath sounds: No stridor. No wheezing or rales.   Abdominal:      Palpations: Abdomen is soft.      Tenderness: There is no abdominal tenderness. There is no guarding or rebound.   Skin:     General: Skin is warm and dry.      Findings: No rash.   Neurological:      Comments: Alert and interactive  Responds \"yes\" to all questions  Attends to L with stim  Does not follow verbal commands  Moving L side spontaneously  No movement on R side   Psychiatric:         Mood and Affect: Mood normal.         Behavior: Behavior normal.         Fluids  Date 04/16/25 0700 - 04/17/25 0659   Shift 4314-6673 8005-4124 3710-4167 24 Hour Total   INTAKE   I.V. 187.5   187.5   Enteral 110   110   Shift Total 297.5   297.5   OUTPUT   Drains 0   0   Shift Total 0   0   Weight (kg) 64 64 64 64       Laboratory  Recent Labs     04/14/25  1310 04/15/25  0725 04/16/25  0401   WBC 6.5 7.3 9.9   RBC 4.07* 3.16* 3.73*   HEMOGLOBIN 12.6* 10.0* 11.7*   HEMATOCRIT 38.1* 30.0* 34.5*   MCV 93.6 94.9 92.5   MCH 31.0 31.6 31.4   MCHC 33.1 33.3 33.9   RDW 49.5 49.1 47.2   PLATELETCT 305 232 297   MPV 9.8 10.4 9.3     Recent Labs     04/14/25  1310 04/15/25  0725 04/16/25  0401   SODIUM 138 135 136   POTASSIUM 4.9 3.6 4.2   CHLORIDE 99 103 100   CO2 24 23 26   GLUCOSE 96 101* 130*   BUN 13 9 8   CREATININE 0.52 0.39* 0.43*   CALCIUM 10.0 7.8* 9.0     Recent Labs     04/14/25  1310   APTT 26.0   INR 1.15*                 Imaging  DX-ABDOMEN FOR TUBE PLACEMENT   Final Result      Feeding tube tip at the distal stomach.      DX-ABDOMEN FOR TUBE PLACEMENT   Final Result      1.  Feeding tube tip at the proximal stomach.   2.  Irregular increased density at the lateral RIGHT upper abdomen of uncertain significance.      CT-HEAD W/O   Final Result         1.  Left holohemispheric acute on chronic appearing subdural hematoma, similar to prior study given differences of technique   2.  Mass effect with partial effacement left " ventricle and 5.1 mm left to right midline shift, similar to prior study.   3.  Nonspecific white matter changes, commonly associated with small vessel ischemic disease.  Associated mild cerebral atrophy is noted.   4.  Atherosclerosis.   5.  Left maxillary sinusitis changes               CT-HEAD W/O   Final Result      1. Acute on chronic left holohemispheric subdural hematoma with a maximum thickness 2.1 cm. There is 5.7 mm subfalcine herniation to the left and mass effect on the left lateral ventricle.   2. Stable right temporal and right posterior parietal cortical encephalomalacia.   3. Atrophy and diffuse chronic microangiopathic white matter changes versus demyelination or gliosis.      Findings were conveyed to the ordering physician at the time of this interpretation on 4/14/2025 via an electronic messaging system.         DX-CHEST-PORTABLE (1 VIEW)   Final Result      1. No acute findings.   2. Stable bibasal subsegmental atelectasis or scarring.      IR-US GUIDED PIV    (Results Pending)   IR-EMBOLIZE-NEURO-EXTRACRANIAL    (Results Pending)       Assessment/Plan  * Subdural hematoma (HCC)- (present on admission)  Assessment & Plan  Was not on any blood thinners  Now s/p craniotomy 4/15  Anticipate MMA embolization tomorrow or Friday  Cont Neuro checks  PT/OT/SLP  Follow Na daily  Keppra Sz prophylaxis DC'd  Neuro Srgry following    Severe protein-calorie malnutrition (HCC)- (present on admission)  Assessment & Plan  Currently getting nutrition via NGT    ETOH abuse- (present on admission)  Assessment & Plan  Was a resident at an SNF prior to this admission  Thiamine  Folate  MVI  Follow and replace K, Mg, Phos as appropriate

## 2025-04-16 NOTE — THERAPY
"Speech Language Pathology   Clinical Swallow Evaluation     Patient Name: Sen Vasquez  AGE:  79 y.o., SEX:  male  Medical Record #: [id number]  Date of Service: 4/16/2025      History of Present Illness  79-year-old male admitted 4/15/25 Pt found to have acute on chronic left holohemispheric subdural hematoma with 6 mm subfalcine herniation and mass effect on the left lateral ventricle. S/p surgical evacuation followed by middle meningeal artery embolization.    PMHx: EtOH abuse, stroke 10/2023, CAD, GERD, colitis.    Imaging:   CT-Head 4/14  1.  Left holohemispheric acute on chronic appearing subdural hematoma, similar to prior study given differences of technique  2.  Mass effect with partial effacement left ventricle and 5.1 mm left to right midline shift, similar to prior study.  3.  Nonspecific white matter changes, commonly associated with small vessel ischemic disease.  Associated mild cerebral atrophy is noted.  4.  Atherosclerosis.  5.  Left maxillary sinusitis changes    CXR:4/14  1. No acute findings.  2. Stable bibasal subsegmental atelectasis or scarring.    SLP Hx:  10/9/24: \"clinical signs of severe oropharyngeal dysphagia, likely acute & multifactorial in the context of prolonged encephalopathy & neurological deficits from CVA.\"      General Information:  Vitals  O2 Delivery Device: None - Room Air  Level of Consciousness: Awake  Orientation:  (Pt unable to answer orientation questions, responded \"yes\" to all questions)  Follows Directives: No      Prior Living Situation & Level of Function:  Swallowing: Hx dysphagia       Oral Mechanism Evaluation:  Dentition: Pt did not follow commands to assess   Facial Symmetry: Pt did not follow commands to assess     Labial Observations: Bilateral weakness   Lingual Observations: Pt did not follow commands to assess       Laryngeal Function:  Secretion Management: Adequate  Voice Quality: Wet  Cough: Perceptually weak       Subjective  Patient received awake, " "sitting upright in bed. Pt answering all questions and responding to commands with \"yes\" only. Pt did not follow commands despite max cues.        Assessment  Current Method of Nutrition: NPO until cleared by speech pathology, NGT  Positioning: Decker's (60-90 degrees)  Bolus Administration: SLP  O2 Delivery Device: None - Room Air  Factor(s) Affecting Performance: Impaired mental status, Impaired command following       Swallowing Trials:  Swallowing Trials  Ice: Impaired  Thin Liquid (TN0): Impaired      Comments: SLP provided all feeding presentations d/t bilateral wrist restraints. Pt with inconsistent PO acceptance; some trials pt would spit ice chip out at SLP, some trials pt would accept without difficulty. Pt with complete anterior bolus loss of water trials via tsp. Pt with wet, gurgley voice prior to and follow PO trials; requiring oral secretions to be removed via [place]. Single swallow completed per bolus        Clinical Impressions  Patient presents with clinical indicators concerning for oropharyngeal dysphagia. Pt with inconsistent oral bolus acceptance this date, impeding a complete clinical swallow evaluation this date. Recommend continue NPO/NGT except ice chips with staff following diligent oral care, as pt is appropriate, for comfort, oral hydration, reduced risk of disuse atrophy, and pharyngeal secretion management. SLP to continue to follow to determine pt appropriateness for participation in a diagnostic swallow study vs initiation of oral diet.         Recommendations  Diet Consistency: NPO/NGT except ice chips with staff  Instrumentation: Instrumental swallow study pending clinical progress  Medication: Non Oral  Oral Care: Q2h         SLP Treatment Plan  Treatment Plan: Dysphagia Treatment  SLP Frequency: 4x Per Week  Estimated Duration: Until Therapy Goals Met      Anticipated Discharge Needs  Discharge Recommendations: Recommend post-acute placement for additional speech therapy " services prior to discharge home   Therapy Recommendations Upon DC: Dysphagia Training, Patient / Family / Caregiver Education        Patient / Family Goals  Patient / Family Goal #1: None stated  Short Term Goals  Short Term Goal # 1: Patient will consume prefeeding trials with SLP with good participation and without overt s/sx of aspiration      Edwin Vela, SLP

## 2025-04-16 NOTE — CARE PLAN
The patient is Watcher - Medium risk of patient condition declining or worsening    Shift Goals  Clinical Goals: SBP < 160  Patient Goals: JF  Family Goals: JF    Progress made toward(s) clinical / shift goals:    Problem: Seizure Precautions  Goal: Implementation of seizure precautions  Outcome: Progressing     Problem: Psychosocial  Goal: Patient's level of anxiety will decrease  Outcome: Progressing     Problem: Risk for Aspiration  Goal: Patient's risk for aspiration will be absent or decrease  Outcome: Progressing     Problem: Pain - Standard  Goal: Alleviation of pain or a reduction in pain to the patient’s comfort goal  Outcome: Progressing     Problem: Surgical Drain Management  Goal: Proper management/care of surgical drains will be maintained  Outcome: Progressing     Problem: Safety - Medical Restraint  Goal: Remains free of injury from restraints (Restraint for Interference with Medical Device)  Outcome: Progressing       Patient is not progressing towards the following goals:

## 2025-04-16 NOTE — THERAPY
Occupational Therapy   Initial Evaluation     Patient Name: Sen Vasquez  Age:  79 y.o., Sex:  male  Medical Record #: [id number]  Today's Date: 4/16/2025     Precautions  Precautions: Fall Risk, Swallow Precautions, Nasogastric Tube    Assessment  Patient is 79 y.o. male with a diagnosis of L SDH s/p crani.  Pt currently limited by decreased functional mobility, activity tolerance, cognition, sensation, strength, AROM, coordination, balance, which are affecting pt's ability to complete ADLs/IADLs at baseline. Pt would benefit from OT services in the acute care setting to maximize functional recovery.       Plan    Occupational Therapy Initial Treatment Plan   Treatment Interventions: (P) Self Care / Activities of Daily Living, Neuro Re-Education / Balance, Therapeutic Activity  Treatment Frequency: (P) 3 Times per Week  Duration: (P) Until Therapy Goals Met       Discharge Recommendations: (P) Recommend post-acute placement for additional occupational therapy services prior to discharge home (back to SNF)        04/16/25 0947   Prior Living Situation   Prior Services Continuous (24 Hour) Care Giving Per Service   Housing / Facility Skilled Nursing Facility   Equipment Owned Unable to Determine At This Time   Lives with - Patient's Self Care Capacity Unable To Determine At This Time   Comments pt was admitted from SNF, unable to give any PLOF info.   Prior Level of ADL Function   Self Feeding Unable To Determine At This Time   Dressing Unable To Determine At This Time   Strength Upper Body   Upper Body Strength  X   Comments pt moving B UEs but not following for testing   Balance Assessment   Sitting Balance (Static) Fair -   Sitting Balance (Dynamic) Poor   Standing Balance (Static) Poor   Standing Balance (Dynamic) Poor -   Weight Shift Sitting Poor   Weight Shift Standing Poor   ADL Assessment   Grooming Maximal Assist   Upper Body Dressing Maximal Assist   Lower Body Dressing Maximal Assist   Toileting Maximal  Assist   Functional Mobility   Sit to Stand Moderate Assist   Bed, Chair, Wheelchair Transfer Unable to Participate   Short Term Goals   Short Term Goal # 1 min A with UB dressing   Short Term Goal # 2 mod A with LB dressing   Short Term Goal # 3 min A with ADL txfs   Occupational Therapy Initial Treatment Plan    Treatment Interventions Self Care / Activities of Daily Living;Neuro Re-Education / Balance;Therapeutic Activity   Treatment Frequency 3 Times per Week   Duration Until Therapy Goals Met   Anticipated Discharge Equipment and Recommendations   Discharge Recommendations Recommend post-acute placement for additional occupational therapy services prior to discharge home  (back to SNF)

## 2025-04-16 NOTE — THERAPY
Physical Therapy   Initial Evaluation     Patient Name: Sen Vasquez  Age:  79 y.o., Sex:  male  Medical Record #: [id number]  Today's Date: 4/16/2025     Precautions  Precautions: Fall Risk;Swallow Precautions    Assessment  Patient is 79 y.o. male w/ recent d/c from hospital in October 2/2 ETOH w/d, CVA BG, encephalopathy.  Transferred from SNF 2/2 AMS.  CT in ED indicated acute on chronic left SDH.  He is rec'd in bed, confused, unable to answer questions or follow commands.  Mod assist to sit eob.  Min assist to stand.  Initially needed mod assist to initiate gait, then able to ambulate a short distance w/ min assist/HHA.  Returned to bed per nsg 2/2 safety concerns.  PT will follow and address goals noted below.  Plan    Physical Therapy Initial Treatment Plan   Treatment Plan : Bed Mobility, Gait Training, Therapeutic Activities, Neuro Re-Education / Balance  Treatment Frequency: 5 Times per Week  Duration: Until Therapy Goals Met    DC Equipment Recommendations: Unable to determine at this time  Discharge Recommendations: Recommend post-acute placement for additional physical therapy services prior to discharge home          Objective       04/16/25 1336   Prior Living Situation   Housing / Facility Unable To Determine At This Time   Equipment Owned Unable to Determine At This Time   Lives with - Patient's Self Care Capacity Unable To Determine At This Time   Prior Level of Functional Mobility   Bed Mobility Unable To Determine At This Time   Transfer Status Unable To Determine At This Time   Ambulation Unable To Determine At This Time   Assistive Devices Used Unable to Determine At This Time   Cognition    Level of Consciousness Alert   Strength Lower Body   Comments noted gross movement x4 extremities however unable to formally assess 2/2 unable to follow cues   Balance Assessment   Sitting Balance (Static) Fair -   Sitting Balance (Dynamic) Fair -   Standing Balance (Static) Fair -   Standing Balance (Dynamic)  Poor +   Weight Shift Sitting Poor   Weight Shift Standing Poor   Bed Mobility    Supine to Sit Moderate Assist   Sit to Supine Moderate Assist   Gait Analysis   Gait Level Of Assist Minimal Assist   Assistive Device None   Distance (Feet)   (4 ft forward and back)   Functional Mobility   Sit to Stand Minimal Assist   Bed, Chair, Wheelchair Transfer Unable to Participate   Short Term Goals    Short Term Goal # 1 Pt to move supine to/from eob w/ spv in 6 visits   Short Term Goal # 2 Pt to movesit to/from stand w/ spv in 6 visits   Short Term Goal # 3 Pt to ambulate 150 ft w/ spv in 6 visits   Physical Therapy Initial Treatment Plan    Treatment Plan  Bed Mobility;Gait Training;Therapeutic Activities;Neuro Re-Education / Balance   Treatment Frequency 5 Times per Week   Duration Until Therapy Goals Met   Anticipated Discharge Equipment and Recommendations   DC Equipment Recommendations Unable to determine at this time   Discharge Recommendations Recommend post-acute placement for additional physical therapy services prior to discharge home

## 2025-04-16 NOTE — CONSULTS
"Neuro Interventional Radiology Consult  Author: DAWOOD Melendez Date & Time created: 4/16/2025  5:14 PM   Date of admission  4/14/2025  Note to reader: this note follows the APSO format rather than the historical SOAP format. Assessment and plan located at the top of the note for ease of use.    Chief Complaint  79 y.o. male admitted 4/14/2025 with   Chief Complaint   Patient presents with    ALOC     Pt resides at [facility]. Found patient ALOC and called EMS.        HPI  Patient is a \"79 y.o. male who presented 4/14/2025 with a history of ETOH, CAD, GERD.  He was sent to us from a SNF on 4/14 when he was found to be confused.  Head CT shwoing an acute on chronic wholohemispheric SDH with 6mm of subfalcine herniation and mass effect on the L ventricle.  He was loaded with Keppra and admtted to the ICU with consultation from Neuro Srgry.  He went for craniotomy and drainage on 4/15.\" - DO. Amie NASIMA was consulted for middle meningeal artery embolization.    Neuro IR Dr. Cordova has reviewed patient's history and imaging studies. I examined the patient, and discussed treatment options. I have reviewed patient's most recent labs I reviewed today's labs, including: WBC 9.9; Hgb 11.7, Cr 34.5;   I reviewed CT-head imaging from 4/14/25 which showed: \"Left holohemispheric acute on chronic appearing subdural hematoma, similar to prior study given differences of technique ... Mass effect with partial effacement left ventricle and 5.1 mm left to right midline shift, similar to prior study.\"  Patient is a candidate for bilateral middle meningeal artery embolization for this symptomatic subdural hematoma. I discussed with patient's son, Sen Vasquez Jr. via telephone ([phone]), the method of the procedure at length including the possibility of the use of catheters, contrast, and xrays to facilitate diagnostic procedures and stents and embolic agents to perform endovascular intervention. We " additionally discussed the procedure risks, including bleeding and infection, damage to the arteries, reaction to any medications given during the procedure, side effects of contrast, radiation exposure, in stent stenosis, ophthalmic artery embolization with vision loss, mechanical failure, stroke, hemorrhage, and death. There is a risk for unanticipated neurologic or non neurologic complications. There is a chance either or both middle meningeal arteries may ultimately not be amenable to endovascular intervention. After the procedure, there is a chance that the subdural hematoma could recur. We discussed alternatives to the procedure including surveillance and surgical intervention, which could be discussed with a neuro surgeon. The patient's son Sen Vasquez Jr. verbalizes understanding of risks, benefits, and alternatives and elects to proceed. Patient's son verbalizes being able to answer his phone [phone] on Thursday to discuss surgical consent prior to the procedure.     Patient has been scheduled for bilateral middle meningeal artery embolization with general anesthesia on Thursday (4/17/25) at ~1600. Hospital nursing staff notified of tentative schedule and NPO status at 0800 Thursday morning.     This surgery will be higher risk surgery due to risk of intracranial hemorrhage or thrombotic events. Care provided will be in the Intensive Care Unit. The care needed cannot be provided in outpatient setting and without such care there is high risk of development of complications and death.    Assessment/Plan     Principal Problem:    Subdural hematoma (HCC)  Active Problems:    ETOH abuse    Severe protein-calorie malnutrition (HCC)      Plan NASIMA  - NPO Thursday 4/17/25 at 0800 (Order placed by me)  - B/L MMA Embolization planned for Thursday (4/17/25) at ~1600  - Neuro Checks per unit protocol s/p procedure  - Post procedure groin access site instructions: no lifting greater than 5 lbs, no straining, and no  "baths/ swimming/ soaking in tub for 7-10 days. Shower OK. OK to change dressings/band aid as needed.  -  -  -Thank you for allowing Neuro Interventional Radiology team to participate in the patient's care, if any additional care or requests are needed in the future please do not hesitate call film room or place NASIMA order. [phone]      Thank you for allowing Interventional Radiology team to participate in the patients care, if any additonal care or requests are needed in the future please do not hesitate call or place NASIMA order.        Review of Systems  Physical Exam   Review of Systems   Unable to perform ROS: Mental status change      Vitals:    04/16/25 1650   BP: 122/58   Pulse: 68   Resp:    Temp: (!) 35.8 °C (96.4 °F)   SpO2: 93%      Physical Exam  Vitals and nursing note reviewed.   Constitutional:       General: He is not in acute distress.  Cardiovascular:      Rate and Rhythm: Normal rate.   Pulmonary:      Effort: Pulmonary effort is normal. No respiratory distress.   Abdominal:      Palpations: Abdomen is soft.      Tenderness: There is no abdominal tenderness.   Skin:     General: Skin is warm and dry.   Neurological:      Mental Status: He is alert.      Comments: Alert and interactive, JF orientation  PERRL  Occasional rhythmic twitching movements appreciated to right eye and right lower face, non sustained.  Responds \"yes\" to all questions  Attends to LUE/LLE with stimulation  Does not follow verbal commands  Moving L side spontaneously  No movement to verbal command on R side   Psychiatric:         Mood and Affect: Mood normal.         Behavior: Behavior normal.          Labs reviewed by me today    Recent Labs     04/14/25  1310 04/15/25  0725 04/16/25  0401   WBC 6.5 7.3 9.9   RBC 4.07* 3.16* 3.73*   HEMOGLOBIN 12.6* 10.0* 11.7*   HEMATOCRIT 38.1* 30.0* 34.5*   MCV 93.6 94.9 92.5   MCH 31.0 31.6 31.4   MCHC 33.1 33.3 33.9   RDW 49.5 49.1 47.2   PLATELETCT 305 232 297   MPV 9.8 10.4 9.3 "     Recent Labs     04/14/25  1310 04/15/25  0725 04/16/25  0401   SODIUM 138 135 136   POTASSIUM 4.9 3.6 4.2   CHLORIDE 99 103 100   CO2 24 23 26   GLUCOSE 96 101* 130*   BUN 13 9 8   CREATININE 0.52 0.39* 0.43*   CALCIUM 10.0 7.8* 9.0     DX-ABDOMEN FOR TUBE PLACEMENT   Final Result      Feeding tube tip at the distal stomach.      DX-ABDOMEN FOR TUBE PLACEMENT   Final Result      1.  Feeding tube tip at the proximal stomach.   2.  Irregular increased density at the lateral RIGHT upper abdomen of uncertain significance.      CT-HEAD W/O   Final Result         1.  Left holohemispheric acute on chronic appearing subdural hematoma, similar to prior study given differences of technique   2.  Mass effect with partial effacement left ventricle and 5.1 mm left to right midline shift, similar to prior study.   3.  Nonspecific white matter changes, commonly associated with small vessel ischemic disease.  Associated mild cerebral atrophy is noted.   4.  Atherosclerosis.   5.  Left maxillary sinusitis changes               CT-HEAD W/O   Final Result      1. Acute on chronic left holohemispheric subdural hematoma with a maximum thickness 2.1 cm. There is 5.7 mm subfalcine herniation to the left and mass effect on the left lateral ventricle.   2. Stable right temporal and right posterior parietal cortical encephalomalacia.   3. Atrophy and diffuse chronic microangiopathic white matter changes versus demyelination or gliosis.      Findings were conveyed to the ordering physician at the time of this interpretation on 4/14/2025 via an electronic messaging system.         DX-CHEST-PORTABLE (1 VIEW)   Final Result      1. No acute findings.   2. Stable bibasal subsegmental atelectasis or scarring.      IR-US GUIDED PIV    (Results Pending)   IR-EMBOLIZE-NEURO-EXTRACRANIAL    (Results Pending)     Recent Labs     04/14/25  1310 04/15/25  0725 04/16/25  0401   SODIUM 138 135 136   POTASSIUM 4.9 3.6 4.2   CHLORIDE 99 103 100   CO2 24  "23 26   GLUCOSE 96 101* 130*   BUN 13 9 8     INR   Date Value Ref Range Status   04/14/2025 1.15 (H) 0.87 - 1.13 Final     Comment:     INR - Non-therapeutic Reference Range: 0.87-1.13  INR - Therapeutic Reference Range: 2.0-4.0       No results found for: \"POCINR\"     Intake/Output Summary (Last 24 hours) at 4/16/2025 1539  Last data filed at 4/16/2025 1400  Gross per 24 hour   Intake 1483.61 ml   Output 25 ml   Net 1458.61 ml      Labs not explicitly included in this progress note were reviewed by the author. Radiology/imaging not explicitly included in this progress note was reviewed by the author.     Past Medical History:   Diagnosis Date    Alcohol abuse     Coronary arteriosclerosis     Dyslipidemia     GERD (gastroesophageal reflux disease)     Mediastinal mass     Osteopenia         Home Medications    Not on File       I have performed a physical exam and reviewed and updated ROS and Plan today (4/16/2025).     55 minutes in directly providing and coordinating care and extensive data review.  No time overlap and excludes procedures.   "

## 2025-04-16 NOTE — ASSESSMENT & PLAN NOTE
Was not on any blood thinners  Now s/p craniotomy 4/15  Anticipate MMA embolization tomorrow or Friday  Cont Neuro checks  PT/OT/SLP  Follow Na daily  Bruce Rodriguez prophylaxis DC'd  Neuro Srgry following

## 2025-04-16 NOTE — DISCHARGE PLANNING
Case Management Discharge Planning    Admission Date: 4/14/2025  GMLOS: 6.5  ALOS: 2    6-Clicks ADL Score: 12  6-Clicks Mobility Score:    PT and/or OT Eval ordered: Yes  Post-acute Referrals Ordered: No  Post-acute Choice Obtained: No  Has referral(s) been sent to post-acute provider:  Yes    Anticipated Discharge Dispo: Discharge Disposition: D/T to SNF with Medicare cert in anticipation of skilled care (03)    DME Needed: No    Action(s) Taken:   Chart review was completed.     Per MD, plan is for IR procedure this week.     OT/SLP evaluated and recommended post-acute placement. Pending PT evaluation.     Pt does not follow commands to assess and is nonverbal at this time. PC was placed to pt's son Sen Lee to complete discharge assessment and discuss anticipated discharge plan. No answer, left a VM with direct contact information requesting a call back.     PASRR was completed: [id number]    Referrals are to be sent per protocol pending updated therapy evaluations post procedures.     Escalations Completed: None    Medically Clear: No    Next Steps:  CM RN to follow up with medical team to discuss discharge barriers or needs.     Barriers to Discharge: Medical clearance and Pending Procedures

## 2025-04-16 NOTE — PROGRESS NOTES
Critical Care Progress Note    Date of admission  4/14/2025    Chief Complaint  SDH    Hospital Course  Sen Vasquez is a 79-year-old male with PMH significant for EtOH abuse, CVA 10/2023, ischial rectal abscess, and CAD [facility] 1/5/2025 where he was treated for colitis who presented from [facility] where he resides 4/14 with altered mental status.  Reportedly at baseline patient is A and O x 4.  Staff at the [facility] living noted patient was A/O x 0 with GCS of 14.  Head CT showed acute on chronic left holohemispheric subdural hematoma with 6 mm subfalcine herniation and mass effect on the left lateral ventricle.  Neuroexam here showed a GCS of 9 (3/1/5) Dr. Wallis from neurosurgery was consulted and took patient to OR 4/15 for left frontoparietal craniotomy and evacuation of hematoma. He was transferred back to the ICU post-op.     Interval Problem Update  Reviewed last 24 hour events:  No significant overnight events.  Temp 97.8  SB/SR 55-70's  -140's. No drips  Neuro: Eyes open, does not follow. Nonverbal. Right sided neglected resolved today. Moving all.   RASS: 0. No sedation. CAM ICU (+)  Subdural Drain output 17mL since OR - removed today by NSG APRN  NPO for IR procedure today ---> plan for IR Thursday or Friday. Resume TF's. Neuro IR will place NPO orders when MMA embo scheduled.  I/O: 2600/92  PIV x 2, SDD, Condom Cath + incontinence - bladder scan < 200mL  Mobility 2    No longer requires ICU care.  Transfer to Neurosurgery floor.  Discussed with Hospitalist, Dr. Lobo, who will assume care.  Critical care service is available for any questions or concerns.    Review of Systems  Review of Systems   Unable to perform ROS: Medical condition        Vital Signs for last 24 hours   Temp:  [35.8 °C (96.4 °F)-36.4 °C (97.5 °F)] 36.2 °C (97.2 °F)  Pulse:  [53-71] 60  Resp:  [13-46] 33  BP: ()/(53-66) 121/60  SpO2:  [92 %-100 %] 98  %    Hemodynamic parameters for last 24 hours       Respiratory Information for the last 24 hours       Physical Exam   Physical Exam  Vitals and nursing note reviewed.   Constitutional:       General: He is not in acute distress.     Appearance: He is underweight. He is ill-appearing. He is not toxic-appearing.      Comments: Unkempt/disheveled   HENT:      Head: Normocephalic and atraumatic.      Nose: Nose normal.      Mouth/Throat:      Mouth: Mucous membranes are dry.   Cardiovascular:      Rate and Rhythm: Normal rate and regular rhythm.      Pulses: Normal pulses.      Heart sounds: Normal heart sounds.   Pulmonary:      Effort: Pulmonary effort is normal.      Breath sounds: Normal breath sounds. No wheezing or rhonchi.   Musculoskeletal:      Right lower leg: No edema.      Left lower leg: No edema.   Skin:     General: Skin is warm and dry.      Capillary Refill: Capillary refill takes less than 2 seconds.      Comments: Dry/flaky   Neurological:      Mental Status: He is alert.      GCS: GCS eye subscore is 4. GCS verbal subscore is 1. GCS motor subscore is 5.      Sensory: Sensory deficit present.      Motor: Weakness present.   Psychiatric:         Speech: He is noncommunicative.         Cognition and Memory: Cognition is impaired.      Comments: Unable to assess         Medications  Current Facility-Administered Medications   Medication Dose Route Frequency Provider Last Rate Last Admin    hydrALAZINE (Apresoline) injection 10 mg  10 mg Intravenous Q4HRS PRN DAWOOD Serna        Pharmacy Consult Request ...Pain Management Review 1 Each  1 Each Other PHARMACY TO DOSE SHONDA Argueta.        MD ALERT...DO NOT ADMINISTER NSAIDS or ASPIRIN unless ORDERED By Neurosurgery 1 Each  1 Each Other PRN SHONDA Argueta.        ondansetron (Zofran) syringe/vial injection 4 mg  4 mg Intravenous Q4HRS PRN SHONDA Argueta.        bisacodyl (Dulcolax) suppository 10 mg  10 mg Rectal  Q24HRS PRN BRAULIO ArguetaPSundayNSunday        sodium phosphate enema 1 Each  1 Each Rectal Once PRN BRAULIO ArguetaPJO        Pharmacy Consult: Enteral tube insertion - review meds/change route/product selection  1 Each Other PHARMACY TO DOSE ERROL Serna.P.R.N.        thiamine (B-1) injection 200 mg  200 mg Intravenous Q8HRS ERROL Serna.P.R.N.   200 mg at 04/16/25 0524    Followed by    [START ON 4/19/2025] thiamine (Vitamin B-1) tablet 100 mg  100 mg Enteral Tube DAILY Meche Richards A.P.R.N.        magnesium hydroxide (Milk Of Magnesia) suspension 30 mL  30 mL Enteral Tube QDAY PRN Meche Richards A.P.R.N.        multivitamin tablet 1 Tablet  1 Tablet Enteral Tube DAILY Meche Richards A.P.R.N.   1 Tablet at 04/15/25 1432    folic acid (Folvite) tablet 1 mg  1 mg Enteral Tube DAILY Meche Richards A.P.R.N.   1 mg at 04/15/25 1432    senna-docusate (Pericolace Or Senokot S) 8.6-50 MG per tablet 2 Tablet  2 Tablet Enteral Tube Q EVENING Meche Richards A.P.R.N.        And    polyethylene glycol/lytes (Miralax) Packet 1 Packet  1 Packet Enteral Tube QDAY PRN Meche Richards A.P.R.N.        Respiratory Therapy Consult   Nebulization Continuous RT Adrian Ramirez M.D.           Fluids    Intake/Output Summary (Last 24 hours) at 4/16/2025 1213  Last data filed at 4/16/2025 0800  Gross per 24 hour   Intake 1508.2 ml   Output 25 ml   Net 1483.2 ml       Laboratory          Recent Labs     04/14/25  1310 04/15/25  0725 04/16/25  0401   SODIUM 138 135 136   POTASSIUM 4.9 3.6 4.2   CHLORIDE 99 103 100   CO2 24 23 26   BUN 13 9 8   CREATININE 0.52 0.39* 0.43*   MAGNESIUM  --  1.5 2.1   PHOSPHORUS  --  2.7 2.9   CALCIUM 10.0 7.8* 9.0     Recent Labs     04/14/25  1310 04/15/25  0725 04/16/25  0401   ALTSGPT 10  --   --    ASTSGOT 17  --   --    ALKPHOSPHAT 75  --   --    TBILIRUBIN 1.2  --   --    PREALBUMIN  --   --  9.0*   GLUCOSE 96 101* 130*     Recent Labs     04/14/25  1310 04/15/25  0725 04/16/25  0401    WBC 6.5 7.3 9.9   NEUTSPOLYS 78.40*  --  86.60*   LYMPHOCYTES 11.40*  --  6.20*   MONOCYTES 7.30  --  6.50   EOSINOPHILS 2.00  --  0.10   BASOPHILS 0.60  --  0.20   ASTSGOT 17  --   --    ALTSGPT 10  --   --    ALKPHOSPHAT 75  --   --    TBILIRUBIN 1.2  --   --      Recent Labs     04/14/25  1310 04/15/25  0725 04/16/25  0401   RBC 4.07* 3.16* 3.73*   HEMOGLOBIN 12.6* 10.0* 11.7*   HEMATOCRIT 38.1* 30.0* 34.5*   PLATELETCT 305 232 297   PROTHROMBTM 14.7*  --   --    APTT 26.0  --   --    INR 1.15*  --   --        Imaging  No new imaging today.    Assessment/Plan  * Subdural hematoma (HCC)- (present on admission)  Assessment & Plan  -history of ETOH abuse. No reported recent falls. Not on AC  -TEG unremarkable. No indications for reversal  -serial neurologic exams  -SBP goal < 160  -HOB > 30  -goal normonatremia, normothermia, normoglycemia, euvolemia  -Neurosurgery following. Subdural drain removed today  -PT OT SLP  -Aspiration and Fall Precautions  -SCD's for DVT prophylaxis    Severe protein-calorie malnutrition (HCC)- (present on admission)  Assessment & Plan  -Body mass index is 22.68 kg/m².  -thiamine, vitamins  -monitor for refeeding  -dietary following    ETOH abuse- (present on admission)  Assessment & Plan  -uncertain chronicity or last known ingestion  -high-dose Thiamine  -monitor for withdrawal  -Seizure and Aspiration Precautions  -cessation education and counseling when able         VTE:   Pending NSG clearance  Ulcer: Not Indicated  Lines: None    I have performed a physical exam and reviewed and updated ROS and Plan today (4/16/2025). In review of yesterday's note (4/15/2025), there are no changes except as documented above.     Discussed patient condition and risk of morbidity and/or mortality with RN, RT, Pharmacy, , Patient, neurosurgery, and my attending Dr. Robles.    Please note that this dictation was created using voice recognition software. I have made every reasonable attempt  to correct obvious errors, but there may be errors of grammar and possibly content that I did not discover before finalizing the note.    AVA Serna.

## 2025-04-17 PROBLEM — Z71.89 GOALS OF CARE, COUNSELING/DISCUSSION: Status: ACTIVE | Noted: 2025-01-01

## 2025-04-17 NOTE — CARE PLAN
Problem: Ventilation  Goal: Ability to achieve and maintain unassisted ventilation or tolerate decreased levels of ventilator support  Description: Target End Date:  4 days Document on Vent flowsheet1.  Support and monitor invasive and noninvasive mechanical ventilation2.  Monitor ventilator weaning response3.  Perform ventilator associated pneumonia prevention interventions4.  Manage ventilation therapy by monitoring diagnostic test results  Outcome: Not Met     Ventilator Daily Summary    Vent Day #2  Airway: 8@24    Ventilator settings: 16/390+8@30%  Weaning trials: none  Respiratory Procedures: intubation     Plan: Continue current ventilator settings and wean mechanical ventilation as tolerated per physician orders.

## 2025-04-17 NOTE — CONSULTS
[facility]   DEPARTMENT OF NEUROLOGY    INITIAL ENCOUNTER     MRN: [id number]  Patient seen at the request of: Dr. Daniel Robles D.O.  Chief Complaint/Reason for Consult:  status epilepticus    IMPRESSION & RECOMMENDATIONS:   Focal status epilepticus (left hemispheric onset) with correlating facial twitching R>L  in the setting an acute on chronic left sided SDH with mass effect with L>R MLS s/p crani, MMA embo planned for tomorrow.    Recommend:  - cEEG  - Continue propofol at 80, start versed gtt, titrate to burst suppression x 1-2 days  - Starting Onfi 10 mg BID  - Continue Keppra 1.5g BID  - Continue Vimpat 200 mg BID    We will continue to follow.     Signed:  José Miguel Luo DO  Department of Neurology  [facility]    HISTORY OF PRESENT ILLNESS:   Obtained per chart review, edited accordingly, and will edit when able to corroborate with the patient or family at the bedside.     79 y.o. male who presented 4/14/2025 with a history of ETOH, CAD, GERD.  He was sent to us from a SNF on 4/14 when he was found to be confused.  Head CT showing an acute on chronic wholohemispheric SDH with 6mm of subfalcine herniation and mass effect on the L ventricle. He was loaded with Keppra and admtted to the ICU with consultation from Neuro Srgry.  He went for craniotomy and drainage on 4/15.      On 4/16 nursing notes: Pt having new onset right eye, right facial, and left thumb twitching at approximately 1650. Dr. Lobo notified and came to bedside. Pt yrn stopped and then restarted after MD left. EEG ordered, valium given.     EEG subsequently placed which revealed status epilepticus, and neurology was consulted.     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    ETOH abuse 07/12/2021    CAD (coronary artery disease) 07/12/2021    Chronic pain of both knees 07/12/2021    Weakness 07/13/2021    Alcohol withdrawal  syndrome, with delirium, CVA, prolonged delirium ([facility]) 09/24/2023    Hypokalemia 09/24/2023    Acute encephalopathy 09/24/2023    Thrombocytopenia ([facility]) 09/25/2023    Hypomagnesemia 09/25/2023    CVA (cerebral vascular accident) ([facility]) 10/06/2023    Gastroesophageal reflux disease 04/15/2025     Resolved Ambulatory Problems     Diagnosis Date Noted    No Resolved Ambulatory Problems     Past Medical History:   Diagnosis Date    Alcohol abuse     Coronary arteriosclerosis     Dyslipidemia     GERD (gastroesophageal reflux disease)     Mediastinal mass     Osteopenia         PAST SURGICAL HISTORY:     Past Surgical History:   Procedure Laterality Date    CRANIOTOMY Left 4/15/2025    Procedure: CRANIOTOMY;  Surgeon: Marcelo Wallis M.D.;  Location: SURGERY [facility];  Service: Neurosurgery    CORONARY ARTERY BYPASS, 1         CURRENT MEDICATIONS:     Current Facility-Administered Medications   Medication Dose Route Frequency Provider Last Rate Last Admin    norepinephrine (Levophed) 8 mg in 250 mL NS infusion (premix)  0-1 mcg/kg/min (Ideal) Intravenous Continuous Isac Hope M.D. 24 mL/hr at 04/17/25 0635 0.2 mcg/kg/min at 04/17/25 0635    propofol (DIPRIVAN) injection  80 mcg/kg/min (Ideal) Intravenous Continuous Daniel [place], D.O. 30.8 mL/hr at 04/17/25 0822 80 mcg/kg/min at 04/17/25 0822    magnesium sulfate IVPB premix 2 g  2 g Intravenous Once Meche Richards, A.P.R.N. 25 mL/hr at 04/17/25 0845 2 g at 04/17/25 0845    clobazam (Onfi) tablet 10 mg  10 mg Enteral Tube BID José Miguel Luo, D.O.   10 mg at 04/17/25 0837    midazolam (Versed) premix 125 mg/125 mL infusion  3 mg/hr Intravenous Continuous Daniel [place], D.O. 3 mL/hr at 04/17/25 0842 3 mg/hr at 04/17/25 0842    vasopressin (Vasostrict) 20 Units in  mL Infusion  0.03 Units/min Intravenous Continuous Meche Richards, A.P.R.N.        hydrALAZINE (Apresoline) injection 10 mg  10 mg Intravenous Q4HRS PRN JEUSSITA SernaRSundayN.   10 mg at 04/16/25  2302    diazePAM (Valium) injection 5 mg  5 mg Intravenous Q5 MIN PRN José Holloway D.OSunday   5 mg at 04/17/25 0440    levETIRAcetam (Keppra) injection 1,500 mg  1,500 mg Intravenous Q12HRS ANA LAURA Saleem.OSunday   1,500 mg at 04/17/25 0504    Respiratory Therapy Consult   Nebulization Continuous RT Mohamud Parada M.D.        famotidine (Pepcid) tablet 20 mg  20 mg Enteral Tube Q12HRS Mohamud Parada M.D.   20 mg at 04/17/25 0504    Or    famotidine (Pepcid) injection 20 mg  20 mg Intravenous Q12HRS Mohamud Parada M.D.        senna-docusate (Pericolace Or Senokot S) 8.6-50 MG per tablet 2 Tablet  2 Tablet Enteral Tube BID Mohamud Parada M.D.        And    polyethylene glycol/lytes (Miralax) Packet 1 Packet  1 Packet Enteral Tube QDAY PRN Mohamud Parada M.D.        And    magnesium hydroxide (Milk Of Magnesia) suspension 30 mL  30 mL Enteral Tube QDAY PRN Mohamud Parada M.D.        And    bisacodyl (Dulcolax) suppository 10 mg  10 mg Rectal QDAY PRN Mohamud Parada M.D. MD Alert...ICU Electrolyte Replacement per Pharmacy   Other PHARMACY TO DOSE Mohamud Parada M.D.        lidocaine (Xylocaine) 1 % injection 2 mL  2 mL Tracheal Tube Q30 MIN PRN Mohamud Parada M.D.        fentaNYL (Sublimaze) injection 50 mcg  50 mcg Intravenous Q15 MIN PRN Mohamud Parada M.D.        And    fentaNYL (Sublimaze) injection 100 mcg  100 mcg Intravenous Q15 MIN PRN Mohamud Parada M.D.        And    fentaNYL (SUBLIMAZE) 50 mcg/mL in 50mL (Continuous Infusion)   Intravenous Continuous Mohamud Parada M.D.   Dose not Required at 04/16/25 2315    lacosamide (Vimpat) injection 200 mg  200 mg Intravenous BID Mohamud Parada M.D.   200 mg at 04/17/25 0504    Pharmacy Consult Request ...Pain Management Review 1 Each  1 Each Other PHARMACY TO DOSE SELENE Argueta MD ALERT...DO NOT ADMINISTER NSAIDS or ASPIRIN unless ORDERED By Neurosurgery 1 Each  1 Each Other PRN Evy Remy,  "ERROL.P.NSunday        ondansetron (Zofran) syringe/vial injection 4 mg  4 mg Intravenous Q4HRS PRN SELENE Argueta        sodium phosphate enema 1 Each  1 Each Rectal Once PRN SELENE Argueta        Pharmacy Consult: Enteral tube insertion - review meds/change route/product selection  1 Each Other PHARMACY TO DOSE JESUSITA SernaRJO        thiamine (B-1) injection 200 mg  200 mg Intravenous Q8HRS BRAULIO SernaP.R.N.   200 mg at 04/17/25 0504    Followed by    [START ON 4/19/2025] thiamine (Vitamin B-1) tablet 100 mg  100 mg Enteral Tube DAILY BRAULIO SernaPSundayRSundayN.        multivitamin tablet 1 Tablet  1 Tablet Enteral Tube DAILY BRAULIO SernaP.R.N.   1 Tablet at 04/17/25 0504    folic acid (Folvite) tablet 1 mg  1 mg Enteral Tube DAILY BRAULIO SernaP.R.N.   1 mg at 04/17/25 0504       ALLERGIES:     Allergies   Allergen Reactions    Pravastatin Hives and Swelling        SOCIAL HISTORY:   [facility]    FAMILY HISTORY:   History reviewed. No pertinent family history.     REVIEW OF SYSTEMS:   [facility]    PHYSICAL EXAM:     Vitals:    04/17/25 0700   BP: 93/50   Pulse: 67   Resp: 16   Temp:    SpO2: 97%       NEUROLOGIC EXAM    Mental Status:  Intubated, sedated, not following commands    Cranial Nerves:   Pupils equally round 3 mm and reactive to light 2 mm  +BTT  +Dolls eye  +Cough    Motor/sensory: Not withdrawing to noxious stimulation x4.  Normal bulk and tone  No fasciculations. No spasticity. No cogwheel. No pronator drift.    Reflexes: deferred    DATA:     Labs  Lab Results   Component Value Date    GLUCOSE 116 (H) 04/17/2025    BUN 9 04/17/2025    CO2 23 04/17/2025     Lab Results   Component Value Date/Time    WBC 16.5 (H) 04/17/2025 0422    MCV 93.2 04/17/2025 0422      No results found for: \"TSH\"  No components found for: \"CQYWDP2OY2\", \"R2EUUZA\", \"T4AUTO\", \"K1WZFWV\"  No components found for: \"HGBA1C\"  Lab Results   Component Value Date    HDL 44 10/07/2023    LDL 85 10/07/2023     No " "results found for: \"OHFT96HV\"  No results found for: \"FERRITIN\", \"FOLATE\"  Lab Results   Component Value Date    INR 1.15 (H) 04/14/2025     Lab Results   Component Value Date    ZFFJAQUZ67 373 04/14/2025     Calcium   Date Value Ref Range Status   04/17/2025 8.9 8.5 - 10.5 mg/dL Final     Albumin   Date Value Ref Range Status   04/17/2025 3.1 (L) 3.2 - 4.9 g/dL Final     Hemoglobin   Date Value Ref Range Status   04/17/2025 11.4 (L) 14.0 - 18.0 g/dL Final     @lastb12@    Imaging:   Images were personally reviewed by me.     Total time spent: 60 minutes    "

## 2025-04-17 NOTE — DIETARY
"Nutrition Services Weekly Nutrition Support Update     Day 3 of admit. Sen Vasquez is a 79 y.o. male with admitting DX of Subdural hematoma.     Pt intubated and started pressor/propofol medications, requiring reassessment.      Nutrition Assessment:      Height: 168 cm (5' 6.14\")  Weight: 65.1 kg (143 lb 8.3 oz)  Weight to Use in Calculations: 64 kg (141 lb 1.5 oz)  Weight taken via bed scale  Body mass index is 23.07 kg/m². BMI classification: Normal.  Wt Readings from Last 6 Encounters:   25 65.1 kg (143 lb 8.3 oz)   10/12/23 72.5 kg (159 lb 13.3 oz)   23 80.2 kg (176 lb 12.9 oz)   19 91 kg (200 lb 9.9 oz)      Weight used: 65.1 kg  Calculation/Equation: MSJ x 1.2 = 1587 kcals  RMR per PSU (VE: 6.0 L/min and Tmax: 37.5 C) = 1502 kcals  Total Calories / day: 1500 - 1700  (Calories / k - 26)  Total Grams Protein / day: 78 - 98  (Grams Protein / k.2 - 1.5)     Objective:  Pt is receiving TF at goal rate.   Overnight pt intubated and started on high dose pressor and propofol requiring reassessment of estimated needs.   Pertinent labs: Na 134, K 3.5, glucose 116, Creat 0.48  Pertinent meds: pepcid, folic acid, vimpat, keppra, MVI, senna, thiamine, anti-emetics prn, bowel meds prn, versed IV, Levo at 0.16 mcg/kg/min, propofol at 80 mcg/kg/min (30.8 ml/hr = 813 kcals/day)  Skin/wounds: L head incision, DTI to bilateral heels. No edema noted.   Last BM: 4/15  Low CHO high protein formula is indicated to meet estimated needs on and off propofol     Current diet order:   NPO, TF     Nutrition Diagnosis:      New: Inadequate oral intake r/t acute respiratory failure and intubation status as evidenced by need for nutrition support    Severe malnutrition in context of social or environmental circumstances related to alcohol abuse as evidenced by severe muscle and severe fat wasting.   Nutrition Dx Status: Ongoing    Nutrition Interventions:      To better meet estimated needs, while on propofol " change TF to Vital HP with goal rate 40 ml/hr to provide 960 kcals + kcals from propofol, 84 gm protein, and 803 ml free water per day.   Off propofol, change Vital AF 1.2 with goal rate 55 ml/hr to provide 1584 kcals, 99 gm protein, and 1071 ml free water per day.   Patient aware of active plan of care as appropriate.     Nutrition Monitoring and Evaluation:     Monitor nutrition POC.  Additional fluids per MD/DO.  Monitor vital signs pertinent to nutrition.       RD following and will provide updated recommendations as indicated.

## 2025-04-17 NOTE — PROGRESS NOTES
Critical Care Progress Note    Date of admission  4/14/2025    Chief Complaint  SDH and seizures    Hospital Course  Sen Vasquez is a 79-year-old male with PMH significant for EtOH abuse, CVA 10/2023, ischial rectal abscess, and CAD [facility] at [facility] 1/5/2025 where he was treated for colitis who presented from [facility] where he resides 4/14 with altered mental status.  Reportedly at baseline patient is A and O x 4.  Staff at the assisted living noted patient was A/O x 0 with GCS of 14.  Head CT showed acute on chronic left holohemispheric subdural hematoma with 6 mm subfalcine herniation and mass effect on the left lateral ventricle.  Neuroexam here showed a GCS of 9 (3/1/5) Dr. Wallis from neurosurgery was consulted and took patient to OR 4/15 for left frontoparietal craniotomy and evacuation of hematoma. He was transferred back to the ICU post-op.   4/16 - transfer order to floor but never physically left RICU. Began having rhythmic movements around 1700. CT head stable. Returned from CT in status. Intubated. Awaiting availability of cEEG.    Interval Problem Update  Reviewed last 24 hour events:  Seizures overnight requiring intubation.  Temp 99  SB/SR 55-75  SBP's 's. Actively titrating Levophed, currently at 0.25  Neuro: (+) cough (weak). PERRLA. Does not withdrawal.  RASS: -4 to -5, on Prop at 80.  Vent day #2: APVC 12/390/8/30%  SAT/SBT: no/no. Contraindicated due to cEEG.  Right nare IRIS @ 45. BM 4/15  I/O: 1050/910  Sellers, TLC  Mobility 1 - not eligible to advance due to cEEG..    Still in status this AM. Neuro consult. Adding Versed and Onfi.  Called and updated sons.    Review of Systems  Review of Systems   Unable to perform ROS: Intubated        Vital Signs for last 24 hours   Temp:  [35.7 °C (96.2 °F)-37 °C (98.6 °F)] 35.7 °C (96.2 °F)  Pulse:  [] 51  Resp:  [11-26] 23  BP: ()/() 116/54  SpO2:  [93 %-100 %] 98  %    Hemodynamic parameters for last 24 hours       Respiratory Information for the last 24 hours  Vent Mode: APVCMV  Rate (breaths/min): 12  Vt Target (mL): 390  PEEP/CPAP: 8  MAP: 12  Control VTE (exp VT): 366    Physical Exam   Physical Exam  Vitals and nursing note reviewed.   Constitutional:       General: He is not in acute distress.     Appearance: He is underweight. He is ill-appearing. He is not toxic-appearing.      Interventions: He is sedated, intubated and restrained.   HENT:      Head: Normocephalic.      Nose: Nose normal.      Mouth/Throat:      Lips: Pink.      Mouth: Mucous membranes are moist.   Eyes:      Pupils: Pupils are equal, round, and reactive to light.   Cardiovascular:      Rate and Rhythm: Normal rate and regular rhythm.      Pulses: Normal pulses.      Heart sounds: Normal heart sounds.   Pulmonary:      Effort: He is intubated.   Musculoskeletal:      Right lower leg: No edema.      Left lower leg: No edema.         Medications  Current Facility-Administered Medications   Medication Dose Route Frequency Provider Last Rate Last Admin    norepinephrine (Levophed) 8 mg in 250 mL NS infusion (premix)  0-1 mcg/kg/min (Ideal) Intravenous Continuous Isac Hope M.D. 27.6 mL/hr at 04/17/25 1023 0.23 mcg/kg/min at 04/17/25 1023    propofol (DIPRIVAN) injection  80 mcg/kg/min (Ideal) Intravenous Continuous Daniel Silverhill, D.O. 30.8 mL/hr at 04/17/25 1100 80 mcg/kg/min at 04/17/25 1100    clobazam (Onfi) tablet 10 mg  10 mg Enteral Tube BID José Miguel Luo, D.O.   10 mg at 04/17/25 0837    midazolam (Versed) premix 125 mg/125 mL infusion  5 mg/hr Intravenous Continuous Daniel Travis, D.O. 5 mL/hr at 04/17/25 1028 5 mg/hr at 04/17/25 1028    vasopressin (Vasostrict) 20 Units in  mL Infusion  0.03 Units/min Intravenous Continuous Meche Richards, A.P.R.N.   Dose not Required at 04/17/25 0845    NS (Bolus) 0.9 % infusion 1,000 mL  1,000 mL Intravenous Once Meche Richards, A.P.R.N. 250 mL/hr  at 04/17/25 1031 1,000 mL at 04/17/25 1031    hydrALAZINE (Apresoline) injection 10 mg  10 mg Intravenous Q4HRS PRN Meche Richards A.P.R.NSunday   10 mg at 04/16/25 2302    diazePAM (Valium) injection 5 mg  5 mg Intravenous Q5 MIN PRN José Holloway D.OSunday   5 mg at 04/17/25 0440    levETIRAcetam (Keppra) injection 1,500 mg  1,500 mg Intravenous Q12HRS José Miguel Luo D.O.   1,500 mg at 04/17/25 0504    Respiratory Therapy Consult   Nebulization Continuous RT Mohamud Parada M.D.        famotidine (Pepcid) tablet 20 mg  20 mg Enteral Tube Q12HRS Mohamud Parada M.D.   20 mg at 04/17/25 0504    Or    famotidine (Pepcid) injection 20 mg  20 mg Intravenous Q12HRS Mohamud Parada M.D.        senna-docusate (Pericolace Or Senokot S) 8.6-50 MG per tablet 2 Tablet  2 Tablet Enteral Tube BID Mohamud Parada M.D.        And    polyethylene glycol/lytes (Miralax) Packet 1 Packet  1 Packet Enteral Tube QDAY PRN Mohamud Parada M.D.        And    magnesium hydroxide (Milk Of Magnesia) suspension 30 mL  30 mL Enteral Tube QDAY PRN Mohamud Parada M.D.        And    bisacodyl (Dulcolax) suppository 10 mg  10 mg Rectal QDAY PRN Mohamud Parada M.D.        MD Alert...ICU Electrolyte Replacement per Pharmacy   Other PHARMACY TO DOSE Mohamud Parada M.D.        lidocaine (Xylocaine) 1 % injection 2 mL  2 mL Tracheal Tube Q30 MIN PRN Mohamud Parada M.D.        fentaNYL (Sublimaze) injection 50 mcg  50 mcg Intravenous Q15 MIN PRN Mohamud Parada M.D.        And    fentaNYL (Sublimaze) injection 100 mcg  100 mcg Intravenous Q15 MIN PRN Mohamud Parada M.D.        And    fentaNYL (SUBLIMAZE) 50 mcg/mL in 50mL (Continuous Infusion)   Intravenous Continuous Mohamud Parada M.D.   Dose not Required at 04/16/25 2315    lacosamide (Vimpat) injection 200 mg  200 mg Intravenous BID Mohamud Parada M.D.   200 mg at 04/17/25 7073    Pharmacy Consult Request ...Pain Management Review 1 Each  1 Each Other PHARMACY  TO DOSE SHONDA Argueta.        MD ALERT...DO NOT ADMINISTER NSAIDS or ASPIRIN unless ORDERED By Neurosurgery 1 Each  1 Each Other PRN SELENE Argueta        ondansetron (Zofran) syringe/vial injection 4 mg  4 mg Intravenous Q4HRS PRN SELENE Argueta        sodium phosphate enema 1 Each  1 Each Rectal Once PRN SELENE Argueta        Pharmacy Consult: Enteral tube insertion - review meds/change route/product selection  1 Each Other PHARMACY TO DOSE BRAULIO SernaPSundayRSundayN.        thiamine (B-1) injection 200 mg  200 mg Intravenous Q8HRS BRAULIO SernaP.R.N.   200 mg at 04/17/25 0504    Followed by    [START ON 4/19/2025] thiamine (Vitamin B-1) tablet 100 mg  100 mg Enteral Tube DAILY BRAULIO SernaP.RSundayN.        multivitamin tablet 1 Tablet  1 Tablet Enteral Tube DAILY BRAULIO SernaP.R.N.   1 Tablet at 04/17/25 0504    folic acid (Folvite) tablet 1 mg  1 mg Enteral Tube DAILY BRAULIO SernaP.R.N.   1 mg at 04/17/25 0504       Fluids    Intake/Output Summary (Last 24 hours) at 4/17/2025 1236  Last data filed at 4/17/2025 1000  Gross per 24 hour   Intake 1352.98 ml   Output 971 ml   Net 381.98 ml       Laboratory  Recent Labs     04/16/25  2325 04/17/25  0213   ISTATAPH 7.483* 7.491*   ISTATAPCO2 34.2 31.4*   ISTATAPO2 >500* 111*   ISTATATCO2 27* 25*   WIODAUD2OEG 100* 99   ISTATARTHCO3 25.6 24.0   ISTATARTBE 2 1   ISTATTEMP 96.2 F 95.7 F   ISTATFIO2 100 30   ISTATSPEC Arterial Arterial   ISTATAPHTC 7.503* 7.516*   SUWAIBSV2CQ >500* 101         Recent Labs     04/16/25  0401 04/16/25 1925 04/17/25 0422   SODIUM 136 135 134*   POTASSIUM 4.2 3.8 3.5*   CHLORIDE 100 100 98   CO2 26 25 23   BUN 8 8 9   CREATININE 0.43* 0.40* 0.48*   MAGNESIUM 2.1 1.8 1.7   PHOSPHORUS 2.9 3.2 3.3   CALCIUM 9.0 8.7 8.9     Recent Labs     04/14/25  1310 04/15/25  0725 04/16/25  0401 04/16/25 1925 04/17/25 0422   ALTSGPT 10  --   --  <5 6   ASTSGOT 17  --   --  14 15   ALKPHOSPHAT 75   --   --  66 71   TBILIRUBIN 1.2  --   --  0.6 0.6   PREALBUMIN  --   --  9.0*  --   --    GLUCOSE 96   < > 130* 102* 116*    < > = values in this interval not displayed.     Recent Labs     04/14/25 1310 04/15/25  0725 04/16/25  0401 04/16/25  1925 04/17/25  0422   WBC 6.5   < > 9.9 11.3* 16.5*   NEUTSPOLYS 78.40*  --  86.60*  --  85.60*   LYMPHOCYTES 11.40*  --  6.20*  --  7.30*   MONOCYTES 7.30  --  6.50  --  5.30   EOSINOPHILS 2.00  --  0.10  --  1.00   BASOPHILS 0.60  --  0.20  --  0.30   ASTSGOT 17  --   --  14 15   ALTSGPT 10  --   --  <5 6   ALKPHOSPHAT 75  --   --  66 71   TBILIRUBIN 1.2  --   --  0.6 0.6    < > = values in this interval not displayed.     Recent Labs     04/14/25  1310 04/15/25  0725 04/16/25  0401 04/16/25  1925 04/17/25  0422   RBC 4.07*   < > 3.73* 3.58* 3.67*   HEMOGLOBIN 12.6*   < > 11.7* 11.2* 11.4*   HEMATOCRIT 38.1*   < > 34.5* 33.8* 34.2*   PLATELETCT 305   < > 297 252 356   PROTHROMBTM 14.7*  --   --   --   --    APTT 26.0  --   --   --   --    INR 1.15*  --   --   --   --     < > = values in this interval not displayed.       Imaging  X-Ray:  I have personally reviewed the images and compared with prior images.  CT:    Reviewed    Assessment/Plan  * Status epilepticus (HCC)  Assessment & Plan  -Focal status epilepticus 04/16/25, returned to ICU   -Keppra 4 g IV followed by 1.5 mg IV BID  -Neurology consulted  -Versed Drip  -added Vimpat and Onfi today  -cEEG  -Seizure and Aspiration Precautions      Goals of care, counseling/discussion  Assessment & Plan  -I called and spoke to son, Sen, who requested I also call his brother (patient's other son), Fuad.  -I updated the sons on the overnight events with development of status epilepticus requiring intubation and mechanical ventilation.  -I discussed this is a major setback for their already frail father. Neither son elaborated on their relationship with their father when I explored that topic (Sen had just woken up when I called  and Fuad was in a work meeting that he had to step out of to take the call).  -Patient does not have an AD that the sons know of and they are unsure of his wishes for Code Status. They would like to speak amongst themselves over the next day or 2 and reconvene for another update to help guide their decision.      On mechanically assisted ventilation (HCC)  Assessment & Plan  -Intubated for status epilepticus  -Intubation date 4/16  -Ventilator dependent respiratory failure  -Modify ventilator to optimize oxygenation, acid-base balance and ventilation  -CXR as indicated: monitor lung volumes and tube/line placement  -HOB > 30  -Titrate FiO2 to keep sats greater than 92%  -Chlorhexidine  -goal CO2 35-40  -Daily awakening and SBT trials unless contraindicated  -ABCDEF bundle  -I am actively adjusting ventilator based on clinical indicators and ABG's    Severe protein-calorie malnutrition (HCC)- (present on admission)  Assessment & Plan  -Body mass index is 22.68 kg/m².  -thiamine, vitamins  -monitor for refeeding  -dietary following    Subdural hematoma (HCC)- (present on admission)  Assessment & Plan  -History of ETOH abuse. No reported recent falls. Not on AC  -Serial neurologic exams  -SBP goal < 160  -HOB > 30  -Goal normonatremia, normothermia, normoglycemia, euvolemia  -Neurosurgery following. S/p craniotomy with evacuation 4/15. Subdural drain removed 4/16  -PT OT SLP  -Aspiration and Fall Precautions  -SCD's for DVT prophylaxis    ETOH abuse- (present on admission)  Assessment & Plan  -Uncertain chronicity or last known ingestion  -Thiamine  -Monitor for withdrawal  -Cessation education and counseling when able  -Replace electrolytes  -Maintain euvolemia          VTE:   Pending NSG clearance.  Ulcer: H2 Antagonist  Lines: Central Line  Ongoing indication addressed and Sellers Catheter  Ongoing indication addressed    I have performed a physical exam and reviewed and updated ROS and Plan today (4/17/2025). In  review of yesterday's note (4/16/2025), there are no changes except as documented above.     Discussed patient condition and risk of morbidity and/or mortality with Family, RN, RT, Pharmacy, , neurology and neurosurgery, and my attending Dr. Robles.  The patient remains critically ill.  Critical care time = 74 minutes in directly providing and coordinating critical care and extensive data review.  No time overlap and excludes procedures.    Please note that this dictation was created using voice recognition software. I have made every reasonable attempt to correct obvious errors, but there may be errors of grammar and possibly content that I did not discover before finalizing the note.    AVA Serna.

## 2025-04-17 NOTE — PROGRESS NOTES
Dr. Parada at bedside for emergent intubation at 2218    2219: TO called & all agree  2220: 20mg of etomidate and 100mg of rocuronium given  2222: #8 ETT in place, 24 @ lip with a positive color change, and bilateral breath sounds.  2223: on ventilator    CVC placement    2230: TO called  2232: guidewire in   2235: guidewire out  2238: CVC sutured  2240: procedure end

## 2025-04-17 NOTE — EEG PROGRESS NOTE
Continuous inpatient video EEG monitoring   Preliminary review    EEG initial 7 minutes reviewed.     Patient has had 4 focal to bilateral seizures of left hemispheric onset. Clinically patient exhibits facial twitching R > L.     Findings are consistent with status epilepticus.     Treating physician notified on Voalte urgently.     Mellissa Ventura M.D.   Epileptologist/Neurologist

## 2025-04-17 NOTE — PROGRESS NOTES
Neurosurgery Progress Note    Subjective:  Began seizing last night, intubated overnight  EEG notes continued seizing, proprofol increased this am    Exam:  Intubated, sedated.  Pinpoint pupils.  CDI     BP  Min: 75/46  Max: 176/92  Pulse  Av.8  Min: 52  Max: 109  Resp  Av.6  Min: 11  Max: 26  Temp  Av.2 °C (97.2 °F)  Min: 35.7 °C (96.2 °F)  Max: 37 °C (98.6 °F)  Monitored Temp 2  Av.4 °C (97.5 °F)  Min: 35.4 °C (95.7 °F)  Max: 37.5 °C (99.5 °F)  SpO2  Av.5 %  Min: 93 %  Max: 100 %    No data recorded    Recent Labs     25   WBC 9.9 11.3* 16.5*   RBC 3.73* 3.58* 3.67*   HEMOGLOBIN 11.7* 11.2* 11.4*   HEMATOCRIT 34.5* 33.8* 34.2*   MCV 92.5 94.4 93.2   MCH 31.4 31.3 31.1   MCHC 33.9 33.1 33.3   RDW 47.2 48.7 48.4   PLATELETCT 297 252 356   MPV 9.3 9.8 9.7     Recent Labs     25  04025  0422   SODIUM 136 135 134*   POTASSIUM 4.2 3.8 3.5*   CHLORIDE 100 100 98   CO2 26 25 23   GLUCOSE 130* 102* 116*   BUN 8 8 9   CREATININE 0.43* 0.40* 0.48*   CALCIUM 9.0 8.7 8.9     Recent Labs     25  1310   APTT 26.0   INR 1.15*     Recent Labs     25  17125   REACTMIN 4.5* 4.7   CLOTKINET 0.8 0.8   CLOTANGL 79.1* 79.5*   MAXCLOTS 64.3 64.6   PRCINADP 0.0 10.3   PRCINAA 0.0 5.5       Intake/Output                         25 0700 - 25 0659 25 07 - 25 0659     1036-2041 5537-2957 Total  Total                 Intake    I.V.  187.5  219.5 407  --  -- --    Propofol Volume -- 119 119 -- -- --    Norepinephrine Volume -- 100.5 100.5 -- -- --    Volume (mL) (D5LR infusion) 187.5 -- 187.5 -- -- --    Other  60  -- 60  --  -- --    Medications (PO/Enteral Liquids) 60 -- 60 -- -- --    Enteral  290  440 730  --  -- --    Free Water / Tube Flush 90 90 180 -- -- --    Enteral Volume (Enteral Tube 04/15/25 Cortrak - Gastric 10 Fr. Right nare) 200 350 550 -- -- --    Total Intake  537.5 659.5 1197 -- -- --       Output    Urine  500  421 921  --  -- --    Output (mL) (Urethral Catheter Temperature probe) -- 121 121 -- -- --    Output (mL) ([REMOVED] External Urinary Catheter (Condom) 04/17/25 0000) 500 300 800 -- -- --    Drains  0  -- 0  --  -- --    Output (mL) ([REMOVED] Subdural Drain Group Left 04/16/25 1000) 0 -- 0 -- -- --    Stool  --  -- --  --  -- --    Number of Times Stooled 0 x 0 x 0 x -- -- --    Total Output 500 421 921 -- -- --       Net I/O     37.5 238.5 276 -- -- --              Intake/Output Summary (Last 24 hours) at 4/17/2025 0911  Last data filed at 4/17/2025 0600  Gross per 24 hour   Intake 1066.94 ml   Output 921 ml   Net 145.94 ml             NORepinephrine  0-1 mcg/kg/min (Ideal) Continuous    propofol  80 mcg/kg/min (Ideal) Continuous    magnesium sulfate  2 g Once    clobazam  10 mg BID    midazolam  3 mg/hr Continuous    vasopressin (Vasostrict) infusion  0.03 Units/min Continuous    hydrALAZINE  10 mg Q4HRS PRN    diazePAM  5 mg Q5 MIN PRN    levETIRAcetam (Keppra) IV  1,500 mg Q12HRS    Respiratory Therapy Consult   Continuous RT    famotidine  20 mg Q12HRS    Or    famotidine  20 mg Q12HRS    senna-docusate  2 Tablet BID    And    polyethylene glycol/lytes  1 Packet QDAY PRN    And    magnesium hydroxide  30 mL QDAY PRN    And    bisacodyl  10 mg QDAY PRN    MD Alert...Adult ICU Electrolyte Replacement per Pharmacy   PHARMACY TO DOSE    lidocaine  2 mL Q30 MIN PRN    fentaNYL  50 mcg Q15 MIN PRN    And    fentaNYL  100 mcg Q15 MIN PRN    And    fentaNYL   Continuous    lacosamide  200 mg BID    Pharmacy Consult Request  1 Each PHARMACY TO DOSE    MD ALERT...DO NOT ADMINISTER NSAIDS or ASPIRIN unless ORDERED By Neurosurgery  1 Each PRN    ondansetron  4 mg Q4HRS PRN    sodium phosphate  1 Each Once PRN    Pharmacy  1 Each PHARMACY TO DOSE    thiamine  200 mg Q8HRS    Followed by    [START ON 4/19/2025] thiamine  100 mg DAILY    multivitamin  1 Tablet DAILY     folic acid  1 mg DAILY       Assessment and Plan:  POD #2 Crani for SD  Prophylactic anticoagulation: no         Start date/time: tbd     Neuro following for seizure  Left MMA when able  CT4/16- improved, post op changes

## 2025-04-17 NOTE — PROGRESS NOTES
Critical Care Progress Note    Date of admission  4/14/2025    Chief Complaint  SDH    Hospital Course  Sen Vasquez is a 79-year-old male with PMH significant for EtOH abuse, CVA 10/2023, ischial rectal abscess, and CAD [facility] at [facility] 1/5/2025 where he was treated for colitis who presented from [facility] where he resides 4/14 with altered mental status.  Reportedly at baseline patient is A and O x 4.  Staff at the [facility] living noted patient was A/O x 0 with GCS of 14.  Head CT showed acute on chronic left holohemispheric subdural hematoma with 6 mm subfalcine herniation and mass effect on the left lateral ventricle.  Neuroexam here showed a GCS of 9 (3/1/5) Dr. Wallis from neurosurgery was consulted and took patient to OR 4/15 for left frontoparietal craniotomy and evacuation of hematoma. He was transferred back to the ICU post-op.     Interval Problem Update  Reviewed last 24 hour events:  6:00 PM returned to ICU for focal status epilepticus   Afebrile  SB/SR 55-70's  -140's   Subdural Drain output 17mL since OR - removed today by JOLLY APRN  NPO for IR procedure today ---> plan for IR Thursday or Friday. Resume TF's. Neuro IR will place NPO orders when MMA embo scheduled.  I/O: +4.7 L  VTE ppx contraindicated until cleared by CHASIDY    He was given valium 5 mg IVP, ativan 2 mg IVP, followed by Keppra 4 g and versed 4 mg IVP. His clinical seizures stopped, he was protecting his airway. He was then sent for stat CTH which is stable compared to prior. Unfortunately he re-entered status so he was intubated and placed on propofol.     Review of Systems  Review of Systems   Unable to perform ROS: Medical condition        Vital Signs for last 24 hours   Temp:  [35.7 °C (96.2 °F)-37 °C (98.6 °F)] 35.7 °C (96.2 °F)  Pulse:  [53-70] 63  Resp:  [11-33] 20  BP: ()/() 163/78  SpO2:  [93 %-100 %] 100 %    Hemodynamic parameters for last 24 hours        Respiratory Information for the last 24 hours  Vent Mode: APVCMV  Rate (breaths/min): 20  PEEP/CPAP: 8  MAP: 11  Control VTE (exp VT): 394    Physical Exam   Physical Exam  Vitals and nursing note reviewed.   HENT:      Head: Normocephalic.      Nose: Nose normal.      Mouth/Throat:      Mouth: Mucous membranes are moist.   Eyes:      Pupils: Pupils are equal, round, and reactive to light.   Cardiovascular:      Rate and Rhythm: Normal rate.      Pulses: Normal pulses.   Pulmonary:      Effort: Pulmonary effort is normal. No respiratory distress.   Abdominal:      General: Abdomen is flat. There is no distension.      Tenderness: There is no abdominal tenderness. There is no guarding or rebound.   Musculoskeletal:      Right lower leg: No edema.      Left lower leg: No edema.   Skin:     General: Skin is warm and dry.      Capillary Refill: Capillary refill takes less than 2 seconds.   Neurological:      Comments: Responds to name, rhythmic R facial twitching with some RUE and RLE extremity twitching         Medications  Current Facility-Administered Medications   Medication Dose Route Frequency Provider Last Rate Last Admin    hydrALAZINE (Apresoline) injection 10 mg  10 mg Intravenous Q4HRS PRN DAWOOD Serna        diazePAM (Valium) injection 5 mg  5 mg Intravenous Q5 MIN PRN José Holloway DSundayOSunday   5 mg at 04/16/25 1712    [START ON 4/17/2025] levETIRAcetam (Keppra) injection 1,500 mg  1,500 mg Intravenous Q12HRS José Miguel Luo D.O.        propofol (DIPRIVAN) injection  40 mcg/kg/min (Ideal) Intravenous Continuous Mohamud Parada M.D. 15.4 mL/hr at 04/16/25 2245 40 mcg/kg/min at 04/16/25 2245    Pharmacy Consult Request ...Pain Management Review 1 Each  1 Each Other PHARMACY TO DOSE SHONDA Argueta.        MD ALERT...DO NOT ADMINISTER NSAIDS or ASPIRIN unless ORDERED By Neurosurgery 1 Each  1 Each Other PRN BRAULIO ArguetaPJO        ondansetron (Zofran) syringe/vial  injection 4 mg  4 mg Intravenous Q4HRS PRN BRAULIO ArguetaPSundayNSunday        bisacodyl (Dulcolax) suppository 10 mg  10 mg Rectal Q24HRS PRN BRAULIO ArguetaPJO        sodium phosphate enema 1 Each  1 Each Rectal Once PRN SELENE Argueta        Pharmacy Consult: Enteral tube insertion - review meds/change route/product selection  1 Each Other PHARMACY TO DOSE ERROL Serna.P.R.N.        thiamine (B-1) injection 200 mg  200 mg Intravenous Q8HRS ERROL Serna.P.R.N.   200 mg at 04/16/25 2157    Followed by    [START ON 4/19/2025] thiamine (Vitamin B-1) tablet 100 mg  100 mg Enteral Tube DAILY Meche Richards A.P.R.N.        magnesium hydroxide (Milk Of Magnesia) suspension 30 mL  30 mL Enteral Tube QDAY PRN Meche Richards A.P.R.N.        multivitamin tablet 1 Tablet  1 Tablet Enteral Tube DAILY Meche Richards A.P.R.N.   1 Tablet at 04/15/25 1432    folic acid (Folvite) tablet 1 mg  1 mg Enteral Tube DAILY Meche Richards A.P.R.N.   1 mg at 04/15/25 1432    senna-docusate (Pericolace Or Senokot S) 8.6-50 MG per tablet 2 Tablet  2 Tablet Enteral Tube Q EVENING Meche Richards A.P.R.N.   2 Tablet at 04/16/25 1809    And    polyethylene glycol/lytes (Miralax) Packet 1 Packet  1 Packet Enteral Tube QDAY PRN Meche Richards A.P.R.N.        Respiratory Therapy Consult   Nebulization Continuous RT Adrian Ramirez M.D.           Fluids    Intake/Output Summary (Last 24 hours) at 4/16/2025 2248  Last data filed at 4/16/2025 2200  Gross per 24 hour   Intake 1127.58 ml   Output 525 ml   Net 602.58 ml       Laboratory          Recent Labs     04/15/25  0725 04/16/25  0401 04/16/25  1925   SODIUM 135 136 135   POTASSIUM 3.6 4.2 3.8   CHLORIDE 103 100 100   CO2 23 26 25   BUN 9 8 8   CREATININE 0.39* 0.43* 0.40*   MAGNESIUM 1.5 2.1 1.8   PHOSPHORUS 2.7 2.9 3.2   CALCIUM 7.8* 9.0 8.7     Recent Labs     04/14/25  1310 04/15/25  0725 04/16/25  0401 04/16/25  1925   ALTSGPT 10  --   --  <5   ASTSGOT 17  --   --   14   ALKPHOSPHAT 75  --   --  66   TBILIRUBIN 1.2  --   --  0.6   PREALBUMIN  --   --  9.0*  --    GLUCOSE 96 101* 130* 102*     Recent Labs     04/14/25  1310 04/15/25  0725 04/16/25  0401 04/16/25 1925   WBC 6.5 7.3 9.9 11.3*   NEUTSPOLYS 78.40*  --  86.60*  --    LYMPHOCYTES 11.40*  --  6.20*  --    MONOCYTES 7.30  --  6.50  --    EOSINOPHILS 2.00  --  0.10  --    BASOPHILS 0.60  --  0.20  --    ASTSGOT 17  --   --  14   ALTSGPT 10  --   --  <5   ALKPHOSPHAT 75  --   --  66   TBILIRUBIN 1.2  --   --  0.6     Recent Labs     04/14/25  1310 04/15/25  0725 04/16/25 0401 04/16/25 1925   RBC 4.07* 3.16* 3.73* 3.58*   HEMOGLOBIN 12.6* 10.0* 11.7* 11.2*   HEMATOCRIT 38.1* 30.0* 34.5* 33.8*   PLATELETCT 305 232 297 252   PROTHROMBTM 14.7*  --   --   --    APTT 26.0  --   --   --    INR 1.15*  --   --   --        Imaging  No new imaging today.    Assessment/Plan  * Status epilepticus (HCC)  Assessment & Plan  Focal status epilepticus 04/16/25, returned to ICU     Keppra 4 g IV followed by 1.5 mg IV BID  Versed IV push for break though  Monitor for requiring intubation  cVEEG  Neuro consulted  STAT CTH similar to prior as far as shift    On mechanically assisted ventilation (HCC)  Assessment & Plan  04/16/25 intubated for status epilepticus    Lung protective ventilation strategies  Optimize oxygenation, ventilation, and acid base balance  ABCDEF Bundle     Severe protein-calorie malnutrition (HCC)- (present on admission)  Assessment & Plan  -Body mass index is 22.68 kg/m².  -thiamine, vitamins  -monitor for refeeding  -dietary following    Subdural hematoma (HCC)- (present on admission)  Assessment & Plan  History of ETOH abuse. No reported recent falls. Not on AC    Serial neurologic exams  SBP goal < 160  HOB > 30  Goal normonatremia, normothermia, normoglycemia, euvolemia  Neurosurgery following. Subdural drain removed 4/16  PT OT SLP  Aspiration and Fall Precautions  SCD's for DVT prophylaxis    ETOH abuse-  (present on admission)  Assessment & Plan  Uncertain chronicity or last known ingestion  Thiamine  Monitor for withdrawal  Cessation education and counseling when able  Replace electrolytes  Maintain euvolemia          VTE:   Pending NSG clearance  Ulcer: Not Indicated  Lines: None    I have performed a physical exam and reviewed and updated ROS and Plan today (4/16/2025). In review of yesterday's note (4/15/2025), there are no changes except as documented above.     The patient remains critically ill.  Critical care time = 109 minutes in directly providing and coordinating critical care and extensive data review.  No time overlap and excludes procedures.

## 2025-04-17 NOTE — THERAPY
Speech Language Therapy Contact Note    Patient Name: Sen Vasquez  Age:  79 y.o., Sex:  male  Medical Record #: [id number]  Today's Date: 4/17/2025 04/17/25 0747   Treatment Variance   Reason For Missed Therapy Medical - Patient Is Not Medically Stable   Interdisciplinary Plan of Care Collaboration   Collaboration Comments Per chart, patient now intubated. SLP to hold dysphagia management/cognitive evaluation and follow up at later date.

## 2025-04-17 NOTE — PROGRESS NOTES
EEG at bedside, pt continuing to have eye/facial twitches and EEG + for seizures despite giving 5mg IV valium earlier.  Dr. Lobo notified, will carry out orders.  Dr. Parada to bedside.

## 2025-04-17 NOTE — CARE PLAN
The patient is Watcher - Medium risk of patient condition declining or worsening    Shift Goals  Clinical Goals: Monitor for seizures, CEEG, SBP <160, Q1 hour neuro checks  Patient Goals: JF  Family Goals: JF    Progress made toward(s) clinical / shift goals:      Problem: Neuro Status  Goal: Neuro status will remain stable or improve  Outcome: Progressing  Note: Q4 neuro checks in place. Neuro status remained stable post intubation.      Problem: Pain - Standard  Goal: Alleviation of pain or a reduction in pain to the patient’s comfort goal  Outcome: Progressing     Problem: Skin Integrity  Goal: Skin integrity is maintained or improved  Outcome: Progressing  Note: Q2 turns in place. Assessed for signs and symptoms of skin breakdown. Mepilex applied to elbows and heels. Photos uploaded into chart.      Problem: Safety - Medical Restraint  Goal: Remains free of injury from restraints (Restraint for Interference with Medical Device)  Outcome: Progressing  Addressed this shift: Remains free of injury from restraints (restraint for interference with medical device):   Determine that other, less restrictive measures have been tried or would not be effective before applying the restraint   Evaluate the patient's condition at the time of restraint application   Inform patient/family regarding the reason for restraint   Every 2 hours: Monitor safety, psychosocial status, comfort, nutrition and hydration     Problem: Neuro Status  Goal: Neuro status will remain stable or improve  Outcome: Progressing  Note: Q4 neuro checks in place. Neuro status remained stable post intubation.        Patient is not progressing towards the following goals:    Problem: Mobility  Goal: Patient's capacity to carry out activities will improve  Outcome: Not Progressing  Note: Unable to mobilize due to neuro instability and engagement.

## 2025-04-17 NOTE — PROGRESS NOTES
Pt having new onset right eye, right facial, and left thumb twitching at approximately 1650.  Dr. Lobo notified and came to bedside.  Pt yrn stopped and then restarted after MD left.  EEG ordered, valium given.

## 2025-04-17 NOTE — CARE PLAN
The patient is Watcher - Medium risk of patient condition declining or worsening    Shift Goals  Clinical Goals: Monitor for seizures, CEEG, SBP <160 and MAP >65, Q4 hour neuro checks  Patient Goals: JF  Family Goals: JF    Progress made toward(s) clinical / shift goals:      Problem: Seizure Precautions  Goal: Implementation of seizure precautions  Outcome: Progressing     Problem: Safety - Medical Restraint  Goal: Free from restraint(s) (Restraint for Interference with Medical Device)  Outcome: Progressing - Wrist restraints discontinued.     Problem: Pain - Standard  Goal: Alleviation of pain or a reduction in pain to the patient’s comfort goal  Outcome: Progressing    Problem: Hemodynamics   Goal: Vital signs stable  Outcome: Progressing - Pt on levophed, have been titrating.  Rate decreased from - 0.27 to 0.16mcg/kg/min throughout shift.       Patient is not progressing towards the following goals:      Problem: Neuro Status  Goal: Neuro status will remain stable or improve  Outcome: Not Progressing - Pt sedated on propofol and versed for burst suppression.     Problem: Mobility  Goal: Patient's capacity to carry out activities will improve  Outcome: Not Progressing - due to RASS = -5     Problem: Early Mobilization - Post Surgery  Goal: Early mobilization post surgery  Outcome: Not Progressing

## 2025-04-17 NOTE — ASSESSMENT & PLAN NOTE
Intubated for status epilepticus on 4/16  Ventilator dependent respiratory failure  Modify ventilator to optimize oxygenation, acid-base balance and ventilation  CXR as indicated: monitor lung volumes and tube/line placement  HOB > 30  Titrate FiO2 to keep sats greater than 92%  Chlorhexidine  goal CO2 35-40  Daily awakening and SBT trials unless contraindicated  ABCDEF bundle  I am actively adjusting ventilator based on clinical indicators and ABG's  Lasix 40mg IV BID

## 2025-04-17 NOTE — PROGRESS NOTES
Per Dr. Ventura patient is still in status epilepticus. Notified Dr. Robles and Meche CHARLES. Propofol increased to 80 mcg/kg/min. Day shift RN also made aware.

## 2025-04-17 NOTE — THERAPY
04/17/25 1346   Interdisciplinary Plan of Care Collaboration   Collaboration Comments Hold PT today per nsg, not medically appropriate.

## 2025-04-17 NOTE — ASSESSMENT & PLAN NOTE
Focal status epilepticus 04/16/25, returned to ICU   Continue Keppra, Vimpat, Onfi  Remains off all sedation since AM 4/20  Neurology following  cEEG -->no seizures, stopped cEEG on 4/22  Seizure and Aspiration Precautions.

## 2025-04-17 NOTE — WOUND TEAM
[facility]  Inpatient Services  Initial Wound and Skin Care Evaluation    Admission Date: 4/14/2025     Last order of IP CONSULT TO WOUND CARE was found on 4/15/2025 from Hospital Encounter on 4/14/2025     HPI, PMH, SH: Reviewed    Past Surgical History:   Procedure Laterality Date    CRANIOTOMY Left 4/15/2025    Procedure: CRANIOTOMY;  Surgeon: Marcelo Wallis M.D.;  Location: SURGERY [facility];  Service: Neurosurgery    CORONARY ARTERY BYPASS, 1       Social History     Tobacco Use    Smoking status: Some Days     Types: Cigarettes    Smokeless tobacco: Never   Substance Use Topics    Alcohol use: Yes     Comment: 400 ml Arabella daily     Chief Complaint   Patient presents with    ALOC     Pt resides at [facility]. Found patient ALOC and called EMS.      Diagnosis: Subdural hematoma (HCC) [S06.5XAA]    Unit where seen by Wound Team: [id number]     WOUND CONSULT RELATED TO:  Sacrum, heels, elbows    WOUND TEAM PLAN OF CARE - Frequency of Follow-up:   Nursing to follow dressing orders written for wound care. Contact wound team if area fails to progress, deteriorates or with any questions/concerns if something comes up before next scheduled follow up (See below as to whether wound is following and frequency of wound follow up)   Not following, consult as needed  - bilateral heels     WOUND HISTORY:   Patient lives at a memory facility and patient fell out of bed and had a change in LOC.  Patient with significant history of ETOH       WOUND ASSESSMENT/LDA  Wound 04/16/25 Pressure Injury Heel Bilateral POA sDTI (Active)   Date First Assessed/Time First Assessed: 04/16/25 1317   Present on Original Admission: Yes  Hand Hygiene Completed: Yes  Primary Wound Type: Pressure Injury  Location: Heel  Laterality: Bilateral  Wound Description (Comments): POA sDTI      Assessments 4/16/2025  1:00 PM   Wound Image      Site Assessment Purple   Periwound Assessment Red   Margins Defined edges;Attached edges   Closure  Adhesive bandage   Drainage Amount None   Treatments Cleansed;Nonselective debridement;Site care;Offloading   Offloading/DME Heel float boot   Wound Cleansing Soap and Water   Periwound Protectant Not Applicable   Dressing Status Clean;Dry;Intact   Dressing Changed New   Dressing Cleansing/Solutions Not Applicable   Dressing Options Offloading Dressing - Heel   Dressing Change/Treatment Frequency Every 72 hrs, and As Needed   NEXT Dressing Change/Treatment Date 04/19/25   NEXT Weekly Photo (Inpatient Only) 04/23/25   Wound Team Following Not following   Pressure Injury Stage Deep Tissue        Vascular:    LAURA:   No results found.    Lab Values:    Lab Results   Component Value Date/Time    WBC 9.9 04/16/2025 04:01 AM    RBC 3.73 (L) 04/16/2025 04:01 AM    HEMOGLOBIN 11.7 (L) 04/16/2025 04:01 AM    HEMATOCRIT 34.5 (L) 04/16/2025 04:01 AM    CREACTPROT 3.23 (H) 04/16/2025 04:01 AM    SEDRATEWES 7 10/04/2023 11:39 PM    HBA1C 4.8 01/06/2025 04:39 AM    HBA1C 4.9 10/06/2023 05:36 PM    PLATELETCT 297 04/16/2025 04:01 AM         Culture Results show:  No results found for this or any previous visit (from the past 720 hours).    Pain Level/Medicated:  None, Tolerated without pain medication       INTERVENTIONS BY WOUND TEAM:  Chart and images reviewed. Discussed with bedside RN. All areas of concern (based on picture review, LDA review and discussion with bedside RN) have been thoroughly assessed. Documentation of areas based on significant findings. This RN in to assess patient. Performed standard wound care which includes appropriate positioning, dressing removal and non-selective debridement. Pictures and measurements obtained weekly if/when required.    Wound:  bilateral heels   Cleansed/Non-selectively Debrided with:  Moist warm washcloth  Elinor wound: Cleansed with Moist warm washcloth, Prepped with N/A  Primary Dressing:  offloading adhesive foams    Advanced Wound Care Discharge Planning  Number of Clinicians  necessary to complete wound care: 1  Is patient requiring IV pain medications for dressing changes:  No   Length of time for dressing change 20 min. (This does not include chart review, pre-medication time, set up, clean up or time spent charting.)    Interdisciplinary consultation: Patient, Bedside RN (Randi), N/A.  Pressure injury and staging reviewed with N/A.    EVALUATION / RATIONALE FOR TREATMENT:     Date:  04/16/25  Wound Status:  Initial evaluation    Patient with POA sDTI to bilateral heels.  Offloading interventions placed.  Patient with small moisture fissure.  Condom catheter in place for moisture management , and barrier paste.  L. Hallux with dry callous.  Okay to be open to air.           Goals: Steady decrease in wound area and depth weekly.    NURSING PLAN OF CARE ORDERS:  Dressing changes: See Dressing Care orders  Skin care: See Skin Care orders  RN Prevention Protocol    NUTRITION RECOMMENDATIONS   Wound Team Recommendations:  N/A    DIET ORDERS (From admission to next 24h)       Start     Ordered    04/17/25 0800  Diet NPO Restrict to: Sips with Medications (Prior to 1600 B/L MMA Embo with Dr. Cordova on 4/17/25)  ALL MEALS        Question:  Diet NPO Restrict to:  Answer:  Sips with Medications  Comment:  Prior to 1600 B/L MMA Embo with Dr. Cordova on 4/17/25 04/16/25 1648    04/16/25 1007  Diet NPO Restrict to: With Tube Feed  ALL MEALS        Question:  Diet NPO Restrict to:  Answer:  With Tube Feed    04/16/25 1008    04/16/25 1000  Diet: Diet Tube Feed; Formula: Jevity; Jevity: 1.2 RTH; Goal Rate (mL/Hour): 70; Duration: 24 HR  ALL MEALS        Comments: Start at 25mL/hr and advance by 10 ml Q 12 hours to goal rate   Question Answer Comment   Diet Diet Tube Feed    Formula: Jevity    Jevity: 1.2 RTH    Goal Rate (mL/Hour) 70    Route Enteral Tube    Duration 24 HR        04/16/25 0959                    PREVENTATIVE INTERVENTIONS:    Q shift Edy - performed per nursing policy  Q  shift pressure point assessments - performed per nursing policy    Surface/Positioning  ICU Low Airloss - Currently in Place  Reposition q 2 hours with pillows - Currently in Place    Offloading/Redistribution  Heel offloading dressing (Silicone dressing) - Currently in Place  Heel float boots (Prevalon boot) - Currently in Place      Containment/Moisture Prevention    Purwick/Condom Cath - Currently in Place  Barrier paste - Currently in Place    Anticipated discharge plans:  TBD        Vac Discharge Needs:  Vac Discharge plan is purely a recommendation from wound team and not a requirement for discharge unless otherwise stated by physician.  Not Applicable Pt not on a wound vac

## 2025-04-17 NOTE — PROGRESS NOTES
Contacted patient's son Sen to update on plan of care changes during this shift. [facility]

## 2025-04-17 NOTE — PROCEDURES
[facility]    Continuous Video Electroencephalogram Report          Patient Name: Sen Vasquez  MRN: [id number]  #: [id number]  Date of Service: 5:52 PM on 4/16/2025 to 07:00 on 04/17/25  Total Recording Time: 13 hours and 3 minutes.  Referring Provider: Daniel Robles D.O.    INDICATION:  Sen Vasquez 79 y.o. male presenting with status epilepticus    CURRENT ANTI-SEIZURE AND OTHER PERTINENT MEDICATIONS:   Keppra  Vimpat  Ativan PRN  Propofol gtt    Loads:    06:08:16 PM  Keppra 1000mg  06:17:01 PM  Ativan 2mg  06:19:03 PM  Keppra 3000mg  06:27:12 PM  Versed 4mg    TECHNIQUE: CVEEG was set up by a Neurodiagnostic technologist who performed education to the patient and staff. A minimum of 23 electrodes and 23 channel recording was setup and performed by Neurodiagnostic technologist, in accordance with the international 10-20 system. Impedence, electrode integrity, and technical impressions were documented a minimum of every 2-24 hour period by a Neurodiagnostic Technologist and reviewed by Interpreting Physician. The study was reviewed in bipolar and referential montages. The recording examined the patient in the awake, drowsy, and sleep followed by sedated/comatose state(s).     DESCRIPTION OF THE RECORD:  Initially the background was continuous, gradually became discontinuous after 12:30 AM. Left hemispheric focal slowing was present throughout.     Occasional N2 sleep transients in the form of rudimentary and/or ill-defined sleep spindles fragments and vertex waves were seen in the leads over the central regions.     ACTIVATION PROCEDURES:   NA    ICTAL AND INTERICTAL FINDINGS:   At the start of the recording, patient was in status epilepticus. He clinically exhibited intermittent facial twitching R > L with confusion. Electrographically there was left hemispheric 1 Hz high amplitude rhythmic discharges with spread diffusely accompanied by muscle artifact. The status epilepticus ended after 6:29 PM.     There was a  return of 1 Hz left LPDs embedded in rhythmic delta slowing, at times with evolution.  Additional seizures were noted at 08:22:49 PM, 08:26:34 PM, 09:52:38 PM, 04:26:08 AM, 05:39:42 AM.            EKG: Single lead EKG regular    EVENTS:  As above    INTERPRETATION:  At the start of the recording, patient was in status epilepticus. He clinically exhibited intermittent facial twitching R > L with confusion. Electrographically there was left hemispheric 1 Hz high amplitude rhythmic discharges with spread diffusely accompanied by muscle artifact. The status epilepticus ended after 6:29 PM.     There was a return of 1 Hz left LPDs embedded in rhythmic delta slowing, at times with evolution.  Additional seizures were noted at 08:22:49 PM, 08:26:34 PM, 09:52:38 PM, 04:26:08 AM, 05:39:42 AM.    Otherwise there was focal left hemispheric slowing throughout and continuous generalized background slowing, with periods of discontinuity in the later hours of the recording.     Comments: EEG was notable for status epilepticus arising from the left hemisphere with corresponding clinical confusion and R > L facial twitching. Status broke after Keppra, Ativan, and Versed were given. The findings remained on the ictal-interictal continuum thereafter, and there were additional seizures at the times listed above.    Findings were also consistent with focal left hemispheric dysfunction which can be seen in the setting of a structural lesion, postictally, or other clinical scenario.     In addition there was moderate to marked cerebral dysfunction of a nonspecific etiology with sedative/medication effects contributing.      Clinical correlation recommended.       Mellissa Ventura M.D.  Epileptologist/Neurologist  [facility]

## 2025-04-17 NOTE — PROCEDURES
Central Line Insertion    Date/Time: 4/16/2025 10:51 PM    Performed by: Mohamud Parada M.D.  Authorized by: Mohamud Parada M.D.    Consent:     Consent obtained:  Emergent situation  Pre-procedure details:     Hand hygiene: Hand hygiene performed prior to insertion      Sterile barrier technique: All elements of maximal sterile technique followed      Skin preparation:  2% chlorhexidine    Skin preparation agent: Skin preparation agent completely dried prior to procedure    Anesthesia:     Anesthesia method:  Local infiltration    Local anesthetic:  Lidocaine 1% w/o epi  Procedure details:     Location:  R internal jugular    Patient position:  Flat    Procedural supplies:  Triple lumen    Catheter size:  7 Fr    Landmarks identified: yes      Ultrasound guidance: yes      Sterile ultrasound techniques: Sterile gel and sterile probe covers were used      Number of attempts:  1    Successful placement: yes    Post-procedure details:     Post-procedure:  Dressing applied and line sutured    Guidewire: guidewire removal confirmed      Assessment:  Blood return through all ports and free fluid flow    Patient tolerance of procedure:  Tolerated well, no immediate complications

## 2025-04-17 NOTE — HOSPITAL COURSE
Sen Vasquez is a 79-year-old male with PMH significant for EtOH abuse, CVA 10/2023, ischial rectal abscess, and [facility] 1/5/2025 where he was treated for colitis who presented from [facility] where he resides 4/14 with altered mental status.  Reportedly at baseline patient is A and O x 4.  Staff at the [facility] living noted patient was A/O x 0 with GCS of 14.  Head CT showed acute on chronic left holohemispheric subdural hematoma with 6 mm subfalcine herniation and mass effect on the left lateral ventricle.  Neuroexam here showed a GCS of 9 (3/1/5) Dr. Wallis from neurosurgery was consulted and took patient to OR 4/15 for left frontoparietal craniotomy and evacuation of hematoma. He was transferred back to the ICU post-op.   4/16 - transfer order to floor but never physically left RICU. Began having rhythmic movements around 1700. CT head stable. Returned from CT in status. Intubated. Awaiting availability of cEEG.  4/17 - VD #2. Status on cEEG. Added Vimpat, Onfi, and Versed drip. Prop @ 80.  4/18 - VD #3. Burst suppressed on Versed @5, Prop @ 80, Keppra, Vimpat, Onfi.  4/19 - VD #4. Versed weaned off. Weaning Prop. No seizures on EEG.  Son, Sen, to bedside. Brought in POA paperwork which has been scanned into chart. GOC discussion. Will discuss with his brother and reconvene probably Monday.  4/20 - VD #5. Off all sedation. No seizures on cEEG.   4/21 - VD#6, no seizures, GOC talks: DNR  4/22 - VD#7, no seizures on cEEG-->will D/C, no sedation and not waking  4/23 - VD#8, no sedation, still minor w/d with stimulation  4/24- VD#9, no sedation, CT this AM (no longer w/d uppers) shows no changes, w/d on lowers.

## 2025-04-18 NOTE — CONSULTS
[facility]   DEPARTMENT OF NEUROLOGY    FOLLOW UP ENCOUNTER     MRN: [id number]  Patient seen at the request of: Dr. Daniel Robles D.O.  Chief Complaint/Reason for Consult:  status epilepticus    IE:   - on cEEG adequate burst suppression was achieved on propofol and versed gtt.    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    Recommend:  - cEEG  - Continue propofol at 80, versed gtt @ 5 mg/hr targeting burst suppression till likely tomorrow, 4/19 AM  - Increasing Onfi 10 mg BID to 15 mg BID  - Continue Keppra 1.5g BID  - Continue Vimpat 200 mg BID    We will continue to follow.     Signed:  José Miguel Luo DO  Department of Neurology  [facility]    HISTORY OF PRESENT ILLNESS:   Obtained per chart review, edited accordingly, and will edit when able to corroborate with the patient or family at the bedside.     79 y.o. male who presented 4/14/2025 with a history of ETOH, CAD, GERD.  He was sent to us from a SNF on 4/14 when he was found to be confused.  Head CT showing an acute on chronic wholohemispheric SDH with 6mm of subfalcine herniation and mass effect on the L ventricle. He was loaded with Keppra and admtted to the ICU with consultation from Neuro Srgry.  He went for craniotomy and drainage on 4/15.      On 4/16 nursing notes: Pt having new onset right eye, right facial, and left thumb twitching at approximately 1650. Dr. Lobo notified and came to bedside. Pt yrn stopped and then restarted after MD left. EEG ordered, valium given.     EEG subsequently placed which revealed status epilepticus, and neurology was consulted.     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) 09/25/2023    Hypomagnesemia 09/25/2023    CVA (cerebral vascular accident) ([facility]) 10/06/2023    Gastroesophageal reflux disease 04/15/2025     Resolved Ambulatory Problems     Diagnosis Date Noted    No Resolved Ambulatory Problems     Past Medical History:   Diagnosis Date    Alcohol abuse     Coronary arteriosclerosis     Dyslipidemia     GERD (gastroesophageal reflux disease)     Mediastinal mass     Osteopenia         PAST SURGICAL HISTORY:     Past Surgical History:   Procedure Laterality Date    CRANIOTOMY Left 4/15/2025    Procedure: CRANIOTOMY;  Surgeon: Marcelo Wallis M.D.;  Location: [facility]ERY [facility];  Service: Neurosurgery    CORONARY ARTERY BYPASS, 1         CURRENT MEDICATIONS:     Current Facility-Administered Medications   Medication Dose Route Frequency Provider Last Rate Last Admin    NS (Bolus) 0.9 % infusion 1,000 mL  1,000 mL Intravenous Once BRAULIO SernaPSundayRSundayNSunday 250 mL/hr at 04/18/25 0849 1,000 mL at 04/18/25 0849    norepinephrine (Levophed) 8 mg in 250 mL NS infusion (premix)  0-1 mcg/kg/min (Ideal) Intravenous Continuous Isac Hope M.D. 19.2 mL/hr at 04/18/25 0816 0.16 mcg/kg/min at 04/18/25 0816    propofol (DIPRIVAN) injection  80 mcg/kg/min (Ideal) Intravenous Continuous Daniel Travis, D.O. 30.8 mL/hr at 04/18/25 0735 80 mcg/kg/min at 04/18/25 0735    clobazam (Onfi) tablet 10 mg  10 mg Enteral Tube BID José Miguel Luo, D.O.   10 mg at 04/18/25 0535    midazolam (Versed) premix 125 mg/125 mL infusion  5 mg/hr Intravenous Continuous Daniel [place], D.O. 5 mL/hr at 04/18/25 0600 5 mg/hr at 04/18/25 0600    vasopressin (Vasostrict) 20 Units in  mL Infusion  0.03 Units/min Intravenous Continuous Meche CHOW  BRAULIO RichardsP.R.N.   Dose not Required at 04/17/25 0845    hydrALAZINE (Apresoline) injection 10 mg  10 mg Intravenous Q4HRS PRN BRAULIO SernaP.R.N.   10 mg at 04/16/25 2302    diazePAM (Valium) injection 5 mg  5 mg Intravenous Q5 MIN PRN José Holloway D.OSunday   5 mg at 04/17/25 0440    levETIRAcetam (Keppra) injection 1,500 mg  1,500 mg Intravenous Q12HRS José Miguel Luo, D.O.   1,500 mg at 04/18/25 0535    Respiratory Therapy Consult   Nebulization Continuous RT Mohamud Parada M.D.        senna-docusate (Pericolace Or Senokot S) 8.6-50 MG per tablet 2 Tablet  2 Tablet Enteral Tube BID Mohamud Parada M.D.   2 Tablet at 04/18/25 0534    And    polyethylene glycol/lytes (Miralax) Packet 1 Packet  1 Packet Enteral Tube QDAY PRN Mohamud Parada M.D.   1 Packet at 04/17/25 1721    And    magnesium hydroxide (Milk Of Magnesia) suspension 30 mL  30 mL Enteral Tube QDAY PRN Mohamud Parada M.D.        And    bisacodyl (Dulcolax) suppository 10 mg  10 mg Rectal QDAY PRN Mohamud Parada M.D. MD Alert...ICU Electrolyte Replacement per Pharmacy   Other PHARMACY TO DOSE Mohamud Parada M.D.        lidocaine (Xylocaine) 1 % injection 2 mL  2 mL Tracheal Tube Q30 MIN PRN Mohamud Parada M.D.        fentaNYL (Sublimaze) injection 50 mcg  50 mcg Intravenous Q15 MIN PRN Mohamud Parada M.D.        And    fentaNYL (Sublimaze) injection 100 mcg  100 mcg Intravenous Q15 MIN PRN Mohamud Parada M.D.        lacosamide (Vimpat) injection 200 mg  200 mg Intravenous BID Mohamud Parada M.D.   200 mg at 04/18/25 0534    Pharmacy Consult Request ...Pain Management Review 1 Each  1 Each Other PHARMACY TO DOSE SELENE Argueta MD ALERT...DO NOT ADMINISTER NSAIDS or ASPIRIN unless ORDERED By Neurosurgery 1 Each  1 Each Other PRN Evy Remy, ERROL.P.N.        ondansetron (Zofran) syringe/vial injection 4 mg  4 mg Intravenous Q4HRS PRN ERROL Argueta.P.N.        sodium phosphate  "enema 1 Each  1 Each Rectal Once PRN SELENE Argueta        Pharmacy Consult: Enteral tube insertion - review meds/change route/product selection  1 Each Other PHARMACY TO DOSE DAWOOD Serna        [START ON 4/19/2025] thiamine (Vitamin B-1) tablet 100 mg  100 mg Enteral Tube DAILY JESUSITA SernaRSundayN.        multivitamin tablet 1 Tablet  1 Tablet Enteral Tube DAILY BRAULIO SernaPSundayR.N.   1 Tablet at 04/18/25 0535    folic acid (Folvite) tablet 1 mg  1 mg Enteral Tube DAILY BRAULIO SernaPSundayR.N.   1 mg at 04/18/25 0535       ALLERGIES:     Allergies   Allergen Reactions    Pravastatin Hives and Swelling        SOCIAL HISTORY:   [facility]    FAMILY HISTORY:   History reviewed. No pertinent family history.     REVIEW OF SYSTEMS:   [facility]    PHYSICAL EXAM:     Vitals:    04/18/25 0945   BP: 99/51   Pulse: (!) 59   Resp: 14   Temp:    SpO2:        NEUROLOGIC EXAM    Mental Status:  Intubated, sedated, not following commands    Cranial Nerves:   Pupils equally round 3 mm and reactive to light 2 mm  +BTT  +Dolls eye  +Cough    Motor/sensory: Not withdrawing to noxious stimulation x4.  Normal bulk and tone  No fasciculations. No spasticity. No cogwheel. No pronator drift.    Reflexes: deferred    DATA:     Labs  Lab Results   Component Value Date    GLUCOSE 128 (H) 04/18/2025    BUN 9 04/18/2025    CO2 24 04/18/2025     Lab Results   Component Value Date/Time    WBC 16.5 (H) 04/17/2025 0422    MCV 93.2 04/17/2025 0422      No results found for: \"TSH\"  No components found for: \"XCUDUS9NB8\", \"M1TSZJF\", \"T4AUTO\", \"H8OGABV\"  No components found for: \"HGBA1C\"  Lab Results   Component Value Date    HDL 44 10/07/2023    LDL 85 10/07/2023     No results found for: \"HGTE52AK\"  No results found for: \"FERRITIN\", \"FOLATE\"  Lab Results   Component Value Date    INR 1.15 (H) 04/14/2025     Lab Results   Component Value Date    TPMZQUFH78 373 04/14/2025     Calcium   Date Value Ref Range Status   04/17/2025 8.9 8.5 " - 10.5 mg/dL Final     Albumin   Date Value Ref Range Status   04/17/2025 3.1 (L) 3.2 - 4.9 g/dL Final     Hemoglobin   Date Value Ref Range Status   04/17/2025 11.4 (L) 14.0 - 18.0 g/dL Final     @lastb12@    Imaging:   Images were personally reviewed by me.     Total time spent: 35 minutes

## 2025-04-18 NOTE — CARE PLAN
The patient is Watcher - Medium risk of patient condition declining or worsening    Shift Goals  Clinical Goals: CEEG, MAP >65, Q4 neuro checks  Patient Goals: JF  Family Goals: Updates    Progress made toward(s) clinical / shift goals:      Problem: Seizure Precautions  Goal: Implementation of seizure precautions  Outcome: Progressing     Problem: Risk for Aspiration  Goal: Patient's risk for aspiration will be absent or decrease  Outcome: Progressing     Problem: Pain - Standard  Goal: Alleviation of pain or a reduction in pain to the patient’s comfort goal  Outcome: Progressing     Problem: Neuro Status  Goal: Neuro status will remain stable or improve  Outcome: Progressing     Problem: Skin Integrity  Goal: Skin integrity is maintained or improved  Outcome: Progressing  Note: Q2 turns in place. Assessed for signs and symptoms of skin breakdown.        Patient is not progressing towards the following goals: N/A

## 2025-04-18 NOTE — CARE PLAN
The patient is Watcher - Medium risk of patient condition declining or worsening    Shift Goals  Clinical Goals: CEEG, MAP >65, Q4 neuro checks  Patient Goals: JF  Family Goals: Updates    Progress made toward(s) clinical / shift goals:      Problem: Seizure Precautions  Goal: Implementation of seizure precautions  Outcome: Progressing     Problem: Pain - Standard  Goal: Alleviation of pain or a reduction in pain to the patient’s comfort goal  Outcome: Progressing    Problem: Safety - Medical Restraint  Goal: Free from restraint(s) (Restraint for Interference with Medical Device)  Outcome: Progressing     Patient is not progressing towards the following goals:    Problem: Neuro Status  Goal: Neuro status will remain stable or improve  Outcome: Not Progressing - Pt sedated for burst suppression on cEEG.  GCS = 3.     Problem: Mobility  Goal: Patient's capacity to carry out activities will improve  Outcome: Not Progressing - Pt sedated for burst suppression on cEEG.     Problem: Early Mobilization - Post Surgery  Goal: Early mobilization post surgery  Outcome: Not Progressing

## 2025-04-18 NOTE — PROCEDURES
VIDEO ELECTROENCEPHALOGRAM  REPORT      Referring provider: Dr. Robles    DOS:   4/18/2025      INDICATION:  Sen Vasquez 79 y.o. male presenting with status epilepticus     CURRENT ANTIEPILEPTIC REGIMEN:   ONFI  Keppra  Vimpat  Propofol gtt  Versed gtt     TECHNIQUE: A 30-channel, 23 hrs video electroencephalogram (VEEG) was performed in accordance with the international 10-20 system. This digital study was reviewed in bipolar and referential montages. The recording examined the patient during sedated and comatose state.     The EEG was set up and taken down by a Neurodiagnostic technologist who performed education to patient and staff.     A minimum but not limited to 23 electrodes and 23 channel recording was setup and performed by Neurodiagnostic technologist.    Impedances, electrode integrity, and technical impressions were documented a minimum of every 2-24 hour period by a Neurodiagnostic Technologist and reviewed by Interpreting physician.     There was supervised withdrawal of the following medications: n/a    ACTIVATION PROCEDURES:   None     DESCRIPTION OF THE RECORD:  The background consisted of burst-suppression pattern with ~70%-80% suppression rate. The bursts consisted of asymmetric,asynchronous mixed frequencies with duration 1-3 seconds . No reactivity seen. No sleep pattern seen.     ICTAL AND/OR INTERICTAL FINDINGS:   No focal or generalized epileptiform activity was noted. No regional slowing was seen during this study.  No seizures were recorded during the study.     EVENT(S):  none     EKG: sampling review of EKG recording demonstrated sinus rhythm.       INTERPRETATION:   This is an abnormal video EEG recording in the sedated state.   1) The burst-suppression pattern is consistent with sedation effect and encephalopathy.    2) No epileptiform discharges or seizures were seen.  No events were captured during the study. Clinical correlation is recommended.    Hank Connell MD  Diplomate, American  Board of Psychiatry and Neurology   Diplomate, American Board of Psychiatry and Neurology in Epilepsy

## 2025-04-18 NOTE — CARE PLAN
Problem: Ventilation  Goal: Ability to achieve and maintain unassisted ventilation or tolerate decreased levels of ventilator support  Description: Target End Date:  4 days Document on Vent flowsheet1.  Support and monitor invasive and noninvasive mechanical ventilation2.  Monitor ventilator weaning response3.  Perform ventilator associated pneumonia prevention interventions4.  Manage ventilation therapy by monitoring diagnostic test results  Outcome: Not Met     Ventilator Daily Summary    Vent Day #3  Airway: 8@24    Ventilator settings: 14/390/+8,30%  Weaning trials: none  Respiratory Procedures: none    Plan: Continue current ventilator settings and wean mechanical ventilation as tolerated per physician orders.

## 2025-04-18 NOTE — DISCHARGE PLANNING
Case Management Discharge Planning    Admission Date: 4/14/2025  GMLOS: 6.5  ALOS: 4    6-Clicks ADL Score: 12  6-Clicks Mobility Score: 14  PT and/or OT Eval ordered: Yes  Post-acute Referrals Ordered: No  Post-acute Choice Obtained: No  Has referral(s) been sent to post-acute provider:  No    Anticipated Discharge Dispo: Discharge Disposition: D/T to SNF with Medicare cert in anticipation of skilled care (03)    DME Needed: No    Action(s) Taken:   Chart review was completed. Patient was discussed during IDT rounds.    Pt is intubated vent day 3, cEEG, propofol, and versed gtt.     Anticipated discharge plan is post-acute placement.     Bedside RN informed pt's son Sen Lee had requested to speak with this RNCM. No family at the pt's bedside at this time. PC was placed to Sen Lee re: discharge assessment. No answer, left a voicemail with my direct contact information requesting a call back.     PC was placed to pt's son Fuad. He states his brother Sen is the best point of contact for discharge assessment. He plans to have a GOC discussion with Sen and will request Sen contact this RNCM to complete assessment and discuss potential discharge disposition options.     1301:   PC was received from pt's daughter-in-law Betzy Vasquez. She is  to Sen Vasquez. She stated he is a  and is unable to make calls while he is on the road. The best time to contact him is before 0900.     Betzy asked that [facility]. RNCM [facility].     Discharge assessment was completed (see below).     1504:   [facility] stating: no AD records on file. Response letter was saved into the media. Betzy Vasquez was updated.     Escalations Completed: None    Medically Clear: No    Next Steps:  CM RN to follow up with medical team to discuss discharge barriers or needs.     Barriers to  Discharge: Medical clearance    Care Transition Team Assessment    Patient is currently intubated and unable to answer questions at this time; information for this assessment was obtained from his daughter-in-law Betzy Vasquez ([phone]) over the phone.       Case management role discussed; understanding of case management role verbalized.   Demographics on face sheet verified.   Please see H&P for pertinent PMH and reason for admission.   Housing: Pt is a resident at the [facility] assisted living facility. His physical home address is [address], [place] [address]  IADLS/ADLS: Prior to this admission pt was mod-independent with his ADLs and ambulated with a walker at baseline.   DME: Walker and manual wheelchair   O2: RA at baseline  Discharge support: Pt's son Sen Lee and TANIA Betzy are pt's primary support in the local area. His son Fuad lives out of state.   PCP: [facility]   [facility]    Insurance: Medicare and [facility]   AD: None on file. Betzy reports pt has an AD completed at the [facility].   Discharge planning: LTACH vs Rehab vs SNF    Information Source  Orientation Level: Unable to assess  Information Given By: Relative  Informant's Name: Betzy Vasquez (daughter in law)  Who is responsible for making decisions for patient? : Adult child  Name(s) of Primary Decision Maker: Sen Vasquez    Readmission Evaluation  Is this a readmission?: No    Elopement Risk  Legal Hold: No  Ambulatory or Self Mobile in Wheelchair: No-Not an Elopement Risk  Elopement Risk: Not at Risk for Elopement    Interdisciplinary Discharge Planning  Lives with - Patient's Self Care Capacity: Unable To Determine At This Time  Housing / Facility: Unable To Determine At This Time  Prior Services: Continuous (24 Hour) Care Giving Per Service    Discharge Preparedness  What is your plan after discharge?: Uncertain - pending medical team collaboration, Skilled nursing  facility  What are your discharge supports?: Child, Other (comment) (daughter in law)  Prior Functional Level: Ambulatory, Independent with Activities of Daily Living, Independent with Medication Management, Needs Assist with Medication Management, Needs Assist with Activities of Daily Living, Uses Walker, Uses Wheelchair    Functional Assesment  Prior Functional Level: Ambulatory, Independent with Activities of Daily Living, Independent with Medication Management, Needs Assist with Medication Management, Needs Assist with Activities of Daily Living, Uses Walker, Uses Wheelchair    Finances  Financial Barriers to Discharge: No  Prescription Coverage: Yes    Advance Directive  Advance Directive?: None    Domestic Abuse  Have you ever been the victim of abuse or violence?: No    Psychological Assessment  History of Substance Abuse: Alcohol  Substance Abuse Comments: No  History of Psychiatric Problems: No  Non-compliant with Treatment: No  Newly Diagnosed Illness: No    Discharge Risks or Barriers  Discharge risks or barriers?: Complex medical needs  Patient risk factors: Complex medical needs    Anticipated Discharge Information  Discharge Disposition: D/T to SNF with Medicare cert in anticipation of skilled care (03)

## 2025-04-18 NOTE — PROGRESS NOTES
[facility]   DEPARTMENT OF NEUROLOGY    FOLLOW UP ENCOUNTER     MRN: [id number]  Patient seen at the request of: Dr. Daniel Robles D.O.  Chief Complaint/Reason for Consult:  status epilepticus    IE:   - on cEEG adequate burst suppression was achieved on propofol and versed gtt.    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    Recommend:  - cEEG  - Continue propofol at 80, versed gtt @ 5 mg/hr targeting burst suppression till likely tomorrow, 4/19 AM  - Increasing Onfi 10 mg BID to 15 mg BID  - Continue Keppra 1.5g BID  - Continue Vimpat 200 mg BID    We will continue to follow.     Signed:  José Miguel Luo DO  Department of Neurology  [facility]    HISTORY OF PRESENT ILLNESS:   Obtained per chart review, edited accordingly, and will edit when able to corroborate with the patient or family at the bedside.     79 y.o. male who presented 4/14/2025 with a history of ETOH, CAD, GERD.  He was sent to us from a SNF on 4/14 when he was found to be confused.  Head CT showing an acute on chronic wholohemispheric SDH with 6mm of subfalcine herniation and mass effect on the L ventricle. He was loaded with Keppra and admtted to the ICU with consultation from Neuro Srgry.  He went for craniotomy and drainage on 4/15.      On 4/16 nursing notes: Pt having new onset right eye, right facial, and left thumb twitching at approximately 1650. Dr. Lobo notified and came to bedside. Pt yrn stopped and then restarted after MD left. EEG ordered, valium given.     EEG subsequently placed which revealed status epilepticus, and neurology was consulted.     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    ETOH abuse 07/12/2021    CAD  (coronary artery disease) 07/12/2021    Chronic pain of both knees 07/12/2021    Weakness 07/13/2021    Alcohol withdrawal syndrome, with delirium, CVA, prolonged delirium (HCC) 09/24/2023    Hypokalemia 09/24/2023    Acute encephalopathy 09/24/2023    Thrombocytopenia ([facility]) 09/25/2023    Hypomagnesemia 09/25/2023    CVA (cerebral vascular accident) ([facility]) 10/06/2023    Gastroesophageal reflux disease 04/15/2025     Resolved Ambulatory Problems     Diagnosis Date Noted    No Resolved Ambulatory Problems     Past Medical History:   Diagnosis Date    Alcohol abuse     Coronary arteriosclerosis     Dyslipidemia     GERD (gastroesophageal reflux disease)     Mediastinal mass     Osteopenia         PAST SURGICAL HISTORY:     Past Surgical History:   Procedure Laterality Date    CRANIOTOMY Left 4/15/2025    Procedure: CRANIOTOMY;  Surgeon: Marcelo Wallis M.D.;  [facility]: SURGERY [facility];  Service: Neurosurgery    CORONARY ARTERY BYPASS, 1         CURRENT MEDICATIONS:     Current Facility-Administered Medications   Medication Dose Route Frequency Provider Last Rate Last Admin    NS (Bolus) 0.9 % infusion 1,000 mL  1,000 mL Intravenous Once Meche Richards, A.P.R.N. 250 mL/hr at 04/18/25 0849 1,000 mL at 04/18/25 0849    clobazam (Onfi) tablet 15 mg  15 mg Enteral Tube BID ANA LAURA Saleem.OSunday        norepinephrine (Levophed) 8 mg in 250 mL NS infusion (premix)  0-1 mcg/kg/min (Ideal) Intravenous Continuous Isac Hope M.D. 19.2 mL/hr at 04/18/25 0816 0.16 mcg/kg/min at 04/18/25 0816    propofol (DIPRIVAN) injection  80 mcg/kg/min (Ideal) Intravenous Continuous Daniel [place], D.O. 30.8 mL/hr at 04/18/25 0735 80 mcg/kg/min at 04/18/25 0735    midazolam (Versed) premix 125 mg/125 mL infusion  5 mg/hr Intravenous Continuous Daniel Travis, D.O. 5 mL/hr at 04/18/25 0600 5 mg/hr at 04/18/25 0600    vasopressin (Vasostrict) 20 Units in  mL Infusion  0.03 Units/min Intravenous Continuous BRAULIO SernaPSundayRSundayN.   Dose  not Required at 04/17/25 0845    hydrALAZINE (Apresoline) injection 10 mg  10 mg Intravenous Q4HRS PRN Meche Richards A.P.R.NSunday   10 mg at 04/16/25 2302    diazePAM (Valium) injection 5 mg  5 mg Intravenous Q5 MIN PRN MORRIS BowieOSunday   5 mg at 04/17/25 0440    levETIRAcetam (Keppra) injection 1,500 mg  1,500 mg Intravenous Q12HRS José Miguel Luo D.O.   1,500 mg at 04/18/25 0535    Respiratory Therapy Consult   Nebulization Continuous RT Mohamud Parada M.D.        senna-docusate (Pericolace Or Senokot S) 8.6-50 MG per tablet 2 Tablet  2 Tablet Enteral Tube BID Mohamud Parada M.D.   2 Tablet at 04/18/25 0534    And    polyethylene glycol/lytes (Miralax) Packet 1 Packet  1 Packet Enteral Tube QDAY PRN Mohamud Parada M.D.   1 Packet at 04/17/25 1721    And    magnesium hydroxide (Milk Of Magnesia) suspension 30 mL  30 mL Enteral Tube QDAY PRN Mohamud Parada M.D.        And    bisacodyl (Dulcolax) suppository 10 mg  10 mg Rectal QDAY PRN Mohamud Parada M.D. MD Alert...ICU Electrolyte Replacement per Pharmacy   Other PHARMACY TO DOSE Mohamud Parada M.D.        lidocaine (Xylocaine) 1 % injection 2 mL  2 mL Tracheal Tube Q30 MIN PRN Mohamud Parada M.D.        fentaNYL (Sublimaze) injection 50 mcg  50 mcg Intravenous Q15 MIN PRN Mohamud Parada M.D.        And    fentaNYL (Sublimaze) injection 100 mcg  100 mcg Intravenous Q15 MIN PRN Mohamud Parada M.D.        lacosamide (Vimpat) injection 200 mg  200 mg Intravenous BID Mohamud Parada M.D.   200 mg at 04/18/25 0534    Pharmacy Consult Request ...Pain Management Review 1 Each  1 Each Other PHARMACY TO DOSE SELENE Argueta MD ALERT...DO NOT ADMINISTER NSAIDS or ASPIRIN unless ORDERED By Neurosurgery 1 Each  1 Each Other PRN Evy M Remy, A.P.N.        ondansetron (Zofran) syringe/vial injection 4 mg  4 mg Intravenous Q4HRS PRN BRAULIO ArguetaP.N.        sodium phosphate enema 1 Each  1 Each Rectal  "Once PRN SELENE Argueta        Pharmacy Consult: Enteral tube insertion - review meds/change route/product selection  1 Each Other PHARMACY TO DOSE DAWOOD Serna        [START ON 4/19/2025] thiamine (Vitamin B-1) tablet 100 mg  100 mg Enteral Tube DAILY AVA Serna.        multivitamin tablet 1 Tablet  1 Tablet Enteral Tube DAILY JESUSITA SernaRSundayN.   1 Tablet at 04/18/25 0535    folic acid (Folvite) tablet 1 mg  1 mg Enteral Tube DAILY JESUSITA SernaRSundayN.   1 mg at 04/18/25 0535       ALLERGIES:     Allergies   Allergen Reactions    Pravastatin Hives and Swelling        SOCIAL HISTORY:   RUST    FAMILY HISTORY:   History reviewed. No pertinent family history.     REVIEW OF SYSTEMS:   RUST    PHYSICAL EXAM:     Vitals:    04/18/25 0951   BP:    Pulse: (!) 59   Resp: 14   Temp:    SpO2: 98%       NEUROLOGIC EXAM    Mental Status:  Intubated, sedated, not following commands    Cranial Nerves:   Pupils equally round 3 mm and reactive to light 2 mm  +BTT  +Dolls eye  +Cough    Motor/sensory: Not withdrawing to noxious stimulation x4.  Normal bulk and tone  No fasciculations. No spasticity. No cogwheel. No pronator drift.    Reflexes: deferred    DATA:     Labs  Lab Results   Component Value Date    GLUCOSE 128 (H) 04/18/2025    BUN 9 04/18/2025    CO2 24 04/18/2025     Lab Results   Component Value Date/Time    WBC 16.5 (H) 04/17/2025 0422    MCV 93.2 04/17/2025 0422      No results found for: \"TSH\"  No components found for: \"OILKJO8ER1\", \"F8VMKKV\", \"T4AUTO\", \"C5CPWBX\"  No components found for: \"HGBA1C\"  Lab Results   Component Value Date    HDL 44 10/07/2023    LDL 85 10/07/2023     No results found for: \"TXIV98FY\"  No results found for: \"FERRITIN\", \"FOLATE\"  Lab Results   Component Value Date    INR 1.15 (H) 04/14/2025     Lab Results   Component Value Date    YFOLWSVP49 373 04/14/2025     Calcium   Date Value Ref Range Status   04/17/2025 8.9 8.5 - 10.5 mg/dL Final "     Albumin   Date Value Ref Range Status   04/17/2025 3.1 (L) 3.2 - 4.9 g/dL Final     Hemoglobin   Date Value Ref Range Status   04/17/2025 11.4 (L) 14.0 - 18.0 g/dL Final     @lastb12@    Imaging:   Images were personally reviewed by me.     Total time spent: 35 minutes

## 2025-04-18 NOTE — CARE PLAN
Ventilator Daily Summary    Vent Day #3  Airway: 8@24    Ventilator settings: 14 390 +8 30  Weaning trials: no  Respiratory Procedures: no    Plan: Continue current ventilator settings and wean mechanical ventilation as tolerated per physician orders.  Problem: Ventilation  Goal: Ability to achieve and maintain unassisted ventilation or tolerate decreased levels of ventilator support  Description: Target End Date:  4 days Document on Vent flowsheet1.  Support and monitor invasive and noninvasive mechanical ventilation2.  Monitor ventilator weaning response3.  Perform ventilator associated pneumonia prevention interventions4.  Manage ventilation therapy by monitoring diagnostic test results  Outcome: Progressing

## 2025-04-18 NOTE — PROGRESS NOTES
Neurosurgery Progress Note    Subjective:  Patient to remain sedated for another day, on Prop and versed    Exam:  Intubated, sedated.  Pinpoint pupils.  CDI     BP  Min: 62/37  Max: 164/73  Pulse  Av.7  Min: 46  Max: 69  Resp  Av.4  Min: 12  Max: 28  Monitored Temp 2  Av.1 °C (96.9 °F)  Min: 34.5 °C (94.1 °F)  Max: 37.1 °C (98.8 °F)  SpO2  Av.8 %  Min: 96 %  Max: 99 %    No data recorded    Recent Labs     25  0500   WBC 11.3* 16.5* 12.9*   RBC 3.58* 3.67* 3.47*   HEMOGLOBIN 11.2* 11.4* 10.6*   HEMATOCRIT 33.8* 34.2* 33.3*   MCV 94.4 93.2 96.0   MCH 31.3 31.1 30.5   MCHC 33.1 33.3 31.8*   RDW 48.7 48.4 51.4*   PLATELETCT 252 356 285   MPV 9.8 9.7 10.2     Recent Labs     25  0422 25  0500   SODIUM 135 134* 138   POTASSIUM 3.8 3.5* 4.0   CHLORIDE 100 98 105   CO2 25 23 24   GLUCOSE 102* 116* 128*   BUN 8 9 9   CREATININE 0.40* 0.48* 0.46*   CALCIUM 8.7 8.9 8.7           Recent Labs     25   REACTMIN 4.7   CLOTKINET 0.8   CLOTANGL 79.5*   MAXCLOTS 64.6   PRCINADP 10.3   PRCINAA 5.5       Intake/Output                         25 - 25 0659 25 - 25 Total  Total                 Intake    I.V.  1747.4  675.3 2422.7  103.8  -- 103.8    Magnesium Sulfate Volume 75 -- 75 -- -- --    Propofol Volume 350.6 365.3 715.9 61.6 -- 61.6    Midazolam Volume 42.8 59.8 102.6 9.9 -- 9.9    Norepinephrine Volume 283.2 250.2 533.4 32.3 -- 32.3    Volume (mL) (NS (Bolus) 0.9 % infusion 1,000 mL) 995.8 -- 995.8 -- -- --    Other  210  120 330  --  -- --    Medications (PO/Enteral Liquids) 210 120 330 -- -- --    Enteral  570  600 1170  110  -- 110    Free Water / Tube Flush 90 120 210 30 -- 30    Enteral Volume (Enteral Tube 04/15/25 Cortrak - Gastric 10 Fr. Right nare) 480 480 960 80 -- 80    Total Intake 2527.4 1395.3 3922.7 213.8 -- 213.8       Output    Urine   1200  317 1517  40  -- 40    Output (mL) (Urethral Catheter Temperature probe) 3250 672 9134 40 -- 40    Stool  --  -- --  --  -- --    Number of Times Stooled 0 x 0 x 0 x 0 x -- 0 x    Total Output 7974 014 5387 40 -- 40       Net I/O     1327.4 1078.3 2405.7 173.8 -- 173.8              Intake/Output Summary (Last 24 hours) at 4/18/2025 0919  Last data filed at 4/18/2025 0800  Gross per 24 hour   Intake 3851.29 ml   Output 1552 ml   Net 2299.29 ml             NS  1,000 mL Once    NORepinephrine  0-1 mcg/kg/min (Ideal) Continuous    propofol  80 mcg/kg/min (Ideal) Continuous    clobazam  10 mg BID    midazolam  5 mg/hr Continuous    vasopressin (Vasostrict) infusion  0.03 Units/min Continuous    hydrALAZINE  10 mg Q4HRS PRN    diazePAM  5 mg Q5 MIN PRN    levETIRAcetam (Keppra) IV  1,500 mg Q12HRS    Respiratory Therapy Consult   Continuous RT    famotidine  20 mg Q12HRS    Or    famotidine  20 mg Q12HRS    senna-docusate  2 Tablet BID    And    polyethylene glycol/lytes  1 Packet QDAY PRN    And    magnesium hydroxide  30 mL QDAY PRN    And    bisacodyl  10 mg QDAY PRN    MD Alert...Adult ICU Electrolyte Replacement per Pharmacy   PHARMACY TO DOSE    lidocaine  2 mL Q30 MIN PRN    fentaNYL  50 mcg Q15 MIN PRN    And    fentaNYL  100 mcg Q15 MIN PRN    And    fentaNYL   Continuous    lacosamide  200 mg BID    Pharmacy Consult Request  1 Each PHARMACY TO DOSE    MD ALERT...DO NOT ADMINISTER NSAIDS or ASPIRIN unless ORDERED By Neurosurgery  1 Each PRN    ondansetron  4 mg Q4HRS PRN    sodium phosphate  1 Each Once PRN    Pharmacy  1 Each PHARMACY TO DOSE    [START ON 4/19/2025] thiamine  100 mg DAILY    multivitamin  1 Tablet DAILY    folic acid  1 mg DAILY       Assessment and Plan:  POD #3 Crani for SD  Prophylactic anticoagulation: no         Start date/time: tbd     Neuro following for seizure  Left MMA when able  CT4/16- improved, post op changes

## 2025-04-18 NOTE — PROGRESS NOTES
Critical Care Progress Note    Date of admission  2025    Chief Complaint  SDH and seizures    Hospital Course  Sen Vasquez is a 79-year-old male with PMH significant for EtOH abuse, CVA 10/2023, ischial rectal abscess, and CAD [facility] at [facility] 20[facility] where he was treated for colitis who presented from [facility] where he resides  with altered mental status.  Reportedly at baseline patient is A and O x 4.  Staff at the assisted living noted patient was A/O x 0 with GCS of 14.  Head CT showed acute on chronic left holohemispheric subdural hematoma with 6 mm subfalcine herniation and mass effect on the left lateral ventricle.  Neuroexam here showed a GCS of 9 (3/1/5) Dr. Wallis from neurosurgery was consulted and took patient to OR 4/15 for left frontoparietal craniotomy and evacuation of hematoma. He was transferred back to the ICU post-op.    - transfer order to floor but never physically left RICU. Began having rhythmic movements around 1700. CT head stable. Returned from CT in status. Intubated. Awaiting availability of cEEG.   - VD #2. Status on cEEG. Added Vimpat, Onfi, and Versed drip. Prop @ 80.    Interval Problem Update  Reviewed last 24 hour events:  No significant overnight events.  Temp 99.1  SB 50's  SBP's 's. MAP's 65-70's. Actively titrating Levophed for MAP > 65 or SBP > 90.  Neuro: unresponsive. Pupils sluggish. Does not withdrawal.  RASS: -5. Prop @ 80. Versed @ 5. Keppra/Vimpat/Onfi  Vent day #3: APVC 14/390/8/30%  AB.36/47/89  TF @ goal. BM 4/15  I/O: 3600/1400  Sellers, TLC  Mobility 1 - not eligible to advance due to cEEG    Burst suppressed - keep current sedation/meds for 24 hours.  Called and updated sonFuad.     Review of Systems  Review of Systems   Unable to perform ROS: Intubated        Vital Signs for last 24 hours   Pulse:  [46-79] 54  Resp:  [12-43] 14  BP: ()/(37-73) 155/68  SpO2:  [96 %-99 %]  98 %    Hemodynamic parameters for last 24 hours       Respiratory Information for the last 24 hours  Vent Mode: APVCMV  Rate (breaths/min): 14  Vt Target (mL): 390  PEEP/CPAP: 8  MAP: 12  Control VTE (exp VT): 387    Physical Exam   Physical Exam  Vitals and nursing note reviewed.   Constitutional:       General: He is not in acute distress.     Appearance: He is underweight. He is ill-appearing. He is not toxic-appearing.      Interventions: He is sedated, intubated and restrained.   HENT:      Head: Normocephalic.      Nose: Nose normal.      Mouth/Throat:      Lips: Pink.      Mouth: Mucous membranes are moist.   Eyes:      Pupils: Pupils are equal, round, and reactive to light.   Cardiovascular:      Rate and Rhythm: Regular rhythm. Bradycardia present.      Pulses: Normal pulses.      Heart sounds: Normal heart sounds.   Pulmonary:      Effort: He is intubated.      Breath sounds: No wheezing or rhonchi.   Abdominal:      General: Bowel sounds are decreased.      Palpations: Abdomen is soft.   Musculoskeletal:      Right lower leg: No edema.      Left lower leg: No edema.   Skin:     General: Skin is warm and dry.      Capillary Refill: Capillary refill takes less than 2 seconds.   Neurological:      GCS: GCS eye subscore is 1. GCS verbal subscore is 1. GCS motor subscore is 1.      Comments: 3T   Psychiatric:      Comments: Intubated/sedated         Medications  Current Facility-Administered Medications   Medication Dose Route Frequency Provider Last Rate Last Admin    norepinephrine (Levophed) 8 mg in 250 mL NS infusion (premix)  0-1 mcg/kg/min (Ideal) Intravenous Continuous Isac Hope M.D. 15.6 mL/hr at 04/18/25 0629 0.13 mcg/kg/min at 04/18/25 0629    propofol (DIPRIVAN) injection  80 mcg/kg/min (Ideal) Intravenous Continuous Daniel Travis, D.O. 30.8 mL/hr at 04/18/25 0436 80 mcg/kg/min at 04/18/25 0436    clobazam (Onfi) tablet 10 mg  10 mg Enteral Tube BID José Miguel Luo, D.O.   10 mg at 04/18/25  0535    midazolam (Versed) premix 125 mg/125 mL infusion  5 mg/hr Intravenous Continuous Daniel [place], D.O. 5 mL/hr at 04/18/25 0600 5 mg/hr at 04/18/25 0600    vasopressin (Vasostrict) 20 Units in  mL Infusion  0.03 Units/min Intravenous Continuous Meche F Richards, A.P.R.N.   Dose not Required at 04/17/25 0845    hydrALAZINE (Apresoline) injection 10 mg  10 mg Intravenous Q4HRS PRN Meche F Richards, A.P.R.N.   10 mg at 04/16/25 2302    diazePAM (Valium) injection 5 mg  5 mg Intravenous Q5 MIN PRN José Holloway, D.O.   5 mg at 04/17/25 0440    levETIRAcetam (Keppra) injection 1,500 mg  1,500 mg Intravenous Q12HRS José Miguel Luo D.O.   1,500 mg at 04/18/25 0535    Respiratory Therapy Consult   Nebulization Continuous RT Mohamud Parada M.D.        famotidine (Pepcid) tablet 20 mg  20 mg Enteral Tube Q12HRS Mohamud Parada M.D.   20 mg at 04/18/25 0535    Or    famotidine (Pepcid) injection 20 mg  20 mg Intravenous Q12HRS Mohamud Parada M.D.        senna-docusate (Pericolace Or Senokot S) 8.6-50 MG per tablet 2 Tablet  2 Tablet Enteral Tube BID Mohamud Parada M.D.   2 Tablet at 04/18/25 0534    And    polyethylene glycol/lytes (Miralax) Packet 1 Packet  1 Packet Enteral Tube QDAY PRN Mohamud Parada M.D.   1 Packet at 04/17/25 1721    And    magnesium hydroxide (Milk Of Magnesia) suspension 30 mL  30 mL Enteral Tube QDAY PRN Mohamud Parada M.D.        And    bisacodyl (Dulcolax) suppository 10 mg  10 mg Rectal QDAY PRN Mohamud Parada M.D. MD Alert...ICU Electrolyte Replacement per Pharmacy   Other PHARMACY TO DOSE Mohamud Parada M.D.        lidocaine (Xylocaine) 1 % injection 2 mL  2 mL Tracheal Tube Q30 MIN PRN Mohamud Parada M.D.        fentaNYL (Sublimaze) injection 50 mcg  50 mcg Intravenous Q15 MIN PRN Mohamud Parada M.D.        And    fentaNYL (Sublimaze) injection 100 mcg  100 mcg Intravenous Q15 MIN PRN Mohamud Parada M.D.        And    fentaNYL (SUBLIMAZE)  50 mcg/mL in 50mL (Continuous Infusion)   Intravenous Continuous Mohamud Parada M.D.   Dose not Required at 04/16/25 2315    lacosamide (Vimpat) injection 200 mg  200 mg Intravenous BID Mohamud Parada M.D.   200 mg at 04/18/25 0534    Pharmacy Consult Request ...Pain Management Review 1 Each  1 Each Other PHARMACY TO DOSE SHONDA Argueta.        MD ALERT...DO NOT ADMINISTER NSAIDS or ASPIRIN unless ORDERED By Neurosurgery 1 Each  1 Each Other PRN SELENE Argueta        ondansetron (Zofran) syringe/vial injection 4 mg  4 mg Intravenous Q4HRS PRN BRAULIO ArguetaPJO        sodium phosphate enema 1 Each  1 Each Rectal Once PRN SELENE Argueta        Pharmacy Consult: Enteral tube insertion - review meds/change route/product selection  1 Each Other PHARMACY TO DOSE ERROL Serna.P.R.N.        [START ON 4/19/2025] thiamine (Vitamin B-1) tablet 100 mg  100 mg Enteral Tube DAILY ERROL Serna.P.R.N.        multivitamin tablet 1 Tablet  1 Tablet Enteral Tube DAILY ERROL Serna.P.R.N.   1 Tablet at 04/18/25 0535    folic acid (Folvite) tablet 1 mg  1 mg Enteral Tube DAILY ERROL Serna.P.R.N.   1 mg at 04/18/25 0535       Fluids    Intake/Output Summary (Last 24 hours) at 4/18/2025 0648  Last data filed at 4/18/2025 0400  Gross per 24 hour   Intake 3619.03 ml   Output 1387 ml   Net 2232.03 ml       Laboratory  Recent Labs     04/16/25  2325 04/17/25  0213 04/18/25  0246   ISTATAPH 7.483* 7.491* 7.351   ISTATAPCO2 34.2 31.4* 48.1*   ISTATAPO2 >500* 111* 92   ISTATATCO2 27* 25* 28*   [id number] 100* 99 97   ISTATARTHCO3 25.6 24.0 26.6   ISTATARTBE 2 1 1   ISTATTEMP 96.2 F 95.7 F 36.6 C   ISTATFIO2 100 30 30   ISTATSPEC Arterial Arterial Arterial   ISTATAPHTC 7.503* 7.516* 7.357   [id number] >500* 101 89         Recent Labs     04/16/25  1925 04/17/25  0422 04/18/25  0500   SODIUM 135 134* 138   POTASSIUM 3.8 3.5* 4.0   CHLORIDE 100 98 105   CO2 25 23 24   BUN 8 9 9    CREATININE 0.40* 0.48* 0.46*   MAGNESIUM 1.8 1.7 2.0   PHOSPHORUS 3.2 3.3 3.7   CALCIUM 8.7 8.9 8.7     Recent Labs     04/16/25 0401 04/16/25 1925 04/17/25 0422 04/18/25  0500   ALTSGPT  --  <5 6 <5   ASTSGOT  --  14 15 12   ALKPHOSPHAT  --  66 71 75   TBILIRUBIN  --  0.6 0.6 0.3   PREALBUMIN 9.0*  --   --   --    GLUCOSE 130* 102* 116* 128*     Recent Labs     04/16/25  0401 04/16/25 1925 04/17/25  0422 04/18/25  0500   WBC 9.9 11.3* 16.5* 12.9*   NEUTSPOLYS 86.60*  --  85.60* 84.20*   LYMPHOCYTES 6.20*  --  7.30* 6.70*   MONOCYTES 6.50  --  5.30 6.00   EOSINOPHILS 0.10  --  1.00 2.30   BASOPHILS 0.20  --  0.30 0.30   ASTSGOT  --  14 15 12   ALTSGPT  --  <5 6 <5   ALKPHOSPHAT  --  66 71 75   TBILIRUBIN  --  0.6 0.6 0.3     Recent Labs     04/16/25 1925 04/17/25 0422 04/18/25  0500   RBC 3.58* 3.67* 3.47*   HEMOGLOBIN 11.2* 11.4* 10.6*   HEMATOCRIT 33.8* 34.2* 33.3*   PLATELETCT 252 356 285       Imaging  X-Ray:  I have personally reviewed the images and compared with prior images.    Assessment/Plan  * Status epilepticus (HCC)  Assessment & Plan  -Focal status epilepticus 04/16/25, returned to ICU   -Keppra 4 g IV followed by 1.5 mg IV BID  -Neurology consulted  -Versed Drip  -added Vimpat and Onfi today  -cEEG  -Seizure and Aspiration Precautions      Goals of care, counseling/discussion  Assessment & Plan  -I called and spoke to son, Sen, who requested I also call his brother (patient's other son), Fuad.  -I updated the sons on the overnight events with development of status epilepticus requiring intubation and mechanical ventilation.  -I discussed this is a major setback for their already frail father. Neither son elaborated on their relationship with their father when I explored that topic (Sen had just woken up when I called and Fuad was in a work meeting that he had to step out of to take the call).  -Patient does not have an AD that the sons know of and they are unsure of his wishes for Code Status.  They would like to speak amongst themselves over the next day or 2 and reconvene for another update to help guide their decision.      On mechanically assisted ventilation (HCC)  Assessment & Plan  -Intubated for status epilepticus  -Intubation date 4/16  -Ventilator dependent respiratory failure  -Modify ventilator to optimize oxygenation, acid-base balance and ventilation  -CXR as indicated: monitor lung volumes and tube/line placement  -HOB > 30  -Titrate FiO2 to keep sats greater than 92%  -Chlorhexidine  -goal CO2 35-40  -Daily awakening and SBT trials unless contraindicated  -ABCDEF bundle  -I am actively adjusting ventilator based on clinical indicators and ABG's    Severe protein-calorie malnutrition (HCC)- (present on admission)  Assessment & Plan  -Body mass index is 22.68 kg/m².  -thiamine, vitamins  -monitor for refeeding  -dietary following    Subdural hematoma (HCC)- (present on admission)  Assessment & Plan  -History of ETOH abuse. No reported recent falls. Not on AC  -Serial neurologic exams  -SBP goal < 160  -HOB > 30  -Goal normonatremia, normothermia, normoglycemia, euvolemia  -Neurosurgery following. S/p craniotomy with evacuation 4/15. Subdural drain removed 4/16  -PT OT SLP  -Aspiration and Fall Precautions  -SCD's for DVT prophylaxis    ETOH abuse- (present on admission)  Assessment & Plan  -Uncertain chronicity or last known ingestion  -Thiamine  -Monitor for withdrawal  -Cessation education and counseling when able  -Replace electrolytes  -Maintain euvolemia          VTE:   SCD's only until cleared by NSG.  Ulcer:  TF at goal. Stopped Pepcid today  Lines: Central Line  Ongoing indication addressed and Sellers Catheter  Ongoing indication addressed    I have performed a physical exam and reviewed and updated ROS and Plan today (4/18/2025). In review of yesterday's note (4/17/2025), there are no changes except as documented above.     Discussed patient condition and risk of morbidity and/or  mortality with Family, RN, RT, Pharmacy, , neurology and neurosurgery, and my attending Dr. Robles.  The patient remains critically ill.  Critical care time = 58 minutes in directly providing and coordinating critical care and extensive data review.  No time overlap and excludes procedures.    Please note that this dictation was created using voice recognition software. I have made every reasonable attempt to correct obvious errors, but there may be errors of grammar and possibly content that I did not discover before finalizing the note.    AVA Serna.

## 2025-04-19 NOTE — CARE PLAN
Problem: Ventilation  Goal: Ability to achieve and maintain unassisted ventilation or tolerate decreased levels of ventilator support  Description: Target End Date:  4 days Document on Vent flowsheet1.  Support and monitor invasive and noninvasive mechanical ventilation2.  Monitor ventilator weaning response3.  Perform ventilator associated pneumonia prevention interventions4.  Manage ventilation therapy by monitoring diagnostic test results  Outcome: Progressing     Stable on 14, 390, +8, 30%

## 2025-04-19 NOTE — PROGRESS NOTES
Neurosurgery Progress Note    Subjective:  Working on weaning sedation patient is still bur suppressed    Exam:  Intubated, sedated.  Pinpoint pupils.  CDI     BP  Min: 87/55  Max: 131/63  Pulse  Av.4  Min: 49  Max: 72  Resp  Avg: 15  Min: 11  Max: 30  Monitored Temp 2  Av.5 °C (97.7 °F)  Min: 36 °C (96.8 °F)  Max: 37.3 °C (99.1 °F)  SpO2  Av.8 %  Min: 96 %  Max: 100 %    No data recorded    Recent Labs     25  0422 25  0500 25  0420   WBC 16.5* 12.9* 11.3*   RBC 3.67* 3.47* 3.29*   HEMOGLOBIN 11.4* 10.6* 10.0*   HEMATOCRIT 34.2* 33.3* 31.9*   MCV 93.2 96.0 97.0   MCH 31.1 30.5 30.4   MCHC 33.3 31.8* 31.3*   RDW 48.4 51.4* 52.8*   PLATELETCT 356 285 290   MPV 9.7 10.2 10.0     Recent Labs     25  0422 25  0500 25  0420   SODIUM 134* 138 141   POTASSIUM 3.5* 4.0 4.1   CHLORIDE 98 105 107   CO2 23 24 27   GLUCOSE 116* 128* 115*   BUN 9 9 8   CREATININE 0.48* 0.46* 0.41*   CALCIUM 8.9 8.7 8.7           Recent Labs     25  1925   REACTMIN 4.7   CLOTKINET 0.8   CLOTANGL 79.5*   MAXCLOTS 64.6   PRCINADP 10.3   PRCINAA 5.5       Intake/Output                         25 07 - 25 0659 25 07 - 25 0659     [id number] Total  Total                 Intake    I.V.  1648  583.9 2231.9  95.2  -- 95.2    Propofol Volume 368.1 368.5 736.6 61.5 -- 61.5    Midazolam Volume 60 59.8 119.7 9.7 -- 9.7    Norepinephrine Volume 222.5 155.6 378.1 24 -- 24    Volume (mL) (NS (Bolus) 0.9 % infusion 1,000 mL) 997.5 -- 997.5 -- -- --    Other  150  -- 150  --  -- --    Medications (PO/Enteral Liquids) 150 -- 150 -- -- --    Enteral  570  570 1140  80  -- 80    Free Water / Tube Flush 90 90 180 -- -- --    Enteral Volume (Enteral Tube 04/15/25 Cortrak - Gastric 10 Fr. Right nare) 480 480 960 80 -- 80    Total Intake 2368 1153.9 3521.9 175.2 -- 175.2       Output    Urine  1300  680 1980  80  -- 80    Output (mL) (Urethral Catheter  Temperature probe) 8570 901 7689 80 -- 80    Stool  --  -- --  --  -- --    Number of Times Stooled 0 x 0 x 0 x -- -- --    Total Output 9107 132 7406 80 -- 80       Net I/O     1068 473.9 1541.9 95.2 -- 95.2              Intake/Output Summary (Last 24 hours) at 4/19/2025 0954  Last data filed at 4/19/2025 0800  Gross per 24 hour   Intake 3483.34 ml   Output 2020 ml   Net 1463.34 ml             magnesium sulfate  2 g Once    LR  1,000 mL Once    enoxaparin (LOVENOX) injection  40 mg DAILY AT 1800    clobazam  15 mg BID    NORepinephrine  0-1 mcg/kg/min (Ideal) Continuous    propofol  80 mcg/kg/min (Ideal) Continuous    midazolam  2 mg/hr Continuous    hydrALAZINE  10 mg Q4HRS PRN    diazePAM  5 mg Q5 MIN PRN    levETIRAcetam (Keppra) IV  1,500 mg Q12HRS    Respiratory Therapy Consult   Continuous RT    senna-docusate  2 Tablet BID    And    polyethylene glycol/lytes  1 Packet QDAY PRN    And    magnesium hydroxide  30 mL QDAY PRN    And    bisacodyl  10 mg QDAY PRN    MD Alert...Adult ICU Electrolyte Replacement per Pharmacy   PHARMACY TO DOSE    lidocaine  2 mL Q30 MIN PRN    fentaNYL  50 mcg Q15 MIN PRN    And    fentaNYL  100 mcg Q15 MIN PRN    lacosamide  200 mg BID    Pharmacy Consult Request  1 Each PHARMACY TO DOSE    MD ALERT...DO NOT ADMINISTER NSAIDS or ASPIRIN unless ORDERED By Neurosurgery  1 Each PRN    ondansetron  4 mg Q4HRS PRN    sodium phosphate  1 Each Once PRN    Pharmacy  1 Each PHARMACY TO DOSE    thiamine  100 mg DAILY    multivitamin  1 Tablet DAILY    folic acid  1 mg DAILY       Assessment and Plan:  POD #4 Crani for SD  Prophylactic anticoagulation: yes         Start date/time: 4/19     Neuro following for seizure  Left MMA when able  CT4/16- improved, post op changes

## 2025-04-19 NOTE — CARE PLAN
Problem: Ventilation  Goal: Ability to achieve and maintain unassisted ventilation or tolerate decreased levels of ventilator support  Description: Target End Date:  4 days Document on Vent flowsheet1.  Support and monitor invasive and noninvasive mechanical ventilation2.  Monitor ventilator weaning response3.  Perform ventilator associated pneumonia prevention interventions4.  Manage ventilation therapy by monitoring diagnostic test results  Outcome: Not Met     Ventilator Daily Summary    Vent Day #4  Airway: 8@24    Ventilator settings: 14/390/+8, 30%  Weaning trials: none  Respiratory Procedures: none    Plan: Continue current ventilator settings and wean mechanical ventilation as tolerated per physician orders.

## 2025-04-19 NOTE — PROGRESS NOTES
[facility]   DEPARTMENT OF NEUROLOGY    FOLLOW UP ENCOUNTER     MRN: [id number]  Patient seen at the request of: Dr. Daniel Robles D.O.  Chief Complaint/Reason for Consult:  status epilepticus    IE:   - on cEEG with background burst-suppression pattern with ~70%-80% suppression rate.  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    Recommend:  - cEEG  - Continue propofol at 80, start versed wean at 1 mg/hr  - Continue Onfi 15 mg BID  - Continue Keppra 1.5g BID  - Continue Vimpat 200 mg BID    We will continue to follow.     Signed:  José Miguel Luo DO  Department of Neurology  [facility]    HISTORY OF PRESENT ILLNESS:   Obtained per chart review, edited accordingly, and will edit when able to corroborate with the patient or family at the bedside.     79 y.o. male who presented 4/14/2025 with a history of ETOH, CAD, GERD.  He was sent to us from a SNF on 4/14 when he was found to be confused.  Head CT showing an acute on chronic wholohemispheric SDH with 6mm of subfalcine herniation and mass effect on the L ventricle. He was loaded with Keppra and admtted to the ICU with consultation from Neuro Srgry.  He went for craniotomy and drainage on 4/15.      On 4/16 nursing notes: Pt having new onset right eye, right facial, and left thumb twitching at approximately 1650. Dr. Lobo notified and came to bedside. Pt yrn stopped and then restarted after MD left. EEG ordered, valium given.     EEG subsequently placed which revealed status epilepticus, and neurology was consulted.     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    ETOH abuse 07/12/2021    CAD (coronary artery disease)  07/12/2021    Chronic pain of both knees 07/12/2021    Weakness 07/13/2021    Alcohol withdrawal syndrome, with delirium, CVA, prolonged delirium (HCC) 09/24/2023    Hypokalemia 09/24/2023    Acute encephalopathy 09/24/2023    Thrombocytopenia (HCC) 09/25/2023    Hypomagnesemia 09/25/2023    CVA (cerebral vascular accident) (HCC) 10/06/2023    Gastroesophageal reflux disease 04/15/2025    Goals of care, counseling/discussion 04/17/2025     Resolved Ambulatory Problems     Diagnosis Date Noted    No Resolved Ambulatory Problems     Past Medical History:   Diagnosis Date    Alcohol abuse     Coronary arteriosclerosis     Dyslipidemia     GERD (gastroesophageal reflux disease)     Mediastinal mass     Osteopenia         PAST SURGICAL HISTORY:     Past Surgical History:   Procedure Laterality Date    CRANIOTOMY Left 4/15/2025    Procedure: CRANIOTOMY;  Surgeon: Marcelo Wallis M.D.;  Location: SURGERY [facility];  Service: Neurosurgery    CORONARY ARTERY BYPASS, 1         CURRENT MEDICATIONS:     Current Facility-Administered Medications   Medication Dose Route Frequency Provider Last Rate Last Admin    magnesium sulfate IVPB premix 2 g  2 g Intravenous Once Meche F Richards, A.P.R.N. 25 mL/hr at 04/19/25 0819 2 g at 04/19/25 0819    clobazam (Onfi) tablet 15 mg  15 mg Enteral Tube BID José Miguel Luo, D.O.   15 mg at 04/19/25 0533    norepinephrine (Levophed) 8 mg in 250 mL NS infusion (premix)  0-1 mcg/kg/min (Ideal) Intravenous Continuous Isac Hope M.D. 9.6 mL/hr at 04/19/25 0832 0.08 mcg/kg/min at 04/19/25 0832    propofol (DIPRIVAN) injection  80 mcg/kg/min (Ideal) Intravenous Continuous Daniel [place], D.O. 30.8 mL/hr at 04/19/25 0814 80 mcg/kg/min at 04/19/25 0814    midazolam (Versed) premix 125 mg/125 mL infusion  3 mg/hr Intravenous Continuous Meche F Richards, A.P.R.N. 3 mL/hr at 04/19/25 0839 3 mg/hr at 04/19/25 0839    vasopressin (Vasostrict) 20 Units in  mL Infusion  0.03 Units/min Intravenous  Continuous ERROL Serna.P.R.N.   Dose not Required at 04/17/25 0845    hydrALAZINE (Apresoline) injection 10 mg  10 mg Intravenous Q4HRS PRN ERROL Serna.P.R.N.   10 mg at 04/16/25 2302    diazePAM (Valium) injection 5 mg  5 mg Intravenous Q5 MIN PRN José Holloway D.OSunday   5 mg at 04/17/25 0440    levETIRAcetam (Keppra) injection 1,500 mg  1,500 mg Intravenous Q12HRS José Miguel Luo D.O.   1,500 mg at 04/19/25 0533    Respiratory Therapy Consult   Nebulization Continuous RT Mohamud Parada M.D.        senna-docusate (Pericolace Or Senokot S) 8.6-50 MG per tablet 2 Tablet  2 Tablet Enteral Tube BID Mohamud Parada M.D.   2 Tablet at 04/19/25 0533    And    polyethylene glycol/lytes (Miralax) Packet 1 Packet  1 Packet Enteral Tube QDAY PRN Mohamud Parada M.D.   1 Packet at 04/17/25 1721    And    magnesium hydroxide (Milk Of Magnesia) suspension 30 mL  30 mL Enteral Tube QDAY PRN Mohamud Parada M.D.   30 mL at 04/18/25 1732    And    bisacodyl (Dulcolax) suppository 10 mg  10 mg Rectal QDAY PRN Mohamud Parada M.D. MD Alert...ICU Electrolyte Replacement per Pharmacy   Other PHARMACY TO DOSE Mohamud Parada M.D.        lidocaine (Xylocaine) 1 % injection 2 mL  2 mL Tracheal Tube Q30 MIN PRN Mohamud Parada M.D.        fentaNYL (Sublimaze) injection 50 mcg  50 mcg Intravenous Q15 MIN PRN Mohamud Parada M.D.        And    fentaNYL (Sublimaze) injection 100 mcg  100 mcg Intravenous Q15 MIN PRN Mohamud Parada M.D.        lacosamide (Vimpat) injection 200 mg  200 mg Intravenous BID Mohamud Pardaa M.D.   200 mg at 04/19/25 0533    Pharmacy Consult Request ...Pain Management Review 1 Each  1 Each Other PHARMACY TO DOSE SELENE Argueta MD ALERT...DO NOT ADMINISTER NSAIDS or ASPIRIN unless ORDERED By Neurosurgery 1 Each  1 Each Other PRN BRAULIO ArguetaP.N.        ondansetron (Zofran) syringe/vial injection 4 mg  4 mg Intravenous Q4HRS PRN Evy GARNICA  "SELENE Remy        sodium phosphate enema 1 Each  1 Each Rectal Once PRN SELENE Argueta        Pharmacy Consult: Enteral tube insertion - review meds/change route/product selection  1 Each Other PHARMACY TO DOSE DAWOOD Serna        thiamine (Vitamin B-1) tablet 100 mg  100 mg Enteral Tube DAILY JESUSITA SernaR.N.   100 mg at 04/19/25 0533    multivitamin tablet 1 Tablet  1 Tablet Enteral Tube DAILY BRAULIO SernaPSundayR.N.   1 Tablet at 04/19/25 0533    folic acid (Folvite) tablet 1 mg  1 mg Enteral Tube DAILY BRAULIO SernaPSundayR.N.   1 mg at 04/19/25 0533       ALLERGIES:     Allergies   Allergen Reactions    Pravastatin Hives and Swelling        SOCIAL HISTORY:   [facility]    FAMILY HISTORY:   History reviewed. No pertinent family history.     REVIEW OF SYSTEMS:   [facility]    PHYSICAL EXAM:     Vitals:    04/19/25 0800   BP: 112/56   Pulse: (!) 55   Resp: 17   Temp:    SpO2: 99%       NEUROLOGIC EXAM    Mental Status:  Intubated, sedated, not following commands    Cranial Nerves:   Pupils equally round 3 mm and reactive to light 2 mm  +BTT  +Dolls eye  +Cough    Motor/sensory: Not withdrawing to noxious stimulation x4.  Normal bulk and tone  No fasciculations. No spasticity. No cogwheel. No pronator drift.    Reflexes: deferred    DATA:     Labs  Lab Results   Component Value Date    GLUCOSE 115 (H) 04/19/2025    BUN 8 04/19/2025    CO2 27 04/19/2025     Lab Results   Component Value Date/Time    WBC 16.5 (H) 04/17/2025 0422    MCV 93.2 04/17/2025 0422      No results found for: \"TSH\"  No components found for: \"JLLMYL7DW6\", \"C3AQQOH\", \"T4AUTO\", \"F3EVGXO\"  No components found for: \"HGBA1C\"  Lab Results   Component Value Date    HDL 44 10/07/2023    LDL 85 10/07/2023     No results found for: \"TWIQ17DL\"  No results found for: \"FERRITIN\", \"FOLATE\"  Lab Results   Component Value Date    INR 1.15 (H) 04/14/2025     Lab Results   Component Value Date    HPPXGIDQ10 373 04/14/2025     Calcium   Date " Value Ref Range Status   04/17/2025 8.9 8.5 - 10.5 mg/dL Final     Albumin   Date Value Ref Range Status   04/17/2025 3.1 (L) 3.2 - 4.9 g/dL Final     Hemoglobin   Date Value Ref Range Status   04/17/2025 11.4 (L) 14.0 - 18.0 g/dL Final     @lastb12@    Imaging:   Images were personally reviewed by me.     Total time spent: 35 minutes

## 2025-04-19 NOTE — PROGRESS NOTES
Critical Care Progress Note    Date of admission  4/14/2025    Chief Complaint  SDH, Seizures    Hospital Course  Sen Vasquez is a 79-year-old male with PMH significant for EtOH abuse, CVA 10/2023, ischial rectal abscess, and CAD [facility] 1/5/2025 where he was treated for colitis who presented from [facility] where he resides 4/14 with altered mental status.  Reportedly at baseline patient is A and O x 4.  Staff at the assisted living noted patient was A/O x 0 with GCS of 14.  Head CT showed acute on chronic left holohemispheric subdural hematoma with 6 mm subfalcine herniation and mass effect on the left lateral ventricle.  Neuroexam here showed a GCS of 9 (3/1/5) Dr. Wallis from neurosurgery was consulted and took patient to OR 4/15 for left frontoparietal craniotomy and evacuation of hematoma. He was transferred back to the ICU post-op.   4/16 - transfer order to floor but never physically left RICU. Began having rhythmic movements around 1700. CT head stable. Returned from CT in status. Intubated. Awaiting availability of cEEG.  4/17 - VD #2. Status on cEEG. Added Vimpat, Onfi, and Versed drip. Prop @ 80.  4/18 - VD #3. Burst suppressed on Versed @5, Prop @ 80, Keppra, Vimpat, Onfi.    Interval Problem Update  Reviewed last 24 hour events:  No significant overnight events.   Weaning Versed. Once off, start weaning Prop.  Afebrile  SB/SR 55-60's  SBP 's. Actively titrating Levo - currently @ 0.08  Neuro: intubated/sedated for cEEG. No purposeful movements. Does not withdrawal.  RASS -5, Versed 3, Prop @ 80  Vent day #4: APVC 14/390/8/30%  SAT/SBT: no/no. Contraindicated due to cEEG/sedation.  IRIS with TF at goal. BM 4/15  I/O: 3500/2000  Sellers, TLC  Mobility 1 - not eligible to advance due to cEEG.    MMA Embolization possibly Monday.    Review of Systems  Review of Systems   Unable to perform ROS: Intubated        Vital Signs for last 24  hours   Pulse:  [49-71] 55  Resp:  [11-23] 17  BP: ()/(50-63) 112/56  SpO2:  [96 %-100 %] 99 %    Hemodynamic parameters for last 24 hours       Respiratory Information for the last 24 hours  Vent Mode: APVCMV  Rate (breaths/min): 14  Vt Target (mL): 390  PEEP/CPAP: 8  MAP: 12  Control VTE (exp VT): 383    Physical Exam   Physical Exam  Vitals and nursing note reviewed.   Constitutional:       General: He is not in acute distress.     Appearance: He is underweight. He is ill-appearing. He is not toxic-appearing.      Interventions: He is sedated, intubated and restrained.   HENT:      Head: Normocephalic.      Nose: Nose normal.      Mouth/Throat:      Lips: Pink.      Mouth: Mucous membranes are moist.   Eyes:      Pupils: Pupils are equal, round, and reactive to light.   Cardiovascular:      Rate and Rhythm: Regular rhythm. Bradycardia present.      Pulses: Normal pulses.      Heart sounds: Normal heart sounds.   Pulmonary:      Effort: He is intubated.      Breath sounds: No wheezing or rhonchi.   Abdominal:      General: Bowel sounds are decreased.      Palpations: Abdomen is soft.   Musculoskeletal:      Right lower leg: No edema.      Left lower leg: No edema.   Skin:     General: Skin is warm and dry.      Capillary Refill: Capillary refill takes less than 2 seconds.   Neurological:      GCS: GCS eye subscore is 1. GCS verbal subscore is 1. GCS motor subscore is 1.      Comments: 3T   Psychiatric:      Comments: Intubated/sedated         Medications  Current Facility-Administered Medications   Medication Dose Route Frequency Provider Last Rate Last Admin    enoxaparin (Lovenox) inj 40 mg  40 mg Subcutaneous DAILY AT 1800 DAWOOD Serna        clobazam (Onfi) tablet 15 mg  15 mg Enteral Tube BID José Miguel Lou D.O.   15 mg at 04/19/25 0533    norepinephrine (Levophed) 8 mg in 250 mL NS infusion (premix)  0-1 mcg/kg/min (Ideal) Intravenous Continuous Isac Hope M.D. 9.6 mL/hr at 04/19/25  0832 0.08 mcg/kg/min at 04/19/25 0832    propofol (DIPRIVAN) injection  80 mcg/kg/min (Ideal) Intravenous Continuous Daniel [place], D.O. 30.8 mL/hr at 04/19/25 1335 80 mcg/kg/min at 04/19/25 1335    hydrALAZINE (Apresoline) injection 10 mg  10 mg Intravenous Q4HRS PRN BRAULIO SernaPSundayRSundayNSunday   10 mg at 04/16/25 2302    diazePAM (Valium) injection 5 mg  5 mg Intravenous Q5 MIN PRN José Holloway D.OSunday   5 mg at 04/17/25 0440    levETIRAcetam (Keppra) injection 1,500 mg  1,500 mg Intravenous Q12HRS José Miguel Luo D.OSunday   1,500 mg at 04/19/25 0533    Respiratory Therapy Consult   Nebulization Continuous RT Mohamud Parada M.D.        senna-docusate (Pericolace Or Senokot S) 8.6-50 MG per tablet 2 Tablet  2 Tablet Enteral Tube BID Mohamud Parada M.D.   2 Tablet at 04/19/25 0533    And    polyethylene glycol/lytes (Miralax) Packet 1 Packet  1 Packet Enteral Tube QDAY PRN Mohamud Parada M.D.   1 Packet at 04/17/25 1721    And    magnesium hydroxide (Milk Of Magnesia) suspension 30 mL  30 mL Enteral Tube QDAY PRN Mohamud Parada M.D.   30 mL at 04/18/25 1732    And    bisacodyl (Dulcolax) suppository 10 mg  10 mg Rectal QDAY PRN Mohamud Parada M.D.        MD Alert...ICU Electrolyte Replacement per Pharmacy   Other PHARMACY TO DOSE Mohamud Parada M.D.        lidocaine (Xylocaine) 1 % injection 2 mL  2 mL Tracheal Tube Q30 MIN PRN Mohamud Parada M.D.        fentaNYL (Sublimaze) injection 50 mcg  50 mcg Intravenous Q15 MIN PRN Mohamud Parada M.D.        And    fentaNYL (Sublimaze) injection 100 mcg  100 mcg Intravenous Q15 MIN PRN Mohamud Parada M.D.        lacosamide (Vimpat) injection 200 mg  200 mg Intravenous BID Mohamud Parada M.D.   200 mg at 04/19/25 0533    Pharmacy Consult Request ...Pain Management Review 1 Each  1 Each Other PHARMACY TO DOSE BRAULIO ArguetaPKENNEDY.        MD ALERT...DO NOT ADMINISTER NSAIDS or ASPIRIN unless ORDERED By Neurosurgery 1 Each  1 Each Other PRN  SELENE Argueta        ondansetron (Zofran) syringe/vial injection 4 mg  4 mg Intravenous Q4HRS PRN SELENE Argueta        sodium phosphate enema 1 Each  1 Each Rectal Once PRN SELENE Argueta        Pharmacy Consult: Enteral tube insertion - review meds/change route/product selection  1 Each Other PHARMACY TO DOSE BRAULIO SernaPSundayRJO        thiamine (Vitamin B-1) tablet 100 mg  100 mg Enteral Tube DAILY ERROL Serna.P.R.N.   100 mg at 04/19/25 0533    multivitamin tablet 1 Tablet  1 Tablet Enteral Tube DAILY BRAULIO SernaP.R.N.   1 Tablet at 04/19/25 0533    folic acid (Folvite) tablet 1 mg  1 mg Enteral Tube DAILY BRAULIO SernaP.R.N.   1 mg at 04/19/25 0533       Fluids    Intake/Output Summary (Last 24 hours) at 4/19/2025 1442  Last data filed at 4/19/2025 0800  Gross per 24 hour   Intake 1823.65 ml   Output 1410 ml   Net 413.65 ml       Laboratory  Recent Labs     04/16/25  2325 04/17/25  0213 04/18/25  0246   ISTATAPH 7.483* 7.491* 7.351   ISTATAPCO2 34.2 31.4* 48.1*   ISTATAPO2 >500* 111* 92   ISTATATCO2 27* 25* 28*   EILCLSG7PYC 100* 99 97   ISTATARTHCO3 25.6 24.0 26.6   ISTATARTBE 2 1 1   ISTATTEMP 96.2 F 95.7 F 36.6 C   ISTATFIO2 100 30 30   ISTATSPEC Arterial Arterial Arterial   ISTATAPHTC 7.503* 7.516* 7.357   XSTZNHUJ7GH >500* 101 89         Recent Labs     04/17/25  0422 04/18/25  0500 04/19/25  0420   SODIUM 134* 138 141   POTASSIUM 3.5* 4.0 4.1   CHLORIDE 98 105 107   CO2 23 24 27   BUN 9 9 8   CREATININE 0.48* 0.46* 0.41*   MAGNESIUM 1.7 2.0 1.7   PHOSPHORUS 3.3 3.7 2.5   CALCIUM 8.9 8.7 8.7     Recent Labs     04/17/25  0422 04/18/25  0500 04/19/25  0420   ALTSGPT 6 <5 <5   ASTSGOT 15 12 9*   ALKPHOSPHAT 71 75 68   TBILIRUBIN 0.6 0.3 0.2   GLUCOSE 116* 128* 115*     Recent Labs     04/17/25  0422 04/18/25  0500 04/19/25  0420   WBC 16.5* 12.9* 11.3*   NEUTSPOLYS 85.60* 84.20* 79.30*   LYMPHOCYTES 7.30* 6.70* 8.00*   MONOCYTES 5.30 6.00 6.80    EOSINOPHILS 1.00 2.30 5.00   BASOPHILS 0.30 0.30 0.40   ASTSGOT 15 12 9*   ALTSGPT 6 <5 <5   ALKPHOSPHAT 71 75 68   TBILIRUBIN 0.6 0.3 0.2     Recent Labs     04/17/25  0422 04/18/25  0500 04/19/25  0420   RBC 3.67* 3.47* 3.29*   HEMOGLOBIN 11.4* 10.6* 10.0*   HEMATOCRIT 34.2* 33.3* 31.9*   PLATELETCT 356 285 290       Imaging  No new imaging today.    Assessment/Plan  * Status epilepticus (HCC)  Assessment & Plan  -Focal status epilepticus 04/16/25, returned to ICU   -Keppra, Vimpat, Onfi  -Propofol. Weaning Versed.  -Neurology following  -cEEG  -Seizure and Aspiration Precautions      Goals of care, counseling/discussion- (present on admission)  Assessment & Plan  -I called and spoke with son, Fuad, today to give updates, discuss plan of care, and code status. Fuad tells me he thinks his brother is the patient's POA.  -I attempted to call son, Sen, but went straight to .  -I recommended Fuad reach out to his brother Sen sometime today to discuss goals of care moving forward.  -I will attempt to call Sen again throughout the day      On mechanically assisted ventilation (HCC)  Assessment & Plan  -Intubated for status epilepticus  -Intubation date 4/16  -Ventilator dependent respiratory failure  -Modify ventilator to optimize oxygenation, acid-base balance and ventilation  -CXR as indicated: monitor lung volumes and tube/line placement  -HOB > 30  -Titrate FiO2 to keep sats greater than 92%  -Chlorhexidine  -goal CO2 35-40  -Daily awakening and SBT trials unless contraindicated  -ABCDEF bundle  -I am actively adjusting ventilator based on clinical indicators and ABG's    Severe protein-calorie malnutrition (HCC)- (present on admission)  Assessment & Plan  -Body mass index is 25.3 kg/m².  -thiamine, vitamins  -monitor for refeeding  -dietary following    Subdural hematoma (HCC)- (present on admission)  Assessment & Plan  -History of ETOH abuse. No reported recent falls. Not on AC  -Serial neurologic exams  -SBP  goal < 160  -HOB > 30  -Goal normonatremia, normothermia, normoglycemia, euvolemia  -Neurosurgery following. S/p craniotomy with evacuation 4/15. Subdural drain removed 4/16  -PT OT SLP  -Aspiration and Fall Precautions  -SCD's for DVT prophylaxis    ETOH abuse- (present on admission)  Assessment & Plan  -history of  -Thiamine         VTE:  Lovenox  Ulcer: Not Indicated  Lines: Central Line  Ongoing indication addressed and Sellers Catheter  Ongoing indication addressed    I have performed a physical exam and reviewed and updated ROS and Plan today (4/19/2025). In review of yesterday's note (4/18/2025), there are no changes except as documented above.     Discussed patient condition and risk of morbidity and/or mortality with Family, RN, RT, Pharmacy, neurology and neurosurgery, and my attending Dr. Robles.  The patient remains critically ill.  Critical care time = 54 minutes in directly providing and coordinating critical care and extensive data review.  No time overlap and excludes procedures.    Please note that this dictation was created using voice recognition software. I have made every reasonable attempt to correct obvious errors, but there may be errors of grammar and possibly content that I did not discover before finalizing the note.    AVA Serna.

## 2025-04-19 NOTE — PROCEDURES
VIDEO ELECTROENCEPHALOGRAM  REPORT      Referring provider: Dr. Robles    DOS:   4/19/2025      INDICATION:  Sen Vasquez 79 y.o. male presenting with status epilepticus     CURRENT ANTIEPILEPTIC REGIMEN:   ONFI  Keppra  Vimpat  Propofol gtt> off  Versed gtt> off      TECHNIQUE: A 30-channel, 23 hrs video electroencephalogram (VEEG) was performed in accordance with the international 10-20 system. This digital study was reviewed in bipolar and referential montages. The recording examined the patient during sedated and comatose state.     The EEG was set up and taken down by a Neurodiagnostic technologist who performed education to patient and staff.     A minimum but not limited to 23 electrodes and 23 channel recording was setup and performed by Neurodiagnostic technologist.    Impedances, electrode integrity, and technical impressions were documented a minimum of every 2-24 hour period by a Neurodiagnostic Technologist and reviewed by Interpreting physician.     There was supervised withdrawal of the following medications: n/a    ACTIVATION PROCEDURES:   None     DESCRIPTION OF THE RECORD:  The background consisted of burst-suppression pattern with ~70%-80% suppression rate. The bursts consisted of asymmetric,asynchronous mixed frequencies with duration 1-3 seconds . No reactivity seen. The suppression rate has decreased with weaning down sedation and eventually the  background became mostly continuous with asymmetric mixed frequencies. SIRPIDs (stimulus-induced rhythmic, periodic, or ictal discharges) as asymmetric generalized periodic discharges(GPDs) at 1-1.5 Hz, left hemispheric attenuation was noted.     ICTAL AND/OR INTERICTAL FINDINGS:   Left hemispheric lateralized periodic discharges (LPDs) at 1-2 Hz were noted, more frequently seen with decreased sedation.   Left hemisphere breach rhythm was seen during this study.    No definite electrographic seizures recorded during the study.     EVENT(S):  none     EKG:  sampling review of EKG recording demonstrated sinus rhythm.       INTERPRETATION:   This is an abnormal video EEG recording in the sedated state and comatose state.   1) The initial burst-suppression pattern is consistent with sedation effect and encephalopathy.  The background became mostly continuous with decreased sedation.   2) GPDs are commonly seen in diffuse cerebral dysfunction. The asymmetric GPDs with left hemispheric attenuation is likely due to focal structural lesion over the left hemisphere.   3) Left hemispheric LPDs suggests increased risk of focal onset seizure, or/and focal structural lesion.   4) SIRPIDs are relatively common phenomenon found in clinical ill patients. Common causes include hypoxic injury, traumatic brain injury and hemorrhage, as well as toxic-metabolic disturbance. Clinical correlation is recommended.   5)No definite electrographic seizures recorded during the study. No events were captured during the study. Clinical correlation is recommended.    Hank Connell MD  Diplomate, American Board of Psychiatry and Neurology   Diplomate, American Board of Psychiatry and Neurology in Epilepsy

## 2025-04-19 NOTE — CARE PLAN
The patient is Watcher - Medium risk of patient condition declining or worsening    Shift Goals  Clinical Goals: MAP>65, maintain sedation  Patient Goals: JF  Family Goals: Family not present    Progress made toward(s) clinical / shift goals:    Problem: Knowledge Deficit - Standard  Goal: Patient and family/care givers will demonstrate understanding of plan of care, disease process/condition, diagnostic tests and medications  Outcome: Progressing     Problem: Seizure Precautions  Goal: Implementation of seizure precautions  Outcome: Progressing     Problem: Pain - Standard  Goal: Alleviation of pain or a reduction in pain to the patient’s comfort goal  Outcome: Progressing     Problem: Neuro Status  Goal: Neuro status will remain stable or improve  Outcome: Progressing     Problem: Respiratory  Goal: Patient will achieve/maintain optimum respiratory ventilation and gas exchange  Outcome: Progressing     Problem: Skin Integrity  Goal: Skin integrity is maintained or improved  Outcome: Progressing     Problem: Fall Risk  Goal: Patient will remain free from falls  Outcome: Progressing     Problem: Craniotomy Surgery  Goal: Post-Operative Craniotomy Surgery: Patient will achieve optimal post-surgical outcomes  Outcome: Progressing

## 2025-04-20 PROBLEM — D64.9 ANEMIA: Status: ACTIVE | Noted: 2025-01-01

## 2025-04-20 NOTE — CARE PLAN
Problem: Ventilation  Goal: Ability to achieve and maintain unassisted ventilation or tolerate decreased levels of ventilator support  Description: Target End Date:  4 days Document on Vent flowsheet1.  Support and monitor invasive and noninvasive mechanical ventilation2.  Monitor ventilator weaning response3.  Perform ventilator associated pneumonia prevention interventions4.  Manage ventilation therapy by monitoring diagnostic test results  Outcome: Progressing     Stable on 14, 500, +8, 30%. Failed SBT twice

## 2025-04-20 NOTE — CARE PLAN
The patient is Stable - Low risk of patient condition declining or worsening    Shift Goals  Clinical Goals: stable hemodynamics, wean prop  Patient Goals: candelaira  Family Goals: candelaria    Progress made toward(s) clinical / shift goals:    Problem: Knowledge Deficit - Standard  Goal: Patient and family/care givers will demonstrate understanding of plan of care, disease process/condition, diagnostic tests and medications  Outcome: Progressing     Problem: Seizure Precautions  Goal: Implementation of seizure precautions  Outcome: Progressing     Problem: Risk for Aspiration  Goal: Patient's risk for aspiration will be absent or decrease  Outcome: Progressing     Problem: Pain - Standard  Goal: Alleviation of pain or a reduction in pain to the patient’s comfort goal  Outcome: Progressing     Problem: Neuro Status  Goal: Neuro status will remain stable or improve  Outcome: Progressing     Problem: Skin Integrity  Goal: Skin integrity is maintained or improved  Outcome: Progressing     Problem: Fall Risk  Goal: Patient will remain free from falls  Outcome: Progressing       Patient is not progressing towards the following goals:      Problem: Self Care  Goal: Patient will have the ability to perform ADLs independently or with assistance (bathe, groom, dress, toilet and feed)  Outcome: Not Progressing     Problem: Mobility  Goal: Patient's capacity to carry out activities will improve  Outcome: Not Progressing

## 2025-04-20 NOTE — CARE PLAN
The patient is Watcher - Medium risk of patient condition declining or worsening    Shift Goals  Clinical Goals: MAP>65, maintain sedation  Patient Goals: JF  Family Goals: Family not present    Progress made toward(s) clinical / shift goals:    Problem: Neuro Status  Goal: Neuro status will remain stable or improve  Outcome: Progressing     Problem: Skin Integrity  Goal: Skin integrity is maintained or improved  Outcome: Progressing     Problem: Fall Risk  Goal: Patient will remain free from falls  Outcome: Progressing     Problem: Safety - Medical Restraint  Goal: Remains free of injury from restraints (Restraint for Interference with Medical Device)  Outcome: Met       Patient is not progressing towards the following goals:

## 2025-04-20 NOTE — CARE PLAN
Problem: Craniotomy Surgery  Goal: Post-Operative Craniotomy Surgery: Patient will achieve optimal post-surgical outcomes  Outcome: Not Progressing  Flowsheets (Taken 4/20/2025 1617)  Head of Bed Elevated: Greater or equal to 30 degrees     Problem: Incision Care  Goal: Optimal post surgical incision care  Outcome: Progressing     Problem: Safety - Medical Restraint  Goal: Free from restraint(s) (Restraint for Interference with Medical Device)  Flowsheets (Taken 4/20/2025 1617)  Addressed this shift: Free from restraint(s) (restraint for interference with medical device):   Every 24 hours: Continued use of restraint requires Licensed Independent Practitioner to perform face to face examination and written order   Identify and implement measures to help patient regain control   ONCE/SHIFT or MINIMUM Every 12 hours: Assess and document the continuing need for restraints   The patient is Watcher - Medium risk of patient condition declining or worsening    Shift Goals  Clinical Goals: no seizures with propofol weaned  Patient Goals: candelaria  Family Goals: candelaria    Progress made toward(s) clinical / shift goals:  propofol off, monitoring for clinical seizures, cEEG still running    Patient is not progressing towards the following goals:      Problem: Craniotomy Surgery  Goal: Post-Operative Craniotomy Surgery: Patient will achieve optimal post-surgical outcomes  Outcome: Not Progressing  Flowsheets (Taken 4/20/2025 1617)  Head of Bed Elevated: Greater or equal to 30 degrees

## 2025-04-20 NOTE — PROGRESS NOTES
[facility]   DEPARTMENT OF NEUROLOGY    FOLLOW UP ENCOUNTER     MRN: [id number]  Patient seen at the request of: Dr. Daniel Robles D.O.  Chief Complaint/Reason for Consult:  status epilepticus    IE:   - Yesterday was weaned off of versed during the day, and in the afternoon into overnight was weaned off of propofol.   - No longer burst suppressed on cEEG, GPDs + left sided LPDs.     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 Patient was burst suppressed requiring propofol and versed for 36-48hours, and cEEG now no longer in burst suppression after wean. Neurological exam without significant improvement.     Recommend:  - cEEG  - Continue Onfi 15 mg BID  - Continue Keppra 1.5g BID  - Continue Vimpat 200 mg BID    We will continue to follow.     Signed:  José Miguel Luo DO  Department of Neurology  [facility]    HISTORY OF PRESENT ILLNESS:   Obtained per chart review, edited accordingly, and will edit when able to corroborate with the patient or family at the bedside.     79 y.o. male who presented 4/14/2025 with a history of ETOH, CAD, GERD.  He was sent to us from a SNF on 4/14 when he was found to be confused.  Head CT showing an acute on chronic wholohemispheric SDH with 6mm of subfalcine herniation and mass effect on the L ventricle. He was loaded with Keppra and admtted to the ICU with consultation from Neuro Srgry.  He went for craniotomy and drainage on 4/15.      On 4/16 nursing notes: Pt having new onset right eye, right facial, and left thumb twitching at approximately 1650. Dr. Lobo notified and came to bedside. Pt yrn stopped and then restarted after MD left. EEG ordered, valium given.     EEG subsequently placed which revealed status epilepticus, and neurology was  consulted.     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    ETOH abuse 07/12/2021    CAD (coronary artery disease) 07/12/2021    Chronic pain of both knees 07/12/2021    Weakness 07/13/2021    Alcohol withdrawal syndrome, with delirium, CVA, prolonged delirium ([facility]) 09/24/2023    Hypokalemia 09/24/2023    Acute encephalopathy 09/24/2023    Thrombocytopenia ([facility]) 09/25/2023    Hypomagnesemia 09/25/2023    CVA (cerebral vascular accident) ([facility]) 10/06/2023    Gastroesophageal reflux disease 04/15/2025     Resolved Ambulatory Problems     Diagnosis Date Noted    No Resolved Ambulatory Problems     Past Medical History:   Diagnosis Date    Alcohol abuse     Coronary arteriosclerosis     Dyslipidemia     GERD (gastroesophageal reflux disease)     Mediastinal mass     Osteopenia         PAST SURGICAL HISTORY:     Past Surgical History:   Procedure Laterality Date    CRANIOTOMY Left 4/15/2025    Procedure: CRANIOTOMY;  Surgeon: Marcelo Wallis M.D.;  Location: SURGERY [facility];  Service: Neurosurgery    CORONARY ARTERY BYPASS, 1         CURRENT MEDICATIONS:     Current Facility-Administered Medications   Medication Dose Route Frequency Provider Last Rate Last Admin    magnesium sulfate IVPB premix 2 g  2 g Intravenous Once Daniel Travis, D.O.        enoxaparin (Lovenox) inj 40 mg  40 mg Subcutaneous DAILY AT 1800 Meche Richards, A.P.R.N.   40 mg at 04/19/25 1807    clobazam (Onfi) tablet 15 mg  15 mg Enteral Tube BID José Miguel Luo, D.O.   15 mg at 04/20/25 0508    norepinephrine (Levophed) 8 mg in 250 mL NS infusion (premix)  0-1 mcg/kg/min (Ideal) Intravenous Continuous Isac Hope M.D. 1.2 mL/hr at 04/20/25 0545 0.01 mcg/kg/min at 04/20/25 0545    hydrALAZINE (Apresoline) injection 10 mg  10 mg Intravenous Q4HRS PRN Meche Richards, A.P.R.N.   10 mg at 04/16/25 2302    diazePAM (Valium) injection 5 mg  5 mg Intravenous Q5 MIN PRN José  TOD Holloway   5 mg at 04/17/25 0440    levETIRAcetam (Keppra) injection 1,500 mg  1,500 mg Intravenous Q12HRS José Miguel Luo D.O.   1,500 mg at 04/20/25 0508    Respiratory Therapy Consult   Nebulization Continuous RT Mohamud Parada M.D.        senna-docusate (Pericolace Or Senokot S) 8.6-50 MG per tablet 2 Tablet  2 Tablet Enteral Tube BID Mohamud Parada M.D.   2 Tablet at 04/20/25 0508    And    polyethylene glycol/lytes (Miralax) Packet 1 Packet  1 Packet Enteral Tube QDAY PRN Mohamud Parada M.D.   1 Packet at 04/20/25 0507    And    magnesium hydroxide (Milk Of Magnesia) suspension 30 mL  30 mL Enteral Tube QDAY PRN Mohamud Parada M.D.   30 mL at 04/20/25 0507    And    bisacodyl (Dulcolax) suppository 10 mg  10 mg Rectal QDAY PRN Mohamud Parada M.D.   10 mg at 04/20/25 0647    MD Alert...ICU Electrolyte Replacement per Pharmacy   Other PHARMACY TO DOSE Mohamud Parada M.D.        lidocaine (Xylocaine) 1 % injection 2 mL  2 mL Tracheal Tube Q30 MIN PRN Mohamud Parada M.D.        lacosamide (Vimpat) injection 200 mg  200 mg Intravenous BID Mohamud Parada M.D.   200 mg at 04/20/25 0508    Pharmacy Consult Request ...Pain Management Review 1 Each  1 Each Other PHARMACY TO DOSE BRAULIO ArguetaPJO AMARAL ALERT...DO NOT ADMINISTER NSAIDS or ASPIRIN unless ORDERED By Neurosurgery 1 Each  1 Each Other PRN BRAULIO ArguetaP.FABI        ondansetron (Zofran) syringe/vial injection 4 mg  4 mg Intravenous Q4HRS PRN BRAULIO ArguetaP.NSunday        sodium phosphate enema 1 Each  1 Each Rectal Once PRN BRAULIO ArguetaP.FABI        Pharmacy Consult: Enteral tube insertion - review meds/change route/product selection  1 Each Other PHARMACY TO DOSE JESUSITA SernaRJO        thiamine (Vitamin B-1) tablet 100 mg  100 mg Enteral Tube DAILY NORA Serna.RSundayN.   100 mg at 04/20/25 0508    multivitamin tablet 1 Tablet  1 Tablet Enteral Tube DAILY Meche Richards A.P.R.N.   " 1 Tablet at 04/20/25 0508    folic acid (Folvite) tablet 1 mg  1 mg Enteral Tube DAILY DAWOOD Serna   1 mg at 04/20/25 0508       ALLERGIES:     Allergies   Allergen Reactions    Pravastatin Hives and Swelling        SOCIAL HISTORY:   [facility]    FAMILY HISTORY:   History reviewed. No pertinent family history.     REVIEW OF SYSTEMS:   [facility]    PHYSICAL EXAM:     Vitals:    04/20/25 0800   BP: 125/69   Pulse: 69   Resp: (!) 25   Temp:    SpO2: 100%       NEUROLOGIC EXAM    Mental Status:  Intubated, sedated, not following commands    Cranial Nerves:   Pupils equally round 3 mm and reactive to light 2 mm  +BTT  +Dolls eye  +Cough    Motor/sensory: Not withdrawing to noxious stimulation x4.  Normal bulk and tone  No fasciculations. No spasticity. No cogwheel. No pronator drift.    Reflexes: deferred    DATA:     Labs  Lab Results   Component Value Date    GLUCOSE 109 (H) 04/20/2025    BUN 11 04/20/2025    CO2 28 04/20/2025     Lab Results   Component Value Date/Time    WBC 16.5 (H) 04/17/2025 0422    MCV 93.2 04/17/2025 0422      No results found for: \"TSH\"  No components found for: \"PGQBYM1RB8\", \"H3IIOQP\", \"T4AUTO\", \"B0FTEXB\"  No components found for: \"HGBA1C\"  Lab Results   Component Value Date    HDL 44 10/07/2023    LDL 85 10/07/2023     No results found for: \"VVHG63EC\"  No results found for: \"FERRITIN\", \"FOLATE\"  Lab Results   Component Value Date    INR 1.15 (H) 04/14/2025     Lab Results   Component Value Date    WSGEAENC91 373 04/14/2025     Calcium   Date Value Ref Range Status   04/17/2025 8.9 8.5 - 10.5 mg/dL Final     Albumin   Date Value Ref Range Status   04/17/2025 3.1 (L) 3.2 - 4.9 g/dL Final     Hemoglobin   Date Value Ref Range Status   04/17/2025 11.4 (L) 14.0 - 18.0 g/dL Final     @lastb12@    Imaging:   Images were personally reviewed by me.     Total time spent: 35 minutes    "

## 2025-04-20 NOTE — PROGRESS NOTES
Neurosurgery Progress Note    Subjective:  Working on weaning sedation patient is still bur suppressed    Exam:  Intubated, sedated.  Pinpoint pupils.  CDI     BP  Min: 90/55  Max: 134/63  Pulse  Av  Min: 54  Max: 70  Resp  Av.8  Min: 14  Max: 34  Monitored Temp 2  Av.7 °C (98.1 °F)  Min: 36.3 °C (97.3 °F)  Max: 36.9 °C (98.4 °F)  SpO2  Av.6 %  Min: 97 %  Max: 100 %    No data recorded    Recent Labs     25  0500 25  0420 25  0400   WBC 12.9* 11.3* 10.4   RBC 3.47* 3.29* 3.16*   HEMOGLOBIN 10.6* 10.0* 9.6*   HEMATOCRIT 33.3* 31.9* 30.4*   MCV 96.0 97.0 96.2   MCH 30.5 30.4 30.4   MCHC 31.8* 31.3* 31.6*   RDW 51.4* 52.8* 51.8*   PLATELETCT 285 290 274   MPV 10.2 10.0 10.3     Recent Labs     25  0500 25  0420 25  0400   SODIUM 138 141 137   POTASSIUM 4.0 4.1 4.1   CHLORIDE 105 107 101   CO2 24 27 28   GLUCOSE 128* 115* 109*   BUN 9 8 11   CREATININE 0.46* 0.41* 0.36*   CALCIUM 8.7 8.7 8.6                   Intake/Output                         25 - 2559 25 - 25 0659      Total  Total                 Intake    I.V.  1497.2  293 1790.2  --  -- --    Magnesium Sulfate Volume 50 -- 50 -- -- --    Propofol Volume 335.9 217.8 553.7 -- -- --    Midazolam Volume 19.8 -- 19.8 -- -- --    Norepinephrine Volume 91.4 75.2 166.6 -- -- --    Volume (mL) (LR (Bolus) infusion 1,000 mL) 1000 -- 1000 -- -- --    Other  --  300 300  --  -- --    Medications (PO/Enteral Liquids) -- 300 300 -- -- --    NG/GT  400  480 880  80  -- 80    Intake (mL) (Enteral Tube 04/15/25 Cortrak - Gastric 10 Fr. Right nare) 400 480 880 80 -- 80    Enteral  80  90 170  --  -- --    Free Water / Tube Flush -- 90 90 -- -- --    Enteral Volume (Enteral Tube 04/15/25 Cortrak - Gastric 10 Fr. Right nare) 80 -- 80 -- -- --    Total Intake 1977.2 1163 3140.2 80 -- 80       Output    Urine  520  555 7465  125  -- 125    Output (mL)  (Urethral Catheter Temperature probe)  125 -- 125    Total Output  125 -- 125       Net I/O     1457.2 228 1685.2 -45 -- -45              Intake/Output Summary (Last 24 hours) at 4/20/2025 0843  Last data filed at 4/20/2025 0800  Gross per 24 hour   Intake 3044.93 ml   Output 1500 ml   Net 1544.93 ml             magnesium sulfate  2 g Once    enoxaparin (LOVENOX) injection  40 mg DAILY AT 1800    clobazam  15 mg BID    NORepinephrine  0-1 mcg/kg/min (Ideal) Continuous    hydrALAZINE  10 mg Q4HRS PRN    diazePAM  5 mg Q5 MIN PRN    levETIRAcetam (Keppra) IV  1,500 mg Q12HRS    Respiratory Therapy Consult   Continuous RT    senna-docusate  2 Tablet BID    And    polyethylene glycol/lytes  1 Packet QDAY PRN    And    magnesium hydroxide  30 mL QDAY PRN    And    bisacodyl  10 mg QDAY PRN    MD Alert...Adult ICU Electrolyte Replacement per Pharmacy   PHARMACY TO DOSE    lidocaine  2 mL Q30 MIN PRN    lacosamide  200 mg BID    Pharmacy Consult Request  1 Each PHARMACY TO DOSE    MD ALERT...DO NOT ADMINISTER NSAIDS or ASPIRIN unless ORDERED By Neurosurgery  1 Each PRN    ondansetron  4 mg Q4HRS PRN    sodium phosphate  1 Each Once PRN    Pharmacy  1 Each PHARMACY TO DOSE    thiamine  100 mg DAILY    multivitamin  1 Tablet DAILY    folic acid  1 mg DAILY       Assessment and Plan:  POD #5 Crani for SD  Prophylactic anticoagulation: yes         Start date/time: 4/19     Neuro following for seizure  Left MMA when able  CT4/16- improved, post op changes

## 2025-04-20 NOTE — CARE PLAN
Problem: Ventilation  Goal: Ability to achieve and maintain unassisted ventilation or tolerate decreased levels of ventilator support  Description: Target End Date:  4 days Document on Vent flowsheet1.  Support and monitor invasive and noninvasive mechanical ventilation2.  Monitor ventilator weaning response3.  Perform ventilator associated pneumonia prevention interventions4.  Manage ventilation therapy by monitoring diagnostic test results  Outcome: Not Progressing     Ventilator Daily Summary    Vent Day #5  Airway: 8.0 ETT @24    Ventilator settings: APV/CMV 14 - 390 - 8 - 30%  Weaning trials: No  Respiratory Procedures: No    Plan: Continue current ventilator settings and wean mechanical ventilation as tolerated per physician orders.

## 2025-04-20 NOTE — PROGRESS NOTES
Critical Care Progress Note    Date of admission  4/14/2025    Chief Complaint  SDH, seizures    Hospital Course  Sen Vasquez is a 79-year-old male with PMH significant for EtOH abuse, CVA 10/2023, ischial rectal abscess, and CAD [facility] at [facility] 1/5/2025 where he was treated for colitis who presented from [facility] where he resides 4/14 with altered mental status.  Reportedly at baseline patient is A and O x 4.  Staff at the [facility] living noted patient was A/O x 0 with GCS of 14.  Head CT showed acute on chronic left holohemispheric subdural hematoma with 6 mm subfalcine herniation and mass effect on the left lateral ventricle.  Neuroexam here showed a GCS of 9 (3/1/5) Dr. Wallis from neurosurgery was consulted and took patient to OR 4/15 for left frontoparietal craniotomy and evacuation of hematoma. He was transferred back to the ICU post-op.   4/16 - transfer order to floor but never physically left RICU. Began having rhythmic movements around 1700. CT head stable. Returned from CT in status. Intubated. Awaiting availability of cEEG.  4/17 - VD #2. Status on cEEG. Added Vimpat, Onfi, and Versed drip. Prop @ 80.  4/18 - VD #3. Burst suppressed on Versed @5, Prop @ 80, Keppra, Vimpat, Onfi.  4/19 - VD #4. Versed weaned off. Weaning Prop. No seizures on EEG.  Son, Sen, to bedside. Brought in POA paperwork which has been scanned into chart. GOC discussion. Will discuss with his brother and reconvene probably Monday.    Interval Problem Update  Reviewed last 24 hour events:  No significant overnight events  Afebrile  SB/SR 50 to 60s. Crispin as low as 39. Does not sustain.  -110.  Levophed drip currently off  Neuro: Does not open eyes, does not follow commands.  Positive reflexes.  Withdraws BLE.  No movements BUE  RASS: -4. Prop off @ 0600.   Vent day #5: APVC 14/390/8/30%  SAT/SBT: yes/yes. Failed SBT due to apnea. Attempt again this afternoon.  TF  at goal  Loose BMs following escalation of bowel protocol requiring rectal trumpet placement today  I/O: 3100/1500  TLC, Sellers  Mobility 1 - not eligible to advance due to cEEG    Review of Systems  Review of Systems   Unable to perform ROS: Intubated        Vital Signs for last 24 hours   Pulse:  [55-70] 66  Resp:  [14-34] 14  BP: ()/(50-69) 93/54  SpO2:  [95 %-100 %] 95 %    Hemodynamic parameters for last 24 hours       Respiratory Information for the last 24 hours  Vent Mode: APVCMV  Rate (breaths/min): 14  Vt Target (mL): 390  PEEP/CPAP: 8  MAP: 13  Control VTE (exp VT): 500    Physical Exam   Physical Exam  Vitals and nursing note reviewed.   Constitutional:       General: He is not in acute distress.     Appearance: He is underweight. He is ill-appearing. He is not toxic-appearing.      Interventions: He is sedated, intubated and restrained.   HENT:      Head: Normocephalic.      Nose: Nose normal.      Mouth/Throat:      Lips: Pink.      Mouth: Mucous membranes are moist.   Eyes:      Pupils: Pupils are equal, round, and reactive to light.   Cardiovascular:      Rate and Rhythm: Regular rhythm. Bradycardia present.      Pulses: Normal pulses.      Heart sounds: Normal heart sounds.   Pulmonary:      Effort: He is intubated.      Breath sounds: No wheezing or rhonchi.   Abdominal:      General: Bowel sounds are increased.      Palpations: Abdomen is soft.   Musculoskeletal:      Right lower leg: No edema.      Left lower leg: No edema.   Skin:     General: Skin is warm and dry.      Capillary Refill: Capillary refill takes less than 2 seconds.   Neurological:      GCS: GCS eye subscore is 1. GCS verbal subscore is 1. GCS motor subscore is 4.      Comments: 6T   Psychiatric:      Comments: Intubated/sedated         Medications  Current Facility-Administered Medications   Medication Dose Route Frequency Provider Last Rate Last Admin    enoxaparin (Lovenox) inj 40 mg  40 mg Subcutaneous DAILY AT 1800 Meche  GERALDO Richards, A.P.R.N.   40 mg at 04/19/25 1807    clobazam (Onfi) tablet 15 mg  15 mg Enteral Tube BID José Miguel Luo, D.O.   15 mg at 04/20/25 0508    norepinephrine (Levophed) 8 mg in 250 mL NS infusion (premix)  0-1 mcg/kg/min (Ideal) Intravenous Continuous Isac Hope M.D. 1.2 mL/hr at 04/20/25 0545 0.01 mcg/kg/min at 04/20/25 0545    hydrALAZINE (Apresoline) injection 10 mg  10 mg Intravenous Q4HRS PRN Meche Richards A.P.R.N.   10 mg at 04/16/25 2302    diazePAM (Valium) injection 5 mg  5 mg Intravenous Q5 MIN PRN José Holloway D.O.   5 mg at 04/17/25 0440    levETIRAcetam (Keppra) injection 1,500 mg  1,500 mg Intravenous Q12HRS José Miguel Luo, D.O.   1,500 mg at 04/20/25 0508    Respiratory Therapy Consult   Nebulization Continuous RT Mohamud Parada M.D.        senna-docusate (Pericolace Or Senokot S) 8.6-50 MG per tablet 2 Tablet  2 Tablet Enteral Tube BID Mohamud Parada M.D.   2 Tablet at 04/20/25 0508    And    polyethylene glycol/lytes (Miralax) Packet 1 Packet  1 Packet Enteral Tube QDAY PRN Mohamud Parada M.D.   1 Packet at 04/20/25 0507    And    magnesium hydroxide (Milk Of Magnesia) suspension 30 mL  30 mL Enteral Tube QDAY PRN Mohamud Parada M.D.   30 mL at 04/20/25 0507    And    bisacodyl (Dulcolax) suppository 10 mg  10 mg Rectal QDAY PRN Mohamud Parada M.D.   10 mg at 04/20/25 0647    MD Alert...ICU Electrolyte Replacement per Pharmacy   Other PHARMACY TO DOSE Mohamud Parada M.D.        lidocaine (Xylocaine) 1 % injection 2 mL  2 mL Tracheal Tube Q30 MIN PRN Mohamud Parada M.D.        lacosamide (Vimpat) injection 200 mg  200 mg Intravenous BID Mohamud Parada M.D.   200 mg at 04/20/25 2478    Pharmacy Consult Request ...Pain Management Review 1 Each  1 Each Other PHARMACY TO DOSE SELENE Argueta MD ALERT...DO NOT ADMINISTER NSAIDS or ASPIRIN unless ORDERED By Neurosurgery 1 Each  1 Each Other PRN SELENE Argueta         ondansetron (Zofran) syringe/vial injection 4 mg  4 mg Intravenous Q4HRS PRN BRAULIO ArguetaPSundayNSunday        sodium phosphate enema 1 Each  1 Each Rectal Once PRN SELENE Argueta        Pharmacy Consult: Enteral tube insertion - review meds/change route/product selection  1 Each Other PHARMACY TO DOSE ERROL Serna.P.R.N.        thiamine (Vitamin B-1) tablet 100 mg  100 mg Enteral Tube DAILY ERROL Serna.P.R.N.   100 mg at 04/20/25 0508    multivitamin tablet 1 Tablet  1 Tablet Enteral Tube DAILY Meche Richards A.P.R.N.   1 Tablet at 04/20/25 0508    folic acid (Folvite) tablet 1 mg  1 mg Enteral Tube DAILY Meche Richards A.P.R.N.   1 mg at 04/20/25 0508       Fluids    Intake/Output Summary (Last 24 hours) at 4/20/2025 1135  Last data filed at 4/20/2025 0800  Gross per 24 hour   Intake 1865.91 ml   Output 1420 ml   Net 445.91 ml       Laboratory  Recent Labs     04/18/25  0246   ISTATAPH 7.351   ISTATAPCO2 48.1*   ISTATAPO2 92   ISTATATCO2 28*   OBSDANI6ZEB 97   ISTATARTHCO3 26.6   ISTATARTBE 1   ISTATTEMP 36.6 C   ISTATFIO2 30   ISTATSPEC Arterial   ISTATAPHTC 7.357   TVQGUYOS6UE 89         Recent Labs     04/18/25  0500 04/19/25  0420 04/20/25  0400   SODIUM 138 141 137   POTASSIUM 4.0 4.1 4.1   CHLORIDE 105 107 101   CO2 24 27 28   BUN 9 8 11   CREATININE 0.46* 0.41* 0.36*   MAGNESIUM 2.0 1.7 1.7   PHOSPHORUS 3.7 2.5 2.7   CALCIUM 8.7 8.7 8.6     Recent Labs     04/18/25  0500 04/19/25  0420 04/20/25  0400   ALTSGPT <5 <5 <5   ASTSGOT 12 9* 9*   ALKPHOSPHAT 75 68 72   TBILIRUBIN 0.3 0.2 <0.2   GLUCOSE 128* 115* 109*     Recent Labs     04/18/25  0500 04/19/25  0420 04/20/25  0400   WBC 12.9* 11.3* 10.4   NEUTSPOLYS 84.20* 79.30* 80.20*   LYMPHOCYTES 6.70* 8.00* 7.50*   MONOCYTES 6.00 6.80 7.60   EOSINOPHILS 2.30 5.00 4.20   BASOPHILS 0.30 0.40 0.20   ASTSGOT 12 9* 9*   ALTSGPT <5 <5 <5   ALKPHOSPHAT 75 68 72   TBILIRUBIN 0.3 0.2 <0.2     Recent Labs     04/18/25  0500 04/19/25  0420  04/20/25  0400   RBC 3.47* 3.29* 3.16*   HEMOGLOBIN 10.6* 10.0* 9.6*   HEMATOCRIT 33.3* 31.9* 30.4*   PLATELETCT 285 290 274       Imaging  No new imaging to review today.    Assessment/Plan  * Status epilepticus (HCC)  Assessment & Plan  -Focal status epilepticus 04/16/25, returned to ICU   -Keppra, Vimpat, Onfi  -off all sedation AM 4/20  -Neurology following  -cEEG  -Seizure and Aspiration Precautions      On mechanically assisted ventilation (HCC)  Assessment & Plan  -Intubated for status epilepticus  -Intubation date 4/16  -Ventilator dependent respiratory failure  -Modify ventilator to optimize oxygenation, acid-base balance and ventilation  -CXR as indicated: monitor lung volumes and tube/line placement  -HOB > 30  -Titrate FiO2 to keep sats greater than 92%  -Chlorhexidine  -goal CO2 35-40  -Daily awakening and SBT trials unless contraindicated  -ABCDEF bundle  -I am actively adjusting ventilator based on clinical indicators and ABG's    Severe protein-calorie malnutrition (HCC)- (present on admission)  Assessment & Plan  -Body mass index is 25.3 kg/m².  -thiamine, vitamins  -monitor for refeeding  -dietary following    Subdural hematoma (HCC)- (present on admission)  Assessment & Plan  -History of ETOH abuse. No reported recent falls. Not on AC  -Serial neurologic exams  -SBP goal < 160  -HOB > 30  -Goal normonatremia, normothermia, normoglycemia, euvolemia  -Neurosurgery following. S/p craniotomy with evacuation 4/15. Subdural drain removed 4/16  -PT OT SLP  -Aspiration and Fall Precautions  -OK for Lovenox VTE per NSG 4/19    ETOH abuse- (present on admission)  Assessment & Plan  -history of  -Thiamine    Anemia  Assessment & Plan  -monitor for bleeding  -follow CBC  -transfuse for Hgb < 7 or < 8 if cardiac ischemia         VTE:  Lovenox  Ulcer: Not Indicated  Lines: Central Line  Ongoing indication addressed and Sellers Catheter  Ongoing indication addressed    I have performed a physical exam and reviewed  and updated ROS and Plan today (4/20/2025). In review of yesterday's note (4/19/2025), there are no changes except as documented above.     Discussed patient condition and risk of morbidity and/or mortality with RN, RT, Pharmacy, neurology and neurosurgery, and my attending Dr. Robles.  The patient remains critically ill.  Critical care time = 49 minutes in directly providing and coordinating critical care and extensive data review.  No time overlap and excludes procedures.    Please note that this dictation was created using voice recognition software. I have made every reasonable attempt to correct obvious errors, but there may be errors of grammar and possibly content that I did not discover before finalizing the note.    AVA Serna.

## 2025-04-21 NOTE — DISCHARGE PLANNING
Case Management Discharge Planning    Admission Date: 4/14/2025  GMLOS: 6.5  ALOS: 7    6-Clicks ADL Score: 12  6-Clicks Mobility Score: 14  PT and/or OT Eval ordered: Yes  Post-acute Referrals Ordered: No  Post-acute Choice Obtained: No  Has referral(s) been sent to post-acute provider:  No    Anticipated Discharge Dispo: Discharge Disposition: D/T to SNF with Medicare cert in anticipation of skilled care (03)    DME Needed: No    Action(s) Taken:   Chart review was completed. Patient was discussed during IDT rounds.    Per IDT, pt is now vent day 6, off sedation, does not follow commands, on cEEG. Continued GOC discussion with pt's son/legal DPOA Sen Galicia     Escalations Completed: None    Medically Clear: No    Next Steps:  CM RN to follow up with medical team to discuss discharge barriers or needs.     Barriers to Discharge: Medical clearance

## 2025-04-21 NOTE — PROCEDURES
VIDEO ELECTROENCEPHALOGRAM  REPORT      Referring provider: Dr. Robles    DOS:   4/21/2025  INDICATION:  Sen Vasquez 79 y.o. male presenting with status epilepticus   Study Duration: 23 Hours, 41 minutes    CURRENT ANTIEPILEPTIC REGIMEN:   ONFI 15 mg BID  Keppra 1500 mg BID  Vimpat 200 mg BID    IV MEDS  Propofol gtt> off  Versed gtt> off      TECHNIQUE: A 30-channel, 23 hrs video electroencephalogram (VEEG) was performed in accordance with the international 10-20 system. This digital study was reviewed in bipolar and referential montages. The recording examined the patient during sedated and comatose state.   The EEG was set up and taken down by a Neurodiagnostic technologist who performed education to patient and staff.   A minimum but not limited to 23 electrodes and 23 channel recording was setup and performed by Neurodiagnostic technologist.  Impedances, electrode integrity, and technical impressions were documented a minimum of every 2-24 hour period by a Neurodiagnostic Technologist and reviewed by Interpreting physician.   There was supervised withdrawal of the following medications: n/a    ACTIVATION PROCEDURES:   None     DESCRIPTION OF THE RECORD:  The background is mostly continuous, asymmetrical, and comprised of a mixture of theta/delta activity with intermittent overriding faster frequencies. Rare poorly formed 5-6 Hz posterior dominant rhythm was seen. Poorly formed N2 sleep architecture was rarely seen. SIRPIDs (stimulus-induced rhythmic, periodic, or ictal discharges) appeared as asymmetric generalized periodic discharges(GPDs) at 1-1.5 Hz.    ICTAL AND/OR INTERICTAL FINDINGS:   -Frequent runs of generalized periodic discharges, at times asymmetrical and with a shifting predominance.   -Frequent to abundant runs of left hemispheric  and/or independent right hemispheric lateralized periodic discharges at 1-2 Hz (left or right unilateral or bilateral LPDs).  In the last 8 hours of the study discharges  "became blunted and more poorly formed but still persisted frequently.  Discharges were mostly bilateral and dyssynchronous, rarely occurring unilaterally and and independently.    - No definitive electrographic seizures.  Clinical seizures are not entirely ruled out    EVENT(S):    Multiple pushbutton events are noted at the following times: 8:09 AM, 8:25 AM, 10:16 AM, 1:04 PM, 1:58 PM, 2:20 PM, 3:33 PM, 5:01 PM, 5:56 PM, 7:46 PM, 8:23 PM.  The button was pressed for some combination of mouth/jaw movements/hemifacial twitching, eye movements or eyelid twitching, shoulder jerking, head movement, eyelid twitching.  For some of the events I was unable to visualize the patient's face/eyes due to poor lighting or patient being obscured by positioning.  Regardless, there is no definitive abnormal EEG correlate associated with these clinical events.  For many of the clinical events, the patient was seen to be stimulated including maneuvering the patient, examining the patient, suctioning the patient.  Subtle normal physiological cerebral reactivity to stimulation was seen in 5 the pushbutton events.  While there was no definitive abnormal EEG correlate with any of the events, that does not exclude the possibility of focal seizures/ focal status epilepticus, since Hively deep and highly focal seizures may not be captured on scalp EEG (so-called \"scalp negative seizure\").  Therefore clinical correlation strongly advised    EKG: sampling review of EKG recording demonstrated sinus rhythm.       INTERPRETATION:   This is an abnormal video EEG recording in the sedated state and comatose state.   -Moderate to severe background slowing, with intermittent runs of generalized periodic 1-2 Hz discharges with triphasic morphology, suggestive of diffuse/multifocal cerebral dysfunction and consistent with a non-specific encephalopathy. Clinical correlation recommended.   - Frequent to abundant runs of left hemispheric  and/or " "independent right hemispheric lateralized periodic discharges at 1-2 Hz (left or right unilateral or bilateral LPDs).  In the last 8 hours of the study discharges became blunted and more poorly formed but still persisted frequently.  Discharges were mostly bilateral and dyssynchronous, rarely occurring unilaterally and and independently.  These findings suggestive of an increased risk of focal onset seizure, or/and focal structural lesion.   -Multiple pushbutton events are noted at the following times: 8:09 AM, 8:25 AM, 10:16 AM, 1:04 PM, 1:58 PM, 2:20 PM, 3:33 PM, 5:01 PM, 5:56 PM, 7:46 PM, 8:23 PM.  The button was pressed for some combination of mouth/jaw movements/hemifacial twitching, eye movements or eyelid twitching, shoulder jerking, head movement, eyelid twitching.  For some of the events I was unable to visualize the patient's face/eyes due to poor lighting or patient being obscured by positioning.  Regardless, there is no definitive abnormal EEG correlate associated with these clinical events.  For many of the clinical events, the patient was seen to be stimulated including maneuvering the patient, examining the patient, suctioning the patient.  Subtle normal physiological cerebral reactivity to stimulation was seen in 5 the pushbutton events.  While there was no definitive abnormal EEG correlate with any of the events, that does not exclude the possibility of focal seizures/ focal status epilepticus, since Hively deep and highly focal seizures may not be captured on scalp EEG (so-called \"scalp negative seizure\").  Therefore clinical correlation strongly advised.  - Compared to yesterday's study no major differences were seen      Findings discussed with neurohospitalist Dr. David Otto MD  Department of Neurology at [facility]  Diplomate of the American Board of Psychiatry and Neurology, General Neurology  Diplomate of American Board of Psychiatry and Neurology, a " Member Board of the American Board of Medical Subspecialties, Epilepsy  Director of [facility]'s Level III Comprehensive Epilepsy Program  Professor of Clinical Neurology, [facility].   Number: [phone]  Fax: [phone]  E-mail: [email or web address]

## 2025-04-21 NOTE — PROGRESS NOTES
Pulmonary/Critical Care Medicine   Progress Note    Date of service: 4/21/2025  Time: 0705    Mr. Gaitan is a 79-year-old male with who is followed at the [facility] with the past medical history significant for alcohol abuse, stroke in October 2023, ischial rectal abscesses and coronary artery disease who was admitted on 4/14 for acute encephalopathy.  A CT head showed acute on chronic left holohemispheric subdural hematoma with 6 mm of subfalcine herniation and mass effect on the left lateral ventricle.  Dr. Wallis from neurosurgery was consulted and took the patient to the OR on 4/15 for a left frontal parietal craniotomy and evacuation of the hematoma.  He was then admitted to the neuro ICU still intubated for ongoing care.  4/16 - transferred out of the ICU, transferred back for seizures requiring intubation  4/17 - VD#2, cEEG with status epilepticus, Versed drip.  Added Vimpat and Onfi to Keppra  4/18 - VD#3, burst suppressed on Versed at 5 and propofol at 80  4/19 - VD#4, Versed weaned off, Weaning propofol, no seizures on cEEG  4/20 - VD#5, propofol off, no seizures, still on Keppra, Vimpat, Onfi, trial SAT/SBT    Overnight Events:   - no events   - ? Seizure this am   - w/d    - SB/SR 50-70s   - SBP 90-110s   - afebrile   - NPO, ? MMA embolization   - UOP of 600cc overnight, gonzalez   - last BM yesterday   - VD#6   - ASV, FiO2 30%  - CXR(revewed): developing hazy opacity to RLL   - SBT, failed due to apnea   - pepcid   - lovenox     Reviewed labs/imaging    A/P:  Status epilepticus   - cEEG ongoing, no seizures noted   - cont Keppra, Vimpat, and Onfi   - neurology following    SDH   - NSG following   - s/p crani with evacuation on 4/15   - subdrual drain removed on 4/16   - PT/OT/SLP when able   - holding off on MMA embolization     Respiratory failure   - due to status epilepticus   - cont full vent support   - RT/O2 protocols   - cont vent bundle protocols   - I am actively adjusting vent settings  based on ABGs   - SAT/SBTs when clinically able    I spent extensive time in reviewing the patient's condition, physical examination, laboratory and imaging data, prior documentation, in discussion with the IDT, and ARACELI Griffin in formulating an assessment/plan.    Critical Care time: 20 min. No time overlap, procedures not included in time.    Patti Latif MD  Pulmonary and Critical Care Medicine

## 2025-04-21 NOTE — PROCEDURES
VIDEO ELECTROENCEPHALOGRAM  REPORT      Referring provider: Dr. Robles    DOS:   4/20/2025  INDICATION:  Sen Vasquez 79 y.o. male presenting with status epilepticus   Study Duration: 23 Hours, 59 minutes    CURRENT ANTIEPILEPTIC REGIMEN:   ONFI 15 mg BID  Keppra 1500 mg BID  Vimpat 200 mg BID    IV MEDS  Propofol gtt> off  Versed gtt> off      TECHNIQUE: A 30-channel, 23 hrs video electroencephalogram (VEEG) was performed in accordance with the international 10-20 system. This digital study was reviewed in bipolar and referential montages. The recording examined the patient during sedated and comatose state.   The EEG was set up and taken down by a Neurodiagnostic technologist who performed education to patient and staff.   A minimum but not limited to 23 electrodes and 23 channel recording was setup and performed by Neurodiagnostic technologist.  Impedances, electrode integrity, and technical impressions were documented a minimum of every 2-24 hour period by a Neurodiagnostic Technologist and reviewed by Interpreting physician.   There was supervised withdrawal of the following medications: n/a    ACTIVATION PROCEDURES:   None     DESCRIPTION OF THE RECORD:  The background is mostly continuous, asymmetrical, and comprised of a mixture of theta/delta activity with intermittent overriding faster frequencies. No PDR was seen. Poorly formed N2 sleep architecture was rarely seen. SIRPIDs (stimulus-induced rhythmic, periodic, or ictal discharges) appeared as asymmetric generalized periodic discharges(GPDs) at 1-1.5 Hz, left hemispheric attenuation was noted.     ICTAL AND/OR INTERICTAL FINDINGS:   Frequent runs of generalized periodic discharges, at times asymmetrical and with a shifting predominance.   -Intermittent runs of left hemispheric  and/or right hemispheric lateralized periodic discharges at 1-2 Hz (left or right unilateral or bilateral LPDs),   No definite electrographic seizures recorded during the study.      EVENT(S):  none     EKG: sampling review of EKG recording demonstrated sinus rhythm.       INTERPRETATION:   This is an abnormal video EEG recording in the sedated state and comatose state.   -Moderate to severe background slowing suggestive of diffuse/multifocal cerebral dysfunction and consistent with a non-specific encephalopathy. Clinical correlation recommended.   -Frequent runs of generalized periodic discharges, often stimulus-induced and at times asymmetrical and exhibiting a shifting predominance. GPDs are commonly seen in diffuse cerebral dysfunction.   -Intermittent runs of left hemispheric  and/or right hemispheric lateralized periodic discharges at 1-2 Hz (left or right unilateral or bilateral LPDs), findings suggestive of an increased risk of focal onset seizure, or/and focal structural lesion.   -No definite electrographic seizures recorded during the study. No events were captured during the study. Clinical correlation is recommended.    Oniel Otto MD  Department of Neurology at [facility]  Diplomate of the American Board of Psychiatry and Neurology, General Neurology  Diplomate of American Board of Psychiatry and Neurology, a Member Board of the American Board of Medical Subspecialties, Epilepsy  Director of [facility]'s Level III Comprehensive Epilepsy Program  Professor of Clinical Neurology, [facility].   Number: [phone]  Fax: [phone]  E-mail: [email or web address]

## 2025-04-21 NOTE — CARE PLAN
The patient is Watcher - Medium risk of patient condition declining or worsening    Shift Goals  Clinical Goals: Q4 neuro, cEEG, no seizures  Patient Goals: candelaria  Family Goals: candelaria    Progress made toward(s) clinical / shift goals:      Problem: Seizure Precautions  Goal: Implementation of seizure precautions  Outcome: Progressing - Pt off of all sedation since yesterday.  Pt having right eye twitching - not having seizures on EEG per epiletologist     Problem: Pain - Standard  Goal: Alleviation of pain or a reduction in pain to the patient’s comfort goal  Outcome: Progressing     Problem: Safety - Medical Restraint  Goal: Free from restraint(s) (Restraint for Interference with Medical Device)  Outcome: Progressing       Patient is not progressing towards the following goals:    Problem: Neuro Status  Goal: Neuro status will remain stable or improve  Outcome: Not Progressing - Pt withdrawing to pain, barely opening eyes to painful stimulus.     Problem: Mobility  Goal: Patient's capacity to carry out activities will improve  Outcome: Not Progressing - unable to mobilize due to RASS = -4.     Problem: Skin Integrity  Goal: Skin integrity is maintained or improved  Outcome: Not Progressing =- DTIs to bilateral heels, bottom reddened - wound team re-consulted.     Problem: Early Mobilization - Post Surgery  Goal: Early mobilization post surgery  Outcome: Not Progressing - unable to mobilize due to RASS = -4

## 2025-04-21 NOTE — PROGRESS NOTES
After repositioning, pt started having right eye twitches, it stopped after about 2 minutes. cEEG button pushed and Meche CHARLES and Dr. Hernandez notified.

## 2025-04-21 NOTE — PROGRESS NOTES
Critical Care Progress Note    Date of admission  4/14/2025    Chief Complaint  SDH and seizures    Hospital Course  Sen Vasquez is a 79-year-old male with PMH significant for EtOH abuse, CVA 10/2023, ischial rectal abscess, and CAD [facility] at [facility] 1/5/2025 where he was treated for colitis who presented from [facility] where he resides 4/14 with altered mental status.  Reportedly at baseline patient is A and O x 4.  Staff at the [facility] living noted patient was A/O x 0 with GCS of 14.  Head CT showed acute on chronic left holohemispheric subdural hematoma with 6 mm subfalcine herniation and mass effect on the left lateral ventricle.  Neuroexam here showed a GCS of 9 (3/1/5) Dr. Wallis from neurosurgery was consulted and took patient to OR 4/15 for left frontoparietal craniotomy and evacuation of hematoma. He was transferred back to the ICU post-op.   4/16 - transfer order to floor but never physically left RICU. Began having rhythmic movements around 1700. CT head stable. Returned from CT in status. Intubated. Awaiting availability of cEEG.  4/17 - VD #2. Status on cEEG. Added Vimpat, Onfi, and Versed drip. Prop @ 80.  4/18 - VD #3. Burst suppressed on Versed @5, Prop @ 80, Keppra, Vimpat, Onfi.  4/19 - VD #4. Versed weaned off. Weaning Prop. No seizures on EEG.  Son, Sen, to bedside. Brought in POA paperwork which has been scanned into chart. GOC discussion. Will discuss with his brother and reconvene probably Monday.  4/20 - VD #5. Off all sedation. No seizures on cEEG.     Interval Problem Update  Reviewed last 24 hour events:  No significant overnight events.   Temp max 99.3  SB/SR 55-65  -110's. No drips  Neuro: Does not open eyes, does not follow commands.   Eye twiching similar to previous seizure-like activity. Hit button. Per Dr. Otto, no seizures thus far today on cEEG.  RASS: -4, no sedation  Vent day #6: /8/30%  SAT/SBT:  yes/yes. Failed SBT for apnea  Withdrawing RUE/RLE/LLE, no movements LUE  Sellers, RIJ TLC  Lovenox, no ABX  Mobility 1 - not eligible to advance due to cEEG.     Called and updated sonFuad. Updated sonSen, at bedside.    12:15 PM - son/POASen, came to bedside. He has had a chance to speak with his brother and they have decided to change code status to DNR, I OK. Sen asked what to anticipate in the upcoming days. We discussed allowing patient another day or 2 to see if his mentation improves and he remains seizure free. Discussed when patient is ready for extubation we will need to decide at that time if he would be reintubated or transition to DNR/DNI and possible comfort care if patient fails extubation.    Review of Systems  Review of Systems   Unable to perform ROS: Intubated        Vital Signs for last 24 hours   Pulse:  [52-78] 72  Resp:  [12-28] 28  BP: ()/(51-67) 145/67  SpO2:  [96 %-100 %] 96 %    Hemodynamic parameters for last 24 hours       Respiratory Information for the last 24 hours  Vent Mode: ASV  Rate (breaths/min): 15  Vt Target (mL): 500  PEEP/CPAP: 8  P Support (PS + PEEP): 13  MAP: 9.6  Control VTE (exp VT): 341    Physical Exam   Physical Exam  Vitals and nursing note reviewed.   Constitutional:       General: He is not in acute distress.     Appearance: He is underweight. He is ill-appearing. He is not toxic-appearing.      Interventions: He is sedated, intubated and restrained.   HENT:      Head: Normocephalic.      Nose: Nose normal.      Mouth/Throat:      Lips: Pink.      Mouth: Mucous membranes are moist.   Eyes:      Pupils: Pupils are equal, round, and reactive to light.   Cardiovascular:      Rate and Rhythm: Normal rate and regular rhythm.      Pulses: Normal pulses.      Heart sounds: Normal heart sounds.      Comments: SB/SR 55-65 on telemetry  Pulmonary:      Effort: He is intubated.      Breath sounds: No wheezing or rhonchi.   Abdominal:      General: Bowel sounds  are increased.      Palpations: Abdomen is soft.   Musculoskeletal:      Right lower leg: No edema.      Left lower leg: No edema.   Skin:     General: Skin is warm and dry.      Capillary Refill: Capillary refill takes less than 2 seconds.   Neurological:      GCS: GCS eye subscore is 1. GCS verbal subscore is 1. GCS motor subscore is 4.      Comments: 6T   Psychiatric:      Comments: Intubated         Medications  Current Facility-Administered Medications   Medication Dose Route Frequency Provider Last Rate Last Admin    lacosamide (Vimpat) tablet 200 mg  200 mg Enteral Tube BID Meche Richards, A.P.R.N.   200 mg at 04/21/25 0523    levETIRAcetam (Keppra) tablet 1,500 mg  1,500 mg Enteral Tube BID Meche Richards, A.P.R.N.   1,500 mg at 04/21/25 0523    acetaminophen (Tylenol) tablet 650 mg  650 mg Enteral Tube Q6HRS PRN Fatuma Gray        enoxaparin (Lovenox) inj 40 mg  40 mg Subcutaneous DAILY AT 1800 Meche Richards, A.P.R.N.   40 mg at 04/20/25 1705    clobazam (Onfi) tablet 15 mg  15 mg Enteral Tube BID José Miguel Luo D.OSunday   15 mg at 04/21/25 0521    norepinephrine (Levophed) 8 mg in 250 mL NS infusion (premix)  0-1 mcg/kg/min (Ideal) Intravenous Continuous Isac Hope M.D. 1.2 mL/hr at 04/20/25 0545 0.01 mcg/kg/min at 04/20/25 0545    hydrALAZINE (Apresoline) injection 10 mg  10 mg Intravenous Q4HRS PRN Meche Richards, A.P.R.N.   10 mg at 04/16/25 2302    diazePAM (Valium) injection 5 mg  5 mg Intravenous Q5 MIN PRN MORRIS BowieOSunday   5 mg at 04/17/25 0440    Respiratory Therapy Consult   Nebulization Continuous RT Mohamud Parada M.D.        senna-docusate (Pericolace Or Senokot S) 8.6-50 MG per tablet 2 Tablet  2 Tablet Enteral Tube BID Mohamud Parada M.D.   2 Tablet at 04/20/25 0508    And    polyethylene glycol/lytes (Miralax) Packet 1 Packet  1 Packet Enteral Tube QDAY PRN Mohamud Parada M.D.   1 Packet at 04/20/25 0507    And    magnesium hydroxide (Milk Of Magnesia)  suspension 30 mL  30 mL Enteral Tube QDAY PRN Mohamud Parada M.D.   30 mL at 04/20/25 0507    And    bisacodyl (Dulcolax) suppository 10 mg  10 mg Rectal QDAY PRN Mohamud Parada M.D.   10 mg at 04/20/25 0647    MD Alert...ICU Electrolyte Replacement per Pharmacy   Other PHARMACY TO DOSE Mohamud Parada M.D.        lidocaine (Xylocaine) 1 % injection 2 mL  2 mL Tracheal Tube Q30 MIN PRN Mohamud Parada M.D.        Pharmacy Consult Request ...Pain Management Review 1 Each  1 Each Other PHARMACY TO DOSE SELENE Argueta MD ALERT...DO NOT ADMINISTER NSAIDS or ASPIRIN unless ORDERED By Neurosurgery 1 Each  1 Each Other PRN BRAULIO ArguetaPJO        ondansetron (Zofran) syringe/vial injection 4 mg  4 mg Intravenous Q4HRS PRN BRAULIO ArguetaP.NSunday        sodium phosphate enema 1 Each  1 Each Rectal Once PRN BRAULIO ArguetaP.FABI        Pharmacy Consult: Enteral tube insertion - review meds/change route/product selection  1 Each Other PHARMACY TO DOSE ERROL Serna.P.R.N.        thiamine (Vitamin B-1) tablet 100 mg  100 mg Enteral Tube DAILY ERROL Serna.P.R.N.   100 mg at 04/21/25 0523    multivitamin tablet 1 Tablet  1 Tablet Enteral Tube DAILY ERROL Serna.P.R.N.   1 Tablet at 04/21/25 0523    folic acid (Folvite) tablet 1 mg  1 mg Enteral Tube DAILY Meche Richards A.P.R.N.   1 mg at 04/21/25 0523       Fluids    Intake/Output Summary (Last 24 hours) at 4/21/2025 1207  Last data filed at 4/21/2025 1000  Gross per 24 hour   Intake 970 ml   Output 1355 ml   Net -385 ml       Laboratory          Recent Labs     04/19/25  0420 04/20/25  0400 04/21/25  0355   SODIUM 141 137 136   POTASSIUM 4.1 4.1 3.9   CHLORIDE 107 101 100   CO2 27 28 27   BUN 8 11 16   CREATININE 0.41* 0.36* 0.34*   MAGNESIUM 1.7 1.7 2.1   PHOSPHORUS 2.5 2.7 2.8   CALCIUM 8.7 8.6 8.7     Recent Labs     04/19/25  0420 04/20/25  0400 04/21/25  0355   ALTSGPT <5 <5 10   ASTSGOT 9* 9* 17   ALKPHOSPHAT 68  72 109*   TBILIRUBIN 0.2 <0.2 0.2   PREALBUMIN  --   --  9.3*   GLUCOSE 115* 109* 94     Recent Labs     04/19/25  0420 04/20/25  0400 04/21/25  0355   WBC 11.3* 10.4 10.2   NEUTSPOLYS 79.30* 80.20* 75.50*   LYMPHOCYTES 8.00* 7.50* 10.80*   MONOCYTES 6.80 7.60 9.10   EOSINOPHILS 5.00 4.20 3.40   BASOPHILS 0.40 0.20 0.40   ASTSGOT 9* 9* 17   ALTSGPT <5 <5 10   ALKPHOSPHAT 68 72 109*   TBILIRUBIN 0.2 <0.2 0.2     Recent Labs     04/19/25 0420 04/20/25 0400 04/21/25  0355   RBC 3.29* 3.16* 3.08*   HEMOGLOBIN 10.0* 9.6* 9.6*   HEMATOCRIT 31.9* 30.4* 29.1*   PLATELETCT 290 274 300       Imaging  X-Ray:  I have personally reviewed the images and compared with prior images.    Assessment/Plan  * Status epilepticus (HCC)  Assessment & Plan  -Focal status epilepticus 04/16/25, returned to ICU   -Keppra, Vimpat, Onfi  -off all sedation AM 4/20  -Neurology following  -cEEG - no seizures today  -Seizure and Aspiration Precautions    Subdural hematoma (HCC)- (present on admission)  Assessment & Plan  -History of ETOH abuse. No reported recent falls. Not on AC  -Serial neurologic exams  -SBP goal < 160  -HOB > 30  -Goal normonatremia, normothermia, normoglycemia, euvolemia  -Neurosurgery following. S/p craniotomy with evacuation 4/15. Subdural drain removed 4/16  -PT OT SLP  -Aspiration and Fall Precautions  -OK for Lovenox VTE per NSG 4/19  -holding off on MMA for now. If patient improves and transfers out of ICU, can consider doing before discharge, or have done OP once discharged. (Discussed with IR 4/21).    On mechanically assisted ventilation (HCC)  Assessment & Plan  -Intubated for status epilepticus  -Intubation date 4/16  -Ventilator dependent respiratory failure  -Modify ventilator to optimize oxygenation, acid-base balance and ventilation  -CXR as indicated: monitor lung volumes and tube/line placement  -HOB > 30  -Titrate FiO2 to keep sats greater than 92%  -Chlorhexidine  -goal CO2 35-40  -Daily awakening and SBT  trials unless contraindicated  -ABCDEF bundle  -I am actively adjusting ventilator based on clinical indicators and ABG's    Severe protein-calorie malnutrition (HCC)- (present on admission)  Assessment & Plan  -Body mass index is 25.69 kg/m².  -thiamine, vitamins  -monitor for refeeding  -dietary following    ETOH abuse- (present on admission)  Assessment & Plan  -history of  -Thiamine    Anemia  Assessment & Plan  -monitor for bleeding  -follow CBC  -transfuse for Hgb < 7 or < 8 if cardiac ischemia         VTE:  Lovenox  Ulcer: Not Indicated  Lines: Central Line  Ongoing indication addressed and Sellers Catheter  Ongoing indication addressed    I have performed a physical exam and reviewed and updated ROS and Plan today (4/21/2025). In review of yesterday's note (4/20/2025), there are no changes except as documented above.     Discussed patient condition and risk of morbidity and/or mortality with Family, RN, RT, Pharmacy, , neurology and neurosurgery, and my attending Dr. Latif.  The patient remains critically ill.  Critical care time = 63 minutes in directly providing and coordinating critical care and extensive data review.  No time overlap and excludes procedures.    Please note that this dictation was created using voice recognition software. I have made every reasonable attempt to correct obvious errors, but there may be errors of grammar and possibly content that I did not discover before finalizing the note.    AVA Serna.

## 2025-04-21 NOTE — THERAPY
Speech Language Therapy Contact Note    Patient Name: Sen Vasquez  Age:  79 y.o., Sex:  male  Medical Record #: [id number]  Today's Date: 4/21/2025 04/21/25 0942   Treatment Variance   Reason For Missed Therapy Medical - Patient not Able To Participate   Interdisciplinary Plan of Care Collaboration   Collaboration Comments Per chart, patient remains intubated. SLP to follow up once medically appropriate.

## 2025-04-21 NOTE — CARE PLAN
Problem: Ventilation  Goal: Ability to achieve and maintain unassisted ventilation or tolerate decreased levels of ventilator support  Description: Target End Date:  4 days Document on Vent flowsheet1.  Support and monitor invasive and noninvasive mechanical ventilation2.  Monitor ventilator weaning response3.  Perform ventilator associated pneumonia prevention interventions4.  Manage ventilation therapy by monitoring diagnostic test results  Outcome: Not Progressing     Ventilator Daily Summary    Vent Day #6  Airway: 8.0 ETT @24    Ventilator settings:  - 8 - 30%  Weaning trials: No  Respiratory Procedures: No    Plan: Continue current ventilator settings and wean mechanical ventilation as tolerated per physician orders.

## 2025-04-21 NOTE — PROGRESS NOTES
Neurosurgery Progress Note    Subjective:  Sedation off since yesterday, still some questionable seizure activity (eye twitching)    Exam:  Intubated, sedated.  Pinpoint pupils. EO to pain, min wd BLE, LUE withdraw/appears to grab towards pain, but not across midline. CDI     BP  Min: 94/54  Max: 125/57  Pulse  Av.3  Min: 52  Max: 78  Resp  Av.5  Min: 12  Max: 28  Monitored Temp 2  Av.8 °C (98.3 °F)  Min: 36.3 °C (97.3 °F)  Max: 37.4 °C (99.3 °F)  SpO2  Av.2 %  Min: 96 %  Max: 100 %    No data recorded    Recent Labs     25  0355   WBC 11.3* 10.4 10.2   RBC 3.29* 3.16* 3.08*   HEMOGLOBIN 10.0* 9.6* 9.6*   HEMATOCRIT 31.9* 30.4* 29.1*   MCV 97.0 96.2 94.5   MCH 30.4 30.4 31.2   MCHC 31.3* 31.6* 33.0   RDW 52.8* 51.8* 50.5*   PLATELETCT 290 274 300   MPV 10.0 10.3 9.5     Recent Labs     25  04225  0355   SODIUM 141 137 136   POTASSIUM 4.1 4.1 3.9   CHLORIDE 107 101 100   CO2 27 28 27   GLUCOSE 115* 109* 94   BUN 8 11 16   CREATININE 0.41* 0.36* 0.34*   CALCIUM 8.7 8.6 8.7                   Intake/Output                         25 - 25 0659 25 - 25 0659      Total  Total                 Intake    I.V.  49.7  -- 49.7  --  -- --    Magnesium Sulfate Volume 49.7 -- 49.7 -- -- --    Other  --  180 180  120  -- 120    Medications (PO/Enteral Liquids) -- 180 180 120 -- 120    NG/GT  550  330 880  --  -- --    Intake (mL) (Enteral Tube 04/15/25 Cortrak - Gastric 10 Fr. Right nare) 550 330 880 -- -- --    Enteral  90  60 150  30  -- 30    Free Water / Tube Flush 90 60 150 30 -- 30    Total Intake 689.7 570 1259.7 150 -- 150       Output    Urine  375  575 950  30  -- 30    Output (mL) (Urethral Catheter Temperature probe) 375 575 950 30 -- 30    Stool  --  300 300  --  -- --    Number of Times Stooled 1 x -- 1 x -- -- --    Rectal Tube Output (Rectal Tube) -- 300 300 --  -- --    Total Output  30 -- 30       Net I/O     314.7 -305 9.7 120 -- 120              Intake/Output Summary (Last 24 hours) at 4/21/2025 0947  Last data filed at 4/21/2025 0800  Gross per 24 hour   Intake 1269.67 ml   Output 1205 ml   Net 64.67 ml             lacosamide  200 mg BID    levETIRAcetam  1,500 mg BID    acetaminophen  650 mg Q6HRS PRN    enoxaparin (LOVENOX) injection  40 mg DAILY AT 1800    clobazam  15 mg BID    NORepinephrine  0-1 mcg/kg/min (Ideal) Continuous    hydrALAZINE  10 mg Q4HRS PRN    diazePAM  5 mg Q5 MIN PRN    Respiratory Therapy Consult   Continuous RT    senna-docusate  2 Tablet BID    And    polyethylene glycol/lytes  1 Packet QDAY PRN    And    magnesium hydroxide  30 mL QDAY PRN    And    bisacodyl  10 mg QDAY PRN    MD Alert...Adult ICU Electrolyte Replacement per Pharmacy   PHARMACY TO DOSE    lidocaine  2 mL Q30 MIN PRN    Pharmacy Consult Request  1 Each PHARMACY TO DOSE    MD ALERT...DO NOT ADMINISTER NSAIDS or ASPIRIN unless ORDERED By Neurosurgery  1 Each PRN    ondansetron  4 mg Q4HRS PRN    sodium phosphate  1 Each Once PRN    Pharmacy  1 Each PHARMACY TO DOSE    thiamine  100 mg DAILY    multivitamin  1 Tablet DAILY    folic acid  1 mg DAILY       Assessment and Plan:  POD #6Crani for SD  Prophylactic anticoagulation: yes         Start date/time: 4/19     Neuro following for seizure  Left MMA when able  CT4/16- improved, post op changes

## 2025-04-21 NOTE — THERAPY
04/21/25 1226   Interdisciplinary Plan of Care Collaboration   Collaboration Comments Hold PT session.  Per Marquise schmitz -4.

## 2025-04-21 NOTE — DIETARY
Nutrition Services Brief Update:    Problem: Nutritional:  Goal: Nutrition support tolerated and meeting greater than 85% of estimated needs  Outcome: MET    Tubefeed Vital AF 1.2 was at goal overnight then held for NPO at midnight per orders. TF was at goal multiple days, initially Vital HP at 40 ml/hr while on propofol and then switched to Vital AF 1.2 at 55 ml/hr once off propofol. Remains intubated. Propofol has been discontinued and Levo last at 0.01 mcg yesterday 4/20 in am. Refeeding syndrome labs have been stable several days now.    RD following.

## 2025-04-21 NOTE — PROGRESS NOTES
Referring Physician: Patti Latif M.D.    S:  Several push-button events by RN for suspected seizures described as right eye twitching.    Scheduled Medications   Medication Dose Frequency    lacosamide  200 mg BID    levETIRAcetam  1,500 mg BID    enoxaparin (LOVENOX) injection  40 mg DAILY AT 1800    clobazam  15 mg BID    senna-docusate  2 Tablet BID    MD Alert...Adult ICU Electrolyte Replacement per Pharmacy   PHARMACY TO DOSE    Pharmacy Consult Request  1 Each PHARMACY TO DOSE    Pharmacy  1 Each PHARMACY TO DOSE    thiamine  100 mg DAILY    multivitamin  1 Tablet DAILY    folic acid  1 mg DAILY     O:    Vitals:    04/21/25 1000   BP: (!) 145/67   Pulse: 72   Resp: (!) 28   Temp:    SpO2: 96%     Off sedation since 6 am (4/20). Intubated and Ventilated.    Level of consciousness: Responsive to noxious stimuli. Grimaces to pain and withdrawals in the bilateral lower extremities.     Pupils: Reactive to bright light (OU)  Oculomotor: Conjugate. No gaze deviation. No nystagmus.    Face appears symmetric    Motor: No spontaneous movements. No abnormal postures or movements.  Deep tendon reflexes: Not tested.   Plantar responses: Mute    cEEG INTERPRETATION:   This is an abnormal video EEG recording in the sedated state and comatose state.   -Moderate to severe background slowing suggestive of diffuse/multifocal cerebral dysfunction and consistent with a non-specific encephalopathy. Clinical correlation recommended.   -Frequent runs of generalized periodic discharges, often stimulus-induced and at times asymmetrical and exhibiting a shifting predominance. GPDs are commonly seen in diffuse cerebral dysfunction.   -Intermittent runs of left hemispheric  and/or right hemispheric lateralized periodic discharges at 1-2 Hz (left or right unilateral or bilateral LPDs), findings suggestive of an increased risk of focal onset seizure, or/and focal structural lesion.   -No definite electrographic seizures recorded  during the study. No events were captured during the study. Clinical correlation is recommended.    Impression & Recommendations:  Left acute-on-chronic subdural status post crani awaiting MMA embo when able. Focal status epilepticus (left hemispheric onset) seemingly controlled on current anti-seizure medication regimen and is off sedation > 24 hrs.     - cEEG for today. Please push event button for suspected clinical seizures.  - Continue Keppra 1500 mg twice daily.  - Continue Vimpat 200 mg twice daily.  - Continue Onfi 15 mg twice daily.  - Seizure precautions.  Would avoid Ativan or Versed dosing unless electroclinical seizures are confirmed.  - Avoid sedation.    Neurology will follow along.    I provided a total of 50 minutes of acute neurologic care for this patient encounter reviewing medical records, diagnostic studies, direct face-to-face time with the patient and/or family, documentation, communicating and coordinating plan of care.      Rohit Hernandez MD  Neurohospitalist, Acute Care Services          Please note that this dictation was created using voice recognition software.  I have made every reasonable attempt to correct obvious errors, but I expect that there are errors of grammar and possibly content that I did not discover before finalizing the note.

## 2025-04-21 NOTE — CARE PLAN
The patient is Watcher - Medium risk of patient condition declining or worsening    Shift Goals  Clinical Goals: Q4 neuro, cEEG, pulm hygeine  Patient Goals: candelaria  Family Goals: candelaria    Progress made toward(s) clinical / shift goals:    Problem: Neuro Status  Goal: Neuro status will remain stable or improve  Outcome: Progressing     Problem: Fall Risk  Goal: Patient will remain free from falls  Outcome: Progressing     Problem: Safety - Medical Restraint  Goal: Free from restraint(s) (Restraint for Interference with Medical Device)  Outcome: Progressing       Patient is not progressing towards the following goals:      Problem: Mobility  Goal: Patient's capacity to carry out activities will improve  Outcome: Not Progressing

## 2025-04-22 NOTE — CARE PLAN
The patient is Watcher - Medium risk of patient condition declining or worsening    Shift Goals  Clinical Goals: Q4 neuro, cEEG, improve neuro exam, stable VS  Patient Goals: JF   Family Goals: JF     Progress made toward(s) clinical / shift goals:      Problem: Incision Care  Goal: Optimal post surgical incision care  Outcome: Progressing - EEG removed, surgical dressing removed per Kristina VENTURA's order.  Incision is well approximated, no drainage noted.     Problem: Pain - Standard  Goal: Alleviation of pain or a reduction in pain to the patient’s comfort goal  Outcome: Progressing     Problem: Seizure Precautions  Goal: Implementation of seizure precautions  Outcome: Progressing - Pt continues to have frequent right eye twitching.  Neurologist aware.  EEG removed per Dr Hernandez's order.       Patient is not progressing towards the following goals:    Problem: Neuro Status  Goal: Neuro status will remain stable or improve  Outcome: Not Progressing - pt remains obtunded (RASS = -4), only withdrawing to painful stimuli.  Continues to have focal seizures.     Problem: Self Care  Goal: Patient will have the ability to perform ADLs independently or with assistance (bathe, groom, dress, toilet and feed)  Outcome: Not Progressing     Problem: Mobility  Goal: Patient's capacity to carry out activities will improve  Outcome: Not Progressing - Unable to mobilize d/t RASS = -4.     Problem: Skin Integrity  Goal: Skin integrity is maintained or improved  Outcome: Not Progressing - Pt has DTI on buttocks and bilateral heels.  EEG removed and several wounds noted on scalp and forehead.  Pictures taken and wound team consulted.

## 2025-04-22 NOTE — PROGRESS NOTES
Referring Physician: Patti Latif M.D.    S:  Frequent; near continuous right eye twitching.     Scheduled Medications   Medication Dose Frequency    lacosamide  200 mg BID    levETIRAcetam  1,500 mg BID    enoxaparin (LOVENOX) injection  40 mg DAILY AT 1800    clobazam  15 mg BID    senna-docusate  2 Tablet BID    MD Alert...Adult ICU Electrolyte Replacement per Pharmacy   PHARMACY TO DOSE    Pharmacy Consult Request  1 Each PHARMACY TO DOSE    Pharmacy  1 Each PHARMACY TO DOSE    thiamine  100 mg DAILY    multivitamin  1 Tablet DAILY    folic acid  1 mg DAILY     O:    Vitals:    04/22/25 1016   BP:    Pulse: 71   Resp: 20   Temp:    SpO2: 96%     Off sedation since 6 am (4/20). Intubated and Ventilated.    Level of consciousness: Responsive to noxious stimuli. Grimaces to pain and withdrawals in the bilateral lower extremities.     Pupils: Reactive to bright light (OU)  Oculomotor: Conjugate. No gaze deviation. No nystagmus.    Face appears symmetric    Motor: No spontaneous movements. Frequent intermittent right eye twitching.   Deep tendon reflexes: Not tested.   Plantar responses: Mute    cEEG INTERPRETATION:   This is an abnormal video EEG recording in the sedated state and comatose state.   -Moderate to severe background slowing, with intermittent runs of generalized periodic 1-2 Hz discharges with triphasic morphology, suggestive of diffuse/multifocal cerebral dysfunction and consistent with a non-specific encephalopathy. Clinical correlation recommended.   - Frequent to abundant runs of left hemispheric  and/or independent right hemispheric lateralized periodic discharges at 1-2 Hz (left or right unilateral or bilateral LPDs).  In the last 8 hours of the study discharges became blunted and more poorly formed but still persisted frequently.  Discharges were mostly bilateral and dyssynchronous, rarely occurring unilaterally and and independently.  These findings suggestive of an increased risk of focal  "onset seizure, or/and focal structural lesion.   -Multiple pushbutton events are noted at the following times: 8:09 AM, 8:25 AM, 10:16 AM, 1:04 PM, 1:58 PM, 2:20 PM, 3:33 PM, 5:01 PM, 5:56 PM, 7:46 PM, 8:23 PM.  The button was pressed for some combination of mouth/jaw movements/hemifacial twitching, eye movements or eyelid twitching, shoulder jerking, head movement, eyelid twitching.  For some of the events I was unable to visualize the patient's face/eyes due to poor lighting or patient being obscured by positioning.  Regardless, there is no definitive abnormal EEG correlate associated with these clinical events.  For many of the clinical events, the patient was seen to be stimulated including maneuvering the patient, examining the patient, suctioning the patient.  Subtle normal physiological cerebral reactivity to stimulation was seen in 5 the pushbutton events.  While there was no definitive abnormal EEG correlate with any of the events, that does not exclude the possibility of focal seizures/ focal status epilepticus, since Hively deep and highly focal seizures may not be captured on scalp EEG (so-called \"scalp negative seizure\").  Therefore clinical correlation strongly advised.  - Compared to yesterday's study no major differences were seen    Impression & Recommendations:    Left acute-on-chronic subdural status post crani awaiting MMA embo when able.   Focal status epilepticus (left hemispheric onset) seemingly controlled on current anti-seizure medication regimen and remains off sedation.   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 Scalp EEG negative.    I do not advise escalating antiseizure medications or adding sedation at this time. Follow clinically for propagation/progression of motor activity.      - Discontinue EEG.   - Continue Keppra 1500 mg twice daily.  - Continue Vimpat 200 mg twice daily.  - Continue " Onfi 15 mg twice daily.  - Seizure precautions.   - Avoid sedation.  - Agree with involving the palliative care team.    Neurology will follow along.    I provided a total of 40 minutes of acute neurologic care for this patient encounter reviewing medical records, diagnostic studies, direct face-to-face time with the patient and/or family, documentation, communicating and coordinating plan of care.      Rohit Hernandez MD  Neurohospitalist, Acute Care Services          Please note that this dictation was created using voice recognition software.  I have made every reasonable attempt to correct obvious errors, but I expect that there are errors of grammar and possibly content that I did not discover before finalizing the note.

## 2025-04-22 NOTE — PROGRESS NOTES
Neurosurgery Progress Note    Subjective:  Sedation off still intermittent eye twitching    Exam:  Intubated. Pinpoint pupils. EO to pain, WDx4.    BP  Min: 92/49  Max: 145/67  Pulse  Av.7  Min: 61  Max: 73  Resp  Av.8  Min: 14  Max: 44  Monitored Temp 2  Av.7 °C (98.1 °F)  Min: 36.3 °C (97.3 °F)  Max: 37.3 °C (99.1 °F)  SpO2  Av.7 %  Min: 96 %  Max: 100 %    No data recorded    Recent Labs     25  0400 25  0355 25  0415   WBC 10.4 10.2 8.4   RBC 3.16* 3.08* 3.19*   HEMOGLOBIN 9.6* 9.6* 9.9*   HEMATOCRIT 30.4* 29.1* 30.1*   MCV 96.2 94.5 94.4   MCH 30.4 31.2 31.0   MCHC 31.6* 33.0 32.9   RDW 51.8* 50.5* 51.4*   PLATELETCT 274 300 313   MPV 10.3 9.5 9.8     Recent Labs     25  0400 25  0355 25  0415   SODIUM 137 136 137   POTASSIUM 4.1 3.9 4.1   CHLORIDE 101 100 101   CO2 28 27 26   GLUCOSE 109* 94 101*   BUN 11 16 15   CREATININE 0.36* 0.34* 0.33*   CALCIUM 8.6 8.7 9.0                   Intake/Output                         25 - 25 0659 25 07 - 25 0659      Total  Total                 Intake    Other  240  150 390  --  -- --    Medications (PO/Enteral Liquids) 240 150 390 -- -- --    Enteral  420  750 1170  --  -- --    Free Water / Tube Flush 90 90 180 -- -- --    Enteral Volume (Enteral Tube 04/15/25 Cortrak - Gastric 10 Fr. Right nare) 330 660 990 -- -- --    Total Intake  -- -- --       Output    Urine  950  995 1945  --  -- --    Output (mL) (Urethral Catheter Temperature probe)  -- -- --    Stool  0  45 45  --  -- --    Rectal Tube Output (Rectal Tube) 0 45 45 -- -- --    Total Output 950 1040  -- -- --       Net I/O     -290 -140 -430 -- -- --              Intake/Output Summary (Last 24 hours) at 2025 0856  Last data filed at 2025 0600  Gross per 24 hour   Intake 1410 ml   Output 1960 ml   Net -550 ml             magnesium sulfate  2 g Once     [Takes medication as prescribed] : takes lacosamide  200 mg BID    levETIRAcetam  1,500 mg BID    acetaminophen  650 mg Q6HRS PRN    enoxaparin (LOVENOX) injection  40 mg DAILY AT 1800    clobazam  15 mg BID    NORepinephrine  0-1 mcg/kg/min (Ideal) Continuous    hydrALAZINE  10 mg Q4HRS PRN    [Held by provider] diazePAM  5 mg Q5 MIN PRN    Respiratory Therapy Consult   Continuous RT    senna-docusate  2 Tablet BID    And    polyethylene glycol/lytes  1 Packet QDAY PRN    And    magnesium hydroxide  30 mL QDAY PRN    And    bisacodyl  10 mg QDAY PRN    MD Alert...Adult ICU Electrolyte Replacement per Pharmacy   PHARMACY TO DOSE    lidocaine  2 mL Q30 MIN PRN    Pharmacy Consult Request  1 Each PHARMACY TO DOSE    MD ALERT...DO NOT ADMINISTER NSAIDS or ASPIRIN unless ORDERED By Neurosurgery  1 Each PRN    ondansetron  4 mg Q4HRS PRN    sodium phosphate  1 Each Once PRN    Pharmacy  1 Each PHARMACY TO DOSE    thiamine  100 mg DAILY    multivitamin  1 Tablet DAILY    folic acid  1 mg DAILY       Assessment and Plan:  POD #7 Crani for SD  Prophylactic anticoagulation: yes         Start date/time: 4/19     Neuro following for seizure  Left MMA when able  CT4/16- improved, post op changes

## 2025-04-22 NOTE — PROGRESS NOTES
Critical Care Progress Note    Date of admission  4/14/2025    Chief Complaint  SDH and seizures    Hospital Course  Sen Vasquez is a 79-year-old male with PMH significant for EtOH abuse, CVA 10/2023, ischial rectal abscess, and CAD [facility] at [facility] 1/5/2025 where he was treated for colitis who presented from [facility] where he resides 4/14 with altered mental status.  Reportedly at baseline patient is A and O x 4.  Staff at the assisted living noted patient was A/O x 0 with GCS of 14.  Head CT showed acute on chronic left holohemispheric subdural hematoma with 6 mm subfalcine herniation and mass effect on the left lateral ventricle.  Neuroexam here showed a GCS of 9 (3/1/5) Dr. Wallis from neurosurgery was consulted and took patient to OR 4/15 for left frontoparietal craniotomy and evacuation of hematoma. He was transferred back to the ICU post-op.   4/16 - transfer order to floor but never physically left RICU. Began having rhythmic movements around 1700. CT head stable. Returned from CT in status. Intubated. Awaiting availability of cEEG.  4/17 - VD #2. Status on cEEG. Added Vimpat, Onfi, and Versed drip. Prop @ 80.  4/18 - VD #3. Burst suppressed on Versed @5, Prop @ 80, Keppra, Vimpat, Onfi.  4/19 - VD #4. Versed weaned off. Weaning Prop. No seizures on EEG.  Son, Sen, to bedside. Brought in POA paperwork which has been scanned into chart. GOC discussion. Will discuss with his brother and reconvene probably Monday.  4/20 - VD #5. Off all sedation. No seizures on cEEG.   4/21 - VD#6, no seizures, GOC talks: DNR    Interval Problem Update  Reviewed last 24 hour events:   - no events overnight   - eyes twitching-->focal motor seizures, but not seen on EEG   - cEEG: no further seizures   - w/d to lowers, and uppers   - SR 60-80s   - -130s   - afebrile   - RASS -4   - right nare, TFs at goal   - BMS: nothing   - UOP of 1 liters overnight, gonzalez   -  level 1 mobility   - VD#7   - ASV 8/30%   - no imaging today   - lovenox   - pepcid   - WBCs 8.4   - Hb 9.9   - platelets 313   - K 4.1   - creat 0.33   - Mg 1.6    Yesterday's Events:  No significant overnight events.   Temp max 99.3  SB/SR 55-65  -110's. No drips  Neuro: Does not open eyes, does not follow commands.   Eye twiching similar to previous seizure-like activity. Hit button. Per Dr. Otto, no seizures thus far today on cEEG.  RASS: -4, no sedation  Vent day #6: /8/30%  SAT/SBT: yes/yes. Failed SBT for apnea  Withdrawing RUE/RLE/LLE, no movements VIELKA Sellers, DENILSON TLC  Lovenox, no ABX  Mobility 1 - not eligible to advance due to cEEG.     Called and updated sonFuad. Updated sonSen, at bedside.    12:15 PM - son/POA Sen, came to bedside. He has had a chance to speak with his brother and they have decided to change code status to DNR, I OK. Sen asked what to anticipate in the upcoming days. We discussed allowing patient another day or 2 to see if his mentation improves and he remains seizure free. Discussed when patient is ready for extubation we will need to decide at that time if he would be reintubated or transition to DNR/DNI and possible comfort care if patient fails extubation.    Review of Systems  Review of Systems   Unable to perform ROS: Intubated        Vital Signs for last 24 hours   Pulse:  [61-73] 68  Resp:  [14-44] 17  BP: ()/(51-67) 113/59  SpO2:  [96 %-100 %] 98 %    Hemodynamic parameters for last 24 hours       Respiratory Information for the last 24 hours  Vent Mode: ASV  PEEP/CPAP: 8  MAP: 9.6  Control VTE (exp VT): 352    Physical Exam   Physical Exam  Vitals and nursing note reviewed.   Constitutional:       General: He is not in acute distress.     Appearance: He is underweight. He is ill-appearing. He is not toxic-appearing.      Interventions: He is sedated, intubated and restrained.   HENT:      Head: Normocephalic.      Right Ear: External ear normal.       Left Ear: External ear normal.      Nose: Nose normal. No rhinorrhea.      Mouth/Throat:      Lips: Pink.      Mouth: Mucous membranes are moist.      Comments: ETT in place  Eyes:      Pupils: Pupils are equal, round, and reactive to light.   Cardiovascular:      Rate and Rhythm: Normal rate and regular rhythm.      Pulses: Normal pulses.      Heart sounds: Normal heart sounds. No murmur heard.  Pulmonary:      Effort: He is intubated.      Breath sounds: No wheezing or rhonchi.   Abdominal:      General: Bowel sounds are increased.      Palpations: Abdomen is soft.      Tenderness: There is no abdominal tenderness. There is no guarding or rebound.   Musculoskeletal:      Cervical back: Normal range of motion and neck supple.      Right lower leg: No edema.      Left lower leg: No edema.   Skin:     General: Skin is warm and dry.      Capillary Refill: Capillary refill takes less than 2 seconds.      Findings: No rash.   Neurological:      GCS: GCS eye subscore is 1. GCS verbal subscore is 1. GCS motor subscore is 4.      Comments: 6T   Psychiatric:      Comments: Intubated         Medications  Current Facility-Administered Medications   Medication Dose Route Frequency Provider Last Rate Last Admin    lacosamide (Vimpat) tablet 200 mg  200 mg Enteral Tube BID Meche Richards, A.P.R.N.   200 mg at 04/22/25 0553    levETIRAcetam (Keppra) tablet 1,500 mg  1,500 mg Enteral Tube BID Meche Richards, A.P.R.N.   1,500 mg at 04/22/25 0553    acetaminophen (Tylenol) tablet 650 mg  650 mg Enteral Tube Q6HRS PRN Fatuma Gray        enoxaparin (Lovenox) inj 40 mg  40 mg Subcutaneous DAILY AT 1800 Meche Andradeer, A.P.R.N.   40 mg at 04/21/25 1704    clobazam (Onfi) tablet 15 mg  15 mg Enteral Tube BID José Miguel Luo D.O.   15 mg at 04/22/25 0552    norepinephrine (Levophed) 8 mg in 250 mL NS infusion (premix)  0-1 mcg/kg/min (Ideal) Intravenous Continuous Isac Hope M.D. 1.2 mL/hr at 04/20/25 0545 0.01 mcg/kg/min at  04/20/25 0545    hydrALAZINE (Apresoline) injection 10 mg  10 mg Intravenous Q4HRS PRN JESUSITA SernaRSundayNSunday   10 mg at 04/16/25 2302    [Held by provider] diazePAM (Valium) injection 5 mg  5 mg Intravenous Q5 MIN PRN José Holloway D.O.   5 mg at 04/21/25 2032    Respiratory Therapy Consult   Nebulization Continuous RT Mohamud Parada M.D.        senna-docusate (Pericolace Or Senokot S) 8.6-50 MG per tablet 2 Tablet  2 Tablet Enteral Tube BID Mohamud Parada M.D.   2 Tablet at 04/21/25 1704    And    polyethylene glycol/lytes (Miralax) Packet 1 Packet  1 Packet Enteral Tube QDAY PRN Mohamud Parada M.D.   1 Packet at 04/20/25 0507    And    magnesium hydroxide (Milk Of Magnesia) suspension 30 mL  30 mL Enteral Tube QDAY PRN Mohamud Parada M.D.   30 mL at 04/20/25 0507    And    bisacodyl (Dulcolax) suppository 10 mg  10 mg Rectal QDAY PRN Mohamud Parada M.D.   10 mg at 04/20/25 0647    MD Alert...ICU Electrolyte Replacement per Pharmacy   Other PHARMACY TO DOSE Mohamud Parada M.D.        lidocaine (Xylocaine) 1 % injection 2 mL  2 mL Tracheal Tube Q30 MIN PRN Mohamud Parada M.D.        Pharmacy Consult Request ...Pain Management Review 1 Each  1 Each Other PHARMACY TO DOSE SELENE Argueta MD ALERT...DO NOT ADMINISTER NSAIDS or ASPIRIN unless ORDERED By Neurosurgery 1 Each  1 Each Other PRN BRAULIO ArguetaPJO        ondansetron (Zofran) syringe/vial injection 4 mg  4 mg Intravenous Q4HRS PRN BRAULIO ArguetaP.FABI        sodium phosphate enema 1 Each  1 Each Rectal Once PRN BRAULIO ArguetaPJO        Pharmacy Consult: Enteral tube insertion - review meds/change route/product selection  1 Each Other PHARMACY TO DOSE JESUSITA SernaRSundayNSunday        thiamine (Vitamin B-1) tablet 100 mg  100 mg Enteral Tube DAILY ERROL Serna.P.R.N.   100 mg at 04/22/25 0553    multivitamin tablet 1 Tablet  1 Tablet Enteral Tube DAILY BRAULIO SernaP.R.N.   1 Tablet  at 04/22/25 0552    folic acid (Folvite) tablet 1 mg  1 mg Enteral Tube DAILY Meche Richards A.P.R.NSunday   1 mg at 04/22/25 0552       Fluids    Intake/Output Summary (Last 24 hours) at 4/22/2025 0650  Last data filed at 4/22/2025 0600  Gross per 24 hour   Intake 1560 ml   Output 1725 ml   Net -165 ml       Laboratory          Recent Labs     04/20/25  0400 04/21/25  0355 04/22/25  0415   SODIUM 137 136 137   POTASSIUM 4.1 3.9 4.1   CHLORIDE 101 100 101   CO2 28 27 26   BUN 11 16 15   CREATININE 0.36* 0.34* 0.33*   MAGNESIUM 1.7 2.1 1.6   PHOSPHORUS 2.7 2.8 3.4   CALCIUM 8.6 8.7 9.0     Recent Labs     04/20/25  0400 04/21/25  0355 04/22/25  0415   ALTSGPT <5 10 9   ASTSGOT 9* 17 20   ALKPHOSPHAT 72 109* 123*   TBILIRUBIN <0.2 0.2 0.2   PREALBUMIN  --  9.3*  --    GLUCOSE 109* 94 101*     Recent Labs     04/20/25 0400 04/21/25 0355 04/22/25  0415   WBC 10.4 10.2 8.4   NEUTSPOLYS 80.20* 75.50* 69.70   LYMPHOCYTES 7.50* 10.80* 11.90*   MONOCYTES 7.60 9.10 12.30   EOSINOPHILS 4.20 3.40 4.50   BASOPHILS 0.20 0.40 0.60   ASTSGOT 9* 17 20   ALTSGPT <5 10 9   ALKPHOSPHAT 72 109* 123*   TBILIRUBIN <0.2 0.2 0.2     Recent Labs     04/20/25  0400 04/21/25 0355 04/22/25  0415   RBC 3.16* 3.08* 3.19*   HEMOGLOBIN 9.6* 9.6* 9.9*   HEMATOCRIT 30.4* 29.1* 30.1*   PLATELETCT 274 300 313       Imaging  X-Ray:  I have personally reviewed the images and compared with prior images.    Assessment/Plan  * Status epilepticus (HCC)  Assessment & Plan  Focal status epilepticus 04/16/25, returned to ICU   Continue Keppra, Vimpat, Onfi  Remains off all sedation since AM 4/20  Neurology following  cEEG -->no seizures, will stop cEEG  Seizure and Aspiration Precautions    Anemia  Assessment & Plan  monitor for bleeding  follow CBC  transfuse for Hgb < 7 or < 8 if cardiac ischemia    On mechanically assisted ventilation (HCC)  Assessment & Plan  Intubated for status epilepticus on 4/16  Ventilator dependent respiratory failure  Modify  ventilator to optimize oxygenation, acid-base balance and ventilation  CXR as indicated: monitor lung volumes and tube/line placement  HOB > 30  Titrate FiO2 to keep sats greater than 92%  Chlorhexidine  goal CO2 35-40  Daily awakening and SBT trials unless contraindicated  ABCDEF bundle  I am actively adjusting ventilator based on clinical indicators and ABG's    Severe protein-calorie malnutrition (HCC)- (present on admission)  Assessment & Plan  Body mass index is 25.69 kg/m².  thiamine, vitamins  monitor for refeeding  dietary following    Subdural hematoma (HCC)- (present on admission)  Assessment & Plan  History of ETOH abuse. No reported recent falls. Not on AC  Serial neurologic exams  SBP goal < 160  HOB > 30  Maintain normonatremia, normothermia, normoglycemia, euvolemia  Neurosurgery following. S/p craniotomy with evacuation 4/15. Subdural drain removed 4/16  PT OT SLP  Aspiration and Fall Precautions  OK for Lovenox VTE per NSG 4/19  holding off on MMA for now. If patient improves and transfers out of ICU, can consider doing before discharge, or have done OP once discharged. (Discussed with IR 4/21).    Hypomagnesemia- (present on admission)  Assessment & Plan  Replete Mg to goal > 2    Acute encephalopathy- (present on admission)  Assessment & Plan  ?post ictal state vs SDH vs underlying dementia  Limit sedatives  Cont to correct all underlying metabolic disturbances     ETOH abuse- (present on admission)  Assessment & Plan  History of  Cont thiamine, MVI, and folate         VTE:  Lovenox  Ulcer: Not Indicated  Lines: Central Line  Ongoing indication addressed and Sellers Catheter  Ongoing indication addressed    I have performed a physical exam and reviewed and updated ROS and Plan today (4/22/2025). In review of yesterday's note (4/21/2025), there are no changes except as documented above.     Discussed patient condition and risk of morbidity and/or mortality with Family, RN, RT, Pharmacy, ,  Charge nurse / hot rounds, and neurology and neurosurgery    The patient remains critically ill.  I have assessed and reassessed the respiratory status and made ventilator adjustments based upon arterial blood gas analysis, ventilator waveforms and airway mechanics.  I have assessed and reassessed the blood pressure, hemodynamics, cardiovascular status. This patient remains at high risk for worsening cardiopulmonary dysfunction and death without the above critical care interventions.    Critical care time = 105 minutes in directly providing and coordinating critical care and extensive data review.  No time overlap and excludes procedures.

## 2025-04-22 NOTE — PROGRESS NOTES
Pt continuing to have frequent right eye twitches, cEEG button pushed.  Twitching seems to start when pt care is being performed.

## 2025-04-22 NOTE — PROGRESS NOTES
PALLIATIVE CARE SOCIAL WORK NOTE    Patient: Sen Vasquez  Age: 79  Gender: Male  MRN: [id number]  Insurance: [facility]  Date Admitted: 4/15/25  Date of Service: 4/22/25    LMSW sent advance directive/[facility]ST request to [facility]. They do not have any documents on file.    Radha Avelar LMSW  Palliative Care

## 2025-04-22 NOTE — CARE PLAN
The patient is Stable - Low risk of patient condition declining or worsening    Shift Goals  Clinical Goals: Q4 neuro, cEEG, SBP <160 and >90, MAP > 65  Patient Goals: JF - Pt RASS -4  Family Goals: JF - no family present    Progress made toward(s) clinical / shift goals:      Problem: Seizure Precautions  Goal: Implementation of seizure precautions  Outcome: Progressing     Problem: Pain - Standard  Goal: Alleviation of pain or a reduction in pain to the patient’s comfort goal  Outcome: Progressing     Problem: Neuro Status  Goal: Neuro status will remain stable or improve  Outcome: Progressing    Problem: Safety - Medical Restraint  Goal: Free from restraint(s) (Restraint for Interference with Medical Device)  Outcome: Met

## 2025-04-22 NOTE — PROGRESS NOTES
Pt continuing to have frequent R eye twitches and occasional L eye twitches since start of shift. Also, pupils noted to be pulsating. cEEG button pressed 2-3 times. Ordered PRN Valium given twice (see MAR) for suspected seizures. Dr. Ku notified. Per Dr. Ku, no seizures associated with button presses.

## 2025-04-22 NOTE — ASSESSMENT & PLAN NOTE
?post ictal state vs SDH vs underlying dementia  Sedatives off since 4/20  Cont to correct all underlying metabolic disturbances   No vast improvement to neuro status

## 2025-04-22 NOTE — CARE PLAN
Problem: Ventilation  Goal: Ability to achieve and maintain unassisted ventilation or tolerate decreased levels of ventilator support  Description: Target End Date:  4 days Document on Vent flowsheet1.  Support and monitor invasive and noninvasive mechanical ventilation2.  Monitor ventilator weaning response3.  Perform ventilator associated pneumonia prevention interventions4.  Manage ventilation therapy by monitoring diagnostic test results  Outcome: Progressing     Vent day 7, /8/30, patient 100% spont, Pinsp 7-8.

## 2025-04-22 NOTE — CONSULTS
"MRN: [id number]  Date of palliative consult: 2025  Reason for consult: Advance care planning  Referring provider: Dr. Latif  Location of consult: R112  Additional consulting services: Neurosurgery, neurology, Neuro IR    HPI:   Per chart review: Sen Vasquez is a 79 y.o. male with \"PMH significant for EtOH abuse, CVA 10/2023, ischial rectal abscess, and [facility] where he was treated for colitis who presented from [facility] where he resides  with altered mental status. Reportedly at baseline patient is A and O x 4. Staff at the [facility] living noted patient was A/O x 0 with GCS of 14. Head CT showed acute on chronic left holohemispheric subdural hematoma with 6 mm subfalcine herniation and mass effect on the left lateral ventricle. Neuroexam here showed a GCS of 9 (3/1/5) Dr. Wallis from neurosurgery was consulted and took patient to OR 4/15 for left frontoparietal craniotomy and evacuation of hematoma. He was transferred back to the ICU post-op.\"    Palliative care was consulted for advance care planning when the patient's mental status did not improve despite 2 days off sedation.    ROS:    ROS Unable to assess due to patient's mental status    PE:   Recent vital signs  BMI: Body mass index is 25.69 kg/m².    No data recorded.  Monitored Temp: 36.2 °C (97.2 °F)  Pulse  Av.4  Min: 46  Max: 109   Blood Pressure : 133/60       Physical Exam intubated, GCS E1 V1 M1 off all sedation, minimal withdrawal to painful stimuli, otherwise completely unresponsive, rhythmic eyelid twitches    ASSESSMENT/PLAN WITH SHARED DECISION MAKING:   PHYSICAL ASPECTS OF CARE  Palliative Performance Scale: Prior to arrival, unclear.  Now 10%    # Acute on chronic subdural hematoma  # Status epilepticus  # Myoclonus  # On mechanically assisted ventilation    SOCIAL ASPECTS OF CARE  History of alcohol abuse    SPIRITUAL ASPECTS OF CARE   Not " discussed    GOALS OF CARE/SERIOUS ILLNESS CONVERSATION  I saw Mr. Vasquez at the bedside, where he was intubated and GCS 3 off of all sedation, which was turned off the morning of 420.  Since he clearly could not make any medical decisions, I reached out to his son and financial power of  Sen to discuss goals of care. Sen could only speak briefly because he is at work at his job as a , so we arranged to have a conference call with him and his brother Fuad, who lives in [place], this afternoon. Fuad will be flying to [place] Thursday evening to see his father.  I updated Fuad and told him to begin thinking about whether his father would have wanted this sort of invasive and aggressive care when there is a very poor chance of recovery. Fuad voiced understanding and seems agreeable to transitioning to a comfort focused plan of care if his father continues to not wake up by the time he arrives in [place].    Unfortunately Sen was busier at work than he expected and not able to have a conference call this afternoon.  However, I was able to provide a medical update on his father's overall very poor prognosis and his continued unresponsiveness despite being off sedation for over 48 hours now.  On hearing that is increasingly likely that his father will never wake up and never be able to be weaned from the ventilator or walk again, he made comments indicating that he may be open to transitioning to a comfort focused plan of care.  However, he does not want to change the plan of care until his brother Fuad arrives and they are able to speak face-to-face, unless there is a significant decline in his father's condition.    Palliative care will continue to follow.    Code Status: DNAR, I okay    ACP Documents: Financial DPOA on file    65 minutes spent discussing advance care planning, this time excludes any other billed services.    Interval diagnostic studies and medical documentation entries pertinent  to this case were reviewed independently by me. This patient has at least one acute or chronic illness or injury that poses a threat to life or bodily function. This patient suffers from a high risk of morbidity from additional invasive diagnostic testing or intensive treatment. Discussion of recommendations and coordination of care undertaken with primary provider/treatment team.

## 2025-04-22 NOTE — PROCEDURES
VIDEO ELECTROENCEPHALOGRAM  REPORT      Referring provider: Dr. Robles    DOS:   4/22/2025  INDICATION:  Sen Vasquez 79 y.o. male presenting with status epilepticus   Study Duration: 3 Hours, 44 minutes    CURRENT ANTIEPILEPTIC REGIMEN:   ONFI 15 mg BID  Keppra 1500 mg BID  Vimpat 200 mg BID    IV MEDS  Propofol gtt> off  Versed gtt> off      TECHNIQUE: A 30-channel, 23 hrs video electroencephalogram (VEEG) was performed in accordance with the international 10-20 system. This digital study was reviewed in bipolar and referential montages. The recording examined the patient during sedated and comatose state.   The EEG was set up and taken down by a Neurodiagnostic technologist who performed education to patient and staff.   A minimum but not limited to 23 electrodes and 23 channel recording was setup and performed by Neurodiagnostic technologist.  Impedances, electrode integrity, and technical impressions were documented a minimum of every 2-24 hour period by a Neurodiagnostic Technologist and reviewed by Interpreting physician.   There was supervised withdrawal of the following medications: n/a    ACTIVATION PROCEDURES:   None     DESCRIPTION OF THE RECORD:  The background is mostly continuous, asymmetrical, and comprised of a mixture of theta/delta activity with intermittent overriding faster frequencies. No posterior dominant rhythm was seen. Poorly formed N2 sleep architecture was rarely seen.     ICTAL AND/OR INTERICTAL FINDINGS:   -Occasional right hemispheric and frequent to abundant independent left left hemispheric runs of lateralized periodic discharges at 0.5-2 Hz (left or right unilateral or bilateral LPDs).   Discharges were mostly bilateral and dyssynchronous, rarely occurring unilaterally and independently.    - No definitive electrographic seizures.  Clinical seizures are not entirely ruled out    EVENT(S):    None    EKG: sampling review of EKG recording demonstrated sinus rhythm.     INTERPRETATION:    This is an abnormal video EEG recording in the sedated state and comatose state.   -Moderate to severe background slowing, with intermittent runs of generalized periodic 1-2 Hz discharges with triphasic morphology, suggestive of diffuse/multifocal cerebral dysfunction and consistent with a non-specific encephalopathy. Clinical correlation recommended.   - Occasional right hemispheric and frequent to abundant independent left left hemispheric runs of lateralized periodic discharges at 0.5-2 Hz (left or right unilateral or bilateral LPDs).   Discharges were mostly bilateral and dyssynchronous, rarely occurring unilaterally and independently. These findings suggestive of an increased risk of focal onset seizure, or/and focal structural lesion.   -No clinical event  -No electrographic or clinical seizures seen          Oniel Otto MD  Department of Neurology at [facility]  Diplomate of the American Board of Psychiatry and Neurology, General Neurology  Diplomate of American Board of Psychiatry and Neurology, a Member Board of the American Board of Medical Subspecialties, Epilepsy  Director of [facility]'s Level III Comprehensive Epilepsy Program  Professor of Clinical Neurology, [facility] of [facility].   Number: [phone]  Fax: [phone]  E-mail: [email or web address]

## 2025-04-22 NOTE — WOUND TEAM
[facility]  Inpatient Services  Wound and Skin Care Follow-up    Admission Date: 4/14/2025     Last order of IP CONSULT TO WOUND CARE was found on 4/20/2025 from Hospital Encounter on 4/14/2025     HPI, PMH, SH: Reviewed    Past Surgical History:   Procedure Laterality Date    CRANIOTOMY Left 4/15/2025    Procedure: CRANIOTOMY;  Surgeon: Marcelo Wallis M.D.;  Location: SURGERY [facility];  Service: Neurosurgery    CORONARY ARTERY BYPASS, 1       Social History     Tobacco Use    Smoking status: Some Days     Types: Cigarettes    Smokeless tobacco: Never   Substance Use Topics    Alcohol use: Yes     Comment: 400 ml Arabella daily     Chief Complaint   Patient presents with    ALOC     Pt resides at [facility]. Found patient ALOC and called EMS.      Diagnosis: Subdural hematoma (HCC) [S06.5XAA]    Unit where seen by Wound Team: R112/00     WOUND FOLLOW UP RELATED TO:  sacrum re-consult       WOUND TEAM PLAN OF CARE - Frequency of Follow-up:   Nursing to follow dressing orders written for wound care. Contact wound team if area fails to progress, deteriorates or with any questions/concerns if something comes up before next scheduled follow up (See below as to whether wound is following and frequency of wound follow up)  Dressing changes by wound team:                   Weekly - sacrum    WOUND HISTORY:       Patient lives at a memory facility and patient fell out of bed and had a change in LOC.  Patient with significant history of ETOH     WOUND ASSESSMENT/LDA  Wound 04/20/25 Sacrum;Coccyx Medial (Active)   Date First Assessed/Time First Assessed: 04/20/25 0849   Present on Original Admission: No  Location: Sacrum;Coccyx  Wound Orientation: Medial sDTI      Assessments 4/21/2025  4:30 PM   Wound Image     Site Assessment Purple;Red   Periwound Assessment Red;Scar tissue;Hyperpigmented   Margins Defined edges;Attached edges   Closure None   Drainage Amount None   Treatments Cleansed;Nonselective  debridement   Wound Cleansing Foam Cleanser/Washcloth   Periwound Protectant Barrier Paste   Dressing Status Intact   Dressing Changed Observed   Dressing Options Offloading Dressing - Sacral   Dressing Change/Treatment Frequency Every 72 hrs, and As Needed   NEXT Dressing Change/Treatment Date 04/24/25   NEXT Weekly Photo (Inpatient Only) 04/28/25   Wound Team Following Weekly   Wound Length (cm) 2 cm   Wound Width (cm) 4 cm   Wound Surface Area (cm^2) 8 cm^2   Shape irregular        Vascular:    LAURA:   No results found.    Lab Values:    Lab Results   Component Value Date/Time    WBC 10.2 04/21/2025 03:55 AM    RBC 3.08 (L) 04/21/2025 03:55 AM    HEMOGLOBIN 9.6 (L) 04/21/2025 03:55 AM    HEMATOCRIT 29.1 (L) 04/21/2025 03:55 AM    CREACTPROT 6.08 (H) 04/21/2025 03:55 AM    SEDRATEWES 7 10/04/2023 11:39 PM    HBA1C 4.8 01/06/2025 04:39 AM    HBA1C 4.9 10/06/2023 05:36 PM    PLATELETCT 300 04/21/2025 03:55 AM         Culture Results show:  No results found for this or any previous visit (from the past 720 hours).    Pain Level/Medicated:  None, Tolerated without pain medication       INTERVENTIONS BY WOUND TEAM:  Chart and images reviewed. Discussed with bedside RN. All areas of concern (based on picture review, LDA review and discussion with bedside RN) have been thoroughly assessed. Documentation of areas based on significant findings. This RN in to assess patient. Performed standard wound care which includes appropriate positioning, dressing removal and non-selective debridement. Pictures and measurements obtained weekly if/when required.    Wound:  sacrococcygeal area  Preparation for Dressing removal: Removed without difficulty  Cleansed/Non-selectively Debrided with:  No rinse foam soap and Moist warm washcloth  Elinor wound: Cleansed with No rinse foam soap and Moist warm washcloth, Prepped with N/A  Primary Dressing:  sacral offloading dsg    Advanced Wound Care Discharge Planning  Number of Clinicians  necessary to complete wound care: 2 one for turning  Is patient requiring IV pain medications for dressing changes:  No   Length of time for dressing change 30 min. (This does not include chart review, pre-medication time, set up, clean up or time spent charting.)    Interdisciplinary consultation: Patient, Bedside RN (Randi), Charlee MINA (Wound RN).  Pressure injury and staging reviewed with Charlee MINA (Wound RN).    EVALUATION / RATIONALE FOR TREATMENT:     Date:  04/21/25  Wound Status:  Initial evaluation    Sacrococcygeal area discolored area to R side purple and non-blanching.   Date:  04/16/25  Wound Status:  Initial evaluation    Patient with POA sDTI to bilateral heels.  Offloading interventions placed.  Patient with small moisture fissure.  Condom catheter in place for moisture management , and barrier paste.  L. Hallux with dry callous.  Okay to be open to air.             Goals: Steady decrease in wound area and depth weekly.    NURSING PLAN OF CARE ORDERS:  No new orders this visit    NUTRITION RECOMMENDATIONS   Wound Team Recommendations:  N/A     DIET ORDERS (From admission to next 24h)       Start     Ordered    04/21/25 1037  Diet: Diet Tube Feed; Formula: Other - Specify formula comments in the field to the right (Vital AF 1.2); Goal Rate (mL/Hour): 55; Duration: 24 HR  ALL MEALS        Question Answer Comment   Diet Diet Tube Feed    Formula: Other - Specify formula comments in the field to the right Vital AF 1.2   Goal Rate (mL/Hour) 55    Route Enteral Tube    Duration 24 HR        04/21/25 1037    04/16/25 7060  Diet NPO Restrict to: Strict (okay to receive meds through the NG/OG tube)  ALL MEALS        Question Answer Comment   Type: Now    Diet NPO Restrict to: Strict okay to receive meds through the NG/OG tube       04/16/25 8022                    PREVENTATIVE INTERVENTIONS:   Q shift Edy - performed per nursing policy  Q shift pressure point assessments - performed per nursing  policy    Surface/Positioning  ICU Low Airloss - Currently in Place  Reposition q 2 hours with wedges - Currently in Place  Z David Pillow - Currently in Place    Offloading/Redistribution  Sacral offloading dressing (Silicone dressing) - Currently in Place  Heel offloading dressing (Silicone dressing) - Currently in Place  Heel float boots (Prevalon boot) - Currently in Place      Respiratory  Anchorfast - Currently in Place    Containment/Moisture Prevention    Sellers Catheter - Currently in Place  Barrier wipes - Currently in Place  Barrier paste - Currently in Place    Anticipated discharge plans:  TBD        Vac Discharge Needs:  Vac Discharge plan is purely a recommendation from wound team and not a requirement for discharge unless otherwise stated by physician.  Not Applicable Pt not on a wound vac

## 2025-04-22 NOTE — CARE PLAN
Problem: Ventilation  Goal: Ability to achieve and maintain unassisted ventilation or tolerate decreased levels of ventilator support  Description: Target End Date:  4 days Document on Vent flowsheet1.  Support and monitor invasive and noninvasive mechanical ventilation2.  Monitor ventilator weaning response3.  Perform ventilator associated pneumonia prevention interventions4.  Manage ventilation therapy by monitoring diagnostic test results  Outcome: Not Progressing     Ventilator Daily Summary    Vent Day #7  Airway: 8.0 ETT @24    Ventilator settings:  - 8 - 30%  Weaning trials: No  Respiratory Procedures: No    Plan: Continue current ventilator settings and wean mechanical ventilation as tolerated per physician orders.

## 2025-04-23 NOTE — CARE PLAN
The patient is Watcher - Medium risk of patient condition declining or worsening    Shift Goals  Clinical Goals: Q4 Neuro, SBP<160  Patient Goals: JF  Family Goals: JF    Progress made toward(s) clinical / shift goals:      Problem: Optimal Care for Alcohol Withdrawal  Goal: Optimal Care for the alcohol withdrawal patient  Outcome: Progressing     Problem: Seizure Precautions  Goal: Implementation of seizure precautions  Outcome: Progressing     Problem: Pain - Standard  Goal: Alleviation of pain or a reduction in pain to the patient’s comfort goal  Outcome: Progressing     Problem: Skin Integrity  Goal: Skin integrity is maintained or improved  Outcome: Progressing     Problem: Fall Risk  Goal: Patient will remain free from falls  Outcome: Progressing       Patient is not progressing towards the following goals:

## 2025-04-23 NOTE — PROGRESS NOTES
Referring Physician: Patti Latif M.D.    S:  Frequent; near continuous right eye twitching. No progression or propagation. Goals of care discussions in progress.     Scheduled Medications   Medication Dose Frequency    famotidine  20 mg BID    lacosamide  200 mg BID    levETIRAcetam  1,500 mg BID    enoxaparin (LOVENOX) injection  40 mg DAILY AT 1800    clobazam  15 mg BID    senna-docusate  2 Tablet BID    MD Alert...Adult ICU Electrolyte Replacement per Pharmacy   PHARMACY TO DOSE    Pharmacy Consult Request  1 Each PHARMACY TO DOSE    Pharmacy  1 Each PHARMACY TO DOSE    thiamine  100 mg DAILY    multivitamin  1 Tablet DAILY    folic acid  1 mg DAILY     O:    Vitals:    04/23/25 1031   BP:    Pulse: (!) 49   Resp: 18   Temp:    SpO2: 99%     Off sedation since 6 am (4/20). Intubated and Ventilated.    Level of consciousness: Responsive to noxious stimuli. Grimaces to pain and withdrawals in the bilateral lower extremities.     Pupils: Reactive to bright light (OU)  Oculomotor: Conjugate. No gaze deviation. No nystagmus.    Face appears symmetric    Motor: No spontaneous movements. Frequent intermittent right eye twitching.   Deep tendon reflexes: Not tested.   Plantar responses: Possible triple flexion responses bilaterally.     cEEG INTERPRETATION:   This is an abnormal video EEG recording in the sedated state and comatose state.   -Moderate to severe background slowing, with intermittent runs of generalized periodic 1-2 Hz discharges with triphasic morphology, suggestive of diffuse/multifocal cerebral dysfunction and consistent with a non-specific encephalopathy. Clinical correlation recommended.   - Occasional right hemispheric and frequent to abundant independent left left hemispheric runs of lateralized periodic discharges at 0.5-2 Hz (left or right unilateral or bilateral LPDs).   Discharges were mostly bilateral and dyssynchronous, rarely occurring unilaterally and independently. These findings  suggestive of an increased risk of focal onset seizure, or/and focal structural lesion.   -No clinical event  -No electrographic or clinical seizures seen    Impression & Recommendations:    Left acute-on-chronic subdural status post crani awaiting MMA embo when able.   Focal status epilepticus (left hemispheric onset) seemingly controlled on current anti-seizure medication regimen and remains off sedation.   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 Scalp EEG negative.    I do not advise escalating antiseizure medications or adding sedation at this time. Follow clinically for propagation/progression of motor activity.      - Continue Keppra 1500 mg twice daily.  - Continue Vimpat 200 mg twice daily.  - Continue Onfi 15 mg twice daily.  - Seizure precautions.   - Avoid sedation.  - Goals of care discussion in progress.     Neurology will sign-off. Please reach out with any questions.     I provided a total of 35 minutes of acute neurologic care for this patient encounter reviewing medical records, diagnostic studies, direct face-to-face time with the patient and/or family, documentation, communicating and coordinating plan of care.      Rohit Hernandez MD  Neurohospitalist, Acute Care Services          Please note that this dictation was created using voice recognition software.  I have made every reasonable attempt to correct obvious errors, but I expect that there are errors of grammar and possibly content that I did not discover before finalizing the note.

## 2025-04-23 NOTE — CARE PLAN
Problem: Ventilation  Goal: Ability to achieve and maintain unassisted ventilation or tolerate decreased levels of ventilator support  Description: Target End Date:  4 days Document on Vent flowsheet1.  Support and monitor invasive and noninvasive mechanical ventilation2.  Monitor ventilator weaning response3.  Perform ventilator associated pneumonia prevention interventions4.  Manage ventilation therapy by monitoring diagnostic test results  Outcome: Progressing     Vent day 8: /8/30%

## 2025-04-23 NOTE — PROGRESS NOTES
Neurosurgery Progress Note    Subjective:  Sedation off still intermittent eye twitching    Exam:  Intubated. Pinpoint pupils. EO to pain, WDx4.CDI with staples    BP  Min: 95/51  Max: 133/60  Pulse  Av.6  Min: 49  Max: 79  Resp  Av.6  Min: 9  Max: 58  Monitored Temp 2  Av.7 °C (98 °F)  Min: 36.2 °C (97.2 °F)  Max: 37.2 °C (99 °F)  SpO2  Av.5 %  Min: 96 %  Max: 100 %    No data recorded    Recent Labs     25  0355 255 25   WBC 10.2 8.4 8.1   RBC 3.08* 3.19* 3.28*   HEMOGLOBIN 9.6* 9.9* 10.1*   HEMATOCRIT 29.1* 30.1* 30.6*   MCV 94.5 94.4 93.3   MCH 31.2 31.0 30.8   MCHC 33.0 32.9 33.0   RDW 50.5* 51.4* 50.4*   PLATELETCT 300 313 309   MPV 9.5 9.8 10.1     Recent Labs     25  0355 255 25   SODIUM 136 137 134*   POTASSIUM 3.9 4.1 3.8   CHLORIDE 100 101 99   CO2 27 26 26   GLUCOSE 94 101* 105*   BUN 16 15 15   CREATININE 0.34* 0.33* 0.35*   CALCIUM 8.7 9.0 9.1                   Intake/Output                         25 0700 - 25 0659 25 07 - 25 0659      Total  Total                 Intake    I.V.  49.7  -- 49.7  48.5  -- 48.5    Magnesium Sulfate Volume 49.7 -- 49.7 48.5 -- 48.5    Other  180  -- 180  100  -- 100    Medications (PO/Enteral Liquids) 180 -- 180 100 -- 100    Enteral  750  750 1500  250  -- 250    Free Water / Tube Flush 90 90 180 30 -- 30    Enteral Volume (Enteral Tube 04/15/25 Cortrak - Gastric 10 Fr. Right nare)  220 -- 220    Total Intake 979.7 750 1729.7 398.5 -- 398.5       Output    Urine  1160  855   1130  -- 1130    Output (mL) (Urethral Catheter Temperature probe) 0501 581 4884 1130 -- 113    Stool  --  -- --  --  -- --    Number of Times Stooled 0 x 1 x 1 x 0 x -- 0 x    Total Output 9048 740 9136 1130 -- 1130       Net I/O     -180.3 -105 -285.3 -731.5 -- -731.5              Intake/Output Summary (Last 24 hours) at 2025 1052  Last  data filed at 4/23/2025 1000  Gross per 24 hour   Intake 1828.5 ml   Output 2985 ml   Net -1156.5 ml             famotidine  20 mg BID    lacosamide  200 mg BID    levETIRAcetam  1,500 mg BID    acetaminophen  650 mg Q6HRS PRN    enoxaparin (LOVENOX) injection  40 mg DAILY AT 1800    clobazam  15 mg BID    hydrALAZINE  10 mg Q4HRS PRN    [Held by provider] diazePAM  5 mg Q5 MIN PRN    Respiratory Therapy Consult   Continuous RT    senna-docusate  2 Tablet BID    And    polyethylene glycol/lytes  1 Packet QDAY PRN    And    magnesium hydroxide  30 mL QDAY PRN    And    bisacodyl  10 mg QDAY PRN    MD Alert...Adult ICU Electrolyte Replacement per Pharmacy   PHARMACY TO DOSE    lidocaine  2 mL Q30 MIN PRN    Pharmacy Consult Request  1 Each PHARMACY TO DOSE    MD ALERT...DO NOT ADMINISTER NSAIDS or ASPIRIN unless ORDERED By Neurosurgery  1 Each PRN    ondansetron  4 mg Q4HRS PRN    sodium phosphate  1 Each Once PRN    Pharmacy  1 Each PHARMACY TO DOSE    thiamine  100 mg DAILY    multivitamin  1 Tablet DAILY    folic acid  1 mg DAILY       Assessment and Plan:  POD #8 Crani for SD  Prophylactic anticoagulation: yes         Start date/time: 4/19     Neuro following for seizure  Left MMA when able  CT4/16- improved, post op changes

## 2025-04-23 NOTE — PROGRESS NOTES
4 Eyes Skin Assessment Completed by BROOKE Marquez and BROOKE Acharya.    Head Scab, Redness, and Incision  Ears Blanching R and L behind ear scab  Nose WDL  Mouth WDL  Neck WDL  Breast/Chest WDL  Shoulder Blades Blanching  Spine WDL  (R) Arm/Elbow/Hand Blanching and Bruising  (L) Arm/Elbow/Hand Bruising  Abdomen WDL  Groin Blanching  Scrotum/Coccyx/Buttocks Blanching, Moisture Fissure, and Discoloration  (R) Leg Bruising and Abrasion  (L) Leg Bruising  (R) Heel/Foot/Toe Non-Blanching, Discoloration, and Bruising  (L) Heel/Foot/Toe Redness, Blanching, and Bruising          Devices In Places ECG, Blood Pressure Cuff, Pulse Ox, Sellers, SCD's, ET Tube, OG/NG, and Central Line      Interventions In Place Heel Mepilex, Sacral Mepilex, Heel Float Boots, TAP System, Pillows, Elbow Mepilex, Q2 Turns, Low Air Loss Mattress, ZFlo Pillow, Heels Loaded W/Pillows, and Pressure Redistribution Mattress    Possible Skin Injury Yes    Pictures Uploaded Into Epic Yes  Wound Consult Placed Yes  RN Wound Prevention Protocol Ordered Yes

## 2025-04-23 NOTE — PROGRESS NOTES
Critical Care Progress Note    Date of admission  4/14/2025    Chief Complaint  SDH and seizures    Hospital Course  Sen Vasquez is a 79-year-old male with PMH significant for EtOH abuse, CVA 10/2023, ischial rectal abscess, and CAD [facility] at [facility] 1/5/2025 where he was treated for colitis who presented from [facility] where he resides 4/14 with altered mental status.  Reportedly at baseline patient is A and O x 4.  Staff at the [facility] living noted patient was A/O x 0 with GCS of 14.  Head CT showed acute on chronic left holohemispheric subdural hematoma with 6 mm subfalcine herniation and mass effect on the left lateral ventricle.  Neuroexam here showed a GCS of 9 (3/1/5) Dr. Wallis from neurosurgery was consulted and took patient to OR 4/15 for left frontoparietal craniotomy and evacuation of hematoma. He was transferred back to the ICU post-op.   4/16 - transfer order to floor but never physically left RICU. Began having rhythmic movements around 1700. CT head stable. Returned from CT in status. Intubated. Awaiting availability of cEEG.  4/17 - VD #2. Status on cEEG. Added Vimpat, Onfi, and Versed drip. Prop @ 80.  4/18 - VD #3. Burst suppressed on Versed @5, Prop @ 80, Keppra, Vimpat, Onfi.  4/19 - VD #4. Versed weaned off. Weaning Prop. No seizures on EEG.  Son, Sen, to bedside. Brought in POA paperwork which has been scanned into chart. GOC discussion. Will discuss with his brother and reconvene probably Monday.  4/20 - VD #5. Off all sedation. No seizures on cEEG.   4/21 - VD#6, no seizures, GOC talks: DNR  4/22 - VD#7, no seizures on cEEG-->will D/C, no sedation and not waking    Interval Problem Update  Reviewed last 24 hour events:   - no overnight events   - no sedation, RASS -4   - w/d to upper/lowers, not waking and following   - Tmax 99.4   - SB/SR 40-70s   - SBP 90-110s   - TFs at goal   - BM this am   - UOP of 750cc overnight, gonzalez   -  right IJ TLC   - level 1 mobility   - VD#8   - CXR(reviewed): cephalization   - ASV-->failed SAT, sometimes apneic   - lovenox   - pepcid   - WBCs 8.1   - Hb 10.1   - K 3.8   - Na 134   - creat 0.34   - Mg 1.7    Yesterday's Events:   - no events overnight   - eyes twitching-->focal motor seizures, but not seen on EEG   - cEEG: no further seizures   - w/d to lowers, and uppers   - SR 60-80s   - -130s   - afebrile   - RASS -4   - right nare, TFs at goal   - BMS: nothing   - UOP of 1 liters overnight, gonzalez   - level 1 mobility   - VD#7   - ASV 8/30%   - no imaging today   - lovenox   - pepcid   - WBCs 8.4   - Hb 9.9   - platelets 313   - K 4.1   - creat 0.33   - Mg 1.6      Review of Systems  Review of Systems   Unable to perform ROS: Intubated        Vital Signs for last 24 hours   Pulse:  [63-79] 64  Resp:  [9-58] 15  BP: ()/(49-63) 108/55  SpO2:  [96 %-100 %] 98 %    Hemodynamic parameters for last 24 hours       Respiratory Information for the last 24 hours  Vent Mode: ASV  PEEP/CPAP: 8  P Support (PS + PEEP): 11  MAP: 10  Control VTE (exp VT): 428    Physical Exam   Physical Exam  Vitals and nursing note reviewed.   Constitutional:       General: He is not in acute distress.     Appearance: He is underweight. He is ill-appearing. He is not toxic-appearing.      Interventions: He is sedated, intubated and restrained.   HENT:      Head: Normocephalic.      Right Ear: External ear normal.      Left Ear: External ear normal.      Nose: Nose normal. No rhinorrhea.      Mouth/Throat:      Lips: Pink.      Mouth: Mucous membranes are moist.      Comments: ETT in place  Eyes:      Pupils: Pupils are equal, round, and reactive to light.   Cardiovascular:      Rate and Rhythm: Normal rate and regular rhythm.      Pulses: Normal pulses.      Heart sounds: Normal heart sounds. No murmur heard.  Pulmonary:      Effort: He is intubated.      Breath sounds: No wheezing or rhonchi.   Abdominal:      General: Bowel  sounds are normal.      Palpations: Abdomen is soft.      Tenderness: There is no abdominal tenderness. There is no guarding or rebound.   Musculoskeletal:      Cervical back: Normal range of motion and neck supple.      Right lower leg: No edema.      Left lower leg: No edema.   Skin:     General: Skin is warm and dry.      Capillary Refill: Capillary refill takes less than 2 seconds.      Findings: No rash.   Neurological:      GCS: GCS eye subscore is 1. GCS verbal subscore is 1. GCS motor subscore is 4.      Comments: 6T, eye lid twitching still noted bilaterally right > left   Psychiatric:      Comments: Intubated         Medications  Current Facility-Administered Medications   Medication Dose Route Frequency Provider Last Rate Last Admin    lacosamide (Vimpat) tablet 200 mg  200 mg Enteral Tube BID Meche Richards, A.P.R.N.   200 mg at 04/23/25 0518    levETIRAcetam (Keppra) tablet 1,500 mg  1,500 mg Enteral Tube BID Meche Richards, A.P.R.N.   1,500 mg at 04/23/25 0518    acetaminophen (Tylenol) tablet 650 mg  650 mg Enteral Tube Q6HRS PRN Fatuma Gray        enoxaparin (Lovenox) inj 40 mg  40 mg Subcutaneous DAILY AT 1800 Meche Richards, A.P.R.N.   40 mg at 04/22/25 1726    clobazam (Onfi) tablet 15 mg  15 mg Enteral Tube BID José Miguel Luo, D.O.   15 mg at 04/23/25 0518    hydrALAZINE (Apresoline) injection 10 mg  10 mg Intravenous Q4HRS PRN Meche Richards, A.P.R.N.   10 mg at 04/16/25 2302    [Held by provider] diazePAM (Valium) injection 5 mg  5 mg Intravenous Q5 MIN PRN José Holloway, D.O.   5 mg at 04/21/25 2032    Respiratory Therapy Consult   Nebulization Continuous RT Mohamud Parada M.D.        senna-docusate (Pericolace Or Senokot S) 8.6-50 MG per tablet 2 Tablet  2 Tablet Enteral Tube BID Mohamud Parada M.D.   2 Tablet at 04/22/25 1726    And    polyethylene glycol/lytes (Miralax) Packet 1 Packet  1 Packet Enteral Tube QDAY PRN Mohamud Parada M.D.   1 Packet at 04/20/25 0503     And    magnesium hydroxide (Milk Of Magnesia) suspension 30 mL  30 mL Enteral Tube QDAY PRN Mohamud Parada M.D.   30 mL at 04/20/25 0507    And    bisacodyl (Dulcolax) suppository 10 mg  10 mg Rectal QDAY PRN Mohamud Parada M.D.   10 mg at 04/20/25 0647    MD Alert...ICU Electrolyte Replacement per Pharmacy   Other PHARMACY TO DOSE Mohamud Parada M.D.        lidocaine (Xylocaine) 1 % injection 2 mL  2 mL Tracheal Tube Q30 MIN PRN Mohamud Parada M.D.        Pharmacy Consult Request ...Pain Management Review 1 Each  1 Each Other PHARMACY TO DOSE SELENE Argueta MD ALERT...DO NOT ADMINISTER NSAIDS or ASPIRIN unless ORDERED By Neurosurgery 1 Each  1 Each Other PRN BRAULIO ArguetaP.NSunday        ondansetron (Zofran) syringe/vial injection 4 mg  4 mg Intravenous Q4HRS PRN BRAULIO ArguetaP.NSunday        sodium phosphate enema 1 Each  1 Each Rectal Once PRN BRAULIO ArguetaP.FABI        Pharmacy Consult: Enteral tube insertion - review meds/change route/product selection  1 Each Other PHARMACY TO DOSE Meche Richards A.P.R.N.        thiamine (Vitamin B-1) tablet 100 mg  100 mg Enteral Tube DAILY ERROL Serna.P.R.N.   100 mg at 04/23/25 0518    multivitamin tablet 1 Tablet  1 Tablet Enteral Tube DAILY Meche Richards A.P.R.N.   1 Tablet at 04/23/25 0518    folic acid (Folvite) tablet 1 mg  1 mg Enteral Tube DAILY Meche Richards A.P.R.N.   1 mg at 04/23/25 0518       Fluids    Intake/Output Summary (Last 24 hours) at 4/23/2025 0648  Last data filed at 4/23/2025 0600  Gross per 24 hour   Intake 1729.73 ml   Output 2015 ml   Net -285.27 ml       Laboratory          Recent Labs     04/21/25  0355 04/22/25  0415 04/23/25  0417   SODIUM 136 137 134*   POTASSIUM 3.9 4.1 3.8   CHLORIDE 100 101 99   CO2 27 26 26   BUN 16 15 15   CREATININE 0.34* 0.33* 0.35*   MAGNESIUM 2.1 1.6 1.7   PHOSPHORUS 2.8 3.4 3.1   CALCIUM 8.7 9.0 9.1     Recent Labs     04/21/25  0355 04/22/25  0410  04/23/25 0417   ALTSGPT 10 9 9   ASTSGOT 17 20 18   ALKPHOSPHAT 109* 123* 129*   TBILIRUBIN 0.2 0.2 0.2   PREALBUMIN 9.3*  --   --    GLUCOSE 94 101* 105*     Recent Labs     04/21/25 0355 04/22/25 0415 04/23/25 0417   WBC 10.2 8.4 8.1   NEUTSPOLYS 75.50* 69.70 72.00   LYMPHOCYTES 10.80* 11.90* 10.20*   MONOCYTES 9.10 12.30 11.40   EOSINOPHILS 3.40 4.50 4.40   BASOPHILS 0.40 0.60 0.60   ASTSGOT 17 20 18   ALTSGPT 10 9 9   ALKPHOSPHAT 109* 123* 129*   TBILIRUBIN 0.2 0.2 0.2     Recent Labs     04/21/25 0355 04/22/25 0415 04/23/25 0417   RBC 3.08* 3.19* 3.28*   HEMOGLOBIN 9.6* 9.9* 10.1*   HEMATOCRIT 29.1* 30.1* 30.6*   PLATELETCT 300 313 309       Imaging  X-Ray:  I have personally reviewed the images and compared with prior images.    Assessment/Plan  * Status epilepticus (HCC)  Assessment & Plan  Focal status epilepticus 04/16/25, returned to ICU   Continue Keppra, Vimpat, Onfi  Remains off all sedation since AM 4/20  Neurology following  cEEG -->no seizures, stopped cEEG on 4/22  Seizure and Aspiration Precautions    Anemia  Assessment & Plan  monitor for bleeding  follow CBC  transfuse for Hgb < 7 or < 8 if cardiac ischemia.    On mechanically assisted ventilation (HCC)  Assessment & Plan  Intubated for status epilepticus on 4/16  Ventilator dependent respiratory failure  Modify ventilator to optimize oxygenation, acid-base balance and ventilation  CXR as indicated: monitor lung volumes and tube/line placement  HOB > 30  Titrate FiO2 to keep sats greater than 92%  Chlorhexidine  goal CO2 35-40  Daily awakening and SBT trials unless contraindicated  ABCDEF bundle  I am actively adjusting ventilator based on clinical indicators and ABG's  Started forced diuresis as needed     Severe protein-calorie malnutrition (HCC)- (present on admission)  Assessment & Plan  Body mass index is 25.69 kg/m².  thiamine, vitamins  monitor for refeeding  dietary following    Subdural hematoma (HCC)- (present on  admission)  Assessment & Plan  History of ETOH abuse. No reported recent falls. Not on AC  Serial neurologic exams-->no change to neuro exam  SBP goal < 160  HOB > 30  Maintain normonatremia, normothermia, normoglycemia, euvolemia  Neurosurgery following. S/p craniotomy with evacuation 4/15. Subdural drain removed 4/16  PT OT SLP  Aspiration and Fall Precautions  OK for Lovenox VTE per NSG 4/19  Holding off on MMA for now. If patient improves and transfers out of ICU, can consider doing before discharge, or have done OP once discharged. (Discussed with IR 4/21).    Hypomagnesemia- (present on admission)  Assessment & Plan  Replete Mg to goal > 2 again    Acute encephalopathy- (present on admission)  Assessment & Plan  ?post ictal state vs SDH vs underlying dementia  Sedatives off since 4/20  Cont to correct all underlying metabolic disturbances     ETOH abuse- (present on admission)  Assessment & Plan  History of  Cont thiamine, MVI, and folate         VTE:  Lovenox  Ulcer: Not Indicated  Lines: Central Line  Ongoing indication addressed and Sellers Catheter  Ongoing indication addressed    I have performed a physical exam and reviewed and updated ROS and Plan today (4/23/2025). In review of yesterday's note (4/22/2025), there are no changes except as documented above.     Discussed patient condition and risk of morbidity and/or mortality with Family, RN, RT, Pharmacy, , Charge nurse / hot rounds, and neurology and neurosurgery    The patient remains critically ill.  I have assessed and reassessed the respiratory status and made ventilator adjustments based upon arterial blood gas analysis, ventilator waveforms and airway mechanics.  I have assessed and reassessed the blood pressure, hemodynamics, cardiovascular status. This patient remains at high risk for worsening cardiopulmonary dysfunction and death without the above critical care interventions.    Critical care time = 106 minutes in directly  providing and coordinating critical care and extensive data review.  No time overlap and excludes procedures.

## 2025-04-23 NOTE — CARE PLAN
The patient is Stable - Low risk of patient condition declining or worsening    Shift Goals  Clinical Goals: Q4 Neuro, SBP <160  Patient Goals: JF  Family Goals: Updates    Progress made toward(s) clinical / shift goals:    Problem: Optimal Care for Alcohol Withdrawal  Goal: Optimal Care for the alcohol withdrawal patient  Outcome: Progressing     Problem: Seizure Precautions  Goal: Implementation of seizure precautions  Outcome: Progressing     Problem: Risk for Aspiration  Goal: Patient's risk for aspiration will be absent or decrease  Outcome: Progressing     Problem: Pain - Standard  Goal: Alleviation of pain or a reduction in pain to the patient’s comfort goal  Outcome: Progressing     Problem: Neuro Status  Goal: Neuro status will remain stable or improve  Outcome: Progressing     Problem: Respiratory  Goal: Patient will achieve/maintain optimum respiratory ventilation and gas exchange  Outcome: Progressing     Problem: Fall Risk  Goal: Patient will remain free from falls  Outcome: Progressing       Patient is not progressing towards the following goals:  Problem: Self Care  Goal: Patient will have the ability to perform ADLs independently or with assistance (bathe, groom, dress, toilet and feed)  Outcome: Not Progressing     Problem: Mobility  Goal: Patient's capacity to carry out activities will improve  Outcome: Not Progressing

## 2025-04-24 NOTE — WOUND TEAM
[facility]  Inpatient Services  Wound and Skin Care Follow-up    Admission Date: 4/14/2025     Last order of IP CONSULT TO WOUND CARE was found on 4/22/2025 from Hospital Encounter on 4/14/2025     HPI, PMH, SH: Reviewed    Past Surgical History:   Procedure Laterality Date    CRANIOTOMY Left 4/15/2025    Procedure: CRANIOTOMY;  Surgeon: Marcelo Wallis M.D.;  Location: SURGERY [facility];  Service: Neurosurgery    CORONARY ARTERY BYPASS, 1       Social History     Tobacco Use    Smoking status: Some Days     Types: Cigarettes    Smokeless tobacco: Never   Substance Use Topics    Alcohol use: Yes     Comment: 400 ml Arabella daily     Chief Complaint   Patient presents with    ALOC     Pt resides at [facility]. Found patient ALOC and called EMS.      Diagnosis: Subdural hematoma (HCC) [S06.5XAA]    Unit where seen by Wound Team: R112/00     WOUND FOLLOW UP RELATED TO:  Consult for scalp, follow up on sacrum, coccyx, and BL heels       WOUND TEAM PLAN OF CARE - Frequency of Follow-up:   Nursing to follow dressing orders written for wound care. Contact wound team if area fails to progress, deteriorates or with any questions/concerns if something comes up before next scheduled follow up (See below as to whether wound is following and frequency of wound follow up)  Dressing changes by wound team:                   Weekly - Sacrum & coccyx  Not following, consult as needed  - Scalp & BL heels    WOUND HISTORY:       Patient lives at a memory facility and patient fell out of bed and had a change in LOC.  Patient with significant history of ETOH     WOUND ASSESSMENT/LDA  Wound 04/20/25 Sacrum;Coccyx Medial sDTI (Active)   Date First Assessed/Time First Assessed: 04/20/25 0849   Present on Original Admission: No  Location: Sacrum;Coccyx  Wound Orientation: Medial  Wound Description (Comments): sDTI      Assessments 4/24/2025 11:00 AM   Wound Image     Site Assessment Red;Purple   Periwound Assessment  Fragile;Intact   Margins Defined edges;Attached edges   Closure Adhesive bandage   Drainage Amount Scant   Drainage Description Serosanguineous   Treatments Offloading;Site care   Dressing Status Clean;Dry;Intact   Dressing Changed Observed   Dressing Cleansing/Solutions Not Applicable   Dressing Options Offloading Dressing - Sacral   Dressing Change/Treatment Frequency Every 72 hrs, and As Needed   NEXT Dressing Change/Treatment Date 04/27/25   NEXT Weekly Photo (Inpatient Only) 05/01/25   Wound Team Following Weekly   Wound Length (cm) 2.5 cm   Wound Width (cm) 1 cm   Wound Depth (cm) 0 cm   Wound Surface Area (cm^2) 2.5 cm^2   Wound Volume (cm^3) 0 cm^3   Shape Irregular   Wound Odor None   WOUND NURSE ONLY - Time Spent with Patient (mins) 45       Wound 04/22/25 Partial Thickness Wound Head;Forehead mx abrasions from EEG electrodes (Active)   Date First Assessed/Time First Assessed: 04/22/25 1030   Primary Wound Type: Partial Thickness Wound  Location: Head;Forehead  Wound Description (Comments): mx abrasions from EEG electrodes      Assessments 4/24/2025 11:00 AM   Wound Image     Site Assessment Pink;Scabbed   Periwound Assessment Clean;Dry;Intact   Margins Attached edges;Defined edges   Closure Open to air   Drainage Amount None   Periwound Protectant Antibiotic Ointment (Ordered)   Dressing Status Open to Air   NEXT Weekly Photo (Inpatient Only) 05/01/25   Wound Team Following Not following   Non-staged Wound Description Partial thickness                Vascular:    LAURA:   No results found.    Lab Values:    Lab Results   Component Value Date/Time    WBC 9.5 04/24/2025 03:32 AM    RBC 3.20 (L) 04/24/2025 03:32 AM    HEMOGLOBIN 9.8 (L) 04/24/2025 03:32 AM    HEMATOCRIT 30.2 (L) 04/24/2025 03:32 AM    CREACTPROT 6.08 (H) 04/21/2025 03:55 AM    SEDRATEWES 7 10/04/2023 11:39 PM    HBA1C 4.8 01/06/2025 04:39 AM    HBA1C 4.9 10/06/2023 05:36 PM    PLATELETCT 282 04/24/2025 03:32 AM         Culture Results  show:  No results found for this or any previous visit (from the past 720 hours).    Pain Level/Medicated:  None, Tolerated without pain medication       INTERVENTIONS BY WOUND TEAM:  Chart and images reviewed. Discussed with bedside RN. All areas of concern (based on picture review, LDA review and discussion with bedside RN) have been thoroughly assessed. Documentation of areas based on significant findings. This RN in to assess patient. Performed standard wound care which includes appropriate positioning, dressing removal and non-selective debridement. Pictures and measurements obtained weekly if/when required.    Wound:  Scalp  Primary Dressing:  BETTY, antibiotic ointment ordered     Wound:  Sacrum & Coccyx  Primary Dressing:  Offloading adhesive foam     Area assessed: BL heels  Primary Dressing:  Offloading adhesive foams and moon boots    Advanced Wound Care Discharge Planning  Number of Clinicians necessary to complete wound care: 1  Is patient requiring IV pain medications for dressing changes:  No   Length of time for dressing change 10 min. (This does not include chart review, pre-medication time, set up, clean up or time spent charting.)    Interdisciplinary consultation: Patient, Bedside RN (Patrizia)    EVALUATION / RATIONALE FOR TREATMENT:     Date:  04/24/25  Wound Status:  Wound progressing as expected    Scalp with scattered areas of skin stripping from EEG electrode removal. Areas already beginning to show signs of healing and are scabbing order. Bacitracin ointment ordered.  Sacrum & Coccyx still with DTI, beginning to evolve and open as expected, most notable on the R coccyx. Continue offloading measures.  BL Heels DTIs resolved. Ecchymosis noted to R lateral foot. Natural hyperpigmentation noted to R medial foot. Continue preventative offloading measures as areas with previous pressure injuries are at high risk for recurrence.    Date:  04/21/25  Wound Status:  Initial evaluation    Sacrococcygeal  area discolored area to R side purple and non-blanching.   Date:  04/16/25  Wound Status:  Initial evaluation    Patient with POA sDTI to bilateral heels.  Offloading interventions placed.  Patient with small moisture fissure.  Condom catheter in place for moisture management , and barrier paste.  L. Hallux with dry callous.  Okay to be open to air.             Goals: Steady decrease in wound area and depth weekly.    NURSING PLAN OF CARE ORDERS:  Dressing changes: See Dressing Care orders    NUTRITION RECOMMENDATIONS   Wound Team Recommendations:  N/A     DIET ORDERS (From admission to next 24h)       Start     Ordered    04/21/25 1037  Diet: Diet Tube Feed; Formula: Other - Specify formula comments in the field to the right (Vital AF 1.2); Goal Rate (mL/Hour): 55; Duration: 24 HR  ALL MEALS        Question Answer Comment   Diet Diet Tube Feed    Formula: Other - Specify formula comments in the field to the right Vital AF 1.2   Goal Rate (mL/Hour) 55    Route Enteral Tube    Duration 24 HR        04/21/25 1037    04/16/25 7820  Diet NPO Restrict to: Strict (okay to receive meds through the NG/OG tube)  ALL MEALS        Question Answer Comment   Type: Now    Diet NPO Restrict to: Strict okay to receive meds through the NG/OG tube       04/16/25 0125                    PREVENTATIVE INTERVENTIONS:   Q shift Edy - performed per nursing policy  Q shift pressure point assessments - performed per nursing policy    Surface/Positioning  ICU Low Airloss - Currently in Place  Reposition q 2 hours with wedges - Currently in Place    Offloading/Redistribution  Sacral offloading dressing (Silicone dressing) - Currently in Place  Heel offloading dressing (Silicone dressing) - Currently in Place  Heel float boots (Prevalon boot) - Currently in Place  Elbow offloading dressing (Silicone dressing) - Currently in Place  Elbow offloading with pillows - Currently in Place    Respiratory  ETT - Currently in  Place    Containment/Moisture Prevention    Sellers Catheter - Currently in Place    Mobilization      Unable to assess     Anticipated discharge plans:  TBD        Vac Discharge Needs:  Vac Discharge plan is purely a recommendation from wound team and not a requirement for discharge unless otherwise stated by physician.  Not Applicable Pt not on a wound vac

## 2025-04-24 NOTE — DIETARY
"Nutrition Services Weekly Nutrition Support Update     Day 10 of admit. Sen Vasquez is a 79 y.o. male with admitting DX of Subdural hematoma.     Tube feeding initiated on 4/15. Current TF via IRIS feeding tube is Vital AF 1.2 with goal rate 55 ml/hr to provide 1584 kcals, 99 gm protein, and 1071 ml free water per day. Current FWF of 30 ml Q 4 hours.      Nutrition Assessment:      Height: 168 cm (5' 6.14\")  Weight: 72.5 kg (159 lb 13.3 oz)  Weight to Use in Calculations: 65.1 kg (143 lb 8.3 oz)  Weight taken via bed scale  Body mass index is 25.69 kg/m². BMI classification: Overweight.  Wt Readings from Last 6 Encounters:   25 72.5 kg (159 lb 13.3 oz)   10/12/23 72.5 kg (159 lb 13.3 oz)   23 80.2 kg (176 lb 12.9 oz)   19 91 kg (200 lb 9.9 oz)      Weight trend: Weight increased by 6 kg from  to . Question weight accuracy as pt appears closer to 143 lbs than 157 lbs.    Weight used: 65.1 kg from bed scale on   Calculation/Equation: MSJ x 1.2 = 1587 kcals  RMR per PSU (VE: 6.6 L/min and Tmax: 37.4 C) = 1573 kcals  Total Calories / day: 1500 - 1700  (Calories / k - 26)  Total Grams Protein / day: 78 - 98  (Grams Protein / k.2 - 1.5)     Objective:  Pt is receiving TF at goal rate. Discussed with RN and pt is tolerating.  Pt is intubated on ventilator support day 9.   Palliative care saw today and reports pt's son is on the way to [facility] to say goodbyes and proceed with comfort care transition is likely.  Pertinent labs: Na 134, glucose 107, Creat 0.42, Alk phos 141  Pertinent meds: pepcid, folic acid, lasix 40 mg BID, vimpat, keppra, MVI, senna, thiamine, anti-emetics prn, bowel meds prn, Levo at 0.05 mcg/kg/min  Skin/wounds: L head incision, DTI of sacrum/coccyx, partial thickness of forehead  Last BM:   Low CHO high protein formula remains indicated to meet estimated needs.     Current diet order:   NPO, TF     Nutrition Diagnosis:      Inadequate oral intake r/t acute " respiratory failure and intubation status as evidenced by need for nutrition support  Nutrition Dx Status: Ongoing     Severe malnutrition in context of social or environmental circumstances related to alcohol abuse as evidenced by severe muscle and severe fat wasting.   Nutrition Dx Status: Ongoing    Nutrition Interventions:      Continue TF Vital AF 1.2 with goal rate 55 ml/hr.  Fluids per MD.   Monitor GOC and possible transition to comfort care per team notes.  Patient aware of active plan of care as appropriate.     Nutrition Monitoring and Evaluation:     Monitor nutrition POC  Additional fluids per MD/DO  Monitor vital signs pertinent to nutrition       RD following and will provide updated recommendations as indicated.

## 2025-04-24 NOTE — PROGRESS NOTES
"Palliative Care    Room: R112    HPI:   Per chart review: Sen Vasquez is a 79 y.o. male with \"PMH significant for EtOH abuse, CVA 10/2023, ischial rectal abscess, and [facility] at [facility] 1/5/2025 where he was treated for colitis who presented from [facility] where he resides 4/14 with altered mental status. Reportedly at baseline patient is A and O x 4. Staff at the assisted living noted patient was A/O x 0 with GCS of 14. Head CT showed acute on chronic left holohemispheric subdural hematoma with 6 mm subfalcine herniation and mass effect on the left lateral ventricle. Neuroexam here showed a GCS of 9 (3/1/5) Dr. Wallis from neurosurgery was consulted and took patient to OR 4/15 for left frontoparietal craniotomy and evacuation of hematoma. He was transferred back to the ICU post-op.\"     Palliative care was consulted for advance care planning when the patient's mental status did not improve despite 2 days off sedation.    Discussion/Plan   I reached out to Sen's son Fuad today.  Fuad confirmed that he is currently on his way to [place], and in fact was in the airport on his way here when we spoke.  He plans to come to the hospital tomorrow sometime between 10 and noon to say his goodbyes to his father and then anticipates that he and his brother will jointly agree to transition to a comfort focused plan of care.  He is extremely grateful to the entire care team for their attentiveness and thoughtfulness in caring for his father.    I also updated Fuad on Sen's worsening neurologic exam with a stable repeat head CT this morning, and he voiced understanding that his father's already poor prognosis is now even more grim.    15 minutes spent discussing advance care planning, this time excludes any other billed services.    Interval diagnostic studies and medical documentation entries pertinent to this case were reviewed independently by me. This patient has at " least one acute or chronic illness or injury that poses a threat to life or bodily function. This patient suffers from a high risk of morbidity from additional invasive diagnostic testing or intensive treatment. Discussion of recommendations and coordination of care undertaken with primary provider/treatment team.

## 2025-04-24 NOTE — PROGRESS NOTES
F/U CT reviewed.  Post op changes.  Nothing that needs further surgical intervention.  If medical Tx will continue, would do MMA emlolization.

## 2025-04-24 NOTE — CARE PLAN
The patient is Watcher - Medium risk of patient condition declining or worsening    Shift Goals  Clinical Goals: Q4 neuro, SBP <160  Patient Goals: JF  Family Goals: JF    Progress made toward(s) clinical / shift goals:    Problem: Knowledge Deficit - Standard  Goal: Patient and family/care givers will demonstrate understanding of plan of care, disease process/condition, diagnostic tests and medications  Outcome: Progressing     Problem: Pain - Standard  Goal: Alleviation of pain or a reduction in pain to the patient’s comfort goal  Outcome: Progressing     Problem: Neuro Status  Goal: Neuro status will remain stable or improve  Outcome: Progressing     Problem: Respiratory  Goal: Patient will achieve/maintain optimum respiratory ventilation and gas exchange  Outcome: Progressing     Problem: Skin Integrity  Goal: Skin integrity is maintained or improved  Outcome: Progressing     Problem: Fall Risk  Goal: Patient will remain free from falls  Outcome: Progressing       Patient is not progressing towards the following goals:  NA

## 2025-04-24 NOTE — CARE PLAN
The patient is Stable - Low risk of patient condition declining or worsening    Shift Goals  Clinical Goals: Q4 Neuro, SBP<160  Patient Goals: JF - RASS -4  Family Goals: Updates    Progress made toward(s) clinical / shift goals:      Problem: Seizure Precautions  Goal: Implementation of seizure precautions  Outcome: Progressing     Problem: Pain - Standard  Goal: Alleviation of pain or a reduction in pain to the patient’s comfort goal  Outcome: Progressing     Problem: Neuro Status  Goal: Neuro status will remain stable or improve  Outcome: Progressing

## 2025-04-24 NOTE — CARE PLAN
Problem: Ventilation  Goal: Ability to achieve and maintain unassisted ventilation or tolerate decreased levels of ventilator support  Description: Target End Date:  4 days Document on Vent flowsheet1.  Support and monitor invasive and noninvasive mechanical ventilation2.  Monitor ventilator weaning response3.  Perform ventilator associated pneumonia prevention interventions4.  Manage ventilation therapy by monitoring diagnostic test results  Outcome: Progressing     VENT DAY 9   /8/30%

## 2025-04-24 NOTE — THERAPY
04/24/25 1352   Interdisciplinary Plan of Care Collaboration   Collaboration Comments Hold PT, RAAs score -4

## 2025-04-24 NOTE — PROGRESS NOTES
Critical Care Progress Note    Date of admission  4/14/2025    Chief Complaint  SDH and seizures    Hospital Course  Sen Vasquez is a 79-year-old male with PMH significant for EtOH abuse, CVA 10/2023, ischial rectal abscess, and CAD [facility] at [facility] 1/5/2025 where he was treated for colitis who presented from [facility] where he resides 4/14 with altered mental status.  Reportedly at baseline patient is A and O x 4.  Staff at the [facility] living noted patient was A/O x 0 with GCS of 14.  Head CT showed acute on chronic left holohemispheric subdural hematoma with 6 mm subfalcine herniation and mass effect on the left lateral ventricle.  Neuroexam here showed a GCS of 9 (3/1/5) Dr. Wallis from neurosurgery was consulted and took patient to OR 4/15 for left frontoparietal craniotomy and evacuation of hematoma. He was transferred back to the ICU post-op.   4/16 - transfer order to floor but never physically left RICU. Began having rhythmic movements around 1700. CT head stable. Returned from CT in status. Intubated. Awaiting availability of cEEG.  4/17 - VD #2. Status on cEEG. Added Vimpat, Onfi, and Versed drip. Prop @ 80.  4/18 - VD #3. Burst suppressed on Versed @5, Prop @ 80, Keppra, Vimpat, Onfi.  4/19 - VD #4. Versed weaned off. Weaning Prop. No seizures on EEG.  Son, Sen, to bedside. Brought in POA paperwork which has been scanned into chart. GOC discussion. Will discuss with his brother and reconvene probably Monday.  4/20 - VD #5. Off all sedation. No seizures on cEEG.   4/21 - VD#6, no seizures, GOC talks: DNR  4/22 - VD#7, no seizures on cEEG-->will D/C, no sedation and not waking  4/23 - VD#8, no sedation, still minor w/d with stimulation    Interval Problem Update  Reviewed last 24 hour events:   - STAT CT head this am shows no changes due to patient no longer w/d to upper   - w/d on the lower   - CPOT 2   - afebrile   - SB/SR 30-70s   - SBP  90-140s   - right nare NG, TFs at goal   -  yesterday   - UOP of 350cc overnight, gonzalez   - level 1 mobility   - VD#9    CXR(reviewed): bilateral basilar atelectasis   - ASV, fails SBT with in/out of apnea   - lovenox   - pepcid   - no abx   - Lasix BID   - WBCs 9.5   - Hb 9.8   - K 4.3   - Na 134   - creat 0.42       Yesterday's Events:   - no overnight events   - no sedation, RASS -4   - w/d to upper/lowers, not waking and following   - Tmax 99.4   - SB/SR 40-70s   - SBP 90-110s   - TFs at goal   - BM this am   - UOP of 750cc overnight, gonzalez   - right IJ TLC   - level 1 mobility   - VD#8   - CXR(reviewed): cephalization   - ASV-->failed SAT, sometimes apneic   - lovenox   - pepcid   - WBCs 8.1   - Hb 10.1   - K 3.8   - Na 134   - creat 0.34   - Mg 1.7    Review of Systems  Review of Systems   Unable to perform ROS: Intubated        Vital Signs for last 24 hours   Pulse:  [49-75] 61  Resp:  [16-21] 19  BP: ()/(49-60) 90/55  SpO2:  [95 %-100 %] 96 %    Hemodynamic parameters for last 24 hours       Respiratory Information for the last 24 hours  Vent Mode: ASV  PEEP/CPAP: 8  MAP: 12  Control VTE (exp VT): 335    Physical Exam   Physical Exam  Vitals and nursing note reviewed.   Constitutional:       General: He is not in acute distress.     Appearance: He is underweight. He is ill-appearing. He is not toxic-appearing.      Interventions: He is sedated, intubated and restrained.   HENT:      Head: Normocephalic.      Right Ear: External ear normal.      Left Ear: External ear normal.      Nose: Nose normal. No rhinorrhea.      Mouth/Throat:      Lips: Pink.      Mouth: Mucous membranes are moist.      Comments: ETT in place  Eyes:      Pupils: Pupils are equal, round, and reactive to light.   Cardiovascular:      Rate and Rhythm: Normal rate and regular rhythm.      Pulses: Normal pulses.      Heart sounds: Normal heart sounds. No murmur heard.  Pulmonary:      Effort: He is intubated.      Breath sounds: No  wheezing or rhonchi.   Abdominal:      General: Bowel sounds are normal.      Palpations: Abdomen is soft.      Tenderness: There is no abdominal tenderness. There is no guarding or rebound.   Musculoskeletal:      Cervical back: Normal range of motion and neck supple.      Right lower leg: No edema.      Left lower leg: No edema.   Skin:     General: Skin is warm and dry.      Capillary Refill: Capillary refill takes less than 2 seconds.      Findings: No rash.   Neurological:      GCS: GCS eye subscore is 1. GCS verbal subscore is 1. GCS motor subscore is 4.      Comments: 6T, eye lid twitching still noted bilaterally right > left   Psychiatric:      Comments: Intubated     No change to exam    Medications  Current Facility-Administered Medications   Medication Dose Route Frequency Provider Last Rate Last Admin    norepinephrine (Levophed) 8 mg in 250 mL NS infusion (premix)  0-1 mcg/kg/min (Ideal) Intravenous Continuous Fatuma Gray        famotidine (Pepcid) injection 20 mg  20 mg Intravenous BID Patti Latif M.D.   20 mg at 04/24/25 0600    lacosamide (Vimpat) tablet 200 mg  200 mg Enteral Tube BID Meche Richards, A.P.R.N.   200 mg at 04/24/25 0516    levETIRAcetam (Keppra) tablet 1,500 mg  1,500 mg Enteral Tube BID Meche Richards, A.P.R.N.   1,500 mg at 04/24/25 0516    acetaminophen (Tylenol) tablet 650 mg  650 mg Enteral Tube Q6HRS PRN Fatuma Gray        enoxaparin (Lovenox) inj 40 mg  40 mg Subcutaneous DAILY AT 1800 Meche Richards, A.P.R.N.   40 mg at 04/23/25 1729    clobazam (Onfi) tablet 15 mg  15 mg Enteral Tube BID José Miguel Luo, D.O.   15 mg at 04/24/25 0516    hydrALAZINE (Apresoline) injection 10 mg  10 mg Intravenous Q4HRS PRN Meche Richards, A.P.R.N.   10 mg at 04/16/25 2302    [Held by provider] diazePAM (Valium) injection 5 mg  5 mg Intravenous Q5 MIN PRN José Holloway D.O.   5 mg at 04/21/25 2032    Respiratory Therapy Consult   Nebulization Continuous RT Mohamud DU  KEISHA Parada        senna-docusate (Pericolace Or Senokot S) 8.6-50 MG per tablet 2 Tablet  2 Tablet Enteral Tube BID Mohamud Parada M.D.   2 Tablet at 04/22/25 1726    And    polyethylene glycol/lytes (Miralax) Packet 1 Packet  1 Packet Enteral Tube QDAY PRN Mohamud Parada M.D.   1 Packet at 04/20/25 0507    And    magnesium hydroxide (Milk Of Magnesia) suspension 30 mL  30 mL Enteral Tube QDAY PRN Mohamud Parada M.D.   30 mL at 04/20/25 0507    And    bisacodyl (Dulcolax) suppository 10 mg  10 mg Rectal QDAY PRN Mohamud Parada M.D.   10 mg at 04/20/25 0647    MD Alert...ICU Electrolyte Replacement per Pharmacy   Other PHARMACY TO DOSE Mohamud Parada M.D.        lidocaine (Xylocaine) 1 % injection 2 mL  2 mL Tracheal Tube Q30 MIN PRN Mohamud Parada M.D.        Pharmacy Consult Request ...Pain Management Review 1 Each  1 Each Other PHARMACY TO DOSE SELENE Argueta MD ALERT...DO NOT ADMINISTER NSAIDS or ASPIRIN unless ORDERED By Neurosurgery 1 Each  1 Each Other PRN BRAULIO ArguetaPJO        ondansetron (Zofran) syringe/vial injection 4 mg  4 mg Intravenous Q4HRS PRN BRAULIO ArguetaPJO        sodium phosphate enema 1 Each  1 Each Rectal Once PRN BRAULIO ArguetaPJO        Pharmacy Consult: Enteral tube insertion - review meds/change route/product selection  1 Each Other PHARMACY TO DOSE BRAULIO SernaP.R.N.        thiamine (Vitamin B-1) tablet 100 mg  100 mg Enteral Tube DAILY ERROL Serna.P.R.N.   100 mg at 04/24/25 0517    multivitamin tablet 1 Tablet  1 Tablet Enteral Tube DAILY ERROL Serna.P.R.N.   1 Tablet at 04/24/25 0517    folic acid (Folvite) tablet 1 mg  1 mg Enteral Tube DAILY BRAULIO SernaP.R.N.   1 mg at 04/24/25 0516       Fluids    Intake/Output Summary (Last 24 hours) at 4/24/2025 0721  Last data filed at 4/24/2025 0600  Gross per 24 hour   Intake 1649.75 ml   Output 2438 ml   Net -788.25 ml       Laboratory          Recent  Labs     04/22/25 0415 04/23/25 0417 04/24/25  0332   SODIUM 137 134* 134*   POTASSIUM 4.1 3.8 4.3   CHLORIDE 101 99 97   CO2 26 26 28   BUN 15 15 21   CREATININE 0.33* 0.35* 0.42*   MAGNESIUM 1.6 1.7 1.8   PHOSPHORUS 3.4 3.1 3.6   CALCIUM 9.0 9.1 9.2     Recent Labs     04/22/25 0415 04/23/25 0417 04/24/25  0332   ALTSGPT 9 9 11   ASTSGOT 20 18 19   ALKPHOSPHAT 123* 129* 141*   TBILIRUBIN 0.2 0.2 0.2   GLUCOSE 101* 105* 107*     Recent Labs     04/22/25 0415 04/23/25 0417 04/24/25  0332   WBC 8.4 8.1 9.5   NEUTSPOLYS 69.70 72.00 76.00*   LYMPHOCYTES 11.90* 10.20* 10.00*   MONOCYTES 12.30 11.40 8.00   EOSINOPHILS 4.50 4.40 3.70   BASOPHILS 0.60 0.60 0.60   ASTSGOT 20 18 19   ALTSGPT 9 9 11   ALKPHOSPHAT 123* 129* 141*   TBILIRUBIN 0.2 0.2 0.2     Recent Labs     04/22/25 0415 04/23/25 0417 04/24/25  0332   RBC 3.19* 3.28* 3.20*   HEMOGLOBIN 9.9* 10.1* 9.8*   HEMATOCRIT 30.1* 30.6* 30.2*   PLATELETCT 313 309 282       Imaging  X-Ray:  I have personally reviewed the images and compared with prior images.    CT head on 4/24:  1.  Postoperative changes are again noted on the left with a subdural collection and pneumocephalus. There is approximately 3 mm of left-to-right shift of midline.  2.  Chronic ischemic changes are again noted.  3.  No new intracranial abnormality is evident.    Assessment/Plan  * Status epilepticus (HCC)  Assessment & Plan  Focal status epilepticus 04/16/25, returned to ICU   Continue Keppra, Vimpat, Onfi  Remains off all sedation since AM 4/20  Neurology following  cEEG -->no seizures, stopped cEEG on 4/22  Seizure and Aspiration Precautions.    Anemia  Assessment & Plan  monitor for bleeding  follow CBC  transfuse for Hgb < 7     On mechanically assisted ventilation (HCC)  Assessment & Plan  Intubated for status epilepticus on 4/16  Ventilator dependent respiratory failure  Modify ventilator to optimize oxygenation, acid-base balance and ventilation  CXR as indicated: monitor lung  volumes and tube/line placement  HOB > 30  Titrate FiO2 to keep sats greater than 92%  Chlorhexidine  goal CO2 35-40  Daily awakening and SBT trials unless contraindicated  ABCDEF bundle  I am actively adjusting ventilator based on clinical indicators and ABG's  Lasix 40mg IV BID    Severe protein-calorie malnutrition (HCC)- (present on admission)  Assessment & Plan  Body mass index is 25.69 kg/m².  thiamine, vitamins  Cont enteral feeds  dietary following    Subdural hematoma (HCC)- (present on admission)  Assessment & Plan  History of ETOH abuse. No reported recent falls. Not on AC  Serial neurologic exams-->no change to neuro exam  SBP goal < 160  HOB > 30  Maintain normonatremia, normothermia, normoglycemia, euvolemia  Neurosurgery following. S/p craniotomy with evacuation 4/15. Subdural drain removed 4/16  PT OT SLP  Aspiration and Fall Precautions  OK for Lovenox VTE per NSG 4/19  Holding off on MMA for now. If patient improves and transfers out of ICU, can consider doing before discharge, or have done OP once discharged. (Discussed with IR 4/21).  Repeat CT head on 4/24    Hypomagnesemia- (present on admission)  Assessment & Plan  Replete Mg to goal > 2    Acute encephalopathy- (present on admission)  Assessment & Plan  ?post ictal state vs SDH vs underlying dementia  Sedatives off since 4/20  Cont to correct all underlying metabolic disturbances   No vast improvement to neuro status    ETOH abuse- (present on admission)  Assessment & Plan  History of  Cont thiamine, MVI, and folate         VTE:  Lovenox  Ulcer: Not Indicated  Lines: Central Line  Ongoing indication addressed and Sellers Catheter  Ongoing indication addressed    I have performed a physical exam and reviewed and updated ROS and Plan today (4/24/2025). In review of yesterday's note (4/23/2025), there are no changes except as documented above.     Discussed patient condition and risk of morbidity and/or mortality with RN, RT, Pharmacy, Case  manager, Charge nurse / hot rounds, and neurology and neurosurgery    The patient remains critically ill.  I have assessed and reassessed the respiratory status and made ventilator adjustments based upon arterial blood gas analysis, ventilator waveforms and airway mechanics.  I have assessed and reassessed the blood pressure, hemodynamics, cardiovascular status. This patient remains at high risk for worsening cardiopulmonary dysfunction and death without the above critical care interventions.    Critical care time = 105 minutes in directly providing and coordinating critical care and extensive data review.  No time overlap and excludes procedures.

## 2025-04-24 NOTE — CARE PLAN
Problem: Ventilation  Goal: Ability to achieve and maintain unassisted ventilation or tolerate decreased levels of ventilator support  Description: Target End Date:  4 days Document on Vent flowsheet1.  Support and monitor invasive and noninvasive mechanical ventilation2.  Monitor ventilator weaning response3.  Perform ventilator associated pneumonia prevention interventions4.  Manage ventilation therapy by monitoring diagnostic test results  Outcome: Not Met     Ventilator Daily Summary    Vent Day #9  Airway: 8@24    Ventilator settings: [id number]+8@30%  Weaning trials: none  Respiratory Procedures: none    Plan: Continue current ventilator settings and wean mechanical ventilation as tolerated per physician orders.

## 2025-04-24 NOTE — PROGRESS NOTES
Neurosurgery Progress Note    Subjective:  Nursing states not withdrawing at change of shift    Exam:  Intubated. Pinpoint pupils. EO to pain, Only slight movement to RLE with deep pain stim. CDI with staples    BP  Min: 88/51  Max: 135/60  Pulse  Av.2  Min: 49  Max: 74  Resp  Av.8  Min: 16  Max: 22  Monitored Temp 2  Av.8 °C (98.2 °F)  Min: 36.3 °C (97.3 °F)  Max: 37.4 °C (99.3 °F)  SpO2  Av %  Min: 95 %  Max: 100 %    No data recorded    Recent Labs     25   WBC 8.4 8.1 9.5   RBC 3.19* 3.28* 3.20*   HEMOGLOBIN 9.9* 10.1* 9.8*   HEMATOCRIT 30.1* 30.6* 30.2*   MCV 94.4 93.3 94.4   MCH 31.0 30.8 30.6   MCHC 32.9 33.0 32.5   RDW 51.4* 50.4* 50.8*   PLATELETCT 313 309 282   MPV 9.8 10.1 10.2     Recent Labs     25  033   SODIUM 137 134* 134*   POTASSIUM 4.1 3.8 4.3   CHLORIDE 101 99 97   CO2 26 26 28   GLUCOSE 101* 105* 107*   BUN 15 15 21   CREATININE 0.33* 0.35* 0.42*   CALCIUM 9.0 9.1 9.2                   Intake/Output                         25 - 25 0625 - 25 0659      Total  Total                 Intake    I.V.  49.8  -- 49.8  --  -- --    Magnesium Sulfate Volume 49.8 -- 49.8 -- -- --    Other  100  120 220  --  -- --    Medications (PO/Enteral Liquids) 100 120 220 -- -- --    Enteral  750  750 1500  140  -- 140    Free Water / Tube Flush 90 90 180 30 -- 30    Enteral Volume (Enteral Tube 04/15/25 Cortrak - Gastric 10 Fr. Right nare)  110 -- 110    Total Intake 899.8 870 1769.8 140 -- 140       Output    Urine  2085  40  -- 40    Output (mL) (Urethral Catheter Temperature probe) 2085 40 -- 40    Stool  --  -- --  --  -- --    Number of Times Stooled 0 x 0 x 0 x 0 x -- 0 x    Total Output 2085 2438 40 -- 40       Net I/O     -1185.3 517 -668.3 100 -- 100              Intake/Output Summary (Last 24 hours) at  4/24/2025 0909  Last data filed at 4/24/2025 0800  Gross per 24 hour   Intake 1669.75 ml   Output 2298 ml   Net -628.25 ml             NORepinephrine  0-1 mcg/kg/min (Ideal) Continuous    furosemide  40 mg BID    magnesium sulfate  2 g Once    famotidine  20 mg BID    lacosamide  200 mg BID    levETIRAcetam  1,500 mg BID    acetaminophen  650 mg Q6HRS PRN    enoxaparin (LOVENOX) injection  40 mg DAILY AT 1800    clobazam  15 mg BID    hydrALAZINE  10 mg Q4HRS PRN    [Held by provider] diazePAM  5 mg Q5 MIN PRN    Respiratory Therapy Consult   Continuous RT    senna-docusate  2 Tablet BID    And    polyethylene glycol/lytes  1 Packet QDAY PRN    And    magnesium hydroxide  30 mL QDAY PRN    And    bisacodyl  10 mg QDAY PRN    MD Alert...Adult ICU Electrolyte Replacement per Pharmacy   PHARMACY TO DOSE    lidocaine  2 mL Q30 MIN PRN    Pharmacy Consult Request  1 Each PHARMACY TO DOSE    MD ALERT...DO NOT ADMINISTER NSAIDS or ASPIRIN unless ORDERED By Neurosurgery  1 Each PRN    ondansetron  4 mg Q4HRS PRN    sodium phosphate  1 Each Once PRN    Pharmacy  1 Each PHARMACY TO DOSE    thiamine  100 mg DAILY    multivitamin  1 Tablet DAILY    folic acid  1 mg DAILY       Assessment and Plan:  POD#8  Crani for SDH     Neuro following for seizure  Left MMA when able  DW Dr Wallis, CT ordered

## 2025-04-25 NOTE — THERAPY
Physical Therapy   Discharge     Patient Name: Sen Vasquez  Age:  79 y.o., Sex:  male  Medical Record #: [id number]  Today's Date: 4/25/2025     Pt has now transitioned to comfort care services per chart. Will d/c patient from acute PT services at this time.     Diallo Cochran PT, DPT

## 2025-04-25 NOTE — PROGRESS NOTES
"Palliative Care    Room: R112    HPI:   Per chart review: Sen Vasquez is a 79 y.o. male with \"PMH significant for EtOH abuse, CVA 10/2023, ischial rectal abscess, and CAD [facility] and [facility] at [facility] 1/5/2025 where he was treated for colitis who presented from [facility] where he resides 4/14 with altered mental status. Reportedly at baseline patient is A and O x 4. Staff at the assisted living noted patient was A/O x 0 with GCS of 14. Head CT showed acute on chronic left holohemispheric subdural hematoma with 6 mm subfalcine herniation and mass effect on the left lateral ventricle. Neuroexam here showed a GCS of 9 (3/1/5) Dr. Wallis from neurosurgery was consulted and took patient to OR 4/15 for left frontoparietal craniotomy and evacuation of hematoma. He was transferred back to the ICU post-op.\"     Palliative care was consulted for advance care planning when the patient's mental status did not improve despite 2 days off sedation. He has now been off for 5 days.       Discussion/Plan   The palliative care team met with Sen's sons, Sen Lee. and Fuad, and Sen Lee's wife Betzy this morning.  Present for the palliative care team were myself and Dr. Sen.  Also present was Stevie Morgan, ICU APRN.    We began by providing a medical update and then spent some time discussing who Sen was as a person.  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  He valued his independence and lived alone until about a month ago.  His family describes him as \"crotchety\", and note that he has been having some decline in his functional status for the past few years but this accelerated rapidly with his recent admission and subsequent move to a nursing home, and then had an acute decline the day prior to this admission.  All agree that he would not want to be a \"constant burden\" on his family or other people.  They " also agree that he would not want to be kept alive by machines indefinitely if there were no chance for meaningful recovery.    After further discussion and confirming with the family that our medical recommendation is that Sen has essentially no chance of making a meaningful recovery back to a quality of life that he would find acceptable, his family elected to transition him to a comfort focused plan of care.    50 minutes spent discussing advance care planning, this time excludes any other billed services.    Interval diagnostic studies and medical documentation entries pertinent to this case were reviewed independently by me. This patient has at least one acute or chronic illness or injury that poses a threat to life or bodily function. This patient suffers from a high risk of morbidity from additional invasive diagnostic testing or intensive treatment. Discussion of recommendations and coordination of care undertaken with primary provider/treatment team.

## 2025-04-25 NOTE — PROGRESS NOTES
Critical Care Progress Note    Date of admission  4/14/2025    Chief Complaint  79 y.o. male admitted 4/14/2025 with   Chief Complaint   Patient presents with    ALOC     Pt resides at [facility]. Found patient ALOC and called EMS.         Hospital Course  Sen Vasquez is a 79-year-old male with PMH significant for EtOH abuse, CVA 10/2023, ischial rectal abscess, and CAD [facility] and [facility] hospitalization at [facility] 1/5/2025 where he was treated for colitis who presented from [facility] where he resides 4/14 with altered mental status.  Reportedly at baseline patient is A and O x 4.  Staff at the [facility] living noted patient was A/O x 0 with GCS of 14.  Head CT showed acute on chronic left holohemispheric subdural hematoma with 6 mm subfalcine herniation and mass effect on the left lateral ventricle.  Neuroexam here showed a GCS of 9 (3/1/5) Dr. Wallis from neurosurgery was consulted and took patient to OR 4/15 for left frontoparietal craniotomy and evacuation of hematoma. He was transferred back to the ICU post-op.   4/16 - transfer order to floor but never physically left RICU. Began having rhythmic movements around 1700. CT head stable. Returned from CT in status. Intubated. Awaiting availability of cEEG.  4/17 - VD #2. Status on cEEG. Added Vimpat, Onfi, and Versed drip. Prop @ 80.  4/18 - VD #3. Burst suppressed on Versed @5, Prop @ 80, Keppra, Vimpat, Onfi.  4/19 - VD #4. Versed weaned off. Weaning Prop. No seizures on EEG.  Son, Sen, to bedside. Brought in POA paperwork which has been scanned into chart. GOC discussion. Will discuss with his brother and reconvene probably Monday.  4/20 - VD #5. Off all sedation. No seizures on cEEG.   4/21 - VD#6, no seizures, GOC talks: DNR  4/22 - VD#7, no seizures on cEEG-->will D/C, no sedation and not waking  4/23 - VD#8, no sedation, still minor w/d with stimulation  4/24- VD#9, no sedation, CT this AM (no longer w/d uppers) shows no  changes, w/d on lowers.    Interval Problem Update  Reviewed last 24 hour events:  - Overnight Events:  - NAONE   - Tm: AF  - Neuro: No acute neuro changes    - HR: 60-70   - SBP:    - VD: # 10    -ASV: 100% / 8 / 30%   - UOP: 2800 mL/24 hrs   - Sellers: y   - GI: tf  - Lines: cvc   - PPx: GI pepcid, DVT lovenox   - Mobility level 1    Infusions:   -levo: off    Review of Systems  ROS     Vital Signs for last 24 hours   Pulse:  [44-76] 66  Resp:  [15-23] 19  BP: ()/(50-70) 87/51  SpO2:  [95 %-100 %] 95 %    Hemodynamic parameters for last 24 hours       Respiratory Information for the last 24 hours  Vent Mode: ASV  PEEP/CPAP: 8  P Support (PS + PEEP): 5  MAP: 13  Control VTE (exp VT): 360    Physical Exam   Physical Exam    Medications  Current Facility-Administered Medications   Medication Dose Route Frequency Provider Last Rate Last Admin    MD ALERT...adult comfort care   Other PRN Stevie Morgan, A.P.R.N.        atropine 1 % ophthalmic solution 2 Drop  2 Drop Sublingual Q4HRS PRN Stevie PEPPER. Morgan, A.P.R.N.        morphine 4 MG/ML injection 4 mg  4 mg Intravenous Q30 MIN PRN Stevie PEPPER. Eddie, A.P.R.N.        morphine 10 mg/mL injection 8 mg  8 mg Intravenous Q30 MIN PRN Stevie PEPPER. Eddie, A.P.R.N.        diazePAM (Valium) injection 5 mg  5 mg Intravenous Q HOUR PRN Stevie Morgan, A.P.R.N.        norepinephrine (Levophed) 8 mg in 250 mL NS infusion (premix)  0-1 mcg/kg/min (Ideal) Intravenous Continuous Fatuma LAROSE Latgerald   Stopped at 04/24/25 1905    furosemide (Lasix) injection 40 mg  40 mg Intravenous BID Patti Latif M.D.   40 mg at 04/25/25 0605    bacitracin-polymyxin b (Polysporin) 500-27067 UNIT/GM ointment   Topical BID Patti Latif M.D.   1 Each at 04/25/25 0607    famotidine (Pepcid) injection 20 mg  20 mg Intravenous BID Patti Latif M.D.   20 mg at 04/25/25 0605    lacosamide (Vimpat) tablet 200 mg  200 mg Enteral Tube BID DAWOOD Serna   200 mg at 04/25/25 0605    levETIRAcetam (Keppra)  tablet 1,500 mg  1,500 mg Enteral Tube BID Meche Andradeer, A.P.R.N.   1,500 mg at 04/25/25 0606    acetaminophen (Tylenol) tablet 650 mg  650 mg Enteral Tube Q6HRS PRN Fatuma Gray        enoxaparin (Lovenox) inj 40 mg  40 mg Subcutaneous DAILY AT 1800 Meche Andradeer, A.P.R.N.   40 mg at 04/24/25 1744    clobazam (Onfi) tablet 15 mg  15 mg Enteral Tube BID José Miguel Luo, D.O.   15 mg at 04/25/25 0605    hydrALAZINE (Apresoline) injection 10 mg  10 mg Intravenous Q4HRS PRN Meche Richards, A.P.R.N.   10 mg at 04/16/25 2302    [Held by provider] diazePAM (Valium) injection 5 mg  5 mg Intravenous Q5 MIN PRN José Holloway D.O.   5 mg at 04/21/25 2032    Respiratory Therapy Consult   Nebulization Continuous RT Mohamud Parada M.D.        senna-docusate (Pericolace Or Senokot S) 8.6-50 MG per tablet 2 Tablet  2 Tablet Enteral Tube BID Mohamud Parada M.D.   2 Tablet at 04/22/25 1726    And    polyethylene glycol/lytes (Miralax) Packet 1 Packet  1 Packet Enteral Tube QDAY PRN Mohamud Parada M.D.   1 Packet at 04/20/25 0507    And    magnesium hydroxide (Milk Of Magnesia) suspension 30 mL  30 mL Enteral Tube QDAY PRN Mohamud Parada M.D.   30 mL at 04/20/25 0507    And    bisacodyl (Dulcolax) suppository 10 mg  10 mg Rectal QDAY PRN Mohamud Parada M.D.   10 mg at 04/20/25 0647    MD Alert...ICU Electrolyte Replacement per Pharmacy   Other PHARMACY TO DOSE Mohamud Parada M.D.        lidocaine (Xylocaine) 1 % injection 2 mL  2 mL Tracheal Tube Q30 MIN PRN Mohamud Parada M.D.        Pharmacy Consult Request ...Pain Management Review 1 Each  1 Each Other PHARMACY TO DOSE ERROL Argueta.P.FABI AMARAL ALERT...DO NOT ADMINISTER NSAIDS or ASPIRIN unless ORDERED By Neurosurgery 1 Each  1 Each Other PRN Evy Remy, A.P.N.        ondansetron (Zofran) syringe/vial injection 4 mg  4 mg Intravenous Q4HRS PRN Evy Remy, A.P.N.        sodium phosphate enema 1 Each  1 Each Rectal Once  SELENE Pena        Pharmacy Consult: Enteral tube insertion - review meds/change route/product selection  1 Each Other PHARMACY TO DOSE JESUSITA SernaRSundayN.        thiamine (Vitamin B-1) tablet 100 mg  100 mg Enteral Tube DAILY BRAULIO SernaP.R.N.   100 mg at 04/25/25 0606    multivitamin tablet 1 Tablet  1 Tablet Enteral Tube DAILY BRAULIO SernaP.R.N.   1 Tablet at 04/25/25 0605    folic acid (Folvite) tablet 1 mg  1 mg Enteral Tube DAILY BRAULIO SernaP.R.N.   1 mg at 04/25/25 0606       Fluids    Intake/Output Summary (Last 24 hours) at 4/25/2025 1156  Last data filed at 4/25/2025 1000  Gross per 24 hour   Intake 1843.23 ml   Output 2640 ml   Net -796.77 ml       Laboratory          Recent Labs     04/23/25  0417 04/24/25  0332 04/25/25  0350   SODIUM 134* 134* 135   POTASSIUM 3.8 4.3 4.1   CHLORIDE 99 97 94*   CO2 26 28 30   BUN 15 21 26*   CREATININE 0.35* 0.42* 0.48*   MAGNESIUM 1.7 1.8 1.8   PHOSPHORUS 3.1 3.6 3.7   CALCIUM 9.1 9.2 9.4     Recent Labs     04/23/25  0417 04/24/25  0332 04/25/25  0350   ALTSGPT 9 11 21   ASTSGOT 18 19 23   ALKPHOSPHAT 129* 141* 146*   TBILIRUBIN 0.2 0.2 <0.2   GLUCOSE 105* 107* 111*     Recent Labs     04/23/25  0417 04/24/25  0332 04/25/25  0350   WBC 8.1 9.5 10.1   NEUTSPOLYS 72.00 76.00* 76.90*   LYMPHOCYTES 10.20* 10.00* 9.90*   MONOCYTES 11.40 8.00 8.80   EOSINOPHILS 4.40 3.70 2.50   BASOPHILS 0.60 0.60 0.70   ASTSGOT 18 19 23   ALTSGPT 9 11 21   ALKPHOSPHAT 129* 141* 146*   TBILIRUBIN 0.2 0.2 <0.2     Recent Labs     04/23/25  0417 04/24/25  0332 04/25/25  0350   RBC 3.28* 3.20* 3.41*   HEMOGLOBIN 10.1* 9.8* 10.6*   HEMATOCRIT 30.6* 30.2* 31.7*   PLATELETCT 309 282 334       Imaging  X-Ray:  I have personally reviewed the images and compared with prior images.  CT:    Reviewed    Assessment/Plan  * Status epilepticus (HCC)  Assessment & Plan  Focal status epilepticus 04/16/25, returned to ICU   Continue Keppra, Vimpat, Onfi  Remains off  all sedation since AM 4/20  Neurology following  cEEG -->no seizures, stopped cEEG on 4/22  Seizure and Aspiration Precautions.     Anemia  Assessment & Plan  monitor for bleeding  follow CBC  transfuse for Hgb < 7      On mechanically assisted ventilation (HCC)  Assessment & Plan  Intubated for status epilepticus on 4/16  Ventilator dependent respiratory failure  Modify ventilator to optimize oxygenation, acid-base balance and ventilation  CXR as indicated: monitor lung volumes and tube/line placement  HOB > 30  Titrate FiO2 to keep sats greater than 92%  Chlorhexidine  goal CO2 35-40  Daily awakening and SBT trials unless contraindicated  ABCDEF bundle  I am actively adjusting ventilator based on clinical indicators and ABG's  Lasix 40mg IV BID     Severe protein-calorie malnutrition (HCC)- (present on admission)  Assessment & Plan  Body mass index is 25.69 kg/m².  thiamine, vitamins  Cont enteral feeds  dietary following     Subdural hematoma (HCC)- (present on admission)  Assessment & Plan  History of ETOH abuse. No reported recent falls. Not on AC  Serial neurologic exams-->no change to neuro exam  SBP goal < 160  HOB > 30  Maintain normonatremia, normothermia, normoglycemia, euvolemia  Neurosurgery following. S/p craniotomy with evacuation 4/15. Subdural drain removed 4/16  PT OT SLP  Aspiration and Fall Precautions  OK for Lovenox VTE per NSG 4/19  Holding off on MMA for now. If patient improves and transfers out of ICU, can consider doing before discharge, or have done OP once discharged. (Discussed with IR 4/21).  Repeat CT head on 4/24     Hypomagnesemia- (present on admission)  Assessment & Plan  Replete Mg to goal > 2     Acute encephalopathy- (present on admission)  Assessment & Plan  ?post ictal state vs SDH vs underlying dementia  Sedatives off since 4/20  Cont to correct all underlying metabolic disturbances   No vast improvement to neuro status     ETOH abuse- (present on admission)  Assessment &  Plan  History of  Cont thiamine, MVI, and folate          VTE:  Lovenox  Ulcer: H2 Antagonist  Lines: Central Line  Ongoing indication addressed and Sellers Catheter  Ongoing indication addressed    I have performed a physical exam and reviewed and updated ROS and Plan today (4/25/2025). In review of yesterday's note (4/24/2025), there are no changes except as documented above.     Discussed patient condition and risk of morbidity and/or mortality with Family, RN, RT, Therapies, Pharmacy, , Charge nurse / hot rounds, Patient, and   my attending Dr Latif.  The patient remains critically ill.  Critical care time = 35 minutes in directly providing and coordinating critical care and extensive data review.  No time overlap and excludes procedures.

## 2025-04-25 NOTE — DISCHARGE PLANNING
Case Management Discharge Planning    Admission Date: 4/14/2025  GMLOS: 6.5  ALOS: 11    Action(s) Taken:   Chart review was completed. Patient was discussed during IDT rounds.    Per MD, pt has now transitioned to comfort care services per family request.     RNCM met with pt's son/DPOA Sen Lee, son Fuad, and daughter-in-law Betzy at the bedside to offer emotional support and offer a packet of logistical information to answer questions they may have now or in the future. Information provided includes Helpful Information for this Difficult Time packet, mortuary list, bereavement resources, and [facility] contact information. Release of information form was provided as requested.

## 2025-04-25 NOTE — PROGRESS NOTES
Neurosurgery Progress Note    Subjective:  Nursing states withdrawing extremities better today  Sons arriving today to discuss POC    Exam:  Intubated.[id number]. EO to pain, Slight WD x4 today. CDI with staples    BP  Min: 82/50  Max: 158/70  Pulse  Av.7  Min: 44  Max: 76  Resp  Av.8  Min: 15  Max: 23  Monitored Temp 2  Av.7 °C (98 °F)  Min: 36.3 °C (97.3 °F)  Max: 37.3 °C (99.1 °F)  SpO2  Av.1 %  Min: 95 %  Max: 100 %    No data recorded    Recent Labs     25  0332 25  0350   WBC 8.1 9.5 10.1   RBC 3.28* 3.20* 3.41*   HEMOGLOBIN 10.1* 9.8* 10.6*   HEMATOCRIT 30.6* 30.2* 31.7*   MCV 93.3 94.4 93.0   MCH 30.8 30.6 31.1   MCHC 33.0 32.5 33.4   RDW 50.4* 50.8* 50.9*   PLATELETCT 309 282 334   MPV 10.1 10.2 9.6     Recent Labs     25  0332 25  0350   SODIUM 134* 134* 135   POTASSIUM 3.8 4.3 4.1   CHLORIDE 99 97 94*   CO2 26 28 30   GLUCOSE 105* 107* 111*   BUN 15 21 26*   CREATININE 0.35* 0.42* 0.48*   CALCIUM 9.1 9.2 9.4                   Intake/Output                         25 - 2559 25 - 25 0659      Total  Total                 Intake    P.O.  0  -- 0  --  -- --    P.O. 0 -- 0 -- -- --    I.V.  111.5  1.3 112.8  --  -- --    Magnesium Sulfate Volume 38.8 -- 38.8 -- -- --    Norepinephrine Volume 72.7 1.3 74 -- -- --    Other  150  60 210  --  -- --    Medications (PO/Enteral Liquids) 150 60 210 -- -- --    Enteral  750  750 1500  140  -- 140    Free Water / Tube Flush 90 90 180 30 -- 30    Enteral Volume (Enteral Tube 04/15/25 Cortrak - Gastric 10 Fr. Right nare)  110 -- 110    Total Intake 1011.5 811.3 1822.8 140 -- 140       Output    Urine  1330  1450 2780  150  -- 150    Output (mL) (Urethral Catheter Temperature probe) 1330 1450 2780 150 -- 150    Stool  --  -- --  --  -- --    Number of Times Stooled 0 x 1 x 1 x 0 x -- 0 x    Total Output 1330 1450  2780 150 -- 150       Net I/O     -318.5 -638.7 -957.2 -10 -- -10              Intake/Output Summary (Last 24 hours) at 4/25/2025 0933  Last data filed at 4/25/2025 0800  Gross per 24 hour   Intake 1822.8 ml   Output 2890 ml   Net -1067.2 ml             magnesium sulfate  2 g Once    NORepinephrine  0-1 mcg/kg/min (Ideal) Continuous    furosemide  40 mg BID    bacitracin-polymyxin b   BID    famotidine  20 mg BID    lacosamide  200 mg BID    levETIRAcetam  1,500 mg BID    acetaminophen  650 mg Q6HRS PRN    enoxaparin (LOVENOX) injection  40 mg DAILY AT 1800    clobazam  15 mg BID    hydrALAZINE  10 mg Q4HRS PRN    [Held by provider] diazePAM  5 mg Q5 MIN PRN    Respiratory Therapy Consult   Continuous RT    senna-docusate  2 Tablet BID    And    polyethylene glycol/lytes  1 Packet QDAY PRN    And    magnesium hydroxide  30 mL QDAY PRN    And    bisacodyl  10 mg QDAY PRN    MD Alert...Adult ICU Electrolyte Replacement per Pharmacy   PHARMACY TO DOSE    lidocaine  2 mL Q30 MIN PRN    Pharmacy Consult Request  1 Each PHARMACY TO DOSE    MD ALERT...DO NOT ADMINISTER NSAIDS or ASPIRIN unless ORDERED By Neurosurgery  1 Each PRN    ondansetron  4 mg Q4HRS PRN    sodium phosphate  1 Each Once PRN    Pharmacy  1 Each PHARMACY TO DOSE    thiamine  100 mg DAILY    multivitamin  1 Tablet DAILY    folic acid  1 mg DAILY       Assessment and Plan:  POD#9  Crani for SDH     Neuro following for seizure  Left MMA when able  CT complete- see Dr Wallis note

## 2025-04-25 NOTE — CARE PLAN
The patient is Stable - Low risk of patient condition declining or worsening    Shift Goals  Clinical Goals: stable hemodynamics, q4 neuro  Patient Goals: JF  Family Goals: JF    Progress made toward(s) clinical / shift goals:       Problem: Pain - Standard  Goal: Alleviation of pain or a reduction in pain to the patient’s comfort goal  Outcome: Progressing     Problem: Neuro Status  Goal: Neuro status will remain stable or improve  Outcome: Progressing   Neuro exam unchanged.     Problem: Skin Integrity  Goal: Skin integrity is maintained or improved  Outcome: Progressing     Problem: Fall Risk  Goal: Patient will remain free from falls  Outcome: Progressing       Patient is not progressing towards the following goals:

## 2025-04-25 NOTE — PROGRESS NOTES
Pulmonary/Critical Care Medicine   Attending Progress Note    Date of service: 4/25/2025  Time: 0705    Sen Vsaquez is a 79-year-old male with PMH significant for EtOH abuse, CVA 10/2023, ischial rectal abscess, and CAD [facility] at [facility] 1/5/2025 where he was treated for colitis who presented from [facility] where he resides 4/14 with altered mental status.  Reportedly at baseline patient is A and O x 4.  Staff at the [facility] noted patient was A/O x 0 with GCS of 14.  Head CT showed acute on chronic left holohemispheric subdural hematoma with 6 mm subfalcine herniation and mass effect on the left lateral ventricle.  Neuroexam here showed a GCS of 9 (3/1/5) Dr. Wallis from neurosurgery was consulted and took patient to OR 4/15 for left frontoparietal craniotomy and evacuation of hematoma. He was transferred back to the ICU post-op.   4/16 - transfer order to floor but never physically left RICU. Began having rhythmic movements around 1700. CT head stable. Returned from CT in status. Intubated. Awaiting availability of cEEG.  4/17 - VD #2. Status on cEEG. Added Vimpat, Onfi, and Versed drip. Prop @ 80.  4/18 - VD #3. Burst suppressed on Versed @5, Prop @ 80, Keppra, Vimpat, Onfi.  4/19 - VD #4. Versed weaned off. Weaning Prop. No seizures on EEG.  Son, Sen, to bedside. Brought in POA paperwork which has been scanned into chart. GOC discussion. Will discuss with his brother and reconvene probably Monday.  4/20 - VD #5. Off all sedation. No seizures on cEEG.   4/21 - VD#6, no seizures, GOC talks: DNR  4/22 - VD#7, no seizures on cEEG-->will D/C, no sedation and not waking  4/23 - VD#8, no sedation, still minor w/d with stimulation    Reviewed last 24 hour events:   - no events overnight   - CT head without changes   - no change to neuro, less w/d to upper   - SR 60-70s, episodes of katarzyna into 30-50s   - SBP 80-110s   - afebrile   - TFs at goal   - UOP of  1450cc overnight, gonzalez   - BM yesterday   - level 1 mobility   - VD#10   - SBT-->apnea   - CXR(reviewed): bibasilar atelectasis   - lovenox   - pepcid   - no abx   - WBCs 10   - Hb 10.6   - platelets 334   - K 4.1   - creat 0.48   - Mg 1.8      Yesterday's Events:              - STAT CT head this am shows no changes due to patient no longer w/d to upper              - w/d on the lower              - CPOT 2              - afebrile              - SB/SR 30-70s              - SBP 90-140s              - right nare NG, TFs at goal              - BM yesterday              - UOP of 350cc overnight, gonzalez              - level 1 mobility              - VD#9               CXR(reviewed): bilateral basilar atelectasis              - ASV, fails SBT with in/out of apnea              - lovenox              - pepcid              - no abx              - Lasix BID              - WBCs 9.5              - Hb 9.8              - K 4.3              - Na 134              - creat 0.42    Labs and imaging reviewed    A/P:  Status Epilepticus   - controlled on Keppra, Vimpa, Onfi   - cont encephalopathic state    Acute Encephalopathy   - ?SDH vs underlying dementia vs medications   - off sedation since 4/20   - no improvement to neuro status    Respiratory Failure   - intubated on 4/16 for airway protection   - RT/O2 protocols   - Modify ventilator to optimize oxygenation, acid-base balance and ventilation     Subdural hematoma   - s/p evacuation on 4/15, drain removed on 4/16   - repeat CT head on 4/24 without change    GOC   - sons came to town and met with Palliative.  Will go comfort care today    I spent extensive time in reviewing the patient's condition, physical examination, laboratory and imaging data, prior documentation, in discussion with IDT rounds, and ARACELI Maldonado in formulating an assessment/plan.    Critical Care time: 10 min. No time overlap, procedures not included in time.    Patti Latif MD  Pulmonary and Critical  Care Medicine

## 2025-04-25 NOTE — PROGRESS NOTES
"I Met with family and palliative care team regarding Mr. Gaitan's current medical condition.  Family stated repeatedly that Mr. Gaitan was an independent person, and he would not like to be a \"constant burden\".  They also state that his decline has been progressive over the last few years and the few days before his hospitalization his mental status deteriorated very rapidly.  They stated that he would not want to be in long-term care, especially \"due to the fact that his brain is what is injured\".  Family has elected to go comfort care at this time.        "

## 2025-04-26 NOTE — DISCHARGE SUMMARY
Death Summary    Cause of Death  Cerebral ischemia due to status epilepticus due to unknown etiology.    Comorbid Conditions at the Time of Death  Principal Problem:    Status epilepticus (HCC) (POA: No)  Active Problems:    ETOH abuse (POA: Yes)    Acute encephalopathy (POA: Yes)    Hypomagnesemia (POA: Yes)    Subdural hematoma (HCC) (POA: Yes)    Severe protein-calorie malnutrition (HCC) (POA: Yes)    On mechanically assisted ventilation (HCC) (POA: No)    Anemia (POA: Unknown)  Resolved Problems:    * No resolved hospital problems. *      History of Presenting Illness and Hospital Course  Sen Vasquez is a 79-year-old male with PMH significant for EtOH abuse, CVA 10/2023, ischial rectal abscess, and CAD [facility] at [facility] 1/5/2025 where he was treated for colitis who presented from [facility] where he resides 4/14 with altered mental status.  Reportedly at baseline patient is A and O x 4.  Staff at the [facility] noted patient was A/O x 0 with GCS of 14.  Head CT showed acute on chronic left holohemispheric subdural hematoma with 6 mm subfalcine herniation and mass effect on the left lateral ventricle.  Neuroexam here showed a GCS of 9 (3/1/5) Dr. Wallis from neurosurgery was consulted and took patient to OR 4/15 for left frontoparietal craniotomy and evacuation of hematoma. He was transferred back to the ICU post-op.   4/16 - transfer order to floor but never physically left RICU. Began having rhythmic movements around 1700. CT head stable. Returned from CT in status. Intubated. Awaiting availability of cEEG.  4/17 - VD #2. Status on cEEG. Added Vimpat, Onfi, and Versed drip. Prop @ 80.  4/18 - VD #3. Burst suppressed on Versed @5, Prop @ 80, Keppra, Vimpat, Onfi.  4/19 - VD #4. Versed weaned off. Weaning Prop. No seizures on EEG.  Son, Sen, to bedside. Brought in POA paperwork which has been scanned into chart. GOC discussion. Will discuss with his  brother and reconvene probably Monday.  4/20 - VD #5. Off all sedation. No seizures on cEEG.   4/21 - VD#6, no seizures, GOC talks: DNR  4/22 - VD#7, no seizures on cEEG-->will D/C, no sedation and not waking  4/23 - VD#8, no sedation, still minor w/d with stimulation  4/24- VD#9, no sedation, CT this AM (no longer w/d uppers) shows no changes, w/d on lowers  4/25 - VD#10. Met with family for GOC discussion. They have elected to go comfort care at this time.    Death Date: 04/25/25   Death Time: 2020         Pronounced By (RN1): Cassie  Pronounced By (RN2): Antoni

## 2025-05-12 NOTE — PROCEDURES
Intubation    Date/Time: 4/16/2025 9:00 PM    Performed by: Mohamud Parada M.D.  Authorized by: Mohamud Parada M.D.    Consent:     Consent obtained:  Emergent situation  Pre-procedure details:     Patient status:  Unresponsive    Mallampati score:  I  Procedure details:     Preoxygenation:  Bag valve mask    Laryngoscope type:  GlideScope    Cormack-Lehane Classification:  Grade 1    Tube size (mm):  8.0    Tube type:  Cuffed    Number of attempts:  1  Placement assessment:     ETT to teeth:  23    Tube secured with:  ETT casas    Breath sounds:  Equal    Placement verification: chest rise and CXR verification      CXR findings:  ETT in proper place  Post-procedure details:     Patient tolerance of procedure:  Tolerated well, no immediate complications

## (undated) DEVICE — CLEANER ELECTRO-SURGICAL TIP - (25/BX 4BX/CA)

## (undated) DEVICE — BLADE SURGICAL CLIPPER - (50EA/CA)

## (undated) DEVICE — SUTURE GENERAL

## (undated) DEVICE — DRAIN CSF WITH ANTI REFLUX VALVE

## (undated) DEVICE — SYRINGE EAR/NOSE 3 OZ STERILE (50/CA

## (undated) DEVICE — COVER LIGHT HANDLE ALC PLUS DISP (18EA/BX)

## (undated) DEVICE — GLOVE BIOGEL ECLIPSE PF LATEX SIZE 6.5 (50PR/BX)

## (undated) DEVICE — TUBING CLEARLINK DUO-VENT - C-FLO (48EA/CA)

## (undated) DEVICE — CORDS BIPOLAR COAGULATION - 12FT STERILE DISP. (10EA/BX)

## (undated) DEVICE — TUBE CONNECT SUCTION CLEAR 120 X 1/4" (50EA/CA)"

## (undated) DEVICE — SUTURE 2-0 VICRYL PLUS CT-1 - 8 X 18 INCH(12/BX)

## (undated) DEVICE — TOOL MR8 2.3CM F2/7CM TAPER (1/EA)

## (undated) DEVICE — GLOVE BIOGEL INDICATOR SZ 7SURGICAL PF LTX - (50/BX 4BX/CA)

## (undated) DEVICE — DRAPE T CRANIOTOMY W/POUCH - (9/CA)

## (undated) DEVICE — KIT SURGIFLO W/OUT THROMBIN - (6EA/BX)

## (undated) DEVICE — CORETEMP DRAPE FORM-FITTED EASY DROPANDGO DRAPE FOR USE ON THE CORETEMP FLUID MANAGEMENT 56IN X 56IN

## (undated) DEVICE — CHLORAPREP 26 ML APPLICATOR - ORANGE TINT(25/CA)

## (undated) DEVICE — LACTATED RINGERS INJ 1000 ML - (14EA/CA 60CA/PF)

## (undated) DEVICE — ELECTRODE DUAL RETURN W/ CORD - (50/PK)

## (undated) DEVICE — SURGIFOAM (SIZE 100) - (6EA/CA)

## (undated) DEVICE — LACTATED RINGERS INJ. 500 ML - (24EA/CA)

## (undated) DEVICE — CLIP SM INTNL HRZN TI ESCP LGT - (24EA/PK 25PK/BX)

## (undated) DEVICE — MIDAS LUBRICATOR DIFFUSER PACK (4EA/CA)

## (undated) DEVICE — SCALP CLIP RANEY 20-1037 (10EA/PK 20PK/CA)

## (undated) DEVICE — SLEEVE VASO DVT COMPRESSION CALF MED - (10PR/CA)

## (undated) DEVICE — SUTURE 4-0 NUROLON CR/8 TF - (12/BX) ETHICON

## (undated) DEVICE — SET LEADWIRE 5 LEAD BEDSIDE DISPOSABLE ECG (1SET OF 5/EA)

## (undated) DEVICE — PERFORATER DISP TIP DGR-0

## (undated) DEVICE — TUBING C&T SET FLYING LEADS DRAIN TUBING (10EA/BX)

## (undated) DEVICE — HEMOSTAT SURG ABSORBABLE - 4 X 8 IN SURGICEL (24EA/CA)

## (undated) DEVICE — SPONGE GAUZESTER 4 X 4 4PLY - (128PK/CA)

## (undated) DEVICE — SODIUM CHL IRRIGATION 0.9% 1000ML (12EA/CA)

## (undated) DEVICE — COVER FOOT UNIVERSAL DISP. - (25EA/CA)

## (undated) DEVICE — SUCTION INSTRUMENT YANKAUER BULBOUS TIP W/O VENT (50EA/CA)

## (undated) DEVICE — BLADE CLIPPER FITS 2501 CLIPPER (BLUE) (20EA/CA)

## (undated) DEVICE — SET EXTENSION WITH 2 PORTS (48EA/CA) ***PART #2C8610 IS A SUBSTITUTE*****

## (undated) DEVICE — GLOVE BIOGEL PI ORTHO SZ 7.5 PF LF (40PR/BX)

## (undated) DEVICE — DRESSING XEROFORM 1X8 - (50/BX 4BX/CA)

## (undated) DEVICE — CANISTER SUCTION 3000ML MECHANICAL FILTER AUTO SHUTOFF MEDI-VAC NONSTERILE LF DISP (40EA/CA)

## (undated) DEVICE — GOWN WARMING STANDARD FLEX - (30/CA)

## (undated) DEVICE — PACK NEURO - (3EA/CA)